# Patient Record
Sex: MALE | Race: BLACK OR AFRICAN AMERICAN | Employment: OTHER | URBAN - METROPOLITAN AREA
[De-identification: names, ages, dates, MRNs, and addresses within clinical notes are randomized per-mention and may not be internally consistent; named-entity substitution may affect disease eponyms.]

---

## 2018-09-17 ENCOUNTER — HOSPITAL ENCOUNTER (EMERGENCY)
Age: 58
Discharge: HOME | End: 2018-09-17
Attending: EMERGENCY MEDICINE
Payer: COMMERCIAL

## 2018-09-17 VITALS
SYSTOLIC BLOOD PRESSURE: 116 MMHG | BODY MASS INDEX: 21.98 KG/M2 | TEMPERATURE: 97.5 F | WEIGHT: 145 LBS | HEIGHT: 68 IN | OXYGEN SATURATION: 97 % | HEART RATE: 66 BPM | RESPIRATION RATE: 18 BRPM | DIASTOLIC BLOOD PRESSURE: 79 MMHG

## 2018-09-17 DIAGNOSIS — J40 BRONCHITIS: Primary | ICD-10-CM

## 2018-09-17 PROCEDURE — 6370000000 HC RX 637 (ALT 250 FOR IP): Performed by: EMERGENCY MEDICINE

## 2018-09-17 PROCEDURE — 99283 EMERGENCY DEPT VISIT LOW MDM: CPT

## 2018-09-17 RX ORDER — BENZONATATE 100 MG/1
100 CAPSULE ORAL 3 TIMES DAILY PRN
Qty: 21 CAPSULE | Refills: 0 | Status: SHIPPED | OUTPATIENT
Start: 2018-09-17 | End: 2018-09-24

## 2018-09-17 RX ORDER — IPRATROPIUM BROMIDE AND ALBUTEROL SULFATE 2.5; .5 MG/3ML; MG/3ML
1 SOLUTION RESPIRATORY (INHALATION) ONCE
Status: COMPLETED | OUTPATIENT
Start: 2018-09-17 | End: 2018-09-17

## 2018-09-17 RX ORDER — AZITHROMYCIN 250 MG/1
TABLET, FILM COATED ORAL
Qty: 1 PACKET | Refills: 0 | Status: SHIPPED | OUTPATIENT
Start: 2018-09-17 | End: 2018-09-27

## 2018-09-17 RX ORDER — ALBUTEROL SULFATE 90 UG/1
2 AEROSOL, METERED RESPIRATORY (INHALATION) EVERY 4 HOURS PRN
Qty: 1 INHALER | Refills: 0 | Status: SHIPPED | OUTPATIENT
Start: 2018-09-17 | End: 2019-08-09

## 2018-09-17 RX ADMIN — IPRATROPIUM BROMIDE AND ALBUTEROL SULFATE 1 AMPULE: .5; 3 SOLUTION RESPIRATORY (INHALATION) at 16:19

## 2018-09-17 ASSESSMENT — ENCOUNTER SYMPTOMS
RHINORRHEA: 1
SHORTNESS OF BREATH: 0
SORE THROAT: 0
BACK PAIN: 0
COUGH: 1
BLOOD IN STOOL: 0
DIARRHEA: 0
CONSTIPATION: 0
COLOR CHANGE: 0
SINUS CONGESTION: 1
ABDOMINAL PAIN: 0
VOMITING: 0
NAUSEA: 0

## 2018-09-17 ASSESSMENT — PAIN DESCRIPTION - PAIN TYPE: TYPE: ACUTE PAIN

## 2018-09-17 ASSESSMENT — PAIN DESCRIPTION - LOCATION: LOCATION: GENERALIZED

## 2018-09-17 ASSESSMENT — PAIN SCALES - GENERAL: PAINLEVEL_OUTOF10: 6

## 2018-09-17 ASSESSMENT — PAIN DESCRIPTION - DESCRIPTORS: DESCRIPTORS: ACHING

## 2018-09-17 NOTE — ED PROVIDER NOTES
The history is provided by the patient. Cough   Cough characteristics:  Productive  Sputum characteristics:  Green and clear  Severity:  Moderate  Onset quality:  Gradual  Duration:  4 days  Timing:  Constant  Progression:  Unchanged  Chronicity:  New  Smoker: yes    Context: upper respiratory infection    Relieved by:  None tried  Worsened by:  Nothing  Ineffective treatments:  None tried  Associated symptoms: chills, rhinorrhea and sinus congestion    Associated symptoms: no chest pain, no fever, no headaches, no rash, no shortness of breath and no sore throat        Review of Systems   Constitutional: Positive for chills. Negative for fever. HENT: Positive for rhinorrhea. Negative for congestion and sore throat. Respiratory: Positive for cough. Negative for shortness of breath. Cardiovascular: Negative for chest pain and palpitations. Gastrointestinal: Negative for abdominal pain, blood in stool, constipation, diarrhea, nausea and vomiting. Genitourinary: Negative for difficulty urinating, dysuria and hematuria. Musculoskeletal: Negative for back pain and neck pain. Skin: Negative for color change, rash and wound. Neurological: Negative for dizziness, syncope, weakness, light-headedness and headaches. Psychiatric/Behavioral: Negative for confusion. Physical Exam   Constitutional: He is oriented to person, place, and time. He appears well-developed and well-nourished. No distress. HENT:   Head: Normocephalic and atraumatic. Right Ear: External ear normal.   Left Ear: External ear normal.   Mouth/Throat: Oropharynx is clear and moist. No oropharyngeal exudate. Edematous nasal mucosa   Eyes: Pupils are equal, round, and reactive to light. Conjunctivae and EOM are normal. Right eye exhibits no discharge. Left eye exhibits no discharge. No scleral icterus. Neck: Neck supple. Cardiovascular: Normal rate, regular rhythm, normal heart sounds and intact distal pulses.   Exam reveals no gallop and no friction rub. No murmur heard. Pulmonary/Chest: Effort normal and breath sounds normal. No stridor. No respiratory distress. He has no wheezes. He has no rales. He exhibits no tenderness. Mild expiratory rhonchi bilaterally   Abdominal: Soft. Bowel sounds are normal. He exhibits no distension and no mass. There is no tenderness. There is no rebound and no guarding. Musculoskeletal: He exhibits no edema. Neurological: He is alert and oriented to person, place, and time. He exhibits normal muscle tone. Skin: Skin is warm and dry. No rash noted. He is not diaphoretic. No erythema. No pallor. Psychiatric: He has a normal mood and affect. His behavior is normal. Judgment and thought content normal.       Procedures    MDM  Number of Diagnoses or Management Options  Bronchitis:   Diagnosis management comments: Initially chest x-ray ordered as well as breathing treatment. He received a breathing treatment but did not want chest x-ray. Patient likely has bronchitis. He is a smoker and was encouraged to stop smoking. He'll be given a Z-Jesus, albuterol inhaler, and Tessalon Perles. He should follow closely with his PCP or return here if needed. His vitals were stable.           --------------------------------------------- PAST HISTORY ---------------------------------------------  Past Medical History:  has a past medical history of Abscess of hand, left; Abscess of scrotum; Bacterial meningitis; Depression; GERD (gastroesophageal reflux disease); Hepatitis C; Herpes zoster w/ nervous system complication; Human immunodeficiency virus, type 2 (HIV 2) ([facility] [address] Inguinal hernia unilateral; Pneumonia; Shingles (herpes zoster) polyneuropathy; Suicidal ideation; and Vitamin D deficiency. Past Surgical History:  has a past surgical history that includes cyst removal (2008); Abscess Drainage (9/18/2007); Inguinal hernia repair (3/9/2012); and other surgical history (3/9/2012).     Social History: reports that he has been smoking Cigarettes. He has a 10.00 pack-year smoking history. He has never used smokeless tobacco. He reports that he does not drink alcohol or use drugs. Family History: family history includes Cancer (age of onset: [de-identified]) in his father; Heart Disease (age of onset: 48) in his brother. The patients home medications have been reviewed. Allergies: Patient has no known allergies. -------------------------------------------------- RESULTS -------------------------------------------------  Labs:  No results found for this visit on 09/17/18. Radiology:  XR CHEST STANDARD (2 VW)    (Results Pending)         ------------------------- NURSING NOTES AND VITALS REVIEWED ---------------------------  Date / Time Roomed:  9/17/2018  3:50 PM  ED Bed Assignment:  [id number]    The nursing notes within the ED encounter and vital signs as below have been reviewed. /79   Pulse 66   Temp 97.5 °F (36.4 °C) (Oral)   Resp 18   Ht 5' 8\" (1.727 m)   Wt 145 lb (65.8 kg)   SpO2 97%   BMI 22.05 kg/m²   Oxygen Saturation Interpretation: Normal      ------------------------------------------ PROGRESS NOTES ------------------------------------------  ED COURSE MEDICATIONS:                Medications   ipratropium-albuterol (DUONEB) nebulizer solution 1 ampule (1 ampule Inhalation Given 9/17/18 1619)       I have spoken with the patient and friend and discussed todays results, in addition to providing specific details for the plan of care and counseling regarding the diagnosis and prognosis. Their questions are answered at this time and they are agreeable with the plan. I discussed at length with them reasons for immediate return here for re evaluation. They will followup with primary care by calling their office tomorrow.       --------------------------------- ADDITIONAL PROVIDER NOTES ---------------------------------  At this time the patient is without objective evidence of an acute process requiring hospitalization or inpatient management. They have remained hemodynamically stable throughout their entire ED visit and are stable for discharge with outpatient follow-up. The plan has been discussed in detail and they are aware of the specific conditions for emergent return, as well as the importance of follow-up. New Prescriptions    ALBUTEROL SULFATE HFA (VENTOLIN HFA) 108 (90 BASE) MCG/ACT INHALER    Inhale 2 puffs into the lungs every 4 hours as needed for Wheezing    AZITHROMYCIN (ZITHROMAX Z-STELLA) 250 MG TABLET    TAKE 500MG PO DAY ONE. .. 250MG PO DAY TWO THROUGH FIVE   DISPENSE 6 TABS  NO REFILLS    BENZONATATE (TESSALON PERLES) 100 MG CAPSULE    Take 1 capsule by mouth 3 times daily as needed for Cough       Diagnosis:  1. Bronchitis        Disposition:  Patient's disposition: Discharge to home  Patient's condition is stable.            Mu Redding DO  Resident  09/17/18 7197

## 2019-08-09 ENCOUNTER — APPOINTMENT (OUTPATIENT)
Dept: ULTRASOUND IMAGING | Age: 59
DRG: 351 | End: 2019-08-09
Payer: COMMERCIAL

## 2019-08-09 ENCOUNTER — APPOINTMENT (OUTPATIENT)
Dept: GENERAL RADIOLOGY | Age: 59
DRG: 351 | End: 2019-08-09
Payer: COMMERCIAL

## 2019-08-09 ENCOUNTER — APPOINTMENT (OUTPATIENT)
Dept: CT IMAGING | Age: 59
DRG: 351 | End: 2019-08-09
Payer: COMMERCIAL

## 2019-08-09 ENCOUNTER — HOSPITAL ENCOUNTER (INPATIENT)
Age: 59
LOS: 5 days | Discharge: NURSING, REHAB OR LONG-TERM CARE | DRG: 351 | End: 2019-08-14
Attending: EMERGENCY MEDICINE | Admitting: INTERNAL MEDICINE
Payer: COMMERCIAL

## 2019-08-09 DIAGNOSIS — M79.89 LEFT ARM SWELLING: ICD-10-CM

## 2019-08-09 DIAGNOSIS — L03.114 CELLULITIS OF LEFT UPPER EXTREMITY: Primary | ICD-10-CM

## 2019-08-09 DIAGNOSIS — S52.092A OTHER CLOSED FRACTURE OF PROXIMAL END OF LEFT ULNA, INITIAL ENCOUNTER: ICD-10-CM

## 2019-08-09 DIAGNOSIS — B20 HISTORY OF HIV INFECTION (HCC): ICD-10-CM

## 2019-08-09 DIAGNOSIS — S52.002A CLOSED FRACTURE OF PROXIMAL END OF LEFT ULNA, UNSPECIFIED FRACTURE MORPHOLOGY, INITIAL ENCOUNTER: ICD-10-CM

## 2019-08-09 PROBLEM — L03.90 CELLULITIS: Status: ACTIVE | Noted: 2019-08-09

## 2019-08-09 PROBLEM — S52.009A: Status: ACTIVE | Noted: 2019-08-09

## 2019-08-09 LAB
ALBUMIN SERPL-MCNC: 3.5 G/DL (ref 3.5–5.2)
ALP BLD-CCNC: 62 U/L (ref 40–129)
ALT SERPL-CCNC: 16 U/L (ref 0–40)
ANION GAP SERPL CALCULATED.3IONS-SCNC: 13 MMOL/L (ref 7–16)
APPEARANCE FLUID: NORMAL
AST SERPL-CCNC: 35 U/L (ref 0–39)
BASOPHILS ABSOLUTE: 0.06 E9/L (ref 0–0.2)
BASOPHILS RELATIVE PERCENT: 0.3 % (ref 0–2)
BILIRUB SERPL-MCNC: 0.8 MG/DL (ref 0–1.2)
BUN BLDV-MCNC: 23 MG/DL (ref 8–23)
CALCIUM SERPL-MCNC: 8.3 MG/DL (ref 8.6–10.2)
CELL COUNT FLUID TYPE: NORMAL
CHLORIDE BLD-SCNC: 97 MMOL/L (ref 98–107)
CO2: 26 MMOL/L (ref 22–29)
COLOR FLUID: NORMAL
CREAT SERPL-MCNC: 1.7 MG/DL (ref 0.7–1.2)
EOSINOPHILS ABSOLUTE: 0.01 E9/L (ref 0.05–0.5)
EOSINOPHILS RELATIVE PERCENT: 0 % (ref 0–6)
GFR AFRICAN AMERICAN: 50
GFR NON-AFRICAN AMERICAN: 50 ML/MIN/1.73
GLUCOSE BLD-MCNC: 163 MG/DL (ref 74–99)
HCT VFR BLD CALC: 43.8 % (ref 37–54)
HEMOGLOBIN: 15.2 G/DL (ref 12.5–16.5)
IMMATURE GRANULOCYTES #: 0.32 E9/L
IMMATURE GRANULOCYTES %: 1.6 % (ref 0–5)
LACTIC ACID, SEPSIS: 2.2 MMOL/L (ref 0.5–1.9)
LACTIC ACID, SEPSIS: 2.8 MMOL/L (ref 0.5–1.9)
LACTIC ACID: 2.6 MMOL/L (ref 0.5–2.2)
LYMPHOCYTES ABSOLUTE: 1.18 E9/L (ref 1.5–4)
LYMPHOCYTES RELATIVE PERCENT: 5.8 % (ref 20–42)
MCH RBC QN AUTO: 35.4 PG (ref 26–35)
MCHC RBC AUTO-ENTMCNC: 34.7 % (ref 32–34.5)
MCV RBC AUTO: 102.1 FL (ref 80–99.9)
MONOCYTE, FLUID: 6 %
MONOCYTES ABSOLUTE: 1.25 E9/L (ref 0.1–0.95)
MONOCYTES RELATIVE PERCENT: 6.2 % (ref 2–12)
NEUTROPHIL, FLUID: 95 %
NEUTROPHILS ABSOLUTE: 17.37 E9/L (ref 1.8–7.3)
NEUTROPHILS RELATIVE PERCENT: 86.1 % (ref 43–80)
NUCLEATED CELLS FLUID: 4973 /UL
PDW BLD-RTO: 13.5 FL (ref 11.5–15)
PLATELET # BLD: 250 E9/L (ref 130–450)
PMV BLD AUTO: 9.8 FL (ref 7–12)
POTASSIUM SERPL-SCNC: 4.2 MMOL/L (ref 3.5–5)
RBC # BLD: 4.29 E12/L (ref 3.8–5.8)
RBC FLUID: NORMAL /UL
SODIUM BLD-SCNC: 136 MMOL/L (ref 132–146)
TOTAL PROTEIN: 7.6 G/DL (ref 6.4–8.3)
WBC # BLD: 20.2 E9/L (ref 4.5–11.5)

## 2019-08-09 PROCEDURE — 88305 TISSUE EXAM BY PATHOLOGIST: CPT

## 2019-08-09 PROCEDURE — 6360000004 HC RX CONTRAST MEDICATION: Performed by: RADIOLOGY

## 2019-08-09 PROCEDURE — 93971 EXTREMITY STUDY: CPT

## 2019-08-09 PROCEDURE — 96375 TX/PRO/DX INJ NEW DRUG ADDON: CPT

## 2019-08-09 PROCEDURE — 96365 THER/PROPH/DIAG IV INF INIT: CPT

## 2019-08-09 PROCEDURE — 6360000002 HC RX W HCPCS: Performed by: NURSE PRACTITIONER

## 2019-08-09 PROCEDURE — 6360000002 HC RX W HCPCS: Performed by: EMERGENCY MEDICINE

## 2019-08-09 PROCEDURE — 6360000002 HC RX W HCPCS: Performed by: INTERNAL MEDICINE

## 2019-08-09 PROCEDURE — 73201 CT UPPER EXTREMITY W/DYE: CPT

## 2019-08-09 PROCEDURE — 6370000000 HC RX 637 (ALT 250 FOR IP): Performed by: NURSE PRACTITIONER

## 2019-08-09 PROCEDURE — 2580000003 HC RX 258: Performed by: NURSE PRACTITIONER

## 2019-08-09 PROCEDURE — 2580000003 HC RX 258: Performed by: RADIOLOGY

## 2019-08-09 PROCEDURE — 90471 IMMUNIZATION ADMIN: CPT | Performed by: EMERGENCY MEDICINE

## 2019-08-09 PROCEDURE — 85025 COMPLETE CBC W/AUTO DIFF WBC: CPT

## 2019-08-09 PROCEDURE — 73090 X-RAY EXAM OF FOREARM: CPT

## 2019-08-09 PROCEDURE — 80053 COMPREHEN METABOLIC PANEL: CPT

## 2019-08-09 PROCEDURE — 2580000003 HC RX 258: Performed by: EMERGENCY MEDICINE

## 2019-08-09 PROCEDURE — 87070 CULTURE OTHR SPECIMN AEROBIC: CPT

## 2019-08-09 PROCEDURE — 73060 X-RAY EXAM OF HUMERUS: CPT

## 2019-08-09 PROCEDURE — 89051 BODY FLUID CELL COUNT: CPT

## 2019-08-09 PROCEDURE — 87205 SMEAR GRAM STAIN: CPT

## 2019-08-09 PROCEDURE — 87186 SC STD MICRODIL/AGAR DIL: CPT

## 2019-08-09 PROCEDURE — 90715 TDAP VACCINE 7 YRS/> IM: CPT | Performed by: EMERGENCY MEDICINE

## 2019-08-09 PROCEDURE — 73080 X-RAY EXAM OF ELBOW: CPT

## 2019-08-09 PROCEDURE — 88112 CYTOPATH CELL ENHANCE TECH: CPT

## 2019-08-09 PROCEDURE — 99284 EMERGENCY DEPT VISIT MOD MDM: CPT

## 2019-08-09 PROCEDURE — 83605 ASSAY OF LACTIC ACID: CPT

## 2019-08-09 PROCEDURE — 87147 CULTURE TYPE IMMUNOLOGIC: CPT

## 2019-08-09 PROCEDURE — 1200000000 HC SEMI PRIVATE

## 2019-08-09 PROCEDURE — APPSS45 APP SPLIT SHARED TIME 31-45 MINUTES: Performed by: NURSE PRACTITIONER

## 2019-08-09 PROCEDURE — 29105 APPLICATION LONG ARM SPLINT: CPT

## 2019-08-09 PROCEDURE — 87040 BLOOD CULTURE FOR BACTERIA: CPT

## 2019-08-09 PROCEDURE — 96367 TX/PROPH/DG ADDL SEQ IV INF: CPT

## 2019-08-09 PROCEDURE — 36415 COLL VENOUS BLD VENIPUNCTURE: CPT

## 2019-08-09 RX ORDER — MULTIVITAMIN WITH FOLIC ACID 400 MCG
1 TABLET ORAL DAILY
COMMUNITY
Start: 2019-07-26 | End: 2021-01-01

## 2019-08-09 RX ORDER — SODIUM CHLORIDE 0.9 % (FLUSH) 0.9 %
10 SYRINGE (ML) INJECTION PRN
Status: DISCONTINUED | OUTPATIENT
Start: 2019-08-09 | End: 2019-08-14 | Stop reason: HOSPADM

## 2019-08-09 RX ORDER — SODIUM CHLORIDE 0.9 % (FLUSH) 0.9 %
10 SYRINGE (ML) INJECTION EVERY 12 HOURS SCHEDULED
Status: DISCONTINUED | OUTPATIENT
Start: 2019-08-09 | End: 2019-08-14 | Stop reason: HOSPADM

## 2019-08-09 RX ORDER — LACTOSE-REDUCED FOOD
1 LIQUID (ML) ORAL 3 TIMES DAILY
COMMUNITY
Start: 2019-07-26 | End: 2021-01-01

## 2019-08-09 RX ORDER — ACETAMINOPHEN 325 MG/1
650 TABLET ORAL EVERY 4 HOURS PRN
Status: DISCONTINUED | OUTPATIENT
Start: 2019-08-09 | End: 2019-08-14 | Stop reason: HOSPADM

## 2019-08-09 RX ORDER — EMTRICITABINE AND TENOFOVIR ALAFENAMIDE 200; 25 MG/1; MG/1
1 TABLET ORAL NIGHTLY
Status: ON HOLD | COMMUNITY
Start: 2019-07-26 | End: 2021-01-01 | Stop reason: HOSPADM

## 2019-08-09 RX ORDER — ONDANSETRON 2 MG/ML
4 INJECTION INTRAMUSCULAR; INTRAVENOUS EVERY 6 HOURS PRN
Status: DISCONTINUED | OUTPATIENT
Start: 2019-08-09 | End: 2019-08-14 | Stop reason: HOSPADM

## 2019-08-09 RX ORDER — KETOROLAC TROMETHAMINE 30 MG/ML
15 INJECTION, SOLUTION INTRAMUSCULAR; INTRAVENOUS EVERY 8 HOURS PRN
Status: DISPENSED | OUTPATIENT
Start: 2019-08-09 | End: 2019-08-11

## 2019-08-09 RX ORDER — DOLUTEGRAVIR SODIUM 50 MG/1
50 TABLET, FILM COATED ORAL NIGHTLY
Status: ON HOLD | COMMUNITY
Start: 2019-07-26 | End: 2021-01-01 | Stop reason: HOSPADM

## 2019-08-09 RX ORDER — KETOROLAC TROMETHAMINE 30 MG/ML
30 INJECTION, SOLUTION INTRAMUSCULAR; INTRAVENOUS EVERY 6 HOURS PRN
Status: DISCONTINUED | OUTPATIENT
Start: 2019-08-09 | End: 2019-08-09

## 2019-08-09 RX ORDER — LACTOSE-REDUCED FOOD
1 LIQUID (ML) ORAL 3 TIMES DAILY
Status: DISCONTINUED | OUTPATIENT
Start: 2019-08-09 | End: 2019-08-09

## 2019-08-09 RX ORDER — MULTIVITAMIN WITH FOLIC ACID 400 MCG
1 TABLET ORAL DAILY
Status: DISCONTINUED | OUTPATIENT
Start: 2019-08-09 | End: 2019-08-14 | Stop reason: HOSPADM

## 2019-08-09 RX ORDER — NICOTINE 21 MG/24HR
1 PATCH, TRANSDERMAL 24 HOURS TRANSDERMAL DAILY
Status: DISCONTINUED | OUTPATIENT
Start: 2019-08-09 | End: 2019-08-14 | Stop reason: HOSPADM

## 2019-08-09 RX ORDER — MORPHINE SULFATE 4 MG/ML
6 INJECTION, SOLUTION INTRAMUSCULAR; INTRAVENOUS ONCE
Status: COMPLETED | OUTPATIENT
Start: 2019-08-09 | End: 2019-08-09

## 2019-08-09 RX ORDER — SODIUM CHLORIDE 9 MG/ML
INJECTION, SOLUTION INTRAVENOUS CONTINUOUS
Status: DISCONTINUED | OUTPATIENT
Start: 2019-08-09 | End: 2019-08-09

## 2019-08-09 RX ORDER — GABAPENTIN 300 MG/1
600 CAPSULE ORAL 2 TIMES DAILY
Status: DISCONTINUED | OUTPATIENT
Start: 2019-08-09 | End: 2019-08-10

## 2019-08-09 RX ORDER — HYDROCODONE BITARTRATE AND ACETAMINOPHEN 5; 325 MG/1; MG/1
1 TABLET ORAL EVERY 4 HOURS PRN
Status: DISCONTINUED | OUTPATIENT
Start: 2019-08-09 | End: 2019-08-14 | Stop reason: HOSPADM

## 2019-08-09 RX ORDER — SODIUM CHLORIDE 9 MG/ML
INJECTION, SOLUTION INTRAVENOUS CONTINUOUS
Status: ACTIVE | OUTPATIENT
Start: 2019-08-09 | End: 2019-08-09

## 2019-08-09 RX ORDER — 0.9 % SODIUM CHLORIDE 0.9 %
1000 INTRAVENOUS SOLUTION INTRAVENOUS ONCE
Status: COMPLETED | OUTPATIENT
Start: 2019-08-09 | End: 2019-08-09

## 2019-08-09 RX ORDER — GABAPENTIN 600 MG/1
1200 TABLET ORAL 2 TIMES DAILY
Status: DISCONTINUED | OUTPATIENT
Start: 2019-08-09 | End: 2019-08-09

## 2019-08-09 RX ADMIN — Medication 10 ML: at 10:26

## 2019-08-09 RX ADMIN — ENOXAPARIN SODIUM 40 MG: 40 INJECTION SUBCUTANEOUS at 13:39

## 2019-08-09 RX ADMIN — MORPHINE SULFATE 6 MG: 4 INJECTION, SOLUTION INTRAMUSCULAR; INTRAVENOUS at 11:00

## 2019-08-09 RX ADMIN — DOLUTEGRAVIR SODIUM 50 MG: 50 TABLET, FILM COATED ORAL at 13:39

## 2019-08-09 RX ADMIN — HYDROCODONE BITARTRATE AND ACETAMINOPHEN 1 TABLET: 5; 325 TABLET ORAL at 21:46

## 2019-08-09 RX ADMIN — CEFTRIAXONE SODIUM 2 G: 2 INJECTION, POWDER, FOR SOLUTION INTRAMUSCULAR; INTRAVENOUS at 10:07

## 2019-08-09 RX ADMIN — SODIUM CHLORIDE: 9 INJECTION, SOLUTION INTRAVENOUS at 13:46

## 2019-08-09 RX ADMIN — GABAPENTIN 600 MG: 300 CAPSULE ORAL at 13:40

## 2019-08-09 RX ADMIN — KETOROLAC TROMETHAMINE 15 MG: 30 INJECTION, SOLUTION INTRAMUSCULAR at 14:26

## 2019-08-09 RX ADMIN — KETOROLAC TROMETHAMINE 15 MG: 30 INJECTION, SOLUTION INTRAMUSCULAR at 22:05

## 2019-08-09 RX ADMIN — IOPAMIDOL 100 ML: 755 INJECTION, SOLUTION INTRAVENOUS at 10:26

## 2019-08-09 RX ADMIN — SODIUM CHLORIDE 1000 ML: 9 INJECTION, SOLUTION INTRAVENOUS at 10:09

## 2019-08-09 RX ADMIN — GABAPENTIN 600 MG: 300 CAPSULE ORAL at 21:49

## 2019-08-09 RX ADMIN — EMTRICITABINE AND TENOFOVIR ALAFENAMIDE 1 TABLET: 200; 25 TABLET ORAL at 13:40

## 2019-08-09 RX ADMIN — MULTIVITAMIN TABLET 1 TABLET: TABLET at 13:39

## 2019-08-09 RX ADMIN — TETANUS TOXOID, REDUCED DIPHTHERIA TOXOID AND ACELLULAR PERTUSSIS VACCINE, ADSORBED 0.5 ML: 5; 2.5; 8; 8; 2.5 SUSPENSION INTRAMUSCULAR at 10:08

## 2019-08-09 RX ADMIN — VANCOMYCIN HYDROCHLORIDE 1.5 G: 10 INJECTION, POWDER, LYOPHILIZED, FOR SOLUTION INTRAVENOUS at 10:45

## 2019-08-09 ASSESSMENT — PAIN SCALES - GENERAL
PAINLEVEL_OUTOF10: 3
PAINLEVEL_OUTOF10: 0
PAINLEVEL_OUTOF10: 10
PAINLEVEL_OUTOF10: 0
PAINLEVEL_OUTOF10: 0
PAINLEVEL_OUTOF10: 8
PAINLEVEL_OUTOF10: 0
PAINLEVEL_OUTOF10: 7
PAINLEVEL_OUTOF10: 7

## 2019-08-09 ASSESSMENT — ENCOUNTER SYMPTOMS
CONSTIPATION: 0
BLOOD IN STOOL: 0
COUGH: 0
RHINORRHEA: 0
SINUS PRESSURE: 0
VOMITING: 0
SHORTNESS OF BREATH: 0
NAUSEA: 0
WHEEZING: 0
EYE PAIN: 0
EYE REDNESS: 0
SORE THROAT: 0
DIARRHEA: 0
ABDOMINAL PAIN: 0
EYE DISCHARGE: 0
BACK PAIN: 0

## 2019-08-09 ASSESSMENT — PAIN DESCRIPTION - PROGRESSION: CLINICAL_PROGRESSION: RESOLVED

## 2019-08-09 NOTE — ED NOTES
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 See Original ED Note for H&P and ED course above. I have reviewed and discussed the case, including pertinent history (medical, surgical, family and social) and exam findings with the Medical Resident assigned to Alda Derian. I have personally performed and/or participated in the history, exam, medical decision making, and procedures and agree with all pertinent clinical information and any additional changes or corrections are noted below in my assessment and plan. I have discussed this patient in detail with the resident, and provided the instruction and education,       I have reviewed my findings and recommendations with the assigned Medical Resident, Alda Menard and members of family present at the time of disposition. Review of Systems:   Pertinent positives and negatives are stated within HPI, all other systems reviewed and are negative.    --------------------------------------------- PAST HISTORY ---------------------------------------------  Past Medical History:  has a past medical history of Abscess of hand, left, Abscess of scrotum, Bacterial meningitis, Depression, GERD (gastroesophageal reflux disease), Hepatitis C, Herpes zoster w/ nervous system complication, Human immunodeficiency virus, type 2 (HIV 2) [facility] Inguinal hernia unilateral, Pneumonia, Shingles (herpes zoster) polyneuropathy, Suicidal ideation, and Vitamin D deficiency. Past Surgical History:  has a past surgical history that includes cyst removal (2008); Abscess Drainage (9/18/2007); Inguinal hernia repair (3/9/2012); and other surgical history (3/9/2012). Social History:  reports that he has been smoking cigarettes. He has a 10.00 pack-year smoking history.  He has never used smokeless tobacco. He reports that he drank alcohol. He reports that he does not use drugs. Family History: family history includes Cancer (age of onset: [de-identified]) in his father; Heart Disease (age of onset: 48) in his brother. The patients home medications have been reviewed. Allergies: Patient has no known allergies. Fall 1 week ago  Injured left arm  Aching pain in left arm  Now swollen and red and warm  Hurts to move and hurts to touch  No other injuries  No fever or chills  No chest pain or sob  No abdominal pain or back pain  No other extremity complaints    On exam:  Left upper extremity is swollen diffusely. Tender to palpation from the mid humerus to the mid forearm. Compartments soft. The arm is warm to the touch as well. Able to pronated and supinate but with pain. Able to flex and extend at the elbow but with pain. Swelling diffusely along the proximal forearm and distal humerus. No obvious abscess, no crepitus or necrosis. Neurovascularly intact distally. 2+ radial pulses. Capillary refill <3 secondary. Warm and well perfused. Median, radial, ulnar nerves intact. Sensation intact to light touch. 5/5 strength. Cardinal movements of the hand intact. No evidence of compartment syndrome. All flexor and extensor mechanisms intact. Based on his immunosuppressed status, wbc elevation and concerns for cellultis, IV antibiotics given. Vanc and Ceftriaxone. Medicine consulted for admission. 1. Cellulitis of left upper extremity    2. Left arm swelling    3. Closed fracture of proximal end of left ulna, unspecified fracture morphology, initial encounter    4.  History of HIV infection ([facility] [address]           Bharat Helm MD  08/09/19 0532

## 2019-08-09 NOTE — ED PROVIDER NOTES
Patient is a 59years old male with history of HIV here with left arm swelling and pain that started a week ago. Patient states he tripped and fell about a week ago on his left arm after which he has had pain in his left arm. He states nothing makes his pain worse or better. He denies taking anything for his discomfort. He does not recall the last time he had a tetanus shot. He denies hitting his head, headache, neck pain/stiffness, change in vision/hearing, numbness/tingling, weakness, nausea/vomiting, chest pain/pressure, shortness of breath, abdominal pain, blood in stool or urine, urinary problems, history of heart problems, or use of blood thinning medications. Arm Injury   Location:  Arm  Arm location:  L arm  Pain details:     Quality:  Dull    Duration:  1 week  Tetanus status:  Unknown  Relieved by:  Nothing  Worsened by:  Nothing  Ineffective treatments:  None tried  Associated symptoms: fever (Subjective) and swelling    Associated symptoms: no back pain, no decreased range of motion, no neck pain, no numbness, no stiffness and no tingling        Review of Systems   Constitutional: Positive for fever (Subjective). Negative for chills and diaphoresis. HENT: Negative for ear pain, hearing loss, rhinorrhea, sinus pressure and sore throat. Eyes: Negative for pain, discharge, redness and visual disturbance. Respiratory: Negative for cough, shortness of breath and wheezing. Cardiovascular: Negative for chest pain. Gastrointestinal: Negative for abdominal pain, blood in stool, constipation, diarrhea, nausea and vomiting. Genitourinary: Negative for dysuria, flank pain, frequency and hematuria. Musculoskeletal: Positive for arthralgias and myalgias. Negative for back pain, neck pain, neck stiffness and stiffness. Skin: Negative for rash and wound. Neurological: Negative for dizziness, syncope, speech difficulty, weakness, light-headedness, numbness and headaches.    Hematological:

## 2019-08-09 NOTE — H&P
identified. As above. Ct Humerus Left W Contrast    Result Date: 2019  Patient MRN:  [id number] : 1960 Age: 59 years Gender: Male Order Date:  2019 9:15 AM EXAM: CT HUMERUS LEFT W CONTRAST NUMBER OF IMAGES:  [address] views INDICATION:  infection? COMPARISON: None . There is a fracture of the proximal ulna. This is likely an avulsion fracture. Small bony fragments are seen. There is a joint effusion. There is extensive soft tissue swelling. No convincing abscess is identified. Otherwise the bones appear to be in anatomic alignment. No foreign body is identified. Probable fracture of the proximal ulna Soft tissue swelling compatible with cellulitis, subcutaneous soft tissue hematoma could give a similar appearance. Ct Radius Ulna Left W Contrast    Result Date: 2019  Patient MRN:  [id number] : 1960 Age: 59 years Gender: Male Order Date:  2019 9:15 AM EXAM: CT RADIUS ULNA LEFT W CONTRAST NUMBER OF IMAGES:  700 views INDICATION:  infection? COMPARISON: None . Findings suspicious for a avulsion fracture at the level the proximal ulna at the level of the coronoid process. There is extensive soft tissue edema present. This probably in the subcutaneous tissues. No convincing abscess is identified. Otherwise the bones appear to be in anatomic alignment. No foreign body is identified. As compatible avulsion fracture Extensive subcutaneous edema, no convincing abscess is identified. Findings could be posttraumatic, the findings could be related to cellulitis please correlate clinically. Us Dup Upper Extremity Left Venous    Result Date: 2019  Patient MRN:  [id number] : 1960 Age: 59 years Gender: Male Order Date:  2019 7:30 AM Exam: US DUP UPPER EXTREMITY LEFT VENOUS Number of images:  27 Indication:  ARM DVT SUSPECTED Comparison: None.  Technique:  Gray-scale, color Doppler, and spectral Doppler ultrasonography of the deep veins was performed by the sonographic technologist. Selected images were submitted for radiologic interpretation. FINDINGS: Vascular flow with physiologic respiratory variation is demonstrated in the internal jugular and subclavian veins. Compressibility, vascular flow, and augmentation of flow are demonstrated in the axillary, brachial, basilic, and cephalic veins. Compressibility and vascular flow are demonstrated in the radial vein. The provided images do not adequately demonstrate compression in the left ulnar vein. The provided images do not adequately demonstrate compressibility in the left ulnar vein. If there is concern for thrombus in the left ulnar vein, images of diagnostic quality are needed. No evidence of deep venous thrombosis in the other left upper extremity veins. ASSESSMENT:    Active Problems:   Cellulitis of Left Arm  Fracture of Left Proximal End of Ulna  Hypertension  HIV Infection   Neuropathy   Resolved Problems:     No resolved hospital problems. PLAN:    1. Cellulitis of Left Arm - admit medical floor - IV Ancef added - ID consult - multiple cuts and scrapes note on bilateral arms and hands - left arm swelling and pain - Norco - continue monitor for fever and chills   2. Fracture of  Left Proximal Ulna - currently a partial cast/splint applied - orthopedic surgery following   3. Acute Kidney Injury - IVF's - continue monitor renal status   4. Elevated Lactic Acid - IVF's and repeat lactic   5. Hypertension - well controlled   6. Neuropathy - on Neuropathy - bilateral hand numbness   7. HIV Infection - continue antiviral medications - ID consulted     Code Status: Full Code  DVT prophylaxis: Lovenox       Electronically signed by CRISTHIAN Sanford CNP on 8/9/2019 at 2:16 PM      NOTE: This report was transcribed using voice recognition software. Every effort was made to ensure accuracy; however, inadvertent computerized transcription errors may be present.

## 2019-08-09 NOTE — ED NOTES
DARRYL faxed and spoke to [place], patient chimed for transport upstairs, Rocio MANZANARES applying splint to left arm     Nahid Armstrong RN  08/09/19 7708

## 2019-08-10 LAB
ALBUMIN SERPL-MCNC: 2.9 G/DL (ref 3.5–5.2)
ALP BLD-CCNC: 58 U/L (ref 40–129)
ALT SERPL-CCNC: 10 U/L (ref 0–40)
ANION GAP SERPL CALCULATED.3IONS-SCNC: 12 MMOL/L (ref 7–16)
ANION GAP SERPL CALCULATED.3IONS-SCNC: 12 MMOL/L (ref 7–16)
ANTISTREPTOLYSIN-O: 583 IU/ML (ref 0–200)
AST SERPL-CCNC: 18 U/L (ref 0–39)
BASOPHILS ABSOLUTE: 0.04 E9/L (ref 0–0.2)
BASOPHILS RELATIVE PERCENT: 0.2 % (ref 0–2)
BILIRUB SERPL-MCNC: 0.6 MG/DL (ref 0–1.2)
BUN BLDV-MCNC: 24 MG/DL (ref 8–23)
BUN BLDV-MCNC: 24 MG/DL (ref 8–23)
C-REACTIVE PROTEIN: 34.4 MG/DL (ref 0–0.4)
CALCIUM SERPL-MCNC: 8.1 MG/DL (ref 8.6–10.2)
CALCIUM SERPL-MCNC: 8.2 MG/DL (ref 8.6–10.2)
CHLORIDE BLD-SCNC: 102 MMOL/L (ref 98–107)
CHLORIDE BLD-SCNC: 102 MMOL/L (ref 98–107)
CO2: 25 MMOL/L (ref 22–29)
CO2: 26 MMOL/L (ref 22–29)
CREAT SERPL-MCNC: 1.5 MG/DL (ref 0.7–1.2)
CREAT SERPL-MCNC: 1.6 MG/DL (ref 0.7–1.2)
EOSINOPHILS ABSOLUTE: 0.17 E9/L (ref 0.05–0.5)
EOSINOPHILS RELATIVE PERCENT: 1 % (ref 0–6)
GFR AFRICAN AMERICAN: 53
GFR AFRICAN AMERICAN: 58
GFR NON-AFRICAN AMERICAN: 53 ML/MIN/1.73
GFR NON-AFRICAN AMERICAN: 58 ML/MIN/1.73
GLUCOSE BLD-MCNC: 114 MG/DL (ref 74–99)
GLUCOSE BLD-MCNC: 141 MG/DL (ref 74–99)
GRAM STAIN ORDERABLE: NORMAL
HCT VFR BLD CALC: 40.4 % (ref 37–54)
HEMOGLOBIN: 13.7 G/DL (ref 12.5–16.5)
IMMATURE GRANULOCYTES #: 0.3 E9/L
IMMATURE GRANULOCYTES %: 1.8 % (ref 0–5)
LYMPHOCYTES ABSOLUTE: 0.88 E9/L (ref 1.5–4)
LYMPHOCYTES RELATIVE PERCENT: 5.3 % (ref 20–42)
MAGNESIUM: 1.9 MG/DL (ref 1.6–2.6)
MCH RBC QN AUTO: 35.3 PG (ref 26–35)
MCHC RBC AUTO-ENTMCNC: 33.9 % (ref 32–34.5)
MCV RBC AUTO: 104.1 FL (ref 80–99.9)
MONOCYTES ABSOLUTE: 0.88 E9/L (ref 0.1–0.95)
MONOCYTES RELATIVE PERCENT: 5.3 % (ref 2–12)
NEUTROPHILS ABSOLUTE: 14.2 E9/L (ref 1.8–7.3)
NEUTROPHILS RELATIVE PERCENT: 86.4 % (ref 43–80)
PDW BLD-RTO: 13.3 FL (ref 11.5–15)
PHOSPHORUS: 2.1 MG/DL (ref 2.5–4.5)
PLATELET # BLD: 214 E9/L (ref 130–450)
PMV BLD AUTO: 10.3 FL (ref 7–12)
POTASSIUM REFLEX MAGNESIUM: 3.3 MMOL/L (ref 3.5–5)
POTASSIUM SERPL-SCNC: 3.4 MMOL/L (ref 3.5–5)
RBC # BLD: 3.88 E12/L (ref 3.8–5.8)
SEDIMENTATION RATE, ERYTHROCYTE: 55 MM/HR (ref 0–15)
SODIUM BLD-SCNC: 139 MMOL/L (ref 132–146)
SODIUM BLD-SCNC: 140 MMOL/L (ref 132–146)
TOTAL PROTEIN: 6.8 G/DL (ref 6.4–8.3)
WBC # BLD: 16.5 E9/L (ref 4.5–11.5)

## 2019-08-10 PROCEDURE — 80053 COMPREHEN METABOLIC PANEL: CPT

## 2019-08-10 PROCEDURE — 36415 COLL VENOUS BLD VENIPUNCTURE: CPT

## 2019-08-10 PROCEDURE — 83735 ASSAY OF MAGNESIUM: CPT

## 2019-08-10 PROCEDURE — 84100 ASSAY OF PHOSPHORUS: CPT

## 2019-08-10 PROCEDURE — 6370000000 HC RX 637 (ALT 250 FOR IP): Performed by: INTERNAL MEDICINE

## 2019-08-10 PROCEDURE — 6360000002 HC RX W HCPCS: Performed by: SPECIALIST

## 2019-08-10 PROCEDURE — 80048 BASIC METABOLIC PNL TOTAL CA: CPT

## 2019-08-10 PROCEDURE — 1200000000 HC SEMI PRIVATE

## 2019-08-10 PROCEDURE — 2580000003 HC RX 258: Performed by: SPECIALIST

## 2019-08-10 PROCEDURE — 86140 C-REACTIVE PROTEIN: CPT

## 2019-08-10 PROCEDURE — 85651 RBC SED RATE NONAUTOMATED: CPT

## 2019-08-10 PROCEDURE — 85025 COMPLETE CBC W/AUTO DIFF WBC: CPT

## 2019-08-10 PROCEDURE — 6370000000 HC RX 637 (ALT 250 FOR IP): Performed by: NURSE PRACTITIONER

## 2019-08-10 PROCEDURE — 2580000003 HC RX 258: Performed by: NURSE PRACTITIONER

## 2019-08-10 PROCEDURE — 6360000002 HC RX W HCPCS: Performed by: INTERNAL MEDICINE

## 2019-08-10 PROCEDURE — APPSS30 APP SPLIT SHARED TIME 16-30 MINUTES: Performed by: NURSE PRACTITIONER

## 2019-08-10 PROCEDURE — 86060 ANTISTREPTOLYSIN O TITER: CPT

## 2019-08-10 RX ORDER — GABAPENTIN 300 MG/1
600 CAPSULE ORAL ONCE
Status: COMPLETED | OUTPATIENT
Start: 2019-08-10 | End: 2019-08-10

## 2019-08-10 RX ORDER — UREA 10 %
1 LOTION (ML) TOPICAL NIGHTLY PRN
Status: DISCONTINUED | OUTPATIENT
Start: 2019-08-10 | End: 2019-08-14 | Stop reason: HOSPADM

## 2019-08-10 RX ORDER — GABAPENTIN 400 MG/1
1200 CAPSULE ORAL 2 TIMES DAILY
Status: DISCONTINUED | OUTPATIENT
Start: 2019-08-10 | End: 2019-08-14 | Stop reason: HOSPADM

## 2019-08-10 RX ORDER — POTASSIUM CHLORIDE 20 MEQ/1
40 TABLET, EXTENDED RELEASE ORAL ONCE
Status: COMPLETED | OUTPATIENT
Start: 2019-08-10 | End: 2019-08-10

## 2019-08-10 RX ORDER — LANOLIN ALCOHOL/MO/W.PET/CERES
1 CREAM (GRAM) TOPICAL NIGHTLY PRN
COMMUNITY
End: 2021-01-01

## 2019-08-10 RX ADMIN — HYDROCODONE BITARTRATE AND ACETAMINOPHEN 1 TABLET: 5; 325 TABLET ORAL at 13:10

## 2019-08-10 RX ADMIN — KETOROLAC TROMETHAMINE 15 MG: 30 INJECTION, SOLUTION INTRAMUSCULAR at 10:47

## 2019-08-10 RX ADMIN — GABAPENTIN 600 MG: 300 CAPSULE ORAL at 08:51

## 2019-08-10 RX ADMIN — EMTRICITABINE AND TENOFOVIR ALAFENAMIDE 1 TABLET: 200; 25 TABLET ORAL at 08:52

## 2019-08-10 RX ADMIN — Medication 10 ML: at 08:53

## 2019-08-10 RX ADMIN — Medication 10 ML: at 09:39

## 2019-08-10 RX ADMIN — POTASSIUM CHLORIDE 40 MEQ: 20 TABLET, EXTENDED RELEASE ORAL at 10:26

## 2019-08-10 RX ADMIN — DOLUTEGRAVIR SODIUM 50 MG: 50 TABLET, FILM COATED ORAL at 08:52

## 2019-08-10 RX ADMIN — HYDROCODONE BITARTRATE AND ACETAMINOPHEN 1 TABLET: 5; 325 TABLET ORAL at 04:48

## 2019-08-10 RX ADMIN — VANCOMYCIN HYDROCHLORIDE 1.5 G: 10 INJECTION, POWDER, LYOPHILIZED, FOR SOLUTION INTRAVENOUS at 06:37

## 2019-08-10 RX ADMIN — HYDROCODONE BITARTRATE AND ACETAMINOPHEN 1 TABLET: 5; 325 TABLET ORAL at 19:24

## 2019-08-10 RX ADMIN — GABAPENTIN 1200 MG: 400 CAPSULE ORAL at 21:13

## 2019-08-10 RX ADMIN — GABAPENTIN 600 MG: 300 CAPSULE ORAL at 14:55

## 2019-08-10 RX ADMIN — HYDROCODONE BITARTRATE AND ACETAMINOPHEN 1 TABLET: 5; 325 TABLET ORAL at 08:59

## 2019-08-10 RX ADMIN — Medication 1 MG: at 21:13

## 2019-08-10 RX ADMIN — MULTIVITAMIN TABLET 1 TABLET: TABLET at 08:52

## 2019-08-10 ASSESSMENT — PAIN DESCRIPTION - DESCRIPTORS
DESCRIPTORS: ACHING
DESCRIPTORS: ACHING;DISCOMFORT
DESCRIPTORS: ACHING

## 2019-08-10 ASSESSMENT — PAIN SCALES - GENERAL
PAINLEVEL_OUTOF10: 6
PAINLEVEL_OUTOF10: 6
PAINLEVEL_OUTOF10: 7
PAINLEVEL_OUTOF10: 2
PAINLEVEL_OUTOF10: 6
PAINLEVEL_OUTOF10: 6

## 2019-08-10 ASSESSMENT — PAIN DESCRIPTION - FREQUENCY
FREQUENCY: INTERMITTENT

## 2019-08-10 ASSESSMENT — PAIN DESCRIPTION - LOCATION
LOCATION: ELBOW
LOCATION: GENERALIZED
LOCATION: GENERALIZED

## 2019-08-10 ASSESSMENT — PAIN DESCRIPTION - PAIN TYPE
TYPE: ACUTE PAIN

## 2019-08-10 ASSESSMENT — PAIN - FUNCTIONAL ASSESSMENT
PAIN_FUNCTIONAL_ASSESSMENT: ACTIVITIES ARE NOT PREVENTED

## 2019-08-10 ASSESSMENT — PAIN DESCRIPTION - PROGRESSION: CLINICAL_PROGRESSION: NOT CHANGED

## 2019-08-10 ASSESSMENT — PAIN DESCRIPTION - ONSET
ONSET: AWAKENED FROM SLEEP
ONSET: ON-GOING

## 2019-08-10 ASSESSMENT — PAIN DESCRIPTION - ORIENTATION: ORIENTATION: LEFT

## 2019-08-10 NOTE — PROGRESS NOTES
AND PLAN:    Left arm cellulitis likely stemming from hand web space blisters with olecranon bursitis. Small avulsion fracture off coronoid process and medial olecranon appear old on CT scan and xray. CT scan did not show significant olecranon bursa fluid or other abscess formation. U/S neg for DVT    Will see how he continues to respond to vanc. WBC count trending down today. (20 => 16)  Wrap applied to LUE and elevating LUE by hanging up on IV pole with stockinette. Pt clinically feeling better than yesterday. OK to diet, will make NPO p MN just in case.     Vibha Ray

## 2019-08-10 NOTE — PROGRESS NOTES
Pt refused a full skin assessment, education provided, will continue to encourage cooperation with assessments to best meet the patient needs.  Alex Melara

## 2019-08-10 NOTE — PROGRESS NOTES
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 No reported adverse drug reactions. No nausea, vomiting, diarrhea. + pain left arm. Temp max 100    Review of systems:  As stated above in the chief complaint, otherwise negative. Medications:  Scheduled Meds:   potassium chloride  40 mEq Oral Once    emtricitabine-tenofovir alafenamide  1 tablet Oral Daily    multivitamin  1 tablet Oral Daily    dolutegravir sodium  50 mg Oral Daily    sodium chloride flush  10 mL Intravenous 2 times per day    enoxaparin  40 mg Subcutaneous Daily    nicotine  1 patch Transdermal Daily    gabapentin  600 mg Oral BID    vancomycin  1,500 mg Intravenous Q24H     Continuous Infusions:  PRN Meds:sodium chloride flush, sodium chloride flush, magnesium hydroxide, ondansetron, acetaminophen, HYDROcodone 5 mg - acetaminophen, HYDROmorphone, ketorolac    OBJECTIVE:  /86   Pulse 75   Temp 98.5 °F (36.9 °C) (Oral)   Resp 18   Ht 5' 8\" (1.727 m)   Wt 167 lb 8 oz (76 kg)   SpO2 96%   BMI 25.47 kg/m²   Temp  Av.1 °F (37.3 °C)  Min: 98.2 °F (36.8 °C)  Max: 100 °F (37.8 °C)  Constitutional: The patient is awake, alert, and oriented. Skin: Warm and dry. Left hand with areas of cracking, splitting, open between fingers, no tenderness or cellulitis  HEENT: Round and reactive pupils. Moist mucous membranes. No ulcerations or thrush. Neck: Supple to movements. Chest: No use of accessory muscles to breathe. Symmetrical expansion. No wheezing, crackles or rhonchi. Cardiovascular: S1 and S2 are rhythmic and regular. No murmurs appreciated. Abdomen: Positive bowel sounds to auscultation. Benign to palpation. Extremities: No clubbing, no cyanosis. Left arm hanging elevated, wrapped hand to mid humerus.    Lines:

## 2019-08-11 LAB
ANION GAP SERPL CALCULATED.3IONS-SCNC: 10 MMOL/L (ref 7–16)
BASOPHILS ABSOLUTE: 0.05 E9/L (ref 0–0.2)
BASOPHILS RELATIVE PERCENT: 0.4 % (ref 0–2)
BUN BLDV-MCNC: 20 MG/DL (ref 8–23)
CALCIUM SERPL-MCNC: 8.4 MG/DL (ref 8.6–10.2)
CHLORIDE BLD-SCNC: 103 MMOL/L (ref 98–107)
CO2: 27 MMOL/L (ref 22–29)
CREAT SERPL-MCNC: 1.4 MG/DL (ref 0.7–1.2)
EOSINOPHILS ABSOLUTE: 0.4 E9/L (ref 0.05–0.5)
EOSINOPHILS RELATIVE PERCENT: 3.1 % (ref 0–6)
GFR AFRICAN AMERICAN: >60
GFR NON-AFRICAN AMERICAN: >60 ML/MIN/1.73
GLUCOSE BLD-MCNC: 101 MG/DL (ref 74–99)
HCT VFR BLD CALC: 39 % (ref 37–54)
HEMOGLOBIN: 13.5 G/DL (ref 12.5–16.5)
IMMATURE GRANULOCYTES #: 0.09 E9/L
IMMATURE GRANULOCYTES %: 0.7 % (ref 0–5)
LYMPHOCYTES ABSOLUTE: 1.28 E9/L (ref 1.5–4)
LYMPHOCYTES RELATIVE PERCENT: 10 % (ref 20–42)
MCH RBC QN AUTO: 35.7 PG (ref 26–35)
MCHC RBC AUTO-ENTMCNC: 34.6 % (ref 32–34.5)
MCV RBC AUTO: 103.2 FL (ref 80–99.9)
MONOCYTES ABSOLUTE: 1.09 E9/L (ref 0.1–0.95)
MONOCYTES RELATIVE PERCENT: 8.5 % (ref 2–12)
NEUTROPHILS ABSOLUTE: 9.89 E9/L (ref 1.8–7.3)
NEUTROPHILS RELATIVE PERCENT: 77.3 % (ref 43–80)
PDW BLD-RTO: 13.3 FL (ref 11.5–15)
PLATELET # BLD: 238 E9/L (ref 130–450)
PMV BLD AUTO: 9.6 FL (ref 7–12)
POTASSIUM SERPL-SCNC: 3.6 MMOL/L (ref 3.5–5)
RBC # BLD: 3.78 E12/L (ref 3.8–5.8)
SODIUM BLD-SCNC: 140 MMOL/L (ref 132–146)
VANCOMYCIN TROUGH: 7.5 MCG/ML (ref 5–16)
WBC # BLD: 12.8 E9/L (ref 4.5–11.5)

## 2019-08-11 PROCEDURE — 6370000000 HC RX 637 (ALT 250 FOR IP): Performed by: NURSE PRACTITIONER

## 2019-08-11 PROCEDURE — 6360000002 HC RX W HCPCS: Performed by: NURSE PRACTITIONER

## 2019-08-11 PROCEDURE — 36415 COLL VENOUS BLD VENIPUNCTURE: CPT

## 2019-08-11 PROCEDURE — 2580000003 HC RX 258: Performed by: SPECIALIST

## 2019-08-11 PROCEDURE — 80202 ASSAY OF VANCOMYCIN: CPT

## 2019-08-11 PROCEDURE — 85025 COMPLETE CBC W/AUTO DIFF WBC: CPT

## 2019-08-11 PROCEDURE — APPSS30 APP SPLIT SHARED TIME 16-30 MINUTES: Performed by: NURSE PRACTITIONER

## 2019-08-11 PROCEDURE — 6360000002 HC RX W HCPCS: Performed by: SPECIALIST

## 2019-08-11 PROCEDURE — 2580000003 HC RX 258: Performed by: NURSE PRACTITIONER

## 2019-08-11 PROCEDURE — 80048 BASIC METABOLIC PNL TOTAL CA: CPT

## 2019-08-11 PROCEDURE — 6370000000 HC RX 637 (ALT 250 FOR IP): Performed by: INTERNAL MEDICINE

## 2019-08-11 PROCEDURE — 6360000002 HC RX W HCPCS: Performed by: INTERNAL MEDICINE

## 2019-08-11 PROCEDURE — 1200000000 HC SEMI PRIVATE

## 2019-08-11 RX ADMIN — VANCOMYCIN HYDROCHLORIDE 1000 MG: 1 INJECTION, POWDER, LYOPHILIZED, FOR SOLUTION INTRAVENOUS at 22:05

## 2019-08-11 RX ADMIN — DOLUTEGRAVIR SODIUM 50 MG: 50 TABLET, FILM COATED ORAL at 10:53

## 2019-08-11 RX ADMIN — HYDROCODONE BITARTRATE AND ACETAMINOPHEN 1 TABLET: 5; 325 TABLET ORAL at 19:40

## 2019-08-11 RX ADMIN — HYDROCODONE BITARTRATE AND ACETAMINOPHEN 1 TABLET: 5; 325 TABLET ORAL at 08:36

## 2019-08-11 RX ADMIN — Medication 10 ML: at 07:14

## 2019-08-11 RX ADMIN — HYDROMORPHONE HYDROCHLORIDE 0.5 MG: 1 INJECTION, SOLUTION INTRAMUSCULAR; INTRAVENOUS; SUBCUTANEOUS at 02:45

## 2019-08-11 RX ADMIN — Medication 10 ML: at 09:14

## 2019-08-11 RX ADMIN — EMTRICITABINE AND TENOFOVIR ALAFENAMIDE 1 TABLET: 200; 25 TABLET ORAL at 10:54

## 2019-08-11 RX ADMIN — VANCOMYCIN HYDROCHLORIDE 1.5 G: 10 INJECTION, POWDER, LYOPHILIZED, FOR SOLUTION INTRAVENOUS at 07:14

## 2019-08-11 RX ADMIN — MULTIVITAMIN TABLET 1 TABLET: TABLET at 10:54

## 2019-08-11 RX ADMIN — GABAPENTIN 1200 MG: 400 CAPSULE ORAL at 22:05

## 2019-08-11 RX ADMIN — HYDROCODONE BITARTRATE AND ACETAMINOPHEN 1 TABLET: 5; 325 TABLET ORAL at 15:19

## 2019-08-11 RX ADMIN — GABAPENTIN 1200 MG: 400 CAPSULE ORAL at 10:54

## 2019-08-11 RX ADMIN — Medication 10 ML: at 22:05

## 2019-08-11 ASSESSMENT — PAIN SCALES - GENERAL
PAINLEVEL_OUTOF10: 6
PAINLEVEL_OUTOF10: 3
PAINLEVEL_OUTOF10: 8
PAINLEVEL_OUTOF10: 3
PAINLEVEL_OUTOF10: 0
PAINLEVEL_OUTOF10: 0
PAINLEVEL_OUTOF10: 7
PAINLEVEL_OUTOF10: 5

## 2019-08-11 ASSESSMENT — PAIN DESCRIPTION - FREQUENCY
FREQUENCY: INTERMITTENT

## 2019-08-11 ASSESSMENT — PAIN DESCRIPTION - ONSET
ONSET: ON-GOING

## 2019-08-11 ASSESSMENT — PAIN - FUNCTIONAL ASSESSMENT
PAIN_FUNCTIONAL_ASSESSMENT: ACTIVITIES ARE NOT PREVENTED
PAIN_FUNCTIONAL_ASSESSMENT: PREVENTS OR INTERFERES SOME ACTIVE ACTIVITIES AND ADLS
PAIN_FUNCTIONAL_ASSESSMENT: PREVENTS OR INTERFERES SOME ACTIVE ACTIVITIES AND ADLS
PAIN_FUNCTIONAL_ASSESSMENT: ACTIVITIES ARE NOT PREVENTED
PAIN_FUNCTIONAL_ASSESSMENT: PREVENTS OR INTERFERES SOME ACTIVE ACTIVITIES AND ADLS

## 2019-08-11 ASSESSMENT — PAIN DESCRIPTION - DESCRIPTORS
DESCRIPTORS: ACHING;DISCOMFORT;SHARP
DESCRIPTORS: ACHING
DESCRIPTORS: ACHING;DISCOMFORT
DESCRIPTORS: ACHING

## 2019-08-11 ASSESSMENT — PAIN DESCRIPTION - ORIENTATION
ORIENTATION: LEFT

## 2019-08-11 ASSESSMENT — PAIN DESCRIPTION - LOCATION
LOCATION: GENERALIZED
LOCATION: GENERALIZED
LOCATION: ELBOW
LOCATION: ARM;ELBOW
LOCATION: ARM;ELBOW
LOCATION: ELBOW

## 2019-08-11 ASSESSMENT — PAIN DESCRIPTION - PROGRESSION
CLINICAL_PROGRESSION: NOT CHANGED
CLINICAL_PROGRESSION: NOT CHANGED

## 2019-08-11 ASSESSMENT — PAIN DESCRIPTION - PAIN TYPE
TYPE: ACUTE PAIN

## 2019-08-11 NOTE — PROGRESS NOTES
peripheral    Laboratory and Tests Review:  Lab Results   Component Value Date    WBC 16.5 (H) 08/10/2019    WBC 20.2 (H) 08/09/2019    WBC 5.7 03/08/2017    HGB 13.7 08/10/2019    HCT 40.4 08/10/2019    .1 (H) 08/10/2019     08/10/2019     Lab Results   Component Value Date    NEUTROABS 14.20 (H) 08/10/2019    NEUTROABS 17.37 (H) 08/09/2019    NEUTROABS 3.00 03/08/2017     No results found for: CRPHS  Lab Results   Component Value Date    ALT 10 08/10/2019    AST 18 08/10/2019    ALKPHOS 58 08/10/2019    BILITOT 0.6 08/10/2019     Lab Results   Component Value Date     08/10/2019    K 3.4 08/10/2019    K 3.3 08/10/2019     08/10/2019    CO2 26 08/10/2019    BUN 24 08/10/2019    CREATININE 1.5 08/10/2019    CREATININE 1.6 08/10/2019    CREATININE 1.7 08/09/2019    GFRAA 58 08/10/2019    LABGLOM 58 08/10/2019    GLUCOSE 141 08/10/2019    GLUCOSE 83 01/26/2012    PROT 6.8 08/10/2019    LABALBU 2.9 08/10/2019    LABALBU 4.2 01/17/2012    CALCIUM 8.2 08/10/2019    BILITOT 0.6 08/10/2019    ALKPHOS 58 08/10/2019    AST 18 08/10/2019    ALT 10 08/10/2019     Lab Results   Component Value Date    CRP 34.4 (H) 08/10/2019     Lab Results   Component Value Date    SEDRATE 55 (H) 08/10/2019     Radiology:  CT radius/ulna  Findings suspicious for a avulsion fracture at the level the proximal   ulna at the level of the coronoid process. Microbiology:     Blood cultures 8/9/2019: Negative so far  Left elbow olecranon fluid. Gram stain: Rare gram-positive cocci. Culture negative so far    ASSESSMENT:  · Status post fall  · Left septic olecranon bursitis  · Cellulitis associated to bursitis  · Leukocytosis secondary to bursitis - improving  · Minimally displaced fracture of the left coronoid process  · HIV infection, currently on antiretrovirals.  Reportedly has a suppressed viral load and a near normal CD4 count  · History of hepatitis C infection treated by Dr. Ramirez Poles:    · Continue

## 2019-08-11 NOTE — PLAN OF CARE
Problem: SKIN INTEGRITY  Goal: Report decrease in pain level  8/11/2019 1023 by Santana Brandon RN  Outcome: Met This Shift  8/11/2019 0553 by Tri Estrada RN  Outcome: Met This Shift     Problem: Pain:  Goal: Pain level will decrease  Description  Pain level will decrease  8/11/2019 1023 by Santana Brandon RN  Outcome: Met This Shift  8/11/2019 0553 by Tri Estrada RN  Outcome: Met This Shift

## 2019-08-12 PROCEDURE — 2580000003 HC RX 258: Performed by: NURSE PRACTITIONER

## 2019-08-12 PROCEDURE — 6360000002 HC RX W HCPCS: Performed by: SPECIALIST

## 2019-08-12 PROCEDURE — 6360000002 HC RX W HCPCS: Performed by: INTERNAL MEDICINE

## 2019-08-12 PROCEDURE — 6360000002 HC RX W HCPCS: Performed by: NURSE PRACTITIONER

## 2019-08-12 PROCEDURE — 6370000000 HC RX 637 (ALT 250 FOR IP): Performed by: NURSE PRACTITIONER

## 2019-08-12 PROCEDURE — 99232 SBSQ HOSP IP/OBS MODERATE 35: CPT | Performed by: INTERNAL MEDICINE

## 2019-08-12 PROCEDURE — APPSS30 APP SPLIT SHARED TIME 16-30 MINUTES: Performed by: PHYSICIAN ASSISTANT

## 2019-08-12 PROCEDURE — 6370000000 HC RX 637 (ALT 250 FOR IP): Performed by: INTERNAL MEDICINE

## 2019-08-12 PROCEDURE — 1200000000 HC SEMI PRIVATE

## 2019-08-12 PROCEDURE — 2580000003 HC RX 258: Performed by: SPECIALIST

## 2019-08-12 RX ADMIN — HYDROCODONE BITARTRATE AND ACETAMINOPHEN 1 TABLET: 5; 325 TABLET ORAL at 08:58

## 2019-08-12 RX ADMIN — VANCOMYCIN HYDROCHLORIDE 1000 MG: 1 INJECTION, POWDER, LYOPHILIZED, FOR SOLUTION INTRAVENOUS at 10:26

## 2019-08-12 RX ADMIN — HYDROCODONE BITARTRATE AND ACETAMINOPHEN 1 TABLET: 5; 325 TABLET ORAL at 04:57

## 2019-08-12 RX ADMIN — HYDROCODONE BITARTRATE AND ACETAMINOPHEN 1 TABLET: 5; 325 TABLET ORAL at 22:54

## 2019-08-12 RX ADMIN — DOLUTEGRAVIR SODIUM 50 MG: 50 TABLET, FILM COATED ORAL at 07:48

## 2019-08-12 RX ADMIN — HYDROCODONE BITARTRATE AND ACETAMINOPHEN 1 TABLET: 5; 325 TABLET ORAL at 13:57

## 2019-08-12 RX ADMIN — MULTIVITAMIN TABLET 1 TABLET: TABLET at 07:48

## 2019-08-12 RX ADMIN — Medication 10 ML: at 20:04

## 2019-08-12 RX ADMIN — HYDROMORPHONE HYDROCHLORIDE 0.5 MG: 1 INJECTION, SOLUTION INTRAMUSCULAR; INTRAVENOUS; SUBCUTANEOUS at 20:04

## 2019-08-12 RX ADMIN — GABAPENTIN 1200 MG: 400 CAPSULE ORAL at 21:15

## 2019-08-12 RX ADMIN — EMTRICITABINE AND TENOFOVIR ALAFENAMIDE 1 TABLET: 200; 25 TABLET ORAL at 07:48

## 2019-08-12 RX ADMIN — GABAPENTIN 1200 MG: 400 CAPSULE ORAL at 07:47

## 2019-08-12 RX ADMIN — Medication 10 ML: at 10:26

## 2019-08-12 RX ADMIN — CEFTRIAXONE 2 G: 2 INJECTION, POWDER, FOR SOLUTION INTRAMUSCULAR; INTRAVENOUS at 15:44

## 2019-08-12 ASSESSMENT — PAIN SCALES - GENERAL
PAINLEVEL_OUTOF10: 9
PAINLEVEL_OUTOF10: 4
PAINLEVEL_OUTOF10: 6
PAINLEVEL_OUTOF10: 0
PAINLEVEL_OUTOF10: 9
PAINLEVEL_OUTOF10: 9
PAINLEVEL_OUTOF10: 3
PAINLEVEL_OUTOF10: 6
PAINLEVEL_OUTOF10: 0
PAINLEVEL_OUTOF10: 0
PAINLEVEL_OUTOF10: 9

## 2019-08-12 ASSESSMENT — PAIN DESCRIPTION - DESCRIPTORS
DESCRIPTORS: ACHING
DESCRIPTORS: ACHING;CONSTANT
DESCRIPTORS: ACHING
DESCRIPTORS: ACHING
DESCRIPTORS: ACHING;CONSTANT

## 2019-08-12 ASSESSMENT — PAIN DESCRIPTION - ONSET
ONSET: ON-GOING
ONSET: GRADUAL
ONSET: ON-GOING
ONSET: ON-GOING

## 2019-08-12 ASSESSMENT — PAIN DESCRIPTION - LOCATION
LOCATION: ARM;ELBOW
LOCATION: ELBOW
LOCATION: ARM;ELBOW

## 2019-08-12 ASSESSMENT — PAIN DESCRIPTION - ORIENTATION
ORIENTATION: LEFT

## 2019-08-12 ASSESSMENT — PAIN DESCRIPTION - PAIN TYPE
TYPE: ACUTE PAIN

## 2019-08-12 ASSESSMENT — PAIN - FUNCTIONAL ASSESSMENT
PAIN_FUNCTIONAL_ASSESSMENT: PREVENTS OR INTERFERES SOME ACTIVE ACTIVITIES AND ADLS
PAIN_FUNCTIONAL_ASSESSMENT: PREVENTS OR INTERFERES SOME ACTIVE ACTIVITIES AND ADLS
PAIN_FUNCTIONAL_ASSESSMENT: ACTIVITIES ARE NOT PREVENTED
PAIN_FUNCTIONAL_ASSESSMENT: PREVENTS OR INTERFERES SOME ACTIVE ACTIVITIES AND ADLS
PAIN_FUNCTIONAL_ASSESSMENT: ACTIVITIES ARE NOT PREVENTED

## 2019-08-12 ASSESSMENT — PAIN DESCRIPTION - FREQUENCY
FREQUENCY: INTERMITTENT

## 2019-08-12 ASSESSMENT — PAIN DESCRIPTION - PROGRESSION: CLINICAL_PROGRESSION: NOT CHANGED

## 2019-08-12 NOTE — PROGRESS NOTES
Rebecca Guevara Hospitalist   Progress Note    Admitting Date and Time: 8/9/2019  7:04 AM  Admit Dx: Cellulitis [L03.90]  Cellulitis [L03.90]    Subjective: patient lying in bed, doing well this am   Denies any new complaints   Continues to have left arm pain but has noted some mild improvement. Left arm in sling and elevated. Denies fever, chills,. Denies N/V/D- tolerating antibiotics well       Patient was admitted with Cellulitis [L03.90]  Cellulitis [L03.90]. ROS: denies fever, chills, cp, sob, n/v, HA unless stated above.      enoxaparin  40 mg Subcutaneous Daily    vancomycin  1,000 mg Intravenous Q12H    gabapentin  1,200 mg Oral BID    emtricitabine-tenofovir alafenamide  1 tablet Oral Daily    multivitamin  1 tablet Oral Daily    dolutegravir sodium  50 mg Oral Daily    sodium chloride flush  10 mL Intravenous 2 times per day    nicotine  1 patch Transdermal Daily       melatonin 1 mg Nightly PRN   sodium chloride flush 10 mL PRN   sodium chloride flush 10 mL PRN   magnesium hydroxide 30 mL Daily PRN   ondansetron 4 mg Q6H PRN   acetaminophen 650 mg Q4H PRN   HYDROcodone 5 mg - acetaminophen 1 tablet Q4H PRN   HYDROmorphone 0.5 mg Q4H PRN        Objective:    /76   Pulse 64   Temp 99.1 °F (37.3 °C) (Oral)   Resp 16   Ht 5' 8\" (1.727 m)   Wt 162 lb 4.8 oz (73.6 kg)   SpO2 92%   BMI 24.68 kg/m²   General Appearance: alert and oriented to person, place and time, well-developed and well-nourished, in no acute distress  Skin: warm and dry, no rash or erythema  Head: normocephalic and atraumatic  Pulmonary/Chest: clear to auscultation bilaterally- no wheezes, rales or rhonchi, normal air movement, no respiratory distress  Cardiovascular: normal rate, normal S1 and S2, no gallops and intact distal pulses  Abdomen: soft, non-tender, non-distended, normal bowel sounds, no masses or organomegaly  Extremities: + 1 edema of left hand-  Left arm in sling and elevated  Neurologic: proximal ulnar fracture: ortho following. No surgical intervention at this time. Splinted and elevated   3. CHANEL: unsure of baseline. IVF discontinued. Creatinine now 1.4, trending downward. Will continue to monitor   4. HTN: BP normalized now, continue to monitor  5. HIV: continue antiretrovirals   6. Tobacco abuse: continue to encourage cessation   7. Deconditioning: will consult PT/OT to evaluate and treat   8. Dispo: awaiting final ID recommendations on possible IV antibiotics on discharge and possible need for PICC. If needed, will likely go to SNF. Social work following      Electronically signed by Ping Lucio on 8/12/2019 at 8:50 AM  HOSPITALIST Brynn [facility] NOTE 8/13/2019 0857AM:    Details of the evaluation - subjective assessment (including medication profile, past medical, family and social history when applicable), examination, review of lab and test data, diagnostic impressions and medical decision making - performed by MAURICIO Lucio, were discussed with me on the date of service and I agree with clinical information herein unless otherwise noted. The patient has been evaluated by me personally on date of service. Pt reports no fevers, chills,n/v.     Exam: heart reg at rate of 68,lungs cta, abd pos bs soft nt, ext neg for le edema    I agree with the assessment and plan of MAURICIO Lucio    Left arm cellulitis  Left arm edema  Left septic olecranon bursitis  chanel  htn  hiv  Hypokalemia  Elevated lactic acid level      Electronically signed by Daniele Koroma D.O.   Hospitalist  4M Hospitalist Service at [facility]

## 2019-08-12 NOTE — PROGRESS NOTES
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 No reported adverse drug reactions. The patient is feeling better. He is having less pain over the left elbow. Review of systems:  As stated above in the chief complaint, otherwise negative. Medications:  Scheduled Meds:   enoxaparin  40 mg Subcutaneous Daily    vancomycin  1,000 mg Intravenous Q12H    gabapentin  1,200 mg Oral BID    emtricitabine-tenofovir alafenamide  1 tablet Oral Daily    multivitamin  1 tablet Oral Daily    dolutegravir sodium  50 mg Oral Daily    sodium chloride flush  10 mL Intravenous 2 times per day    nicotine  1 patch Transdermal Daily     Continuous Infusions:  PRN Meds:melatonin, sodium chloride flush, sodium chloride flush, magnesium hydroxide, ondansetron, acetaminophen, HYDROcodone 5 mg - acetaminophen, HYDROmorphone    OBJECTIVE:  /72   Pulse 70   Temp 98.2 °F (36.8 °C) (Oral)   Resp 17   Ht 5' 8\" (1.727 m)   Wt 162 lb 4.8 oz (73.6 kg)   SpO2 93%   BMI 24.68 kg/m²   Temp  Av.8 °F (37.1 °C)  Min: 98.2 °F (36.8 °C)  Max: 99.2 °F (37.3 °C)  Constitutional: The patient is awake, alert, and oriented. Skin: Warm and dry. Left hand with areas of cracking, splitting, open between fingers, no tenderness or cellulitis  HEENT: Round and reactive pupils. Moist mucous membranes. No ulcerations or thrush. Neck: Supple to movements. Chest: No use of accessory muscles to breathe. Symmetrical expansion. No wheezing, crackles or rhonchi. Cardiovascular: S1 and S2 are rhythmic and regular. No murmurs appreciated. Abdomen: Positive bowel sounds to auscultation. Benign to palpation. Extremities: No clubbing, no cyanosis. Left arm hanging elevated, wrapped hand to mid humerus.    Lines: peripheral    Laboratory and

## 2019-08-12 NOTE — CARE COORDINATION
Social Work / Discharge Planning : SW and CM met with patient and explained role and discharge planner/ transition of care. Patient resides independently in the community. Patient states he works as a . Await ID plan. Patient may need PICC with IV antibiotics. Patient in agreement to go to SNF for antibiotics due to lacking support at home and has an injured arm and states he will NOT be able to do own IV at home. . CM updated for therapy input. SNF choices discussed and he did not state preference. Referral called to Geraldine Fowler for [facility]. Await response. As well as await ID final plan. SW to follow. ,Electronically signed by JUAN CARLOS Mcconnell on 8/12/19 at 12:53 PM    Addendum :Geraldine Fowler from [facility] accepted patient. Geraldine Fowler from [place] [address] met with patient and patient aware of acceptance. Will submit for pre-cert once therapies are in. N 17, HENS and transport form completed. CM updated. CORNELIO to follow.  Electronically signed by JUAN CARLOS Mcconnell on 8/12/2019 at 3:15 PM

## 2019-08-13 LAB
ALBUMIN SERPL-MCNC: 3.1 G/DL (ref 3.5–5.2)
ALP BLD-CCNC: 79 U/L (ref 40–129)
ALT SERPL-CCNC: 26 U/L (ref 0–40)
ANION GAP SERPL CALCULATED.3IONS-SCNC: 10 MMOL/L (ref 7–16)
AST SERPL-CCNC: 34 U/L (ref 0–39)
BILIRUB SERPL-MCNC: 0.3 MG/DL (ref 0–1.2)
BUN BLDV-MCNC: 17 MG/DL (ref 8–23)
CALCIUM SERPL-MCNC: 8.8 MG/DL (ref 8.6–10.2)
CHLORIDE BLD-SCNC: 100 MMOL/L (ref 98–107)
CO2: 27 MMOL/L (ref 22–29)
CREAT SERPL-MCNC: 1.4 MG/DL (ref 0.7–1.2)
GFR AFRICAN AMERICAN: >60
GFR NON-AFRICAN AMERICAN: >60 ML/MIN/1.73
GLUCOSE BLD-MCNC: 102 MG/DL (ref 74–99)
HCT VFR BLD CALC: 38.4 % (ref 37–54)
HEMOGLOBIN: 13.3 G/DL (ref 12.5–16.5)
MCH RBC QN AUTO: 35.3 PG (ref 26–35)
MCHC RBC AUTO-ENTMCNC: 34.6 % (ref 32–34.5)
MCV RBC AUTO: 101.9 FL (ref 80–99.9)
PDW BLD-RTO: 13.2 FL (ref 11.5–15)
PLATELET # BLD: 277 E9/L (ref 130–450)
PMV BLD AUTO: 9.6 FL (ref 7–12)
POTASSIUM SERPL-SCNC: 3.5 MMOL/L (ref 3.5–5)
RBC # BLD: 3.77 E12/L (ref 3.8–5.8)
SODIUM BLD-SCNC: 137 MMOL/L (ref 132–146)
TOTAL PROTEIN: 7.8 G/DL (ref 6.4–8.3)
WBC # BLD: 9.8 E9/L (ref 4.5–11.5)

## 2019-08-13 PROCEDURE — 1200000000 HC SEMI PRIVATE

## 2019-08-13 PROCEDURE — 6360000002 HC RX W HCPCS: Performed by: SPECIALIST

## 2019-08-13 PROCEDURE — 2580000003 HC RX 258: Performed by: SPECIALIST

## 2019-08-13 PROCEDURE — 6360000002 HC RX W HCPCS: Performed by: INTERNAL MEDICINE

## 2019-08-13 PROCEDURE — C1751 CATH, INF, PER/CENT/MIDLINE: HCPCS

## 2019-08-13 PROCEDURE — 2500000003 HC RX 250 WO HCPCS: Performed by: SPECIALIST

## 2019-08-13 PROCEDURE — 99232 SBSQ HOSP IP/OBS MODERATE 35: CPT | Performed by: INTERNAL MEDICINE

## 2019-08-13 PROCEDURE — 80053 COMPREHEN METABOLIC PANEL: CPT

## 2019-08-13 PROCEDURE — 02HV33Z INSERTION OF INFUSION DEVICE INTO SUPERIOR VENA CAVA, PERCUTANEOUS APPROACH: ICD-10-PCS | Performed by: SPECIALIST

## 2019-08-13 PROCEDURE — APPSS30 APP SPLIT SHARED TIME 16-30 MINUTES: Performed by: PHYSICIAN ASSISTANT

## 2019-08-13 PROCEDURE — 6370000000 HC RX 637 (ALT 250 FOR IP): Performed by: NURSE PRACTITIONER

## 2019-08-13 PROCEDURE — 36569 INSJ PICC 5 YR+ W/O IMAGING: CPT

## 2019-08-13 PROCEDURE — 2580000003 HC RX 258: Performed by: NURSE PRACTITIONER

## 2019-08-13 PROCEDURE — 97165 OT EVAL LOW COMPLEX 30 MIN: CPT

## 2019-08-13 PROCEDURE — 97161 PT EVAL LOW COMPLEX 20 MIN: CPT

## 2019-08-13 PROCEDURE — 85027 COMPLETE CBC AUTOMATED: CPT

## 2019-08-13 PROCEDURE — 36415 COLL VENOUS BLD VENIPUNCTURE: CPT

## 2019-08-13 PROCEDURE — 6370000000 HC RX 637 (ALT 250 FOR IP): Performed by: INTERNAL MEDICINE

## 2019-08-13 PROCEDURE — 76937 US GUIDE VASCULAR ACCESS: CPT

## 2019-08-13 RX ORDER — SODIUM CHLORIDE 0.9 % (FLUSH) 0.9 %
10 SYRINGE (ML) INJECTION PRN
Status: DISCONTINUED | OUTPATIENT
Start: 2019-08-13 | End: 2019-08-14 | Stop reason: HOSPADM

## 2019-08-13 RX ORDER — HEPARIN SODIUM (PORCINE) LOCK FLUSH IV SOLN 100 UNIT/ML 100 UNIT/ML
3 SOLUTION INTRAVENOUS PRN
Status: DISCONTINUED | OUTPATIENT
Start: 2019-08-13 | End: 2019-08-14 | Stop reason: HOSPADM

## 2019-08-13 RX ORDER — HEPARIN SODIUM (PORCINE) LOCK FLUSH IV SOLN 100 UNIT/ML 100 UNIT/ML
3 SOLUTION INTRAVENOUS EVERY 12 HOURS SCHEDULED
Status: DISCONTINUED | OUTPATIENT
Start: 2019-08-13 | End: 2019-08-14 | Stop reason: HOSPADM

## 2019-08-13 RX ORDER — LIDOCAINE HYDROCHLORIDE 10 MG/ML
5 INJECTION, SOLUTION EPIDURAL; INFILTRATION; INTRACAUDAL; PERINEURAL ONCE
Status: COMPLETED | OUTPATIENT
Start: 2019-08-13 | End: 2019-08-13

## 2019-08-13 RX ORDER — CEFTRIAXONE 1 G/1
2 INJECTION, POWDER, FOR SOLUTION INTRAMUSCULAR; INTRAVENOUS EVERY 24 HOURS
Qty: 40 G | Refills: 0 | Status: SHIPPED | OUTPATIENT
Start: 2019-08-13 | End: 2019-09-02

## 2019-08-13 RX ADMIN — Medication 10 ML: at 09:26

## 2019-08-13 RX ADMIN — MULTIVITAMIN TABLET 1 TABLET: TABLET at 09:25

## 2019-08-13 RX ADMIN — Medication 10 ML: at 00:07

## 2019-08-13 RX ADMIN — GABAPENTIN 1200 MG: 400 CAPSULE ORAL at 09:25

## 2019-08-13 RX ADMIN — HYDROCODONE BITARTRATE AND ACETAMINOPHEN 1 TABLET: 5; 325 TABLET ORAL at 09:40

## 2019-08-13 RX ADMIN — GABAPENTIN 1200 MG: 400 CAPSULE ORAL at 20:09

## 2019-08-13 RX ADMIN — HYDROCODONE BITARTRATE AND ACETAMINOPHEN 1 TABLET: 5; 325 TABLET ORAL at 04:47

## 2019-08-13 RX ADMIN — Medication 10 ML: at 20:09

## 2019-08-13 RX ADMIN — DOLUTEGRAVIR SODIUM 50 MG: 50 TABLET, FILM COATED ORAL at 09:25

## 2019-08-13 RX ADMIN — LIDOCAINE HYDROCHLORIDE 5 ML: 10 INJECTION, SOLUTION EPIDURAL; INFILTRATION; INTRACAUDAL; PERINEURAL at 11:00

## 2019-08-13 RX ADMIN — HYDROMORPHONE HYDROCHLORIDE 0.5 MG: 1 INJECTION, SOLUTION INTRAMUSCULAR; INTRAVENOUS; SUBCUTANEOUS at 16:52

## 2019-08-13 RX ADMIN — HYDROMORPHONE HYDROCHLORIDE 0.5 MG: 1 INJECTION, SOLUTION INTRAMUSCULAR; INTRAVENOUS; SUBCUTANEOUS at 06:30

## 2019-08-13 RX ADMIN — HYDROMORPHONE HYDROCHLORIDE 0.5 MG: 1 INJECTION, SOLUTION INTRAMUSCULAR; INTRAVENOUS; SUBCUTANEOUS at 10:46

## 2019-08-13 RX ADMIN — CEFTRIAXONE 2 G: 2 INJECTION, POWDER, FOR SOLUTION INTRAMUSCULAR; INTRAVENOUS at 15:40

## 2019-08-13 RX ADMIN — HYDROMORPHONE HYDROCHLORIDE 0.5 MG: 1 INJECTION, SOLUTION INTRAMUSCULAR; INTRAVENOUS; SUBCUTANEOUS at 21:38

## 2019-08-13 RX ADMIN — EMTRICITABINE AND TENOFOVIR ALAFENAMIDE 1 TABLET: 200; 25 TABLET ORAL at 09:25

## 2019-08-13 RX ADMIN — HYDROMORPHONE HYDROCHLORIDE 0.5 MG: 1 INJECTION, SOLUTION INTRAMUSCULAR; INTRAVENOUS; SUBCUTANEOUS at 00:07

## 2019-08-13 RX ADMIN — Medication 300 UNITS: at 20:09

## 2019-08-13 RX ADMIN — HYDROCODONE BITARTRATE AND ACETAMINOPHEN 1 TABLET: 5; 325 TABLET ORAL at 20:09

## 2019-08-13 RX ADMIN — HYDROCODONE BITARTRATE AND ACETAMINOPHEN 1 TABLET: 5; 325 TABLET ORAL at 15:39

## 2019-08-13 RX ADMIN — MAGNESIUM HYDROXIDE 30 ML: 400 SUSPENSION ORAL at 04:51

## 2019-08-13 ASSESSMENT — PAIN DESCRIPTION - PAIN TYPE
TYPE: ACUTE PAIN
TYPE: ACUTE PAIN;SURGICAL PAIN
TYPE: ACUTE PAIN

## 2019-08-13 ASSESSMENT — PAIN SCALES - GENERAL
PAINLEVEL_OUTOF10: 9
PAINLEVEL_OUTOF10: 6
PAINLEVEL_OUTOF10: 7
PAINLEVEL_OUTOF10: 9
PAINLEVEL_OUTOF10: 0
PAINLEVEL_OUTOF10: 9
PAINLEVEL_OUTOF10: 8
PAINLEVEL_OUTOF10: 10
PAINLEVEL_OUTOF10: 9
PAINLEVEL_OUTOF10: 5
PAINLEVEL_OUTOF10: 2
PAINLEVEL_OUTOF10: 10

## 2019-08-13 ASSESSMENT — PAIN DESCRIPTION - ORIENTATION
ORIENTATION: LEFT

## 2019-08-13 ASSESSMENT — PAIN DESCRIPTION - DESCRIPTORS
DESCRIPTORS: CONSTANT
DESCRIPTORS: CONSTANT;ACHING
DESCRIPTORS: ACHING;CONSTANT
DESCRIPTORS: CONSTANT
DESCRIPTORS: ACHING;DISCOMFORT

## 2019-08-13 ASSESSMENT — PAIN DESCRIPTION - LOCATION
LOCATION: ELBOW

## 2019-08-13 ASSESSMENT — PAIN DESCRIPTION - ONSET
ONSET: ON-GOING
ONSET: ON-GOING
ONSET: GRADUAL
ONSET: ON-GOING
ONSET: ON-GOING

## 2019-08-13 ASSESSMENT — PAIN DESCRIPTION - FREQUENCY
FREQUENCY: CONTINUOUS

## 2019-08-13 ASSESSMENT — PAIN DESCRIPTION - PROGRESSION
CLINICAL_PROGRESSION: NOT CHANGED
CLINICAL_PROGRESSION: GRADUALLY IMPROVING
CLINICAL_PROGRESSION: GRADUALLY WORSENING

## 2019-08-13 ASSESSMENT — PAIN - FUNCTIONAL ASSESSMENT
PAIN_FUNCTIONAL_ASSESSMENT: ACTIVITIES ARE NOT PREVENTED
PAIN_FUNCTIONAL_ASSESSMENT: ACTIVITIES ARE NOT PREVENTED
PAIN_FUNCTIONAL_ASSESSMENT: PREVENTS OR INTERFERES SOME ACTIVE ACTIVITIES AND ADLS
PAIN_FUNCTIONAL_ASSESSMENT: PREVENTS OR INTERFERES SOME ACTIVE ACTIVITIES AND ADLS
PAIN_FUNCTIONAL_ASSESSMENT: ACTIVITIES ARE NOT PREVENTED

## 2019-08-13 NOTE — PLAN OF CARE
Problem: SKIN INTEGRITY  Goal: Report decrease in pain level  8/12/2019 2225 by Yolanda Flood RN  Outcome: Met This Shift  8/12/2019 1800 by Mary Subramanian RN  Outcome: Met This Shift     Problem: Pain:  Goal: Pain level will decrease  Description  Pain level will decrease  8/12/2019 2225 by Yolanda Flood RN  Outcome: Met This Shift  8/12/2019 1800 by Mary Subramanian RN  Outcome: Met This Shift  Goal: Control of acute pain  Description  Control of acute pain  8/12/2019 2225 by Yolanda Flood RN  Outcome: Met This Shift  8/12/2019 1800 by Mary Subramanian RN  Outcome: Met This Shift     Problem: Falls - Risk of:  Goal: Will remain free from falls  Description  Will remain free from falls  Outcome: Met This Shift  Goal: Absence of physical injury  Description  Absence of physical injury  Outcome: Met This Shift

## 2019-08-13 NOTE — PLAN OF CARE
Problem: Pain:  Description  Pain management should include both nonpharmacologic and pharmacologic interventions.   Goal: Pain level will decrease  Description  Pain level will decrease  8/13/2019 1208 by Herminio Jerome RN  Outcome: Met This Shift     Problem: Falls - Risk of:  Goal: Will remain free from falls  Description  Will remain free from falls  8/13/2019 1208 by Herminio Jerome RN  Outcome: Met This Shift     Problem: Falls - Risk of:  Goal: Absence of physical injury  Description  Absence of physical injury  8/13/2019 1208 by Herminio Jerome RN  Outcome: Met This Shift     Problem: SKIN INTEGRITY  Goal: Report decrease in pain level  8/13/2019 1208 by Herminio Jerome RN  Outcome: Met This Shift

## 2019-08-13 NOTE — DISCHARGE SUMMARY
[L03.90]  Cellulitis []  59year old male with history of depression, GERD, hepatitis C, HIV, and neuropathy presented to [facility] for left arm swelling and pain. He was admitted for further management of left arm cellulitis and left proximal ulnar fracture. Orthopedic surgery and ID followed throughout in consultation. He was started on IV antibiotics. LUE US negative for DVT. Left elbow olecranon fluid grew strep pyogenes indicating left septic olecranon bursitis. No surgical intervention per orthopedic surgery. Social work followed for placement to [facility]. Patient received PICC line placement for continuation on IV ceftriaxone upon discharge. He is medically stable for discharge to facility with close outpatient follow up     Discharge Exam:  General Appearance: alert and oriented to person, place and time and in no acute distress  Skin: warm and dry  Head: normocephalic and atraumatic  Neck: neck supple and non tender without mass   Pulmonary/Chest: clear to auscultation bilaterally- no wheezes, rales or rhonchi, normal air movement, no respiratory distress  Cardiovascular: normal rate, normal S1 and S2 and no carotid bruits  Abdomen: soft, non-tender, non-distended, normal bowel sounds, no masses or organomegaly  Extremities: left hand/forearm edema- improving  Neurologic: no cranial nerve deficit and speech normal    I/O last 3 completed shifts: In: 1080 [P.O.:1080]  Out: -   No intake/output data recorded. LABS:  Recent Labs     08/13/19  1140 08/14/19  0344    136   K 3.5 3.5    102   CO2 27 27   BUN 17 16   CREATININE 1.4* 1.4*   GLUCOSE 102* 99   CALCIUM 8.8 8.4*       Recent Labs     08/13/19  1140 08/14/19  0344   WBC 9.8 8.9   RBC 3.77* 3.68*   HGB 13.3 13.1   HCT 38.4 37.6   .9* 102.2*   MCH 35.3* 35.6*   MCHC 34.6* 34.8*   RDW 13.2 13.1    316   MPV 9.6 9.9       No results for input(s): POCGLU in the last 72 hours.     Imaging:  Xr Humerus Left (min 2

## 2019-08-13 NOTE — PATIENT CARE CONFERENCE
P Quality Flow/Interdisciplinary Rounds Progress Note        Quality Flow Rounds held on August 13, 2019    Disciplines Attending:  Bedside Nurse, ,  and Nursing Unit Leadership    Saritha Brantley was admitted on 8/9/2019  7:04 AM    Anticipated Discharge Date:  Expected Discharge Date: 08/14/19    Disposition:    Esteban Score:  Esteban Scale Score: 20    Readmission Risk              Risk of Unplanned Readmission:        11           Discussed patient goal for the day, patient clinical progression, and barriers to discharge.   The following Goal(s) of the Day/Commitment(s) have been identified:  picc today       Loretta Bond  August 13, 2019

## 2019-08-13 NOTE — PROGRESS NOTES
· Continue Ceftriaxone for a minimum of 20 days. Reconciled  · Continue antiretrovirals from home  · The patient can be discharged from ID standpoint.   Patient to go to a nursing facility    Vidya Bernard  1:02 PM  8/13/2019

## 2019-08-13 NOTE — PROGRESS NOTES
Occupational Therapy  OCCUPATIONAL THERAPY INITIAL EVALUATION      Date:2019  Patient Name: Xavier Holland  MRN: [id number]  : 1960  Room: [address] [place], [place] [address]    Evaluating OT: Colonel Stern OTR/L [id number]    AM-PAC Daily Activity Raw Score: 23/24    Recommended Adaptive Equipment: TBD    Diagnosis: cellulitis. Pt presents to ED post fall 2 days prior to ED visit with L arm edema. Pertinent Medical History: per ortho continue compression L elbow   Precautions:  Falls     Home Living: Pt lives alone in a 2 story home with B/B available on 1st or 2nd floor. Several \"porch\" steps on entry. Bathroom setup: tub/shower     Prior Level of Function: Independent with ADLs, Independent with IADLs; completed functional mobility no AD. Works on [employer]. Pain Level: no reported pain    Cognition: A&O: 4/4. Problem solving:  Fair   Judgement/safety:  Fair     Functional Assessment:   Initial Eval Status  Date: 19 Treatment session:  Short Term Goals  Treatment frequency: 1-3x/wk PRN x1-3 wks   Feeding Independent     Grooming Independent     UB Dressing Independent     LB Dressing Independent  Donning/doffing B socks      Bathing Supervision  Independent   Toileting Independent     Bed Mobility  Supine to sit: Independent     Functional Transfers STS: Independent     Functional Mobility Independent no AD  Hallway distance     Balance Sitting: good    Standing: good minus no AD     Activity Tolerance WFL     L UE  Pt educated on L UE positioning, edema management, and gentle ROM 1 set x8 reps shoulder to digits with no reported pain  Pt will be Mod I in follow through of HEP for L UE ROM in preparation for maximum independence in all self care tasks.         Strength ROM Additional Info:    RUE  5/5 WFL good  and FMC/dexterity noted during ADL tasks     LUE 3/5 Mild limitations in end ranges due to edema and compression dressing fair  and FMC/dexterity noted during ADL tasks         Hearing: [facility]   Vision: WFL   Sensation:  No c/o numbness or tingling   Tone: WFL   Edema: L UE                            Comments/Treatment: Patient educated on adapted techniques for completion of ADL, safe functional transfers and mobility. Patient required cues for follow through with proper hand/foot placement, pacing, safety, precautions and technique in L UE ROM and HEP in preparation for maximum independence in all self care tasks. Eval Complexity: Low    Assessment of current deficits   Functional mobility []  ADLs [] Strength [x]  Cognition []  Functional transfers  [] IADLs [x] Safety Awareness [x]  Endurance [x]  Fine Motor Coordination [x] Balance [] Vision/perception [] Sensation []   Gross Motor Coordination [x] ROM [x] Delirium []                  Motor Control []    Plan of Care:   ADL retraining []   Equipment needs []   Neuromuscular re-education [] Energy Conservation Techniques []  Functional Transfer training [] Patient and/or Family Education [x]  Functional Mobility training []  Environmental Modifications []  Cognitive re-training []   Compensatory techniques for ADLs []  Splinting Needs []   Positioning to improve overall function [x]   Therapeutic Activity [x]  Therapeutic Exercise  [x]  Visual/Perceptual: []    Delirium prevention/treatment  []   Other:  []    Rehab Potential: Good for established goals     Patient / Family Goal: To get home. Patient and/or family were instructed on functional diagnosis, prognosis/goals and OT plan of care. Pt verbalized good understanding. Upon arrival, patient supine in bed. At end of session, patient seated in chair with call light and phone within reach, all lines and tubes intact. Pt would benefit from continued skilled OT to increase safety and independence with completion of ADL/IADL tasks for functional independence and quality of life.     Low Evaluation + 0 timed treatment minutes    Evaluation time includes thorough review of current medical

## 2019-08-14 VITALS
WEIGHT: 167.9 LBS | HEIGHT: 68 IN | SYSTOLIC BLOOD PRESSURE: 118 MMHG | RESPIRATION RATE: 18 BRPM | HEART RATE: 60 BPM | TEMPERATURE: 99 F | BODY MASS INDEX: 25.45 KG/M2 | DIASTOLIC BLOOD PRESSURE: 70 MMHG | OXYGEN SATURATION: 95 %

## 2019-08-14 LAB
ANION GAP SERPL CALCULATED.3IONS-SCNC: 7 MMOL/L (ref 7–16)
BLOOD CULTURE, ROUTINE: NORMAL
BUN BLDV-MCNC: 16 MG/DL (ref 8–23)
CALCIUM SERPL-MCNC: 8.4 MG/DL (ref 8.6–10.2)
CHLORIDE BLD-SCNC: 102 MMOL/L (ref 98–107)
CO2: 27 MMOL/L (ref 22–29)
CREAT SERPL-MCNC: 1.4 MG/DL (ref 0.7–1.2)
CULTURE, BLOOD 2: NORMAL
GFR AFRICAN AMERICAN: >60
GFR NON-AFRICAN AMERICAN: >60 ML/MIN/1.73
GLUCOSE BLD-MCNC: 99 MG/DL (ref 74–99)
HCT VFR BLD CALC: 37.6 % (ref 37–54)
HEMOGLOBIN: 13.1 G/DL (ref 12.5–16.5)
MCH RBC QN AUTO: 35.6 PG (ref 26–35)
MCHC RBC AUTO-ENTMCNC: 34.8 % (ref 32–34.5)
MCV RBC AUTO: 102.2 FL (ref 80–99.9)
PDW BLD-RTO: 13.1 FL (ref 11.5–15)
PLATELET # BLD: 316 E9/L (ref 130–450)
PMV BLD AUTO: 9.9 FL (ref 7–12)
POTASSIUM SERPL-SCNC: 3.5 MMOL/L (ref 3.5–5)
RBC # BLD: 3.68 E12/L (ref 3.8–5.8)
SODIUM BLD-SCNC: 136 MMOL/L (ref 132–146)
WBC # BLD: 8.9 E9/L (ref 4.5–11.5)

## 2019-08-14 PROCEDURE — 6370000000 HC RX 637 (ALT 250 FOR IP): Performed by: INTERNAL MEDICINE

## 2019-08-14 PROCEDURE — 6360000002 HC RX W HCPCS: Performed by: INTERNAL MEDICINE

## 2019-08-14 PROCEDURE — 36415 COLL VENOUS BLD VENIPUNCTURE: CPT

## 2019-08-14 PROCEDURE — 99239 HOSP IP/OBS DSCHRG MGMT >30: CPT | Performed by: INTERNAL MEDICINE

## 2019-08-14 PROCEDURE — 97110 THERAPEUTIC EXERCISES: CPT

## 2019-08-14 PROCEDURE — 97535 SELF CARE MNGMENT TRAINING: CPT

## 2019-08-14 PROCEDURE — APPSS45 APP SPLIT SHARED TIME 31-45 MINUTES: Performed by: PHYSICIAN ASSISTANT

## 2019-08-14 PROCEDURE — 2580000003 HC RX 258: Performed by: NURSE PRACTITIONER

## 2019-08-14 PROCEDURE — 2580000003 HC RX 258: Performed by: SPECIALIST

## 2019-08-14 PROCEDURE — 6370000000 HC RX 637 (ALT 250 FOR IP): Performed by: NURSE PRACTITIONER

## 2019-08-14 PROCEDURE — 80048 BASIC METABOLIC PNL TOTAL CA: CPT

## 2019-08-14 PROCEDURE — 6360000002 HC RX W HCPCS: Performed by: SPECIALIST

## 2019-08-14 PROCEDURE — 85027 COMPLETE CBC AUTOMATED: CPT

## 2019-08-14 RX ORDER — HYDROCODONE BITARTRATE AND ACETAMINOPHEN 5; 325 MG/1; MG/1
1 TABLET ORAL EVERY 4 HOURS PRN
Refills: 0 | Status: SHIPPED | DISCHARGE
Start: 2019-08-14 | End: 2019-08-17

## 2019-08-14 RX ADMIN — HYDROCODONE BITARTRATE AND ACETAMINOPHEN 1 TABLET: 5; 325 TABLET ORAL at 17:37

## 2019-08-14 RX ADMIN — DOLUTEGRAVIR SODIUM 50 MG: 50 TABLET, FILM COATED ORAL at 08:45

## 2019-08-14 RX ADMIN — HYDROMORPHONE HYDROCHLORIDE 0.5 MG: 1 INJECTION, SOLUTION INTRAMUSCULAR; INTRAVENOUS; SUBCUTANEOUS at 18:38

## 2019-08-14 RX ADMIN — HYDROCODONE BITARTRATE AND ACETAMINOPHEN 1 TABLET: 5; 325 TABLET ORAL at 11:30

## 2019-08-14 RX ADMIN — GABAPENTIN 1200 MG: 400 CAPSULE ORAL at 08:45

## 2019-08-14 RX ADMIN — HYDROMORPHONE HYDROCHLORIDE 0.5 MG: 1 INJECTION, SOLUTION INTRAMUSCULAR; INTRAVENOUS; SUBCUTANEOUS at 09:24

## 2019-08-14 RX ADMIN — MULTIVITAMIN TABLET 1 TABLET: TABLET at 08:45

## 2019-08-14 RX ADMIN — Medication 300 UNITS: at 08:45

## 2019-08-14 RX ADMIN — HYDROMORPHONE HYDROCHLORIDE 0.5 MG: 1 INJECTION, SOLUTION INTRAMUSCULAR; INTRAVENOUS; SUBCUTANEOUS at 14:04

## 2019-08-14 RX ADMIN — CEFTRIAXONE 2 G: 2 INJECTION, POWDER, FOR SOLUTION INTRAMUSCULAR; INTRAVENOUS at 15:37

## 2019-08-14 RX ADMIN — Medication 10 ML: at 08:45

## 2019-08-14 RX ADMIN — EMTRICITABINE AND TENOFOVIR ALAFENAMIDE 1 TABLET: 200; 25 TABLET ORAL at 08:45

## 2019-08-14 RX ADMIN — HYDROCODONE BITARTRATE AND ACETAMINOPHEN 1 TABLET: 5; 325 TABLET ORAL at 03:27

## 2019-08-14 RX ADMIN — HYDROMORPHONE HYDROCHLORIDE 0.5 MG: 1 INJECTION, SOLUTION INTRAMUSCULAR; INTRAVENOUS; SUBCUTANEOUS at 05:16

## 2019-08-14 ASSESSMENT — PAIN SCALES - GENERAL
PAINLEVEL_OUTOF10: 7
PAINLEVEL_OUTOF10: 6
PAINLEVEL_OUTOF10: 10
PAINLEVEL_OUTOF10: 9
PAINLEVEL_OUTOF10: 4
PAINLEVEL_OUTOF10: 7
PAINLEVEL_OUTOF10: 7
PAINLEVEL_OUTOF10: 6
PAINLEVEL_OUTOF10: 3
PAINLEVEL_OUTOF10: 10

## 2019-08-14 NOTE — PROGRESS NOTES
Nurse to nurse called ruben Castor. Papers faxed to [phone]. Made nurse aware elbow has swelling that decreases and increases. Also notified that pt has wrap off at this time.     Electronically signed by Cheko Medel RN on 8/14/2019 at 7:03 PM

## 2019-08-14 NOTE — PROGRESS NOTES
Orthopaedec Progress Note    Resting comfortably. Pain in left elbow with deep flexion, otherwise without complaints. Arm is elevated in foam pillow. Denies fever or chills. LUE:  Unwrapped. Swelling continues to be down and improved from admission  L touch intact  Good cap refill  Compartments soft and no pain with passive stretch  No pain with gentle PROM left elbow and minimal bursal fluid, pain only with passive deep flexion of elbow.  No tenderness to palpation and no pain with passive pronation or supination.      A/P:  Cellulitis, healing hand wounds           Cont elevation of LUE           IV abx as per ID           F/U with ortho in 3 weeks     Rommel Cortes PA-C

## 2019-08-14 NOTE — PROGRESS NOTES
Pt complaining of rapidly increasing LUE pain, redness, and swelling. Pt removed compression wrap and stated he believes it was contributing to the pain and swelling. Asked if I could replace the ACE wrap and he said not now as the swelling has gone down since removal. Pt has left upper extremity elevated with hiwot pillow. Will continue to monitor.     Electronically signed by Flash Lau RN on 8/14/2019 at 10:45 AM

## 2019-08-14 NOTE — PROGRESS NOTES
of motion; Good understanding indicated. patient demonstrated Good tolerance of 10 repetitions of each exercise without complaint/indication of pain. Patient education provided regarding L UE edema management techniques, including AROM and elevation; patient verbalized understanding. Pt will be Mod I in follow through of HEP for L UE ROM in preparation for maximum independence in all self care tasks. Comments: Upon this OTR's arrival to patient's room, patient supine in bed. Upon conclusion of this session, patient supine in bed (per patient preference) with all lines and tubes intact and call light within reach. Pair of elastic shoe laces and shoe horn provided to patient during this session, following provision of patient education regarding the potential benefits of these AE items to maximize independence with lower body ADLs. Education: Patient education provided regardin) potential benefits of adaptations/AE to maximize independence with LB dressing tasks, 2) compensatory strategies to maximize independence/safety with ADLs, 3) edema management and reduction techniques, 4) AROM exercises with L UE and potential benefits of these. Patient demonstrated Good understanding. · Patient has made Good progress towards set goals. · Continue with current plan of care. Time In: 1525  Time Out: 1555  Total Treatment Time: 30 minutes    Hedy Haro OTR/L  License Number: [id number]

## 2019-08-16 LAB
BODY FLUID CULTURE, STERILE: ABNORMAL
BODY FLUID CULTURE, STERILE: ABNORMAL
GRAM STAIN RESULT: ABNORMAL
ORGANISM: ABNORMAL

## 2020-01-01 ENCOUNTER — APPOINTMENT (OUTPATIENT)
Dept: GENERAL RADIOLOGY | Age: 60
End: 2020-01-01
Payer: MEDICAID

## 2020-01-01 ENCOUNTER — HOSPITAL ENCOUNTER (EMERGENCY)
Age: 60
Discharge: HOME | End: 2020-11-15
Payer: MEDICAID

## 2020-01-01 VITALS
HEIGHT: 68 IN | DIASTOLIC BLOOD PRESSURE: 95 MMHG | BODY MASS INDEX: 24.25 KG/M2 | TEMPERATURE: 96.8 F | SYSTOLIC BLOOD PRESSURE: 151 MMHG | RESPIRATION RATE: 18 BRPM | HEART RATE: 83 BPM | WEIGHT: 160 LBS | OXYGEN SATURATION: 97 %

## 2020-01-01 LAB
ANION GAP SERPL CALCULATED.3IONS-SCNC: 13 MMOL/L (ref 7–16)
BASOPHILS ABSOLUTE: 0.07 E9/L (ref 0–0.2)
BASOPHILS RELATIVE PERCENT: 1.1 % (ref 0–2)
BUN BLDV-MCNC: 9 MG/DL (ref 8–23)
CALCIUM SERPL-MCNC: 9.1 MG/DL (ref 8.6–10.2)
CHLORIDE BLD-SCNC: 102 MMOL/L (ref 98–107)
CO2: 25 MMOL/L (ref 22–29)
CREAT SERPL-MCNC: 1.4 MG/DL (ref 0.7–1.2)
EOSINOPHILS ABSOLUTE: 0.38 E9/L (ref 0.05–0.5)
EOSINOPHILS RELATIVE PERCENT: 5.8 % (ref 0–6)
GFR AFRICAN AMERICAN: >60
GFR NON-AFRICAN AMERICAN: >60 ML/MIN/1.73
GLUCOSE BLD-MCNC: 89 MG/DL (ref 74–99)
HCT VFR BLD CALC: 47.3 % (ref 37–54)
HEMOGLOBIN: 16.4 G/DL (ref 12.5–16.5)
IMMATURE GRANULOCYTES #: 0.04 E9/L
IMMATURE GRANULOCYTES %: 0.6 % (ref 0–5)
LYMPHOCYTES ABSOLUTE: 1.26 E9/L (ref 1.5–4)
LYMPHOCYTES RELATIVE PERCENT: 19.1 % (ref 20–42)
MCH RBC QN AUTO: 34.7 PG (ref 26–35)
MCHC RBC AUTO-ENTMCNC: 34.7 % (ref 32–34.5)
MCV RBC AUTO: 100.2 FL (ref 80–99.9)
MONOCYTES ABSOLUTE: 0.82 E9/L (ref 0.1–0.95)
MONOCYTES RELATIVE PERCENT: 12.4 % (ref 2–12)
NEUTROPHILS ABSOLUTE: 4.03 E9/L (ref 1.8–7.3)
NEUTROPHILS RELATIVE PERCENT: 61 % (ref 43–80)
PDW BLD-RTO: 12.2 FL (ref 11.5–15)
PLATELET # BLD: 253 E9/L (ref 130–450)
PMV BLD AUTO: 9.5 FL (ref 7–12)
POTASSIUM SERPL-SCNC: 4.1 MMOL/L (ref 3.5–5)
RBC # BLD: 4.72 E12/L (ref 3.8–5.8)
SEDIMENTATION RATE, ERYTHROCYTE: 8 MM/HR (ref 0–15)
SODIUM BLD-SCNC: 140 MMOL/L (ref 132–146)
URIC ACID, SERUM: 7.7 MG/DL (ref 3.4–7)
WBC # BLD: 6.6 E9/L (ref 4.5–11.5)

## 2020-01-01 PROCEDURE — 85025 COMPLETE CBC W/AUTO DIFF WBC: CPT

## 2020-01-01 PROCEDURE — 73564 X-RAY EXAM KNEE 4 OR MORE: CPT

## 2020-01-01 PROCEDURE — 84550 ASSAY OF BLOOD/URIC ACID: CPT

## 2020-01-01 PROCEDURE — 99283 EMERGENCY DEPT VISIT LOW MDM: CPT

## 2020-01-01 PROCEDURE — 96374 THER/PROPH/DIAG INJ IV PUSH: CPT

## 2020-01-01 PROCEDURE — 6370000000 HC RX 637 (ALT 250 FOR IP): Performed by: NURSE PRACTITIONER

## 2020-01-01 PROCEDURE — 6360000002 HC RX W HCPCS

## 2020-01-01 PROCEDURE — 80048 BASIC METABOLIC PNL TOTAL CA: CPT

## 2020-01-01 PROCEDURE — 85651 RBC SED RATE NONAUTOMATED: CPT

## 2020-01-01 RX ORDER — DEXAMETHASONE SODIUM PHOSPHATE 10 MG/ML
10 INJECTION INTRAMUSCULAR; INTRAVENOUS ONCE
Status: COMPLETED | OUTPATIENT
Start: 2020-01-01 | End: 2020-01-01

## 2020-01-01 RX ORDER — COLCHICINE 0.6 MG/1
0.6 TABLET ORAL 2 TIMES DAILY
Qty: 20 TABLET | Refills: 0 | Status: SHIPPED | OUTPATIENT
Start: 2020-01-01 | End: 2021-01-01

## 2020-01-01 RX ORDER — DEXAMETHASONE SODIUM PHOSPHATE 10 MG/ML
10 INJECTION INTRAMUSCULAR; INTRAVENOUS ONCE
Status: DISCONTINUED | OUTPATIENT
Start: 2020-01-01 | End: 2020-01-01

## 2020-01-01 RX ORDER — OXYCODONE HYDROCHLORIDE AND ACETAMINOPHEN 5; 325 MG/1; MG/1
1 TABLET ORAL ONCE
Status: COMPLETED | OUTPATIENT
Start: 2020-01-01 | End: 2020-01-01

## 2020-01-01 RX ORDER — DEXAMETHASONE SODIUM PHOSPHATE 10 MG/ML
INJECTION INTRAMUSCULAR; INTRAVENOUS
Status: COMPLETED
Start: 2020-01-01 | End: 2020-01-01

## 2020-01-01 RX ORDER — OXYCODONE HYDROCHLORIDE AND ACETAMINOPHEN 5; 325 MG/1; MG/1
1 TABLET ORAL EVERY 6 HOURS PRN
Qty: 10 TABLET | Refills: 0 | Status: SHIPPED | OUTPATIENT
Start: 2020-01-01 | End: 2020-01-01

## 2020-01-01 RX ADMIN — DEXAMETHASONE SODIUM PHOSPHATE 10 MG: 10 INJECTION INTRAMUSCULAR; INTRAVENOUS at 00:28

## 2020-01-01 RX ADMIN — OXYCODONE AND ACETAMINOPHEN 1 TABLET: 5; 325 TABLET ORAL at 23:46

## 2020-01-01 ASSESSMENT — PAIN DESCRIPTION - ORIENTATION: ORIENTATION: LEFT

## 2020-01-01 ASSESSMENT — PAIN SCALES - GENERAL
PAINLEVEL_OUTOF10: 9
PAINLEVEL_OUTOF10: 10

## 2020-01-01 ASSESSMENT — PAIN DESCRIPTION - PAIN TYPE: TYPE: ACUTE PAIN

## 2020-01-01 ASSESSMENT — PAIN DESCRIPTION - LOCATION: LOCATION: KNEE

## 2020-02-25 ENCOUNTER — HOSPITAL ENCOUNTER (EMERGENCY)
Age: 60
Discharge: HOME | End: 2020-02-25
Payer: COMMERCIAL

## 2020-02-25 VITALS
TEMPERATURE: 98 F | SYSTOLIC BLOOD PRESSURE: 133 MMHG | RESPIRATION RATE: 18 BRPM | OXYGEN SATURATION: 95 % | HEART RATE: 60 BPM | DIASTOLIC BLOOD PRESSURE: 60 MMHG

## 2020-02-25 PROCEDURE — 99282 EMERGENCY DEPT VISIT SF MDM: CPT

## 2020-02-25 RX ORDER — BACITRACIN, NEOMYCIN, POLYMYXIN B 400; 3.5; 5 [USP'U]/G; MG/G; [USP'U]/G
OINTMENT TOPICAL
Qty: 30 G | Refills: 0 | Status: SHIPPED | OUTPATIENT
Start: 2020-02-25 | End: 2020-03-06

## 2020-02-25 RX ORDER — CEPHALEXIN 500 MG/1
500 CAPSULE ORAL 4 TIMES DAILY
Qty: 28 CAPSULE | Refills: 0 | Status: SHIPPED | OUTPATIENT
Start: 2020-02-25 | End: 2020-03-03
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
## 2020-03-19 ENCOUNTER — HOSPITAL ENCOUNTER (EMERGENCY)
Age: 60
Discharge: HOME | End: 2020-03-19
Payer: COMMERCIAL

## 2020-03-19 ENCOUNTER — APPOINTMENT (OUTPATIENT)
Dept: GENERAL RADIOLOGY | Age: 60
End: 2020-03-19
Payer: COMMERCIAL

## 2020-03-19 ENCOUNTER — APPOINTMENT (OUTPATIENT)
Dept: CT IMAGING | Age: 60
End: 2020-03-19
Payer: COMMERCIAL

## 2020-03-19 VITALS
BODY MASS INDEX: 25.76 KG/M2 | DIASTOLIC BLOOD PRESSURE: 79 MMHG | HEIGHT: 68 IN | RESPIRATION RATE: 14 BRPM | SYSTOLIC BLOOD PRESSURE: 139 MMHG | OXYGEN SATURATION: 95 % | TEMPERATURE: 98 F | WEIGHT: 170 LBS | HEART RATE: 81 BPM

## 2020-03-19 PROCEDURE — 73562 X-RAY EXAM OF KNEE 3: CPT

## 2020-03-19 PROCEDURE — 70486 CT MAXILLOFACIAL W/O DYE: CPT

## 2020-03-19 PROCEDURE — 99284 EMERGENCY DEPT VISIT MOD MDM: CPT

## 2020-03-19 RX ORDER — NAPROXEN 500 MG/1
500 TABLET ORAL 2 TIMES DAILY WITH MEALS
Qty: 20 TABLET | Refills: 0 | Status: SHIPPED | OUTPATIENT
Start: 2020-03-19 | End: 2021-01-01

## 2020-03-19 ASSESSMENT — PAIN DESCRIPTION - LOCATION: LOCATION: KNEE

## 2020-03-19 ASSESSMENT — PAIN DESCRIPTION - PAIN TYPE: TYPE: ACUTE PAIN

## 2020-03-19 ASSESSMENT — PAIN DESCRIPTION - ORIENTATION: ORIENTATION: LEFT;RIGHT
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
## 2020-03-19 NOTE — CARE COORDINATION
3/19/2020 Contacted [facility] and spoke to Ursula Zhang at the transportation line. Wheelchair transportation was arranged for between now 12:30 pm and 3 pm to  at the ER entrance to transport to home conf # [id number]. Patient informed of that transportation was arranged.

## 2020-05-25 ENCOUNTER — APPOINTMENT (OUTPATIENT)
Dept: GENERAL RADIOLOGY | Age: 60
End: 2020-05-25
Payer: MEDICAID

## 2020-05-25 ENCOUNTER — HOSPITAL ENCOUNTER (EMERGENCY)
Age: 60
Discharge: HOME | End: 2020-05-25
Payer: MEDICAID

## 2020-05-25 VITALS
OXYGEN SATURATION: 93 % | HEIGHT: 68 IN | SYSTOLIC BLOOD PRESSURE: 135 MMHG | DIASTOLIC BLOOD PRESSURE: 88 MMHG | TEMPERATURE: 98.9 F | RESPIRATION RATE: 16 BRPM | BODY MASS INDEX: 25.76 KG/M2 | WEIGHT: 170 LBS | HEART RATE: 76 BPM

## 2020-05-25 PROCEDURE — 99283 EMERGENCY DEPT VISIT LOW MDM: CPT

## 2020-05-25 PROCEDURE — 73610 X-RAY EXAM OF ANKLE: CPT

## 2020-05-25 PROCEDURE — 6370000000 HC RX 637 (ALT 250 FOR IP): Performed by: PHYSICIAN ASSISTANT

## 2020-05-25 PROCEDURE — 73590 X-RAY EXAM OF LOWER LEG: CPT

## 2020-05-25 PROCEDURE — 73630 X-RAY EXAM OF FOOT: CPT

## 2020-05-25 RX ORDER — NAPROXEN 500 MG/1
500 TABLET ORAL 2 TIMES DAILY
Qty: 60 TABLET | Refills: 0 | Status: SHIPPED | OUTPATIENT
Start: 2020-05-25 | End: 2021-01-01

## 2020-05-25 RX ORDER — IBUPROFEN 400 MG/1
600 TABLET ORAL ONCE
Status: COMPLETED | OUTPATIENT
Start: 2020-05-25 | End: 2020-05-25

## 2020-05-25 RX ADMIN — IBUPROFEN 600 MG: 400 TABLET ORAL at 13:01

## 2020-05-25 ASSESSMENT — PAIN DESCRIPTION - PAIN TYPE: TYPE: ACUTE PAIN

## 2020-05-25 ASSESSMENT — PAIN SCALES - GENERAL
PAINLEVEL_OUTOF10: 7
PAINLEVEL_OUTOF10: 7

## 2020-05-25 ASSESSMENT — PAIN DESCRIPTION - ORIENTATION: ORIENTATION: LEFT

## 2020-05-25 ASSESSMENT — PAIN DESCRIPTION - LOCATION: LOCATION: ANKLE

## 2020-05-25 NOTE — ED PROVIDER NOTES
[facility]     Department of Emergency Medicine   ED  Provider Note  Admit Date/RoomTime: 5/25/2020 11:56 AM  ED Room: FIFI/FIFI  Chief Complaint:   Ankle Pain (twisted his ankle when he fell. Able to apply some pressure on it when walking. )    History of Present Illness   Source of history provided by:  patient. History/Exam Limitations: none. Ramsey Nath is a 62 y.o. old male presenting to the emergency department by private vehicle, for Left ankle pain which occured 1 hour(s) prior to arrival.  Cause of complaint: twisting injury while at home. Patient states he was coming down off of a ladder and his knee gave out and he landed on the ground rolling his ankle. There has been a history of no prior problems with this area in the past.  Since onset the symptoms have been moderate in degree with ability to bear weight, but with some pain. His pain is aggraveated by walking and relieved by nothing. Tetanus Status: up to date. ROS    Pertinent positives and negatives are stated within HPI, all other systems reviewed and are negative. Past Surgical History:   Procedure Laterality Date    ABSCESS DRAINAGE  9/18/2007    scrotal. [facility]. Dr. Lay Ramos  2008    testicle    INGUINAL HERNIA REPAIR  3/9/2012    left indirect hernia repaired with mesh, Dr. Jeimy Tyler, [address] [place] [address] OTHER SURGICAL HISTORY  3/9/2012    Left Inguinal Hernia Repair;Removal of Foreign Object Left foot   Social History:  reports that he has been smoking cigarettes. He has a 10.00 pack-year smoking history. He has never used smokeless tobacco. He reports previous alcohol use. He reports that he does not use drugs. Family History: family history includes Cancer (age of onset: [de-identified]) in his father; Heart Disease (age of onset: 48) in his brother. Allergies: Patient has no known allergies.     Physical Exam           ED Triage Vitals [05/25/20 1157]   BP Temp Temp Source Pulse Resp SpO2 Height Weight   135/88 98.9 °F (37.2 °C) (ADVIL;MOTRIN) tablet 600 mg (600 mg Oral Given 5/25/20 1301)        Consults:   None    Procedure(s):   none    MDM:       Films were obtained based on moderate suspicion for bony injury as per history/physical findings . Patient was noted to have a moderate ankle sprain no fracture dislocation was noted with some arthritis. Patient was placed in a postop shoe Ace wrap and crutches. Patient was told to use ice 20 minutes on 20 minutes off and stay nonweightbearing for a few days to help aid in his healing. Patient was told if his pain continues he may need repeat x-ray in a week or 2 after the swelling decreases. Patient was told to alternate Tylenol and ibuprofen for his discomfort. Patient was told to take this on a full stomach. Patient was told to return back to emergency department if symptoms worsen in any way. Patient still able to ambulate and has good DP/PT pulses. Patient does have full flexion extension with pain. Patient neurovascular and sensory intact. Good cap refill. Patient stable for discharge. Plan is subsequently for symptom control, limited use and  immobilization with appropriate outpatient follow-up. Counseling: The emergency provider has spoken with the patient and discussed todays results, in addition to providing specific details for the plan of care and counseling regarding the diagnosis and prognosis. Questions are answered at this time and they are agreeable with the plan. Assessment      1. Moderate left ankle sprain, initial encounter      Plan   Discharge to home  Patient condition is stable    New Medications     New Prescriptions    NAPROXEN (NAPROSYN) 500 MG TABLET    Take 1 tablet by mouth 2 times daily     Electronically signed by Dee Lord PA-C   DD: 5/25/20  **This report was transcribed using voice recognition software. Every effort was made to ensure accuracy; however, inadvertent computerized transcription errors may be present.   END OF ED

## 2020-05-26 ENCOUNTER — CARE COORDINATION (OUTPATIENT)
Dept: CARE COORDINATION | Age: 60
End: 2020-05-26

## 2020-05-26 NOTE — CARE COORDINATION
Patient contacted regarding recent discharge and COVID-19 risk. Discussed COVID-19 related testing which was not done at this time. Test results were not done. Patient informed of results, if available? No     Care Transition Nurse/ Ambulatory Care Manager contacted the patient by telephone to perform post discharge assessment. Verified name and  with patient as identifiers. Patient has following risk factors of: immunocompromised and liver dx. CTN/ACM reviewed discharge instructions, medical action plan and red flags related to discharge diagnosis. Reviewed and educated them on any new and changed medications related to discharge diagnosis. Reports that he will be picking up the Naproxen from the pharmacy today. Advised obtaining a 90-day supply of all daily and as-needed medications. Denies any fever, cough, or SOB at this time. ACM discussed f/u appt from ED visit with his PCP. Reports that he isn't sure if Dr Levar Strickland is still his doctor and was agreeable to the contact information for pre-service once he confirms. Education provided regarding infection prevention, and signs and symptoms of COVID-19 and when to seek medical attention with patient who verbalized understanding. Discussed exposure protocols and quarantine from [address] you at higher risk for severe illness 2019 and given an opportunity for questions and concerns. The patient agrees to contact the COVID-19 hotline 130-588-7006 or PCP office for questions related to their healthcare. CTN/ACM provided contact information for future reference. From CDC: Are you at higher risk for severe illness?  Wash your hands often.  Avoid close contact (6 feet, which is about two arm lengths) with people who are sick.  Put distance between yourself and other people if COVID-19 is spreading in your community.  Clean and disinfect frequently touched surfaces.  Avoid all cruise travel and non-essential air travel.    Call your healthcare professional if you have concerns about COVID-19 and your underlying condition or if you are sick. For more information on steps you can take to protect yourself, see CDC's How to Protect Yourself    Pt declined Loop enrollment. Plan for follow-up call in 7-14 days based on severity of symptoms and risk factors.

## 2020-06-05 ENCOUNTER — CARE COORDINATION (OUTPATIENT)
Dept: CARE COORDINATION | Age: 60
End: 2020-06-05

## 2020-06-27 ENCOUNTER — HOSPITAL ENCOUNTER (EMERGENCY)
Age: 60
Discharge: HOME | End: 2020-06-27
Payer: COMMERCIAL

## 2020-06-27 VITALS
HEART RATE: 75 BPM | OXYGEN SATURATION: 97 % | BODY MASS INDEX: 23.57 KG/M2 | SYSTOLIC BLOOD PRESSURE: 135 MMHG | DIASTOLIC BLOOD PRESSURE: 97 MMHG | RESPIRATION RATE: 16 BRPM | TEMPERATURE: 96.7 F | WEIGHT: 155 LBS

## 2020-06-27 PROCEDURE — 12032 INTMD RPR S/A/T/EXT 2.6-7.5: CPT

## 2020-06-27 PROCEDURE — 99283 EMERGENCY DEPT VISIT LOW MDM: CPT

## 2020-06-27 PROCEDURE — 2500000003 HC RX 250 WO HCPCS: Performed by: PHYSICIAN ASSISTANT

## 2020-06-27 RX ORDER — LIDOCAINE HYDROCHLORIDE 10 MG/ML
10 INJECTION, SOLUTION EPIDURAL; INFILTRATION; INTRACAUDAL; PERINEURAL ONCE
Status: COMPLETED | OUTPATIENT
Start: 2020-06-27 | End: 2020-06-27

## 2020-06-27 RX ADMIN — LIDOCAINE HYDROCHLORIDE 10 ML: 10 INJECTION, SOLUTION EPIDURAL; INFILTRATION; INTRACAUDAL; PERINEURAL at 10:04

## 2020-06-27 ASSESSMENT — PAIN DESCRIPTION - LOCATION: LOCATION: ELBOW

## 2020-06-27 ASSESSMENT — PAIN DESCRIPTION - PAIN TYPE: TYPE: ACUTE PAIN

## 2020-06-27 ASSESSMENT — PAIN DESCRIPTION - ORIENTATION: ORIENTATION: RIGHT

## 2020-06-27 ASSESSMENT — PAIN SCALES - GENERAL: PAINLEVEL_OUTOF10: 7

## 2020-06-27 ASSESSMENT — PAIN DESCRIPTION - DESCRIPTORS: DESCRIPTORS: SORE
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
## 2020-06-29 ENCOUNTER — CARE COORDINATION (OUTPATIENT)
Dept: CARE COORDINATION | Age: 60
End: 2020-06-29

## 2020-07-05 ENCOUNTER — HOSPITAL ENCOUNTER (EMERGENCY)
Age: 60
Discharge: HOME | End: 2020-07-05
Attending: EMERGENCY MEDICINE
Payer: MEDICAID

## 2020-07-05 VITALS
BODY MASS INDEX: 22.73 KG/M2 | DIASTOLIC BLOOD PRESSURE: 88 MMHG | RESPIRATION RATE: 16 BRPM | HEIGHT: 68 IN | SYSTOLIC BLOOD PRESSURE: 132 MMHG | OXYGEN SATURATION: 97 % | WEIGHT: 150 LBS | HEART RATE: 67 BPM | TEMPERATURE: 97 F

## 2020-07-05 PROCEDURE — 6370000000 HC RX 637 (ALT 250 FOR IP): Performed by: NURSE PRACTITIONER

## 2020-07-05 PROCEDURE — 99283 EMERGENCY DEPT VISIT LOW MDM: CPT

## 2020-07-05 RX ORDER — DIAPER,BRIEF,INFANT-TODD,DISP
EACH MISCELLANEOUS ONCE
Status: COMPLETED | OUTPATIENT
Start: 2020-07-05 | End: 2020-07-05

## 2020-07-05 RX ORDER — IBUPROFEN 600 MG/1
600 TABLET ORAL EVERY 6 HOURS PRN
Qty: 28 TABLET | Refills: 0 | Status: SHIPPED | OUTPATIENT
Start: 2020-07-05 | End: 2021-01-01

## 2020-07-05 RX ORDER — IBUPROFEN 400 MG/1
400 TABLET ORAL ONCE
Status: COMPLETED | OUTPATIENT
Start: 2020-07-05 | End: 2020-07-05

## 2020-07-05 RX ORDER — CEFDINIR 300 MG/1
300 CAPSULE ORAL ONCE
Status: COMPLETED | OUTPATIENT
Start: 2020-07-05 | End: 2020-07-05

## 2020-07-05 RX ORDER — CEFDINIR 300 MG/1
300 CAPSULE ORAL 2 TIMES DAILY
Qty: 14 CAPSULE | Refills: 0 | Status: SHIPPED | OUTPATIENT
Start: 2020-07-05 | End: 2020-01-01

## 2020-07-05 RX ADMIN — IBUPROFEN 400 MG: 400 TABLET, FILM COATED ORAL at 13:43

## 2020-07-05 RX ADMIN — BACITRACIN ZINC: 500 OINTMENT TOPICAL at 13:43

## 2020-07-05 RX ADMIN — CEFDINIR 300 MG: 300 CAPSULE ORAL at 13:43

## 2020-07-05 ASSESSMENT — PAIN SCALES - GENERAL
PAINLEVEL_OUTOF10: 5
PAINLEVEL_OUTOF10: 5
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
## 2020-11-15 NOTE — ED PROVIDER NOTES
Independent   HPI:  11/14/20, Time: 10:50 PM KYLE Leach is a 62 y.o. male presenting to the ED for 1 week history of left knee pain and swelling. Patient denies any specific injury, trauma, turning or twisting that he can recall over the last week but reports that over the last week he has been having pain and swelling. He is able to ambulate but expresses pain. States that over a year ago he actually twisted it but nothing recently. He denies fevers with this. He denies any pain or swelling to his left calf. Patient otherwise reports being in normal state of health. He denies any fevers denies any chest pain, shortness of breath or abdominal pain he also has not had any nausea vomiting or diarrhea. States that he has been taken over-the-counter meds without effect. Review of Systems:   A complete review of systems was performed and pertinent positives and negatives are stated within HPI, all other systems reviewed and are negative.          --------------------------------------------- PAST HISTORY ---------------------------------------------  Past Medical History:  has a past medical history of Abscess of hand, left, Abscess of scrotum, Bacterial meningitis, Depression, GERD (gastroesophageal reflux disease), Hepatitis C, Herpes zoster w/ nervous system complication, Human immunodeficiency virus, type 2 ([facility] 2) [facility] Inguinal hernia unilateral, Pneumonia, Shingles (herpes zoster) polyneuropathy, Suicidal ideation, and Vitamin D deficiency. Past Surgical History:  has a past surgical history that includes cyst removal (2008); Abscess Drainage (9/18/2007); Inguinal hernia repair (3/9/2012); and other surgical history (3/9/2012). Social History:  reports that he has been smoking cigarettes. He has a 10.00 pack-year smoking history. He has never used smokeless tobacco. He reports previous alcohol use. He reports that he does not use drugs.     Family History: family history includes Cancer (age of onset: [de-identified]) in his father; Heart Disease (age of onset: 48) in his brother. The patients home medications have been reviewed. Allergies: Patient has no known allergies.     -------------------------------------------------- RESULTS -------------------------------------------------  All laboratory and radiology results have been personally reviewed by myself   LABS:  Results for orders placed or performed during the hospital encounter of 11/14/20   CBC Auto Differential   Result Value Ref Range    WBC 6.6 4.5 - 11.5 E9/L    RBC 4.72 3.80 - 5.80 E12/L    Hemoglobin 16.4 12.5 - 16.5 g/dL    Hematocrit 47.3 37.0 - 54.0 %    .2 (H) 80.0 - 99.9 fL    MCH 34.7 26.0 - 35.0 pg    MCHC 34.7 (H) 32.0 - 34.5 %    RDW 12.2 11.5 - 15.0 fL    Platelets 506 867 - 159 E9/L    MPV 9.5 7.0 - 12.0 fL    Neutrophils % 61.0 43.0 - 80.0 %    Immature Granulocytes % 0.6 0.0 - 5.0 %    Lymphocytes % 19.1 (L) 20.0 - 42.0 %    Monocytes % 12.4 (H) 2.0 - 12.0 %    Eosinophils % 5.8 0.0 - 6.0 %    Basophils % 1.1 0.0 - 2.0 %    Neutrophils Absolute 4.03 1.80 - 7.30 E9/L    Immature Granulocytes # 0.04 E9/L    Lymphocytes Absolute 1.26 (L) 1.50 - 4.00 E9/L    Monocytes Absolute 0.82 0.10 - 0.95 E9/L    Eosinophils Absolute 0.38 0.05 - 0.50 E9/L    Basophils Absolute 0.07 0.00 - 0.20 L1/Z   Basic Metabolic Panel   Result Value Ref Range    Sodium 140 132 - 146 mmol/L    Potassium 4.1 3.5 - 5.0 mmol/L    Chloride 102 98 - 107 mmol/L    CO2 25 22 - 29 mmol/L    Anion Gap 13 7 - 16 mmol/L    Glucose 89 74 - 99 mg/dL    BUN 9 8 - 23 mg/dL    CREATININE 1.4 (H) 0.7 - 1.2 mg/dL    GFR Non-African American >60 >=60 mL/min/1.73    GFR African American >60     Calcium 9.1 8.6 - 10.2 mg/dL   Uric acid   Result Value Ref Range    Uric Acid, Serum 7.7 (H) 3.4 - 7.0 mg/dL       RADIOLOGY:  Interpreted by Radiologist.  XR KNEE LEFT (MIN 4 VIEWS)   Final Result   Mild-to-moderate tricompartmental knee joint space narrowing with marginal   buttressing osteophytes. ------------------------- NURSING NOTES AND VITALS REVIEWED ---------------------------   The nursing notes within the ED encounter and vital signs as below have been reviewed. BP (!) 151/95   Pulse 83   Temp 96.8 °F (36 °C) (Temporal)   Resp 18   Ht 5' 8\" (1.727 m)   Wt 160 lb (72.6 kg)   SpO2 97%   BMI 24.33 kg/m²   Oxygen Saturation Interpretation: Normal      ---------------------------------------------------PHYSICAL EXAM--------------------------------------      Constitutional/General: Alert and oriented x3, well appearing, non toxic in NAD  Head: Normocephalic and atraumatic  Eyes: PERRL, EOMI  Mouth: Oropharynx clear, handling secretions, no trismus  Neck: Supple, full ROM,   Pulmonary: Lungs clear to auscultation bilaterally, no wheezes, rales, or rhonchi. Not in respiratory distress  Cardiovascular:  Regular rate and rhythm, no murmurs, gallops, or rubs. 2+ distal pulses  Abdomen: Soft, non tender, non distended,   Extremities: Moves all extremities x 4. Warm and well perfused, patient with notable welling to supra patella as well as medial aspect. Left lower extremity and right lower extremity compartments are all soft. Full sensation intact. 2+ pulses. No warmth no erythema noted to the left patella region. Skin: warm and dry without rash  Neurologic: GCS 15,  Psych: Normal Affect      ------------------------------ ED COURSE/MEDICAL DECISION MAKING----------------------  Medications   oxyCODONE-acetaminophen (PERCOCET) 5-325 MG per tablet 1 tablet (1 tablet Oral Given 11/14/20 6354)   dexamethasone (DECADRON) injection 10 mg (10 mg Intravenous Given 11/15/20 0028)         ED COURSE:       Medical Decision Making: Will be for labs will also obtain imaging and medicate for symptom relief.   Labs resulted CBC unremarkable, chemistry panel with creatinine level of 1.4 appears to be at patient's baseline, uric acid is elevated at 7.7 patient with gout to the left knee which would explain his knee swelling and pain. Patient was provided Percocet here as well as Decadron. Plan will be for discharge home with close follow-up by his primary care physician. Patient will be sent home with Percocet for pain relief will not send him home with any NSAID as he does have slight bump in his creatinine level will also send him home with colchicine. Patient made aware of diagnosis. He was educated to follow-up with his primary care physician on Monday as well as when to return back to the emergency department. Patient neurovascular intact. He is able to ambulate easily and independently. I do not suspect any septic joint. Patient expressed understanding and safely discharged home    Counseling: The emergency provider has spoken with the patient and discussed todays results, in addition to providing specific details for the plan of care and counseling regarding the diagnosis and prognosis. Questions are answered at this time and they are agreeable with the plan.      --------------------------------- IMPRESSION AND DISPOSITION ---------------------------------    IMPRESSION  1. Acute gout of left knee, unspecified cause    2. Acute pain of left knee    3. Joint swelling        DISPOSITION  Disposition: Discharge to home  Patient condition is good      NOTE: This report was transcribed using voice recognition software.  Every effort was made to ensure accuracy; however, inadvertent computerized transcription errors may be present     CRISTHIAN Lynne CNP  11/15/20 0052  ATTENDING PROVIDER ATTESTATION:     Supervising Physician, on-site, available for consultation, non-participatory in the evaluation or care of this patient       Donovan Smallwood MD  11/15/20 1964

## 2020-11-15 NOTE — ED NOTES
Bed:  HA  Expected date:   Expected time:   Means of arrival:   Comments:  triage     Charles Estrada RN  11/14/20 [id number]

## 2021-01-01 ENCOUNTER — APPOINTMENT (OUTPATIENT)
Dept: CT IMAGING | Age: 61
DRG: 720 | End: 2021-01-01
Payer: MEDICAID

## 2021-01-01 ENCOUNTER — APPOINTMENT (OUTPATIENT)
Dept: MRI IMAGING | Age: 61
DRG: 720 | End: 2021-01-01
Payer: MEDICAID

## 2021-01-01 ENCOUNTER — ANESTHESIA EVENT (OUTPATIENT)
Dept: OPERATING ROOM | Age: 61
DRG: 720 | End: 2021-01-01
Payer: MEDICAID

## 2021-01-01 ENCOUNTER — APPOINTMENT (OUTPATIENT)
Dept: GENERAL RADIOLOGY | Age: 61
DRG: 890 | End: 2021-01-01
Payer: MEDICAID

## 2021-01-01 ENCOUNTER — HOSPITAL ENCOUNTER (INPATIENT)
Age: 61
LOS: 4 days | Discharge: HOSPICE | DRG: 890 | End: 2021-07-08
Attending: EMERGENCY MEDICINE | Admitting: STUDENT IN AN ORGANIZED HEALTH CARE EDUCATION/TRAINING PROGRAM
Payer: MEDICAID

## 2021-01-01 ENCOUNTER — HOSPITAL ENCOUNTER (EMERGENCY)
Age: 61
Discharge: NURSING, REHAB OR LONG-TERM CARE | DRG: 890 | End: 2021-07-03
Attending: EMERGENCY MEDICINE
Payer: MEDICAID

## 2021-01-01 ENCOUNTER — HOSPITAL ENCOUNTER (EMERGENCY)
Age: 61
Discharge: HOME | End: 2021-06-11
Attending: EMERGENCY MEDICINE
Payer: MEDICAID

## 2021-01-01 ENCOUNTER — APPOINTMENT (OUTPATIENT)
Dept: CT IMAGING | Age: 61
DRG: 890 | End: 2021-01-01
Payer: MEDICAID

## 2021-01-01 ENCOUNTER — APPOINTMENT (OUTPATIENT)
Dept: MRI IMAGING | Age: 61
DRG: 890 | End: 2021-01-01
Payer: MEDICAID

## 2021-01-01 ENCOUNTER — HOSPITAL ENCOUNTER (INPATIENT)
Age: 61
LOS: 12 days | Discharge: HOME HEALTH | DRG: 720 | End: 2021-06-06
Attending: EMERGENCY MEDICINE | Admitting: FAMILY MEDICINE
Payer: MEDICAID

## 2021-01-01 ENCOUNTER — HOSPITAL ENCOUNTER (EMERGENCY)
Age: 61
Discharge: HOME | End: 2021-06-10
Attending: EMERGENCY MEDICINE
Payer: MEDICAID

## 2021-01-01 ENCOUNTER — APPOINTMENT (OUTPATIENT)
Dept: GENERAL RADIOLOGY | Age: 61
DRG: 720 | End: 2021-01-01
Payer: MEDICAID

## 2021-01-01 ENCOUNTER — HOSPITAL ENCOUNTER (INPATIENT)
Age: 61
LOS: 9 days | Discharge: NURSING, REHAB OR LONG-TERM CARE | DRG: 890 | End: 2021-06-25
Attending: EMERGENCY MEDICINE | Admitting: FAMILY MEDICINE
Payer: MEDICAID

## 2021-01-01 ENCOUNTER — APPOINTMENT (OUTPATIENT)
Dept: ULTRASOUND IMAGING | Age: 61
DRG: 720 | End: 2021-01-01
Payer: MEDICAID

## 2021-01-01 ENCOUNTER — APPOINTMENT (OUTPATIENT)
Dept: CT IMAGING | Age: 61
End: 2021-01-01
Payer: MEDICAID

## 2021-01-01 ENCOUNTER — TELEPHONE (OUTPATIENT)
Dept: VASCULAR SURGERY | Age: 61
End: 2021-01-01

## 2021-01-01 ENCOUNTER — ANESTHESIA (OUTPATIENT)
Dept: OPERATING ROOM | Age: 61
DRG: 720 | End: 2021-01-01
Payer: MEDICAID

## 2021-01-01 ENCOUNTER — HOSPITAL ENCOUNTER (EMERGENCY)
Age: 61
Discharge: LEFT BEFORE DISCHARGE | End: 2021-06-10
Attending: EMERGENCY MEDICINE
Payer: MEDICAID

## 2021-01-01 ENCOUNTER — HOSPITAL ENCOUNTER (EMERGENCY)
Age: 61
Discharge: LEFT BEFORE DISCHARGE | End: 2021-06-11
Attending: EMERGENCY MEDICINE
Payer: MEDICAID

## 2021-01-01 ENCOUNTER — APPOINTMENT (OUTPATIENT)
Dept: ULTRASOUND IMAGING | Age: 61
DRG: 890 | End: 2021-01-01
Payer: MEDICAID

## 2021-01-01 VITALS
BODY MASS INDEX: 26.98 KG/M2 | SYSTOLIC BLOOD PRESSURE: 88 MMHG | WEIGHT: 178 LBS | RESPIRATION RATE: 18 BRPM | OXYGEN SATURATION: 95 % | TEMPERATURE: 99 F | HEART RATE: 96 BPM | DIASTOLIC BLOOD PRESSURE: 64 MMHG | HEIGHT: 68 IN

## 2021-01-01 VITALS
TEMPERATURE: 96.9 F | SYSTOLIC BLOOD PRESSURE: 173 MMHG | DIASTOLIC BLOOD PRESSURE: 105 MMHG | HEART RATE: 84 BPM | RESPIRATION RATE: 18 BRPM | OXYGEN SATURATION: 96 %

## 2021-01-01 VITALS
BODY MASS INDEX: 26.98 KG/M2 | HEIGHT: 68 IN | OXYGEN SATURATION: 95 % | HEART RATE: 78 BPM | RESPIRATION RATE: 16 BRPM | WEIGHT: 178 LBS | DIASTOLIC BLOOD PRESSURE: 86 MMHG | TEMPERATURE: 97.8 F | SYSTOLIC BLOOD PRESSURE: 151 MMHG

## 2021-01-01 VITALS
TEMPERATURE: 97.1 F | BODY MASS INDEX: 26.98 KG/M2 | HEIGHT: 68 IN | DIASTOLIC BLOOD PRESSURE: 102 MMHG | SYSTOLIC BLOOD PRESSURE: 154 MMHG | HEART RATE: 80 BPM | WEIGHT: 178 LBS | OXYGEN SATURATION: 95 % | RESPIRATION RATE: 20 BRPM

## 2021-01-01 VITALS
OXYGEN SATURATION: 96 % | WEIGHT: 178 LBS | DIASTOLIC BLOOD PRESSURE: 99 MMHG | TEMPERATURE: 97.3 F | HEART RATE: 82 BPM | SYSTOLIC BLOOD PRESSURE: 176 MMHG | RESPIRATION RATE: 20 BRPM | BODY MASS INDEX: 27.06 KG/M2

## 2021-01-01 VITALS
RESPIRATION RATE: 28 BRPM | HEIGHT: 68 IN | TEMPERATURE: 96.9 F | WEIGHT: 178 LBS | BODY MASS INDEX: 26.98 KG/M2 | HEART RATE: 99 BPM | OXYGEN SATURATION: 96 % | SYSTOLIC BLOOD PRESSURE: 95 MMHG | DIASTOLIC BLOOD PRESSURE: 66 MMHG

## 2021-01-01 VITALS
DIASTOLIC BLOOD PRESSURE: 94 MMHG | WEIGHT: 178 LBS | HEART RATE: 110 BPM | BODY MASS INDEX: 26.98 KG/M2 | SYSTOLIC BLOOD PRESSURE: 125 MMHG | RESPIRATION RATE: 18 BRPM | HEIGHT: 68 IN | OXYGEN SATURATION: 100 % | TEMPERATURE: 97.3 F

## 2021-01-01 VITALS
DIASTOLIC BLOOD PRESSURE: 76 MMHG | BODY MASS INDEX: 29.44 KG/M2 | OXYGEN SATURATION: 94 % | HEART RATE: 82 BPM | RESPIRATION RATE: 16 BRPM | HEIGHT: 68 IN | WEIGHT: 194.22 LBS | SYSTOLIC BLOOD PRESSURE: 148 MMHG | TEMPERATURE: 97.8 F

## 2021-01-01 VITALS — SYSTOLIC BLOOD PRESSURE: 108 MMHG | OXYGEN SATURATION: 91 % | DIASTOLIC BLOOD PRESSURE: 83 MMHG

## 2021-01-01 DIAGNOSIS — N18.9 ACUTE RENAL FAILURE SUPERIMPOSED ON CHRONIC KIDNEY DISEASE, UNSPECIFIED CKD STAGE, UNSPECIFIED ACUTE RENAL FAILURE TYPE (HCC): ICD-10-CM

## 2021-01-01 DIAGNOSIS — L03.818 CELLULITIS OF OTHER SPECIFIED SITE: ICD-10-CM

## 2021-01-01 DIAGNOSIS — N17.9 SEPSIS WITH ACUTE RENAL FAILURE WITHOUT SEPTIC SHOCK, DUE TO UNSPECIFIED ORGANISM, UNSPECIFIED ACUTE RENAL FAILURE TYPE (HCC): ICD-10-CM

## 2021-01-01 DIAGNOSIS — N19 UREMIA: ICD-10-CM

## 2021-01-01 DIAGNOSIS — N17.9 ACUTE RENAL FAILURE SUPERIMPOSED ON CHRONIC KIDNEY DISEASE, UNSPECIFIED CKD STAGE, UNSPECIFIED ACUTE RENAL FAILURE TYPE (HCC): ICD-10-CM

## 2021-01-01 DIAGNOSIS — R41.82 ALTERED MENTAL STATUS, UNSPECIFIED ALTERED MENTAL STATUS TYPE: Primary | ICD-10-CM

## 2021-01-01 DIAGNOSIS — M54.2 NECK PAIN: ICD-10-CM

## 2021-01-01 DIAGNOSIS — J18.9 PNEUMONIA OF BOTH LOWER LOBES DUE TO INFECTIOUS ORGANISM: ICD-10-CM

## 2021-01-01 DIAGNOSIS — Z51.89 ENCOUNTER FOR REHABILITATION: Primary | ICD-10-CM

## 2021-01-01 DIAGNOSIS — G93.41 METABOLIC ENCEPHALOPATHY: ICD-10-CM

## 2021-01-01 DIAGNOSIS — N18.6 ESRD ON DIALYSIS (HCC): Primary | ICD-10-CM

## 2021-01-01 DIAGNOSIS — F14.10 COCAINE ABUSE (HCC): ICD-10-CM

## 2021-01-01 DIAGNOSIS — E86.0 DEHYDRATION: ICD-10-CM

## 2021-01-01 DIAGNOSIS — Z45.2 ENCOUNTER FOR INSERTION OF VENOUS ACCESS PORT: ICD-10-CM

## 2021-01-01 DIAGNOSIS — Z53.21 ELOPED FROM EMERGENCY DEPARTMENT: Primary | ICD-10-CM

## 2021-01-01 DIAGNOSIS — R74.01 TRANSAMINITIS: ICD-10-CM

## 2021-01-01 DIAGNOSIS — A41.9 SEPSIS WITH ACUTE RENAL FAILURE WITHOUT SEPTIC SHOCK, DUE TO UNSPECIFIED ORGANISM, UNSPECIFIED ACUTE RENAL FAILURE TYPE (HCC): ICD-10-CM

## 2021-01-01 DIAGNOSIS — E86.0 DEHYDRATION: Primary | ICD-10-CM

## 2021-01-01 DIAGNOSIS — S16.1XXA STRAIN OF NECK MUSCLE, INITIAL ENCOUNTER: ICD-10-CM

## 2021-01-01 DIAGNOSIS — E87.0 HYPERNATREMIA: Primary | ICD-10-CM

## 2021-01-01 DIAGNOSIS — R51.9 NONINTRACTABLE HEADACHE, UNSPECIFIED CHRONICITY PATTERN, UNSPECIFIED HEADACHE TYPE: Primary | ICD-10-CM

## 2021-01-01 DIAGNOSIS — F19.10 POLYSUBSTANCE ABUSE (HCC): ICD-10-CM

## 2021-01-01 DIAGNOSIS — R51.9 ACUTE NONINTRACTABLE HEADACHE, UNSPECIFIED HEADACHE TYPE: Primary | ICD-10-CM

## 2021-01-01 DIAGNOSIS — Z99.2 ESRD ON DIALYSIS (HCC): Primary | ICD-10-CM

## 2021-01-01 DIAGNOSIS — R65.20 SEPSIS WITH ACUTE RENAL FAILURE WITHOUT SEPTIC SHOCK, DUE TO UNSPECIFIED ORGANISM, UNSPECIFIED ACUTE RENAL FAILURE TYPE (HCC): ICD-10-CM

## 2021-01-01 DIAGNOSIS — A41.9 SEPTICEMIA (HCC): ICD-10-CM

## 2021-01-01 DIAGNOSIS — Z99.2 DIALYSIS PATIENT (HCC): ICD-10-CM

## 2021-01-01 LAB
AADO2: 497.4 MMHG
AADO2: 590.3 MMHG
ABSOLUTE CD 3: 247 CELLS/UL (ref 570–2400)
ABSOLUTE CD 3: 425 CELLS/UL (ref 570–2400)
ABSOLUTE CD 4 HELPER: 111 CELLS/UL (ref 430–1800)
ABSOLUTE CD 4 HELPER: 45 CELLS/UL (ref 430–1800)
ABSOLUTE CD 8 (SUPP): 199 CELLS/UL (ref 210–1200)
ABSOLUTE CD 8 (SUPP): 307 CELLS/UL (ref 210–1200)
ACETAMINOPHEN LEVEL: <5 MCG/ML (ref 10–30)
ACETAMINOPHEN LEVEL: <5 MCG/ML (ref 10–30)
ACINETOBACTER BAUMANNII BY PCR: NOT DETECTED
ACINETOBACTER BAUMANNII BY PCR: NOT DETECTED
ALBUMIN SERPL-MCNC: 2.3 G/DL (ref 3.5–5.2)
ALBUMIN SERPL-MCNC: 2.4 G/DL (ref 3.5–5.2)
ALBUMIN SERPL-MCNC: 2.5 G/DL (ref 3.5–5.2)
ALBUMIN SERPL-MCNC: 2.6 G/DL (ref 3.5–5.2)
ALBUMIN SERPL-MCNC: 2.7 G/DL (ref 3.5–5.2)
ALBUMIN SERPL-MCNC: 2.8 G/DL (ref 3.5–5.2)
ALBUMIN SERPL-MCNC: 2.9 G/DL (ref 3.5–5.2)
ALBUMIN SERPL-MCNC: 2.9 G/DL (ref 3.5–5.2)
ALBUMIN SERPL-MCNC: 3 G/DL (ref 3.5–5.2)
ALBUMIN SERPL-MCNC: 3.1 G/DL (ref 3.5–5.2)
ALBUMIN SERPL-MCNC: 3.1 G/DL (ref 3.5–5.2)
ALBUMIN SERPL-MCNC: 3.3 G/DL (ref 3.5–5.2)
ALBUMIN SERPL-MCNC: 3.4 G/DL (ref 3.5–5.2)
ALBUMIN SERPL-MCNC: 3.6 G/DL (ref 3.5–5.2)
ALBUMIN SERPL-MCNC: 3.6 G/DL (ref 3.5–5.2)
ALBUMIN SERPL-MCNC: 4 G/DL (ref 3.5–5.2)
ALP BLD-CCNC: 117 U/L (ref 40–129)
ALP BLD-CCNC: 133 U/L (ref 40–129)
ALP BLD-CCNC: 42 U/L (ref 40–129)
ALP BLD-CCNC: 44 U/L (ref 40–129)
ALP BLD-CCNC: 44 U/L (ref 40–129)
ALP BLD-CCNC: 45 U/L (ref 40–129)
ALP BLD-CCNC: 45 U/L (ref 40–129)
ALP BLD-CCNC: 46 U/L (ref 40–129)
ALP BLD-CCNC: 47 U/L (ref 40–129)
ALP BLD-CCNC: 48 U/L (ref 40–129)
ALP BLD-CCNC: 51 U/L (ref 40–129)
ALP BLD-CCNC: 52 U/L (ref 40–129)
ALP BLD-CCNC: 55 U/L (ref 40–129)
ALP BLD-CCNC: 55 U/L (ref 40–129)
ALP BLD-CCNC: 56 U/L (ref 40–129)
ALP BLD-CCNC: 61 U/L (ref 40–129)
ALP BLD-CCNC: 75 U/L (ref 40–129)
ALP BLD-CCNC: 75 U/L (ref 40–129)
ALP BLD-CCNC: 79 U/L (ref 40–129)
ALP BLD-CCNC: 94 U/L (ref 40–129)
ALP BLD-CCNC: 94 U/L (ref 40–129)
ALT SERPL-CCNC: 120 U/L (ref 0–40)
ALT SERPL-CCNC: 133 U/L (ref 0–40)
ALT SERPL-CCNC: 150 U/L (ref 0–40)
ALT SERPL-CCNC: 170 U/L (ref 0–40)
ALT SERPL-CCNC: 191 U/L (ref 0–40)
ALT SERPL-CCNC: 198 U/L (ref 0–40)
ALT SERPL-CCNC: 21 U/L (ref 0–40)
ALT SERPL-CCNC: 238 U/L (ref 0–40)
ALT SERPL-CCNC: 294 U/L (ref 0–40)
ALT SERPL-CCNC: 319 U/L (ref 0–40)
ALT SERPL-CCNC: 322 U/L (ref 0–40)
ALT SERPL-CCNC: 324 U/L (ref 0–40)
ALT SERPL-CCNC: 33 U/L (ref 0–40)
ALT SERPL-CCNC: 34 U/L (ref 0–40)
ALT SERPL-CCNC: 405 U/L (ref 0–40)
ALT SERPL-CCNC: 46 U/L (ref 0–40)
ALT SERPL-CCNC: 48 U/L (ref 0–40)
ALT SERPL-CCNC: 58 U/L (ref 0–40)
ALT SERPL-CCNC: 62 U/L (ref 0–40)
ALT SERPL-CCNC: 62 U/L (ref 0–40)
ALT SERPL-CCNC: 71 U/L (ref 0–40)
ALT SERPL-CCNC: 83 U/L (ref 0–40)
ALT SERPL-CCNC: 99 U/L (ref 0–40)
AMMONIA: 21 UMOL/L (ref 16–60)
AMMONIA: 31 UMOL/L (ref 16–60)
AMORPHOUS: ABNORMAL
AMORPHOUS: ABNORMAL
AMPHETAMINE SCREEN, URINE: NOT DETECTED
AMPHETAMINE SCREEN, URINE: NOT DETECTED
ANION GAP SERPL CALCULATED.3IONS-SCNC: 10 MMOL/L (ref 7–16)
ANION GAP SERPL CALCULATED.3IONS-SCNC: 11 MMOL/L (ref 7–16)
ANION GAP SERPL CALCULATED.3IONS-SCNC: 12 MMOL/L (ref 7–16)
ANION GAP SERPL CALCULATED.3IONS-SCNC: 13 MMOL/L (ref 7–16)
ANION GAP SERPL CALCULATED.3IONS-SCNC: 14 MMOL/L (ref 7–16)
ANION GAP SERPL CALCULATED.3IONS-SCNC: 15 MMOL/L (ref 7–16)
ANION GAP SERPL CALCULATED.3IONS-SCNC: 16 MMOL/L (ref 7–16)
ANION GAP SERPL CALCULATED.3IONS-SCNC: 17 MMOL/L (ref 7–16)
ANION GAP SERPL CALCULATED.3IONS-SCNC: 17 MMOL/L (ref 7–16)
ANION GAP SERPL CALCULATED.3IONS-SCNC: 18 MMOL/L (ref 7–16)
ANION GAP SERPL CALCULATED.3IONS-SCNC: 19 MMOL/L (ref 7–16)
ANION GAP SERPL CALCULATED.3IONS-SCNC: 20 MMOL/L (ref 7–16)
ANION GAP SERPL CALCULATED.3IONS-SCNC: 21 MMOL/L (ref 7–16)
ANION GAP SERPL CALCULATED.3IONS-SCNC: 21 MMOL/L (ref 7–16)
ANION GAP SERPL CALCULATED.3IONS-SCNC: 5 MMOL/L (ref 7–16)
ANION GAP SERPL CALCULATED.3IONS-SCNC: 7 MMOL/L (ref 7–16)
ANION GAP SERPL CALCULATED.3IONS-SCNC: 8 MMOL/L (ref 7–16)
ANISOCYTOSIS: ABNORMAL
APPEARANCE CSF: CLEAR
APTT: 29.8 SEC (ref 24.5–35.1)
APTT: 34.6 SEC (ref 24.5–35.1)
APTT: 34.7 SEC (ref 24.5–35.1)
APTT: 40.2 SEC (ref 24.5–35.1)
AST SERPL-CCNC: 106 U/L (ref 0–39)
AST SERPL-CCNC: 1108 U/L (ref 0–39)
AST SERPL-CCNC: 116 U/L (ref 0–39)
AST SERPL-CCNC: 128 U/L (ref 0–39)
AST SERPL-CCNC: 129 U/L (ref 0–39)
AST SERPL-CCNC: 147 U/L (ref 0–39)
AST SERPL-CCNC: 204 U/L (ref 0–39)
AST SERPL-CCNC: 283 U/L (ref 0–39)
AST SERPL-CCNC: 301 U/L (ref 0–39)
AST SERPL-CCNC: 319 U/L (ref 0–39)
AST SERPL-CCNC: 32 U/L (ref 0–39)
AST SERPL-CCNC: 494 U/L (ref 0–39)
AST SERPL-CCNC: 50 U/L (ref 0–39)
AST SERPL-CCNC: 65 U/L (ref 0–39)
AST SERPL-CCNC: 696 U/L (ref 0–39)
AST SERPL-CCNC: 71 U/L (ref 0–39)
AST SERPL-CCNC: 74 U/L (ref 0–39)
AST SERPL-CCNC: 748 U/L (ref 0–39)
AST SERPL-CCNC: 784 U/L (ref 0–39)
AST SERPL-CCNC: 82 U/L (ref 0–39)
AST SERPL-CCNC: 83 U/L (ref 0–39)
AST SERPL-CCNC: 831 U/L (ref 0–39)
AST SERPL-CCNC: 90 U/L (ref 0–39)
ATYPICAL LYMPHOCYTE RELATIVE PERCENT: 0.9 % (ref 0–4)
B.E.: -11.3 MMOL/L (ref -3–3)
B.E.: -11.9 MMOL/L (ref -3–3)
BACTERIA: ABNORMAL /HPF
BARBITURATE SCREEN URINE: NOT DETECTED
BARBITURATE SCREEN URINE: NOT DETECTED
BASOPHILS ABSOLUTE: 0 E9/L (ref 0–0.2)
BASOPHILS ABSOLUTE: 0.01 E9/L (ref 0–0.2)
BASOPHILS ABSOLUTE: 0.02 E9/L (ref 0–0.2)
BASOPHILS ABSOLUTE: 0.03 E9/L (ref 0–0.2)
BASOPHILS ABSOLUTE: 0.04 E9/L (ref 0–0.2)
BASOPHILS ABSOLUTE: 0.05 E9/L (ref 0–0.2)
BASOPHILS ABSOLUTE: 0.06 E9/L (ref 0–0.2)
BASOPHILS ABSOLUTE: 0.07 E9/L (ref 0–0.2)
BASOPHILS ABSOLUTE: 0.08 E9/L (ref 0–0.2)
BASOPHILS ABSOLUTE: 0.08 E9/L (ref 0–0.2)
BASOPHILS ABSOLUTE: 0.1 E9/L (ref 0–0.2)
BASOPHILS RELATIVE PERCENT: 0 % (ref 0–2)
BASOPHILS RELATIVE PERCENT: 0.1 % (ref 0–2)
BASOPHILS RELATIVE PERCENT: 0.2 % (ref 0–2)
BASOPHILS RELATIVE PERCENT: 0.3 % (ref 0–2)
BASOPHILS RELATIVE PERCENT: 0.4 % (ref 0–2)
BASOPHILS RELATIVE PERCENT: 0.5 % (ref 0–2)
BASOPHILS RELATIVE PERCENT: 0.6 % (ref 0–2)
BASOPHILS RELATIVE PERCENT: 0.6 % (ref 0–2)
BASOPHILS RELATIVE PERCENT: 0.7 % (ref 0–2)
BASOPHILS RELATIVE PERCENT: 0.7 % (ref 0–2)
BASOPHILS RELATIVE PERCENT: 0.8 % (ref 0–2)
BASOPHILS RELATIVE PERCENT: 0.9 % (ref 0–2)
BASOPHILS RELATIVE PERCENT: 0.9 % (ref 0–2)
BASOPHILS RELATIVE PERCENT: 1 % (ref 0–2)
BASOPHILS RELATIVE PERCENT: 1.1 % (ref 0–2)
BASOPHILS RELATIVE PERCENT: 1.2 % (ref 0–2)
BASOPHILS RELATIVE PERCENT: 1.2 % (ref 0–2)
BASOPHILS RELATIVE PERCENT: 1.3 % (ref 0–2)
BASOPHILS RELATIVE PERCENT: 1.5 % (ref 0–2)
BASOPHILS RELATIVE PERCENT: 1.7 % (ref 0–2)
BENZODIAZEPINE SCREEN, URINE: NOT DETECTED
BENZODIAZEPINE SCREEN, URINE: NOT DETECTED
BILIRUB SERPL-MCNC: 0.3 MG/DL (ref 0–1.2)
BILIRUB SERPL-MCNC: 0.4 MG/DL (ref 0–1.2)
BILIRUB SERPL-MCNC: 0.5 MG/DL (ref 0–1.2)
BILIRUB SERPL-MCNC: 0.6 MG/DL (ref 0–1.2)
BILIRUB SERPL-MCNC: 0.7 MG/DL (ref 0–1.2)
BILIRUB SERPL-MCNC: 0.8 MG/DL (ref 0–1.2)
BILIRUB SERPL-MCNC: 0.9 MG/DL (ref 0–1.2)
BILIRUBIN URINE: ABNORMAL
BILIRUBIN URINE: ABNORMAL
BILIRUBIN URINE: NEGATIVE
BILIRUBIN URINE: NEGATIVE
BLOOD CULTURE, ROUTINE: ABNORMAL
BLOOD CULTURE, ROUTINE: ABNORMAL
BLOOD CULTURE, ROUTINE: NORMAL
BLOOD, URINE: ABNORMAL
BOTTLE TYPE: ABNORMAL
BOTTLE TYPE: ABNORMAL
BUN BLDV-MCNC: 108 MG/DL (ref 6–23)
BUN BLDV-MCNC: 114 MG/DL (ref 6–23)
BUN BLDV-MCNC: 122 MG/DL (ref 6–23)
BUN BLDV-MCNC: 123 MG/DL (ref 6–23)
BUN BLDV-MCNC: 128 MG/DL (ref 6–23)
BUN BLDV-MCNC: 19 MG/DL (ref 6–23)
BUN BLDV-MCNC: 19 MG/DL (ref 6–23)
BUN BLDV-MCNC: 20 MG/DL (ref 6–23)
BUN BLDV-MCNC: 22 MG/DL (ref 6–23)
BUN BLDV-MCNC: 23 MG/DL (ref 6–23)
BUN BLDV-MCNC: 25 MG/DL (ref 6–23)
BUN BLDV-MCNC: 28 MG/DL (ref 6–23)
BUN BLDV-MCNC: 30 MG/DL (ref 6–23)
BUN BLDV-MCNC: 34 MG/DL (ref 6–23)
BUN BLDV-MCNC: 36 MG/DL (ref 6–23)
BUN BLDV-MCNC: 38 MG/DL (ref 6–23)
BUN BLDV-MCNC: 41 MG/DL (ref 6–23)
BUN BLDV-MCNC: 43 MG/DL (ref 6–23)
BUN BLDV-MCNC: 44 MG/DL (ref 6–23)
BUN BLDV-MCNC: 50 MG/DL (ref 6–23)
BUN BLDV-MCNC: 51 MG/DL (ref 6–23)
BUN BLDV-MCNC: 52 MG/DL (ref 6–23)
BUN BLDV-MCNC: 53 MG/DL (ref 6–23)
BUN BLDV-MCNC: 54 MG/DL (ref 6–23)
BUN BLDV-MCNC: 57 MG/DL (ref 6–23)
BUN BLDV-MCNC: 65 MG/DL (ref 6–23)
BUN BLDV-MCNC: 74 MG/DL (ref 6–23)
BUN BLDV-MCNC: 76 MG/DL (ref 6–23)
BUN BLDV-MCNC: 77 MG/DL (ref 6–23)
BUN BLDV-MCNC: 77 MG/DL (ref 6–23)
BUN BLDV-MCNC: 78 MG/DL (ref 6–23)
BUN BLDV-MCNC: 85 MG/DL (ref 6–23)
BUN BLDV-MCNC: 88 MG/DL (ref 6–23)
BUN BLDV-MCNC: 93 MG/DL (ref 6–23)
BURR CELLS: ABNORMAL
BURR CELLS: ABNORMAL
C-REACTIVE PROTEIN: 19 MG/DL (ref 0–0.4)
CALCIUM SERPL-MCNC: 7.2 MG/DL (ref 8.6–10.2)
CALCIUM SERPL-MCNC: 7.3 MG/DL (ref 8.6–10.2)
CALCIUM SERPL-MCNC: 7.5 MG/DL (ref 8.6–10.2)
CALCIUM SERPL-MCNC: 7.6 MG/DL (ref 8.6–10.2)
CALCIUM SERPL-MCNC: 7.7 MG/DL (ref 8.6–10.2)
CALCIUM SERPL-MCNC: 7.8 MG/DL (ref 8.6–10.2)
CALCIUM SERPL-MCNC: 7.8 MG/DL (ref 8.6–10.2)
CALCIUM SERPL-MCNC: 7.9 MG/DL (ref 8.6–10.2)
CALCIUM SERPL-MCNC: 8 MG/DL (ref 8.6–10.2)
CALCIUM SERPL-MCNC: 8.1 MG/DL (ref 8.6–10.2)
CALCIUM SERPL-MCNC: 8.3 MG/DL (ref 8.6–10.2)
CALCIUM SERPL-MCNC: 8.5 MG/DL (ref 8.6–10.2)
CALCIUM SERPL-MCNC: 8.5 MG/DL (ref 8.6–10.2)
CALCIUM SERPL-MCNC: 8.6 MG/DL (ref 8.6–10.2)
CALCIUM SERPL-MCNC: 8.7 MG/DL (ref 8.6–10.2)
CALCIUM SERPL-MCNC: 8.7 MG/DL (ref 8.6–10.2)
CALCIUM SERPL-MCNC: 8.8 MG/DL (ref 8.6–10.2)
CALCIUM SERPL-MCNC: 8.8 MG/DL (ref 8.6–10.2)
CALCIUM SERPL-MCNC: 8.9 MG/DL (ref 8.6–10.2)
CALCIUM SERPL-MCNC: 9.1 MG/DL (ref 8.6–10.2)
CALCIUM SERPL-MCNC: 9.2 MG/DL (ref 8.6–10.2)
CALCIUM SERPL-MCNC: 9.2 MG/DL (ref 8.6–10.2)
CALCIUM SERPL-MCNC: 9.4 MG/DL (ref 8.6–10.2)
CALCIUM SERPL-MCNC: 9.6 MG/DL (ref 8.6–10.2)
CANDIDA ALBICANS BY PCR: NOT DETECTED
CANDIDA ALBICANS BY PCR: NOT DETECTED
CANDIDA GLABRATA BY PCR: NOT DETECTED
CANDIDA GLABRATA BY PCR: NOT DETECTED
CANDIDA KRUSEI BY PCR: NOT DETECTED
CANDIDA KRUSEI BY PCR: NOT DETECTED
CANDIDA PARAPSILOSIS BY PCR: NOT DETECTED
CANDIDA PARAPSILOSIS BY PCR: NOT DETECTED
CANDIDA TROPICALIS BY PCR: NOT DETECTED
CANDIDA TROPICALIS BY PCR: NOT DETECTED
CANNABINOID SCREEN URINE: NOT DETECTED
CANNABINOID SCREEN URINE: NOT DETECTED
CD4/CD8 RATIO: 0.23 RATIO (ref 0.8–3.9)
CD4/CD8 RATIO: 0.36 RATIO (ref 0.8–3.9)
CHLORIDE BLD-SCNC: 100 MMOL/L (ref 98–107)
CHLORIDE BLD-SCNC: 102 MMOL/L (ref 98–107)
CHLORIDE BLD-SCNC: 102 MMOL/L (ref 98–107)
CHLORIDE BLD-SCNC: 103 MMOL/L (ref 98–107)
CHLORIDE BLD-SCNC: 105 MMOL/L (ref 98–107)
CHLORIDE BLD-SCNC: 105 MMOL/L (ref 98–107)
CHLORIDE BLD-SCNC: 106 MMOL/L (ref 98–107)
CHLORIDE BLD-SCNC: 107 MMOL/L (ref 98–107)
CHLORIDE BLD-SCNC: 108 MMOL/L (ref 98–107)
CHLORIDE BLD-SCNC: 108 MMOL/L (ref 98–107)
CHLORIDE BLD-SCNC: 110 MMOL/L (ref 98–107)
CHLORIDE BLD-SCNC: 111 MMOL/L (ref 98–107)
CHLORIDE BLD-SCNC: 112 MMOL/L (ref 98–107)
CHLORIDE BLD-SCNC: 114 MMOL/L (ref 98–107)
CHLORIDE BLD-SCNC: 116 MMOL/L (ref 98–107)
CHLORIDE BLD-SCNC: 116 MMOL/L (ref 98–107)
CHLORIDE BLD-SCNC: 117 MMOL/L (ref 98–107)
CHLORIDE BLD-SCNC: 117 MMOL/L (ref 98–107)
CHLORIDE BLD-SCNC: 122 MMOL/L (ref 98–107)
CHLORIDE BLD-SCNC: 122 MMOL/L (ref 98–107)
CHLORIDE BLD-SCNC: 123 MMOL/L (ref 98–107)
CHLORIDE BLD-SCNC: 94 MMOL/L (ref 98–107)
CHLORIDE BLD-SCNC: 94 MMOL/L (ref 98–107)
CHLORIDE BLD-SCNC: 96 MMOL/L (ref 98–107)
CHLORIDE BLD-SCNC: 99 MMOL/L (ref 98–107)
CHLORIDE URINE RANDOM: 31 MMOL/L
CHLORIDE URINE RANDOM: 52 MMOL/L
CHLORIDE URINE RANDOM: <20 MMOL/L
CHOLESTEROL, TOTAL: 184 MG/DL (ref 0–199)
CHOLESTEROL, TOTAL: 191 MG/DL (ref 0–199)
CHP ED QC CHECK: YES
CLARITY: CLEAR
CO2: 14 MMOL/L (ref 22–29)
CO2: 14 MMOL/L (ref 22–29)
CO2: 15 MMOL/L (ref 22–29)
CO2: 16 MMOL/L (ref 22–29)
CO2: 17 MMOL/L (ref 22–29)
CO2: 17 MMOL/L (ref 22–29)
CO2: 18 MMOL/L (ref 22–29)
CO2: 19 MMOL/L (ref 22–29)
CO2: 20 MMOL/L (ref 22–29)
CO2: 20 MMOL/L (ref 22–29)
CO2: 21 MMOL/L (ref 22–29)
CO2: 22 MMOL/L (ref 22–29)
CO2: 22 MMOL/L (ref 22–29)
CO2: 23 MMOL/L (ref 22–29)
CO2: 24 MMOL/L (ref 22–29)
CO2: 25 MMOL/L (ref 22–29)
CO2: 26 MMOL/L (ref 22–29)
CO2: 26 MMOL/L (ref 22–29)
CO2: 28 MMOL/L (ref 22–29)
CO2: 28 MMOL/L (ref 22–29)
CO2: 29 MMOL/L (ref 22–29)
COCAINE METABOLITE SCREEN URINE: POSITIVE
COCAINE METABOLITE SCREEN URINE: POSITIVE
COHB: 0.2 % (ref 0–1.5)
COHB: 0.3 % (ref 0–1.5)
COLOR CSF: COLORLESS
COLOR: ABNORMAL
COLOR: YELLOW
CREAT SERPL-MCNC: 1.3 MG/DL (ref 0.7–1.2)
CREAT SERPL-MCNC: 1.3 MG/DL (ref 0.7–1.2)
CREAT SERPL-MCNC: 1.4 MG/DL (ref 0.7–1.2)
CREAT SERPL-MCNC: 1.5 MG/DL (ref 0.7–1.2)
CREAT SERPL-MCNC: 1.5 MG/DL (ref 0.7–1.2)
CREAT SERPL-MCNC: 1.6 MG/DL (ref 0.7–1.2)
CREAT SERPL-MCNC: 1.9 MG/DL (ref 0.7–1.2)
CREAT SERPL-MCNC: 10.8 MG/DL (ref 0.7–1.2)
CREAT SERPL-MCNC: 11.1 MG/DL (ref 0.7–1.2)
CREAT SERPL-MCNC: 2.2 MG/DL (ref 0.7–1.2)
CREAT SERPL-MCNC: 2.2 MG/DL (ref 0.7–1.2)
CREAT SERPL-MCNC: 2.4 MG/DL (ref 0.7–1.2)
CREAT SERPL-MCNC: 2.9 MG/DL (ref 0.7–1.2)
CREAT SERPL-MCNC: 3 MG/DL (ref 0.7–1.2)
CREAT SERPL-MCNC: 3.1 MG/DL (ref 0.7–1.2)
CREAT SERPL-MCNC: 3.5 MG/DL (ref 0.7–1.2)
CREAT SERPL-MCNC: 3.6 MG/DL (ref 0.7–1.2)
CREAT SERPL-MCNC: 3.9 MG/DL (ref 0.7–1.2)
CREAT SERPL-MCNC: 4.2 MG/DL (ref 0.7–1.2)
CREAT SERPL-MCNC: 4.6 MG/DL (ref 0.7–1.2)
CREAT SERPL-MCNC: 4.7 MG/DL (ref 0.7–1.2)
CREAT SERPL-MCNC: 4.8 MG/DL (ref 0.7–1.2)
CREAT SERPL-MCNC: 4.8 MG/DL (ref 0.7–1.2)
CREAT SERPL-MCNC: 5.1 MG/DL (ref 0.7–1.2)
CREAT SERPL-MCNC: 5.2 MG/DL (ref 0.7–1.2)
CREAT SERPL-MCNC: 5.6 MG/DL (ref 0.7–1.2)
CREAT SERPL-MCNC: 5.8 MG/DL (ref 0.7–1.2)
CREAT SERPL-MCNC: 6.6 MG/DL (ref 0.7–1.2)
CREAT SERPL-MCNC: 7.1 MG/DL (ref 0.7–1.2)
CREAT SERPL-MCNC: 7.9 MG/DL (ref 0.7–1.2)
CREAT SERPL-MCNC: 8.6 MG/DL (ref 0.7–1.2)
CREAT SERPL-MCNC: 8.9 MG/DL (ref 0.7–1.2)
CREAT SERPL-MCNC: 9.4 MG/DL (ref 0.7–1.2)
CREAT SERPL-MCNC: 9.5 MG/DL (ref 0.7–1.2)
CREAT SERPL-MCNC: 9.9 MG/DL (ref 0.7–1.2)
CREATININE URINE: 144 MG/DL (ref 40–278)
CRITICAL: ABNORMAL
CRITICAL: ABNORMAL
CRYPTOCOCCUS NEOFORMANS/GATTII CSF BY PCR: NOT DETECTED
CSF CULTURE: NORMAL
CULTURE, BLOOD 2: ABNORMAL
CULTURE, BLOOD 2: NORMAL
CYTOMEGALOVIRUS CSF BY PCR: NOT DETECTED
DATE ANALYZED: ABNORMAL
DATE ANALYZED: ABNORMAL
DATE OF COLLECTION: ABNORMAL
DATE OF COLLECTION: ABNORMAL
DIRECT EXAM: NORMAL
DIRECT EXAM: NORMAL
EER HIV GENOTYPING: NORMAL
EKG ATRIAL RATE: 76 BPM
EKG ATRIAL RATE: 82 BPM
EKG ATRIAL RATE: 90 BPM
EKG ATRIAL RATE: 96 BPM
EKG P AXIS: 49 DEGREES
EKG P AXIS: 52 DEGREES
EKG P AXIS: 62 DEGREES
EKG P AXIS: 72 DEGREES
EKG P-R INTERVAL: 144 MS
EKG P-R INTERVAL: 144 MS
EKG P-R INTERVAL: 148 MS
EKG P-R INTERVAL: 152 MS
EKG Q-T INTERVAL: 366 MS
EKG Q-T INTERVAL: 392 MS
EKG Q-T INTERVAL: 416 MS
EKG Q-T INTERVAL: 418 MS
EKG QRS DURATION: 76 MS
EKG QRS DURATION: 76 MS
EKG QRS DURATION: 78 MS
EKG QRS DURATION: 86 MS
EKG QTC CALCULATION (BAZETT): 462 MS
EKG QTC CALCULATION (BAZETT): 468 MS
EKG QTC CALCULATION (BAZETT): 479 MS
EKG QTC CALCULATION (BAZETT): 488 MS
EKG R AXIS: 45 DEGREES
EKG R AXIS: 59 DEGREES
EKG R AXIS: 62 DEGREES
EKG R AXIS: 70 DEGREES
EKG T AXIS: 63 DEGREES
EKG T AXIS: 64 DEGREES
EKG T AXIS: 65 DEGREES
EKG T AXIS: 83 DEGREES
EKG VENTRICULAR RATE: 76 BPM
EKG VENTRICULAR RATE: 82 BPM
EKG VENTRICULAR RATE: 90 BPM
EKG VENTRICULAR RATE: 96 BPM
ENTEROBACTER CLOACAE COMPLEX BY PCR: NOT DETECTED
ENTEROBACTER CLOACAE COMPLEX BY PCR: NOT DETECTED
ENTEROBACTERALES BY PCR: NOT DETECTED
ENTEROBACTERALES BY PCR: NOT DETECTED
ENTEROCOCCUS BY PCR: NOT DETECTED
ENTEROCOCCUS BY PCR: NOT DETECTED
ENTEROVIRUS CSF BY PCR: NOT DETECTED
EOSINOPHIL, URINE: 0 % (ref 0–1)
EOSINOPHILS ABSOLUTE: 0 E9/L (ref 0.05–0.5)
EOSINOPHILS ABSOLUTE: 0.01 E9/L (ref 0.05–0.5)
EOSINOPHILS ABSOLUTE: 0.02 E9/L (ref 0.05–0.5)
EOSINOPHILS ABSOLUTE: 0.07 E9/L (ref 0.05–0.5)
EOSINOPHILS ABSOLUTE: 0.1 E9/L (ref 0.05–0.5)
EOSINOPHILS ABSOLUTE: 0.13 E9/L (ref 0.05–0.5)
EOSINOPHILS ABSOLUTE: 0.17 E9/L (ref 0.05–0.5)
EOSINOPHILS ABSOLUTE: 0.26 E9/L (ref 0.05–0.5)
EOSINOPHILS ABSOLUTE: 0.27 E9/L (ref 0.05–0.5)
EOSINOPHILS ABSOLUTE: 0.43 E9/L (ref 0.05–0.5)
EOSINOPHILS ABSOLUTE: 0.45 E9/L (ref 0.05–0.5)
EOSINOPHILS ABSOLUTE: 0.5 E9/L (ref 0.05–0.5)
EOSINOPHILS ABSOLUTE: 0.51 E9/L (ref 0.05–0.5)
EOSINOPHILS ABSOLUTE: 0.51 E9/L (ref 0.05–0.5)
EOSINOPHILS ABSOLUTE: 0.52 E9/L (ref 0.05–0.5)
EOSINOPHILS ABSOLUTE: 0.55 E9/L (ref 0.05–0.5)
EOSINOPHILS ABSOLUTE: 0.56 E9/L (ref 0.05–0.5)
EOSINOPHILS ABSOLUTE: 0.6 E9/L (ref 0.05–0.5)
EOSINOPHILS ABSOLUTE: 0.6 E9/L (ref 0.05–0.5)
EOSINOPHILS ABSOLUTE: 0.65 E9/L (ref 0.05–0.5)
EOSINOPHILS ABSOLUTE: 0.71 E9/L (ref 0.05–0.5)
EOSINOPHILS ABSOLUTE: 0.75 E9/L (ref 0.05–0.5)
EOSINOPHILS ABSOLUTE: 0.79 E9/L (ref 0.05–0.5)
EOSINOPHILS ABSOLUTE: 0.96 E9/L (ref 0.05–0.5)
EOSINOPHILS ABSOLUTE: 1.27 E9/L (ref 0.05–0.5)
EOSINOPHILS ABSOLUTE: 1.31 E9/L (ref 0.05–0.5)
EOSINOPHILS ABSOLUTE: 1.55 E9/L (ref 0.05–0.5)
EOSINOPHILS RELATIVE PERCENT: 0 % (ref 0–6)
EOSINOPHILS RELATIVE PERCENT: 0.2 % (ref 0–6)
EOSINOPHILS RELATIVE PERCENT: 0.3 % (ref 0–6)
EOSINOPHILS RELATIVE PERCENT: 1 % (ref 0–6)
EOSINOPHILS RELATIVE PERCENT: 1.5 % (ref 0–6)
EOSINOPHILS RELATIVE PERCENT: 1.7 % (ref 0–6)
EOSINOPHILS RELATIVE PERCENT: 1.8 % (ref 0–6)
EOSINOPHILS RELATIVE PERCENT: 10 % (ref 0–6)
EOSINOPHILS RELATIVE PERCENT: 10.2 % (ref 0–6)
EOSINOPHILS RELATIVE PERCENT: 11.9 % (ref 0–6)
EOSINOPHILS RELATIVE PERCENT: 13.9 % (ref 0–6)
EOSINOPHILS RELATIVE PERCENT: 21.4 % (ref 0–6)
EOSINOPHILS RELATIVE PERCENT: 24.2 % (ref 0–6)
EOSINOPHILS RELATIVE PERCENT: 27.5 % (ref 0–6)
EOSINOPHILS RELATIVE PERCENT: 3.5 % (ref 0–6)
EOSINOPHILS RELATIVE PERCENT: 5.9 % (ref 0–6)
EOSINOPHILS RELATIVE PERCENT: 6 % (ref 0–6)
EOSINOPHILS RELATIVE PERCENT: 6.2 % (ref 0–6)
EOSINOPHILS RELATIVE PERCENT: 6.8 % (ref 0–6)
EOSINOPHILS RELATIVE PERCENT: 7.1 % (ref 0–6)
EOSINOPHILS RELATIVE PERCENT: 7.2 % (ref 0–6)
EOSINOPHILS RELATIVE PERCENT: 7.6 % (ref 0–6)
EOSINOPHILS RELATIVE PERCENT: 7.8 % (ref 0–6)
EOSINOPHILS RELATIVE PERCENT: 7.8 % (ref 0–6)
EOSINOPHILS RELATIVE PERCENT: 8.9 % (ref 0–6)
EOSINOPHILS RELATIVE PERCENT: 9.4 % (ref 0–6)
EPITHELIAL CELLS, UA: ABNORMAL /HPF
ESCHERICHIA COLI BY PCR: NOT DETECTED
ESCHERICHIA COLI BY PCR: NOT DETECTED
ESCHERICHIA COLI K1 CSF BY PCR: NOT DETECTED
ETHANOL: <10 MG/DL (ref 0–0.08)
ETHANOL: <10 MG/DL (ref 0–0.08)
FENTANYL SCREEN, URINE: NOT DETECTED
FENTANYL SCREEN, URINE: POSITIVE
FIO2: 100 %
FIO2: 100 %
GFR AFRICAN AMERICAN: 10
GFR AFRICAN AMERICAN: 10
GFR AFRICAN AMERICAN: 12
GFR AFRICAN AMERICAN: 13
GFR AFRICAN AMERICAN: 14
GFR AFRICAN AMERICAN: 14
GFR AFRICAN AMERICAN: 15
GFR AFRICAN AMERICAN: 16
GFR AFRICAN AMERICAN: 17
GFR AFRICAN AMERICAN: 19
GFR AFRICAN AMERICAN: 21
GFR AFRICAN AMERICAN: 22
GFR AFRICAN AMERICAN: 25
GFR AFRICAN AMERICAN: 26
GFR AFRICAN AMERICAN: 27
GFR AFRICAN AMERICAN: 33
GFR AFRICAN AMERICAN: 37
GFR AFRICAN AMERICAN: 37
GFR AFRICAN AMERICAN: 44
GFR AFRICAN AMERICAN: 53
GFR AFRICAN AMERICAN: 57
GFR AFRICAN AMERICAN: 57
GFR AFRICAN AMERICAN: 6
GFR AFRICAN AMERICAN: 7
GFR AFRICAN AMERICAN: 8
GFR AFRICAN AMERICAN: 8
GFR AFRICAN AMERICAN: >60
GFR NON-AFRICAN AMERICAN: 10 ML/MIN/1.73
GFR NON-AFRICAN AMERICAN: 10 ML/MIN/1.73
GFR NON-AFRICAN AMERICAN: 12 ML/MIN/1.73
GFR NON-AFRICAN AMERICAN: 13 ML/MIN/1.73
GFR NON-AFRICAN AMERICAN: 14 ML/MIN/1.73
GFR NON-AFRICAN AMERICAN: 14 ML/MIN/1.73
GFR NON-AFRICAN AMERICAN: 15 ML/MIN/1.73
GFR NON-AFRICAN AMERICAN: 16 ML/MIN/1.73
GFR NON-AFRICAN AMERICAN: 17 ML/MIN/1.73
GFR NON-AFRICAN AMERICAN: 19 ML/MIN/1.73
GFR NON-AFRICAN AMERICAN: 21 ML/MIN/1.73
GFR NON-AFRICAN AMERICAN: 22 ML/MIN/1.73
GFR NON-AFRICAN AMERICAN: 25 ML/MIN/1.73
GFR NON-AFRICAN AMERICAN: 26 ML/MIN/1.73
GFR NON-AFRICAN AMERICAN: 27 ML/MIN/1.73
GFR NON-AFRICAN AMERICAN: 33 ML/MIN/1.73
GFR NON-AFRICAN AMERICAN: 37 ML/MIN/1.73
GFR NON-AFRICAN AMERICAN: 37 ML/MIN/1.73
GFR NON-AFRICAN AMERICAN: 44 ML/MIN/1.73
GFR NON-AFRICAN AMERICAN: 53 ML/MIN/1.73
GFR NON-AFRICAN AMERICAN: 57 ML/MIN/1.73
GFR NON-AFRICAN AMERICAN: 57 ML/MIN/1.73
GFR NON-AFRICAN AMERICAN: 6 ML/MIN/1.73
GFR NON-AFRICAN AMERICAN: 7 ML/MIN/1.73
GFR NON-AFRICAN AMERICAN: 8 ML/MIN/1.73
GFR NON-AFRICAN AMERICAN: 8 ML/MIN/1.73
GFR NON-AFRICAN AMERICAN: >60 ML/MIN/1.73
GLUCOSE BLD-MCNC: 100 MG/DL (ref 74–99)
GLUCOSE BLD-MCNC: 100 MG/DL (ref 74–99)
GLUCOSE BLD-MCNC: 103 MG/DL (ref 74–99)
GLUCOSE BLD-MCNC: 106 MG/DL (ref 74–99)
GLUCOSE BLD-MCNC: 110 MG/DL (ref 74–99)
GLUCOSE BLD-MCNC: 111 MG/DL (ref 74–99)
GLUCOSE BLD-MCNC: 111 MG/DL (ref 74–99)
GLUCOSE BLD-MCNC: 113 MG/DL (ref 74–99)
GLUCOSE BLD-MCNC: 115 MG/DL (ref 74–99)
GLUCOSE BLD-MCNC: 117 MG/DL (ref 74–99)
GLUCOSE BLD-MCNC: 117 MG/DL (ref 74–99)
GLUCOSE BLD-MCNC: 118 MG/DL (ref 74–99)
GLUCOSE BLD-MCNC: 123 MG/DL (ref 74–99)
GLUCOSE BLD-MCNC: 124 MG/DL (ref 74–99)
GLUCOSE BLD-MCNC: 161 MG/DL (ref 74–99)
GLUCOSE BLD-MCNC: 162 MG/DL
GLUCOSE BLD-MCNC: 169 MG/DL (ref 74–99)
GLUCOSE BLD-MCNC: 71 MG/DL (ref 74–99)
GLUCOSE BLD-MCNC: 78 MG/DL (ref 74–99)
GLUCOSE BLD-MCNC: 80 MG/DL (ref 74–99)
GLUCOSE BLD-MCNC: 81 MG/DL (ref 74–99)
GLUCOSE BLD-MCNC: 82 MG/DL (ref 74–99)
GLUCOSE BLD-MCNC: 82 MG/DL (ref 74–99)
GLUCOSE BLD-MCNC: 83 MG/DL (ref 74–99)
GLUCOSE BLD-MCNC: 87 MG/DL (ref 74–99)
GLUCOSE BLD-MCNC: 89 MG/DL (ref 74–99)
GLUCOSE BLD-MCNC: 90 MG/DL (ref 74–99)
GLUCOSE BLD-MCNC: 90 MG/DL (ref 74–99)
GLUCOSE BLD-MCNC: 91 MG/DL (ref 74–99)
GLUCOSE BLD-MCNC: 95 MG/DL (ref 74–99)
GLUCOSE BLD-MCNC: 95 MG/DL (ref 74–99)
GLUCOSE BLD-MCNC: 97 MG/DL (ref 74–99)
GLUCOSE BLD-MCNC: 98 MG/DL (ref 74–99)
GLUCOSE BLD-MCNC: 98 MG/DL (ref 74–99)
GLUCOSE BLD-MCNC: 99 MG/DL (ref 74–99)
GLUCOSE BLD-MCNC: 99 MG/DL (ref 74–99)
GLUCOSE URINE: NEGATIVE MG/DL
GLUCOSE, CSF: 41 MG/DL (ref 40–70)
GRAM STAIN RESULT: NORMAL
HAEMOPHILUS INFLUENZAE BY PCR: NOT DETECTED
HAEMOPHILUS INFLUENZAE BY PCR: NOT DETECTED
HAEMOPHILUS INFLUENZAE CSF BY PCR: NOT DETECTED
HAV IGM SER IA-ACNC: ABNORMAL
HAV IGM SER IA-ACNC: NORMAL
HBA1C MFR BLD: 4.9 % (ref 4–5.6)
HBA1C MFR BLD: 5.9 % (ref 4–5.6)
HBV SURFACE AB TITR SER: NORMAL {TITER}
HBV SURFACE AB TITR SER: NORMAL {TITER}
HCO3: 14.9 MMOL/L (ref 22–26)
HCO3: 15 MMOL/L (ref 22–26)
HCT VFR BLD CALC: 28 % (ref 37–54)
HCT VFR BLD CALC: 29.4 % (ref 37–54)
HCT VFR BLD CALC: 31 % (ref 37–54)
HCT VFR BLD CALC: 32 % (ref 37–54)
HCT VFR BLD CALC: 32.1 % (ref 37–54)
HCT VFR BLD CALC: 32.6 % (ref 37–54)
HCT VFR BLD CALC: 32.6 % (ref 37–54)
HCT VFR BLD CALC: 32.9 % (ref 37–54)
HCT VFR BLD CALC: 33.3 % (ref 37–54)
HCT VFR BLD CALC: 33.5 % (ref 37–54)
HCT VFR BLD CALC: 33.6 % (ref 37–54)
HCT VFR BLD CALC: 33.8 % (ref 37–54)
HCT VFR BLD CALC: 33.8 % (ref 37–54)
HCT VFR BLD CALC: 34.2 % (ref 37–54)
HCT VFR BLD CALC: 34.4 % (ref 37–54)
HCT VFR BLD CALC: 35 % (ref 37–54)
HCT VFR BLD CALC: 35 % (ref 37–54)
HCT VFR BLD CALC: 35.7 % (ref 37–54)
HCT VFR BLD CALC: 35.8 % (ref 37–54)
HCT VFR BLD CALC: 36 % (ref 37–54)
HCT VFR BLD CALC: 36.2 % (ref 37–54)
HCT VFR BLD CALC: 36.3 % (ref 37–54)
HCT VFR BLD CALC: 37.1 % (ref 37–54)
HCT VFR BLD CALC: 37.7 % (ref 37–54)
HCT VFR BLD CALC: 37.9 % (ref 37–54)
HCT VFR BLD CALC: 38 % (ref 37–54)
HCT VFR BLD CALC: 38 % (ref 37–54)
HCT VFR BLD CALC: 40.1 % (ref 37–54)
HCT VFR BLD CALC: 41.3 % (ref 37–54)
HCT VFR BLD CALC: 42.2 % (ref 37–54)
HCT VFR BLD CALC: 42.3 % (ref 37–54)
HCT VFR BLD CALC: 49.1 % (ref 37–54)
HCT VFR BLD CALC: 54.6 % (ref 37–54)
HCV QNT BY NAAT IU/ML: NOT DETECTED IU/ML
HCV QNT BY NAAT LOG IU/ML: NOT DETECTED LOG IU/ML
HDLC SERPL-MCNC: 28 MG/DL
HDLC SERPL-MCNC: 45 MG/DL
HEMOGLOBIN: 10 G/DL (ref 12.5–16.5)
HEMOGLOBIN: 10.3 G/DL (ref 12.5–16.5)
HEMOGLOBIN: 10.4 G/DL (ref 12.5–16.5)
HEMOGLOBIN: 10.5 G/DL (ref 12.5–16.5)
HEMOGLOBIN: 10.5 G/DL (ref 12.5–16.5)
HEMOGLOBIN: 10.6 G/DL (ref 12.5–16.5)
HEMOGLOBIN: 10.8 G/DL (ref 12.5–16.5)
HEMOGLOBIN: 10.9 G/DL (ref 12.5–16.5)
HEMOGLOBIN: 11 G/DL (ref 12.5–16.5)
HEMOGLOBIN: 11.2 G/DL (ref 12.5–16.5)
HEMOGLOBIN: 11.4 G/DL (ref 12.5–16.5)
HEMOGLOBIN: 11.5 G/DL (ref 12.5–16.5)
HEMOGLOBIN: 11.6 G/DL (ref 12.5–16.5)
HEMOGLOBIN: 11.6 G/DL (ref 12.5–16.5)
HEMOGLOBIN: 11.8 G/DL (ref 12.5–16.5)
HEMOGLOBIN: 12.1 G/DL (ref 12.5–16.5)
HEMOGLOBIN: 12.2 G/DL (ref 12.5–16.5)
HEMOGLOBIN: 12.3 G/DL (ref 12.5–16.5)
HEMOGLOBIN: 12.7 G/DL (ref 12.5–16.5)
HEMOGLOBIN: 12.8 G/DL (ref 12.5–16.5)
HEMOGLOBIN: 13.2 G/DL (ref 12.5–16.5)
HEMOGLOBIN: 13.5 G/DL (ref 12.5–16.5)
HEMOGLOBIN: 14.3 G/DL (ref 12.5–16.5)
HEMOGLOBIN: 14.4 G/DL (ref 12.5–16.5)
HEMOGLOBIN: 16.4 G/DL (ref 12.5–16.5)
HEMOGLOBIN: 18.2 G/DL (ref 12.5–16.5)
HEMOGLOBIN: 9.3 G/DL (ref 12.5–16.5)
HEMOGLOBIN: 9.7 G/DL (ref 12.5–16.5)
HEPATITIS B CORE IGM ANTIBODY: ABNORMAL
HEPATITIS B CORE IGM ANTIBODY: NORMAL
HEPATITIS B SURFACE ANTIGEN INTERPRETATION: ABNORMAL
HEPATITIS B SURFACE ANTIGEN INTERPRETATION: NORMAL
HEPATITIS C ANTIBODY INTERPRETATION: REACTIVE
HEPATITIS C ANTIBODY INTERPRETATION: REACTIVE
HERPES SIMPLEX VIRUS 1 CSF BY PCR: NOT DETECTED
HERPES SIMPLEX VIRUS 2 CSF BY PCR: NOT DETECTED
HHB: 0.9 % (ref 0–5)
HHB: 14.1 % (ref 0–5)
HIV-1 GENOTYPE: NORMAL
HUMAN HERPESVIRUS 6 CSF BY PCR: NOT DETECTED
HUMAN PARECHOVIRUS CSF BY PCR: NOT DETECTED
HYPOCHROMIA: ABNORMAL
HYPOCHROMIA: ABNORMAL
IMMATURE GRANULOCYTES #: 0.02 E9/L
IMMATURE GRANULOCYTES #: 0.03 E9/L
IMMATURE GRANULOCYTES #: 0.04 E9/L
IMMATURE GRANULOCYTES #: 0.07 E9/L
IMMATURE GRANULOCYTES #: 0.07 E9/L
IMMATURE GRANULOCYTES #: 0.08 E9/L
IMMATURE GRANULOCYTES #: 0.12 E9/L
IMMATURE GRANULOCYTES #: 0.17 E9/L
IMMATURE GRANULOCYTES #: 0.19 E9/L
IMMATURE GRANULOCYTES #: 0.22 E9/L
IMMATURE GRANULOCYTES #: 0.25 E9/L
IMMATURE GRANULOCYTES #: 0.28 E9/L
IMMATURE GRANULOCYTES #: 0.39 E9/L
IMMATURE GRANULOCYTES %: 0.3 % (ref 0–5)
IMMATURE GRANULOCYTES %: 0.5 % (ref 0–5)
IMMATURE GRANULOCYTES %: 0.6 % (ref 0–5)
IMMATURE GRANULOCYTES %: 0.7 % (ref 0–5)
IMMATURE GRANULOCYTES %: 0.9 % (ref 0–5)
IMMATURE GRANULOCYTES %: 0.9 % (ref 0–5)
IMMATURE GRANULOCYTES %: 1.2 % (ref 0–5)
IMMATURE GRANULOCYTES %: 1.5 % (ref 0–5)
IMMATURE GRANULOCYTES %: 1.8 % (ref 0–5)
IMMATURE GRANULOCYTES %: 2 % (ref 0–5)
IMMATURE GRANULOCYTES %: 2.4 % (ref 0–5)
IMMATURE GRANULOCYTES %: 3.1 % (ref 0–5)
IMMATURE GRANULOCYTES %: 3.1 % (ref 0–5)
IMMATURE GRANULOCYTES %: 3.8 % (ref 0–5)
IMMATURE GRANULOCYTES %: 5 % (ref 0–5)
INR BLD: 1.2
INR BLD: 1.4
INR BLD: 3.9
INTERPRETATION: NOT DETECTED
KETONES, URINE: 15 MG/DL
KETONES, URINE: ABNORMAL MG/DL
KETONES, URINE: NEGATIVE MG/DL
KETONES, URINE: NEGATIVE MG/DL
KLEBSIELLA OXYTOCA BY PCR: NOT DETECTED
KLEBSIELLA OXYTOCA BY PCR: NOT DETECTED
KLEBSIELLA PNEUMONIAE GROUP BY PCR: NOT DETECTED
KLEBSIELLA PNEUMONIAE GROUP BY PCR: NOT DETECTED
LAB: ABNORMAL
LAB: ABNORMAL
LACTIC ACID, SEPSIS: 1.2 MMOL/L (ref 0.5–1.9)
LACTIC ACID, SEPSIS: 1.3 MMOL/L (ref 0.5–1.9)
LACTIC ACID, SEPSIS: 1.5 MMOL/L (ref 0.5–1.9)
LACTIC ACID, SEPSIS: 1.7 MMOL/L (ref 0.5–1.9)
LACTIC ACID, SEPSIS: 2.7 MMOL/L (ref 0.5–1.9)
LACTIC ACID, SEPSIS: 3.9 MMOL/L (ref 0.5–1.9)
LACTIC ACID: 0.8 MMOL/L (ref 0.5–2.2)
LACTIC ACID: 0.9 MMOL/L (ref 0.5–2.2)
LACTIC ACID: 1 MMOL/L (ref 0.5–2.2)
LACTIC ACID: 1.1 MMOL/L (ref 0.5–2.2)
LACTIC ACID: 1.2 MMOL/L (ref 0.5–2.2)
LACTIC ACID: 1.3 MMOL/L (ref 0.5–2.2)
LACTIC ACID: 1.4 MMOL/L (ref 0.5–2.2)
LACTIC ACID: 1.4 MMOL/L (ref 0.5–2.2)
LACTIC ACID: 1.6 MMOL/L (ref 0.5–2.2)
LACTIC ACID: 1.9 MMOL/L (ref 0.5–2.2)
LACTIC ACID: 2.5 MMOL/L (ref 0.5–2.2)
LACTIC ACID: 3.9 MMOL/L (ref 0.5–2.2)
LDL CHOLESTEROL CALCULATED: 121 MG/DL (ref 0–99)
LDL CHOLESTEROL CALCULATED: 90 MG/DL (ref 0–99)
LEUKOCYTE ESTERASE, URINE: ABNORMAL
LEUKOCYTE ESTERASE, URINE: NEGATIVE
LIPASE: 17 U/L (ref 13–60)
LISTERIA MONOCYTOGENES BY PCR: NOT DETECTED
LISTERIA MONOCYTOGENES BY PCR: NOT DETECTED
LISTERIA MONOCYTOGENES CSF BY PCR: NOT DETECTED
LV EF: 60 %
LVEF MODALITY: NORMAL
LYMPH SUBSET INFORMATION: ABNORMAL
LYMPH SUBSET INFORMATION: ABNORMAL
LYMPHOCYTES ABSOLUTE: 0.19 E9/L (ref 1.5–4)
LYMPHOCYTES ABSOLUTE: 0.38 E9/L (ref 1.5–4)
LYMPHOCYTES ABSOLUTE: 0.45 E9/L (ref 1.5–4)
LYMPHOCYTES ABSOLUTE: 0.55 E9/L (ref 1.5–4)
LYMPHOCYTES ABSOLUTE: 0.64 E9/L (ref 1.5–4)
LYMPHOCYTES ABSOLUTE: 0.8 E9/L (ref 1.5–4)
LYMPHOCYTES ABSOLUTE: 0.83 E9/L (ref 1.5–4)
LYMPHOCYTES ABSOLUTE: 0.85 E9/L (ref 1.5–4)
LYMPHOCYTES ABSOLUTE: 0.88 E9/L (ref 1.5–4)
LYMPHOCYTES ABSOLUTE: 0.93 E9/L (ref 1.5–4)
LYMPHOCYTES ABSOLUTE: 0.99 E9/L (ref 1.5–4)
LYMPHOCYTES ABSOLUTE: 1.03 E9/L (ref 1.5–4)
LYMPHOCYTES ABSOLUTE: 1.1 E9/L (ref 1.5–4)
LYMPHOCYTES ABSOLUTE: 1.15 E9/L (ref 1.5–4)
LYMPHOCYTES ABSOLUTE: 1.15 E9/L (ref 1.5–4)
LYMPHOCYTES ABSOLUTE: 1.17 E9/L (ref 1.5–4)
LYMPHOCYTES ABSOLUTE: 1.17 E9/L (ref 1.5–4)
LYMPHOCYTES ABSOLUTE: 1.21 E9/L (ref 1.5–4)
LYMPHOCYTES ABSOLUTE: 1.24 E9/L (ref 1.5–4)
LYMPHOCYTES ABSOLUTE: 1.26 E9/L (ref 1.5–4)
LYMPHOCYTES ABSOLUTE: 1.33 E9/L (ref 1.5–4)
LYMPHOCYTES ABSOLUTE: 1.35 E9/L (ref 1.5–4)
LYMPHOCYTES ABSOLUTE: 1.37 E9/L (ref 1.5–4)
LYMPHOCYTES ABSOLUTE: 1.4 E9/L (ref 1.5–4)
LYMPHOCYTES ABSOLUTE: 1.4 E9/L (ref 1.5–4)
LYMPHOCYTES ABSOLUTE: 1.41 E9/L (ref 1.5–4)
LYMPHOCYTES ABSOLUTE: 1.49 E9/L (ref 1.5–4)
LYMPHOCYTES ABSOLUTE: 1.61 E9/L (ref 1.5–4)
LYMPHOCYTES ABSOLUTE: 1.94 E9/L (ref 1.5–4)
LYMPHOCYTES ABSOLUTE: 2.4 E9/L (ref 1.5–4)
LYMPHOCYTES RELATIVE PERCENT: 1.8 % (ref 20–42)
LYMPHOCYTES RELATIVE PERCENT: 12.2 % (ref 20–42)
LYMPHOCYTES RELATIVE PERCENT: 12.7 % (ref 20–42)
LYMPHOCYTES RELATIVE PERCENT: 12.7 % (ref 20–42)
LYMPHOCYTES RELATIVE PERCENT: 12.8 % (ref 20–42)
LYMPHOCYTES RELATIVE PERCENT: 13.2 % (ref 20–42)
LYMPHOCYTES RELATIVE PERCENT: 14.1 % (ref 20–42)
LYMPHOCYTES RELATIVE PERCENT: 14.4 % (ref 20–42)
LYMPHOCYTES RELATIVE PERCENT: 14.8 % (ref 20–42)
LYMPHOCYTES RELATIVE PERCENT: 15.5 % (ref 20–42)
LYMPHOCYTES RELATIVE PERCENT: 15.6 % (ref 20–42)
LYMPHOCYTES RELATIVE PERCENT: 16.4 % (ref 20–42)
LYMPHOCYTES RELATIVE PERCENT: 16.9 % (ref 20–42)
LYMPHOCYTES RELATIVE PERCENT: 17.4 % (ref 20–42)
LYMPHOCYTES RELATIVE PERCENT: 18.6 % (ref 20–42)
LYMPHOCYTES RELATIVE PERCENT: 18.7 % (ref 20–42)
LYMPHOCYTES RELATIVE PERCENT: 19.6 % (ref 20–42)
LYMPHOCYTES RELATIVE PERCENT: 19.8 % (ref 20–42)
LYMPHOCYTES RELATIVE PERCENT: 20.4 % (ref 20–42)
LYMPHOCYTES RELATIVE PERCENT: 21.6 % (ref 20–42)
LYMPHOCYTES RELATIVE PERCENT: 24.2 % (ref 20–42)
LYMPHOCYTES RELATIVE PERCENT: 24.4 % (ref 20–42)
LYMPHOCYTES RELATIVE PERCENT: 31.1 % (ref 20–42)
LYMPHOCYTES RELATIVE PERCENT: 34.9 % (ref 20–42)
LYMPHOCYTES RELATIVE PERCENT: 38.9 % (ref 20–42)
LYMPHOCYTES RELATIVE PERCENT: 40.9 % (ref 20–42)
LYMPHOCYTES RELATIVE PERCENT: 5.2 % (ref 20–42)
LYMPHOCYTES RELATIVE PERCENT: 5.3 % (ref 20–42)
LYMPHOCYTES RELATIVE PERCENT: 6.7 % (ref 20–42)
LYMPHOCYTES RELATIVE PERCENT: 8.7 % (ref 20–42)
Lab: ABNORMAL
MAGNESIUM: 1.5 MG/DL (ref 1.6–2.6)
MAGNESIUM: 1.8 MG/DL (ref 1.6–2.6)
MAGNESIUM: 1.9 MG/DL (ref 1.6–2.6)
MAGNESIUM: 2 MG/DL (ref 1.6–2.6)
MAGNESIUM: 2.1 MG/DL (ref 1.6–2.6)
MAGNESIUM: 2.4 MG/DL (ref 1.6–2.6)
MAGNESIUM: 2.8 MG/DL (ref 1.6–2.6)
MAGNESIUM: 2.8 MG/DL (ref 1.6–2.6)
MCH RBC QN AUTO: 30.8 PG (ref 26–35)
MCH RBC QN AUTO: 30.9 PG (ref 26–35)
MCH RBC QN AUTO: 31.1 PG (ref 26–35)
MCH RBC QN AUTO: 31.1 PG (ref 26–35)
MCH RBC QN AUTO: 31.2 PG (ref 26–35)
MCH RBC QN AUTO: 32.1 PG (ref 26–35)
MCH RBC QN AUTO: 32.2 PG (ref 26–35)
MCH RBC QN AUTO: 32.3 PG (ref 26–35)
MCH RBC QN AUTO: 32.6 PG (ref 26–35)
MCH RBC QN AUTO: 32.6 PG (ref 26–35)
MCH RBC QN AUTO: 32.7 PG (ref 26–35)
MCH RBC QN AUTO: 32.8 PG (ref 26–35)
MCH RBC QN AUTO: 32.9 PG (ref 26–35)
MCH RBC QN AUTO: 33.3 PG (ref 26–35)
MCH RBC QN AUTO: 33.3 PG (ref 26–35)
MCH RBC QN AUTO: 33.4 PG (ref 26–35)
MCH RBC QN AUTO: 33.4 PG (ref 26–35)
MCH RBC QN AUTO: 33.5 PG (ref 26–35)
MCH RBC QN AUTO: 33.6 PG (ref 26–35)
MCH RBC QN AUTO: 33.7 PG (ref 26–35)
MCH RBC QN AUTO: 33.7 PG (ref 26–35)
MCH RBC QN AUTO: 33.9 PG (ref 26–35)
MCH RBC QN AUTO: 33.9 PG (ref 26–35)
MCH RBC QN AUTO: 34 PG (ref 26–35)
MCH RBC QN AUTO: 34 PG (ref 26–35)
MCH RBC QN AUTO: 34.1 PG (ref 26–35)
MCH RBC QN AUTO: 34.2 PG (ref 26–35)
MCH RBC QN AUTO: 34.3 PG (ref 26–35)
MCH RBC QN AUTO: 34.4 PG (ref 26–35)
MCHC RBC AUTO-ENTMCNC: 30 % (ref 32–34.5)
MCHC RBC AUTO-ENTMCNC: 30.3 % (ref 32–34.5)
MCHC RBC AUTO-ENTMCNC: 30.5 % (ref 32–34.5)
MCHC RBC AUTO-ENTMCNC: 30.5 % (ref 32–34.5)
MCHC RBC AUTO-ENTMCNC: 30.7 % (ref 32–34.5)
MCHC RBC AUTO-ENTMCNC: 30.9 % (ref 32–34.5)
MCHC RBC AUTO-ENTMCNC: 31.1 % (ref 32–34.5)
MCHC RBC AUTO-ENTMCNC: 31.2 % (ref 32–34.5)
MCHC RBC AUTO-ENTMCNC: 31.2 % (ref 32–34.5)
MCHC RBC AUTO-ENTMCNC: 32.1 % (ref 32–34.5)
MCHC RBC AUTO-ENTMCNC: 32.2 % (ref 32–34.5)
MCHC RBC AUTO-ENTMCNC: 32.2 % (ref 32–34.5)
MCHC RBC AUTO-ENTMCNC: 32.5 % (ref 32–34.5)
MCHC RBC AUTO-ENTMCNC: 32.8 % (ref 32–34.5)
MCHC RBC AUTO-ENTMCNC: 33 % (ref 32–34.5)
MCHC RBC AUTO-ENTMCNC: 33.1 % (ref 32–34.5)
MCHC RBC AUTO-ENTMCNC: 33.2 % (ref 32–34.5)
MCHC RBC AUTO-ENTMCNC: 33.2 % (ref 32–34.5)
MCHC RBC AUTO-ENTMCNC: 33.3 % (ref 32–34.5)
MCHC RBC AUTO-ENTMCNC: 33.4 % (ref 32–34.5)
MCHC RBC AUTO-ENTMCNC: 33.6 % (ref 32–34.5)
MCHC RBC AUTO-ENTMCNC: 33.6 % (ref 32–34.5)
MCHC RBC AUTO-ENTMCNC: 33.7 % (ref 32–34.5)
MCHC RBC AUTO-ENTMCNC: 33.8 % (ref 32–34.5)
MCHC RBC AUTO-ENTMCNC: 33.9 % (ref 32–34.5)
MCHC RBC AUTO-ENTMCNC: 34 % (ref 32–34.5)
MCHC RBC AUTO-ENTMCNC: 34.1 % (ref 32–34.5)
MCHC RBC AUTO-ENTMCNC: 34.6 % (ref 32–34.5)
MCV RBC AUTO: 100 FL (ref 80–99.9)
MCV RBC AUTO: 100 FL (ref 80–99.9)
MCV RBC AUTO: 100.3 FL (ref 80–99.9)
MCV RBC AUTO: 100.5 FL (ref 80–99.9)
MCV RBC AUTO: 100.8 FL (ref 80–99.9)
MCV RBC AUTO: 101 FL (ref 80–99.9)
MCV RBC AUTO: 101.4 FL (ref 80–99.9)
MCV RBC AUTO: 101.5 FL (ref 80–99.9)
MCV RBC AUTO: 101.9 FL (ref 80–99.9)
MCV RBC AUTO: 102.1 FL (ref 80–99.9)
MCV RBC AUTO: 102.2 FL (ref 80–99.9)
MCV RBC AUTO: 102.4 FL (ref 80–99.9)
MCV RBC AUTO: 102.4 FL (ref 80–99.9)
MCV RBC AUTO: 102.5 FL (ref 80–99.9)
MCV RBC AUTO: 103 FL (ref 80–99.9)
MCV RBC AUTO: 103.2 FL (ref 80–99.9)
MCV RBC AUTO: 103.4 FL (ref 80–99.9)
MCV RBC AUTO: 103.6 FL (ref 80–99.9)
MCV RBC AUTO: 103.8 FL (ref 80–99.9)
MCV RBC AUTO: 104.6 FL (ref 80–99.9)
MCV RBC AUTO: 105.9 FL (ref 80–99.9)
MCV RBC AUTO: 106.5 FL (ref 80–99.9)
MCV RBC AUTO: 96 FL (ref 80–99.9)
MCV RBC AUTO: 96.7 FL (ref 80–99.9)
MCV RBC AUTO: 97.5 FL (ref 80–99.9)
MCV RBC AUTO: 98.3 FL (ref 80–99.9)
MCV RBC AUTO: 98.6 FL (ref 80–99.9)
MCV RBC AUTO: 99 FL (ref 80–99.9)
MCV RBC AUTO: 99.4 FL (ref 80–99.9)
MCV RBC AUTO: 99.5 FL (ref 80–99.9)
MCV RBC AUTO: 99.8 FL (ref 80–99.9)
METAMYELOCYTES RELATIVE PERCENT: 0.9 % (ref 0–1)
METER GLUCOSE: 100 MG/DL (ref 74–99)
METER GLUCOSE: 104 MG/DL (ref 74–99)
METER GLUCOSE: 118 MG/DL (ref 74–99)
METER GLUCOSE: 121 MG/DL (ref 74–99)
METER GLUCOSE: 121 MG/DL (ref 74–99)
METER GLUCOSE: 137 MG/DL (ref 74–99)
METER GLUCOSE: 162 MG/DL (ref 74–99)
METER GLUCOSE: 220 MG/DL (ref 74–99)
METER GLUCOSE: 71 MG/DL (ref 74–99)
METER GLUCOSE: 83 MG/DL (ref 74–99)
METER GLUCOSE: 86 MG/DL (ref 74–99)
METER GLUCOSE: 93 MG/DL (ref 74–99)
METER GLUCOSE: 94 MG/DL (ref 74–99)
METER GLUCOSE: 97 MG/DL (ref 74–99)
METHADONE SCREEN, URINE: NOT DETECTED
METHADONE SCREEN, URINE: NOT DETECTED
METHB: 0.2 % (ref 0–1.5)
METHB: 0.4 % (ref 0–1.5)
METHICILLIN RESISTANCE MECA/C  BY PCR: DETECTED
METHICILLIN RESISTANCE MECA/C  BY PCR: NOT DETECTED
MODE: ABNORMAL
MODE: ABNORMAL
MONOCYTE, CSF: 86 % (ref 10–70)
MONOCYTES ABSOLUTE: 0.12 E9/L (ref 0.1–0.95)
MONOCYTES ABSOLUTE: 0.21 E9/L (ref 0.1–0.95)
MONOCYTES ABSOLUTE: 0.22 E9/L (ref 0.1–0.95)
MONOCYTES ABSOLUTE: 0.37 E9/L (ref 0.1–0.95)
MONOCYTES ABSOLUTE: 0.38 E9/L (ref 0.1–0.95)
MONOCYTES ABSOLUTE: 0.4 E9/L (ref 0.1–0.95)
MONOCYTES ABSOLUTE: 0.44 E9/L (ref 0.1–0.95)
MONOCYTES ABSOLUTE: 0.47 E9/L (ref 0.1–0.95)
MONOCYTES ABSOLUTE: 0.5 E9/L (ref 0.1–0.95)
MONOCYTES ABSOLUTE: 0.55 E9/L (ref 0.1–0.95)
MONOCYTES ABSOLUTE: 0.55 E9/L (ref 0.1–0.95)
MONOCYTES ABSOLUTE: 0.56 E9/L (ref 0.1–0.95)
MONOCYTES ABSOLUTE: 0.63 E9/L (ref 0.1–0.95)
MONOCYTES ABSOLUTE: 0.64 E9/L (ref 0.1–0.95)
MONOCYTES ABSOLUTE: 0.66 E9/L (ref 0.1–0.95)
MONOCYTES ABSOLUTE: 0.68 E9/L (ref 0.1–0.95)
MONOCYTES ABSOLUTE: 0.68 E9/L (ref 0.1–0.95)
MONOCYTES ABSOLUTE: 0.74 E9/L (ref 0.1–0.95)
MONOCYTES ABSOLUTE: 0.78 E9/L (ref 0.1–0.95)
MONOCYTES ABSOLUTE: 0.82 E9/L (ref 0.1–0.95)
MONOCYTES ABSOLUTE: 0.82 E9/L (ref 0.1–0.95)
MONOCYTES ABSOLUTE: 0.83 E9/L (ref 0.1–0.95)
MONOCYTES ABSOLUTE: 0.9 E9/L (ref 0.1–0.95)
MONOCYTES ABSOLUTE: 0.91 E9/L (ref 0.1–0.95)
MONOCYTES ABSOLUTE: 0.92 E9/L (ref 0.1–0.95)
MONOCYTES ABSOLUTE: 0.94 E9/L (ref 0.1–0.95)
MONOCYTES ABSOLUTE: 0.99 E9/L (ref 0.1–0.95)
MONOCYTES ABSOLUTE: 1.02 E9/L (ref 0.1–0.95)
MONOCYTES ABSOLUTE: 1.04 E9/L (ref 0.1–0.95)
MONOCYTES ABSOLUTE: 1.12 E9/L (ref 0.1–0.95)
MONOCYTES RELATIVE PERCENT: 1.7 % (ref 2–12)
MONOCYTES RELATIVE PERCENT: 10.2 % (ref 2–12)
MONOCYTES RELATIVE PERCENT: 10.2 % (ref 2–12)
MONOCYTES RELATIVE PERCENT: 10.3 % (ref 2–12)
MONOCYTES RELATIVE PERCENT: 10.3 % (ref 2–12)
MONOCYTES RELATIVE PERCENT: 10.4 % (ref 2–12)
MONOCYTES RELATIVE PERCENT: 10.4 % (ref 2–12)
MONOCYTES RELATIVE PERCENT: 10.5 % (ref 2–12)
MONOCYTES RELATIVE PERCENT: 10.6 % (ref 2–12)
MONOCYTES RELATIVE PERCENT: 10.9 % (ref 2–12)
MONOCYTES RELATIVE PERCENT: 11.2 % (ref 2–12)
MONOCYTES RELATIVE PERCENT: 11.3 % (ref 2–12)
MONOCYTES RELATIVE PERCENT: 11.4 % (ref 2–12)
MONOCYTES RELATIVE PERCENT: 11.6 % (ref 2–12)
MONOCYTES RELATIVE PERCENT: 12 % (ref 2–12)
MONOCYTES RELATIVE PERCENT: 12.2 % (ref 2–12)
MONOCYTES RELATIVE PERCENT: 13.7 % (ref 2–12)
MONOCYTES RELATIVE PERCENT: 3.6 % (ref 2–12)
MONOCYTES RELATIVE PERCENT: 6.4 % (ref 2–12)
MONOCYTES RELATIVE PERCENT: 7.2 % (ref 2–12)
MONOCYTES RELATIVE PERCENT: 7.3 % (ref 2–12)
MONOCYTES RELATIVE PERCENT: 7.4 % (ref 2–12)
MONOCYTES RELATIVE PERCENT: 7.7 % (ref 2–12)
MONOCYTES RELATIVE PERCENT: 7.8 % (ref 2–12)
MONOCYTES RELATIVE PERCENT: 7.9 % (ref 2–12)
MONOCYTES RELATIVE PERCENT: 8.3 % (ref 2–12)
MONOCYTES RELATIVE PERCENT: 9.3 % (ref 2–12)
MONOCYTES RELATIVE PERCENT: 9.3 % (ref 2–12)
MONOCYTES RELATIVE PERCENT: 9.7 % (ref 2–12)
MONOCYTES RELATIVE PERCENT: 9.8 % (ref 2–12)
MYELOCYTE PERCENT: 0.9 % (ref 0–0)
NEISSERIA MENINGITIDIS BY PCR: NOT DETECTED
NEISSERIA MENINGITIDIS BY PCR: NOT DETECTED
NEISSERIA MENINGITIDIS CSF BY PCR: NOT DETECTED
NEUTROPHILS ABSOLUTE: 1.32 E9/L (ref 1.8–7.3)
NEUTROPHILS ABSOLUTE: 1.37 E9/L (ref 1.8–7.3)
NEUTROPHILS ABSOLUTE: 11.83 E9/L (ref 1.8–7.3)
NEUTROPHILS ABSOLUTE: 2.3 E9/L (ref 1.8–7.3)
NEUTROPHILS ABSOLUTE: 2.32 E9/L (ref 1.8–7.3)
NEUTROPHILS ABSOLUTE: 2.64 E9/L (ref 1.8–7.3)
NEUTROPHILS ABSOLUTE: 2.84 E9/L (ref 1.8–7.3)
NEUTROPHILS ABSOLUTE: 3.01 E9/L (ref 1.8–7.3)
NEUTROPHILS ABSOLUTE: 3.22 E9/L (ref 1.8–7.3)
NEUTROPHILS ABSOLUTE: 3.83 E9/L (ref 1.8–7.3)
NEUTROPHILS ABSOLUTE: 3.98 E9/L (ref 1.8–7.3)
NEUTROPHILS ABSOLUTE: 4.02 E9/L (ref 1.8–7.3)
NEUTROPHILS ABSOLUTE: 4.24 E9/L (ref 1.8–7.3)
NEUTROPHILS ABSOLUTE: 4.27 E9/L (ref 1.8–7.3)
NEUTROPHILS ABSOLUTE: 4.31 E9/L (ref 1.8–7.3)
NEUTROPHILS ABSOLUTE: 4.32 E9/L (ref 1.8–7.3)
NEUTROPHILS ABSOLUTE: 4.38 E9/L (ref 1.8–7.3)
NEUTROPHILS ABSOLUTE: 4.41 E9/L (ref 1.8–7.3)
NEUTROPHILS ABSOLUTE: 4.46 E9/L (ref 1.8–7.3)
NEUTROPHILS ABSOLUTE: 4.48 E9/L (ref 1.8–7.3)
NEUTROPHILS ABSOLUTE: 4.59 E9/L (ref 1.8–7.3)
NEUTROPHILS ABSOLUTE: 4.88 E9/L (ref 1.8–7.3)
NEUTROPHILS ABSOLUTE: 5.24 E9/L (ref 1.8–7.3)
NEUTROPHILS ABSOLUTE: 5.37 E9/L (ref 1.8–7.3)
NEUTROPHILS ABSOLUTE: 5.85 E9/L (ref 1.8–7.3)
NEUTROPHILS ABSOLUTE: 5.95 E9/L (ref 1.8–7.3)
NEUTROPHILS ABSOLUTE: 6.23 E9/L (ref 1.8–7.3)
NEUTROPHILS ABSOLUTE: 7.2 E9/L (ref 1.8–7.3)
NEUTROPHILS ABSOLUTE: 8.65 E9/L (ref 1.8–7.3)
NEUTROPHILS ABSOLUTE: 8.86 E9/L (ref 1.8–7.3)
NEUTROPHILS RELATIVE PERCENT: 42.7 % (ref 43–80)
NEUTROPHILS RELATIVE PERCENT: 46.1 % (ref 43–80)
NEUTROPHILS RELATIVE PERCENT: 47 % (ref 43–80)
NEUTROPHILS RELATIVE PERCENT: 48.4 % (ref 43–80)
NEUTROPHILS RELATIVE PERCENT: 50.6 % (ref 43–80)
NEUTROPHILS RELATIVE PERCENT: 51.7 % (ref 43–80)
NEUTROPHILS RELATIVE PERCENT: 53.5 % (ref 43–80)
NEUTROPHILS RELATIVE PERCENT: 53.9 % (ref 43–80)
NEUTROPHILS RELATIVE PERCENT: 55.2 % (ref 43–80)
NEUTROPHILS RELATIVE PERCENT: 57.7 % (ref 43–80)
NEUTROPHILS RELATIVE PERCENT: 59.8 % (ref 43–80)
NEUTROPHILS RELATIVE PERCENT: 60.6 % (ref 43–80)
NEUTROPHILS RELATIVE PERCENT: 60.7 % (ref 43–80)
NEUTROPHILS RELATIVE PERCENT: 61.1 % (ref 43–80)
NEUTROPHILS RELATIVE PERCENT: 61.3 % (ref 43–80)
NEUTROPHILS RELATIVE PERCENT: 61.7 % (ref 43–80)
NEUTROPHILS RELATIVE PERCENT: 63 % (ref 43–80)
NEUTROPHILS RELATIVE PERCENT: 64.9 % (ref 43–80)
NEUTROPHILS RELATIVE PERCENT: 65.7 % (ref 43–80)
NEUTROPHILS RELATIVE PERCENT: 67.2 % (ref 43–80)
NEUTROPHILS RELATIVE PERCENT: 67.4 % (ref 43–80)
NEUTROPHILS RELATIVE PERCENT: 68.6 % (ref 43–80)
NEUTROPHILS RELATIVE PERCENT: 69 % (ref 43–80)
NEUTROPHILS RELATIVE PERCENT: 70.4 % (ref 43–80)
NEUTROPHILS RELATIVE PERCENT: 77.1 % (ref 43–80)
NEUTROPHILS RELATIVE PERCENT: 81 % (ref 43–80)
NEUTROPHILS RELATIVE PERCENT: 84.9 % (ref 43–80)
NEUTROPHILS RELATIVE PERCENT: 85.3 % (ref 43–80)
NEUTROPHILS RELATIVE PERCENT: 87.8 % (ref 43–80)
NEUTROPHILS RELATIVE PERCENT: 92.9 % (ref 43–80)
NEUTROPHILS, CSF: 14 % (ref 0–10)
NITRITE, URINE: NEGATIVE
NUCLEATED RED BLOOD CELLS: 0.9 /100 WBC
NUCLEATED RED BLOOD CELLS: 2.6 /100 WBC
O2 CONTENT: 15 ML/DL
O2 CONTENT: 17.1 ML/DL
O2 SATURATION: 85.8 % (ref 92–98.5)
O2 SATURATION: 99.1 % (ref 92–98.5)
O2HB: 85.4 % (ref 94–97)
O2HB: 98.5 % (ref 94–97)
OPERATOR ID: 1632
OPERATOR ID: 2962
OPIATE SCREEN URINE: NOT DETECTED
OPIATE SCREEN URINE: POSITIVE
ORDER NUMBER: ABNORMAL
ORDER NUMBER: ABNORMAL
ORGANISM: ABNORMAL
OSMOLALITY URINE: 338 MOSM/KG (ref 300–900)
OSMOLALITY URINE: 619 MOSM/KG (ref 300–900)
OSMOLALITY: 367 MOSM/KG (ref 285–310)
OVALOCYTES: ABNORMAL
OXYCODONE URINE: NOT DETECTED
OXYCODONE URINE: POSITIVE
PATIENT TEMP: 37 C
PATIENT TEMP: 37 C
PCO2: 34.9 MMHG (ref 35–45)
PCO2: 37.3 MMHG (ref 35–45)
PDW BLD-RTO: 13 FL (ref 11.5–15)
PDW BLD-RTO: 13 FL (ref 11.5–15)
PDW BLD-RTO: 13.1 FL (ref 11.5–15)
PDW BLD-RTO: 13.2 FL (ref 11.5–15)
PDW BLD-RTO: 13.3 FL (ref 11.5–15)
PDW BLD-RTO: 13.3 FL (ref 11.5–15)
PDW BLD-RTO: 13.4 FL (ref 11.5–15)
PDW BLD-RTO: 13.5 FL (ref 11.5–15)
PDW BLD-RTO: 13.6 FL (ref 11.5–15)
PDW BLD-RTO: 13.7 FL (ref 11.5–15)
PDW BLD-RTO: 13.8 FL (ref 11.5–15)
PDW BLD-RTO: 13.9 FL (ref 11.5–15)
PDW BLD-RTO: 14.1 FL (ref 11.5–15)
PDW BLD-RTO: 14.2 FL (ref 11.5–15)
PDW BLD-RTO: 14.5 FL (ref 11.5–15)
PDW BLD-RTO: 14.8 FL (ref 11.5–15)
PDW BLD-RTO: 14.9 FL (ref 11.5–15)
PDW BLD-RTO: 15.2 FL (ref 11.5–15)
PEEP/CPAP: 8 CMH2O
PEEP/CPAP: 8 CMH2O
PFO2: 0.6 MMHG/%
PFO2: 1.56 MMHG/%
PH BLOOD GAS: 7.22 (ref 7.35–7.45)
PH BLOOD GAS: 7.25 (ref 7.35–7.45)
PH UA: 5 (ref 5–9)
PH UA: 5 (ref 5–9)
PH UA: 5.5 (ref 5–9)
PH UA: 6 (ref 5–9)
PHENCYCLIDINE SCREEN URINE: NOT DETECTED
PHENCYCLIDINE SCREEN URINE: NOT DETECTED
PHOSPHORUS: 2.5 MG/DL (ref 2.5–4.5)
PHOSPHORUS: 2.8 MG/DL (ref 2.5–4.5)
PHOSPHORUS: 3.2 MG/DL (ref 2.5–4.5)
PHOSPHORUS: 3.5 MG/DL (ref 2.5–4.5)
PHOSPHORUS: 3.6 MG/DL (ref 2.5–4.5)
PHOSPHORUS: 5.6 MG/DL (ref 2.5–4.5)
PHOSPHORUS: 6.7 MG/DL (ref 2.5–4.5)
PIP: 16 CMH2O
PIP: 16 CMH2O
PLATELET # BLD: 146 E9/L (ref 130–450)
PLATELET # BLD: 152 E9/L (ref 130–450)
PLATELET # BLD: 154 E9/L (ref 130–450)
PLATELET # BLD: 156 E9/L (ref 130–450)
PLATELET # BLD: 156 E9/L (ref 130–450)
PLATELET # BLD: 162 E9/L (ref 130–450)
PLATELET # BLD: 168 E9/L (ref 130–450)
PLATELET # BLD: 177 E9/L (ref 130–450)
PLATELET # BLD: 179 E9/L (ref 130–450)
PLATELET # BLD: 182 E9/L (ref 130–450)
PLATELET # BLD: 188 E9/L (ref 130–450)
PLATELET # BLD: 193 E9/L (ref 130–450)
PLATELET # BLD: 199 E9/L (ref 130–450)
PLATELET # BLD: 200 E9/L (ref 130–450)
PLATELET # BLD: 206 E9/L (ref 130–450)
PLATELET # BLD: 207 E9/L (ref 130–450)
PLATELET # BLD: 212 E9/L (ref 130–450)
PLATELET # BLD: 217 E9/L (ref 130–450)
PLATELET # BLD: 219 E9/L (ref 130–450)
PLATELET # BLD: 239 E9/L (ref 130–450)
PLATELET # BLD: 241 E9/L (ref 130–450)
PLATELET # BLD: 244 E9/L (ref 130–450)
PLATELET # BLD: 247 E9/L (ref 130–450)
PLATELET # BLD: 265 E9/L (ref 130–450)
PLATELET # BLD: 269 E9/L (ref 130–450)
PLATELET # BLD: 272 E9/L (ref 130–450)
PLATELET # BLD: 274 E9/L (ref 130–450)
PLATELET # BLD: 288 E9/L (ref 130–450)
PLATELET # BLD: 294 E9/L (ref 130–450)
PLATELET # BLD: 312 E9/L (ref 130–450)
PLATELET # BLD: 319 E9/L (ref 130–450)
PLATELET # BLD: 322 E9/L (ref 130–450)
PLATELET # BLD: 342 E9/L (ref 130–450)
PMV BLD AUTO: 10 FL (ref 7–12)
PMV BLD AUTO: 10.1 FL (ref 7–12)
PMV BLD AUTO: 10.2 FL (ref 7–12)
PMV BLD AUTO: 10.4 FL (ref 7–12)
PMV BLD AUTO: 10.7 FL (ref 7–12)
PMV BLD AUTO: 10.8 FL (ref 7–12)
PMV BLD AUTO: 11 FL (ref 7–12)
PMV BLD AUTO: 11 FL (ref 7–12)
PMV BLD AUTO: 11.1 FL (ref 7–12)
PMV BLD AUTO: 11.2 FL (ref 7–12)
PMV BLD AUTO: 11.8 FL (ref 7–12)
PMV BLD AUTO: 12.1 FL (ref 7–12)
PMV BLD AUTO: 12.2 FL (ref 7–12)
PMV BLD AUTO: 12.3 FL (ref 7–12)
PMV BLD AUTO: 9.4 FL (ref 7–12)
PMV BLD AUTO: 9.5 FL (ref 7–12)
PMV BLD AUTO: 9.6 FL (ref 7–12)
PMV BLD AUTO: 9.6 FL (ref 7–12)
PMV BLD AUTO: 9.7 FL (ref 7–12)
PMV BLD AUTO: 9.7 FL (ref 7–12)
PMV BLD AUTO: 9.8 FL (ref 7–12)
PMV BLD AUTO: 9.9 FL (ref 7–12)
PMV BLD AUTO: 9.9 FL (ref 7–12)
PO2: 155.7 MMHG (ref 75–100)
PO2: 60.4 MMHG (ref 75–100)
POIKILOCYTES: ABNORMAL
POLYCHROMASIA: ABNORMAL
POTASSIUM REFLEX MAGNESIUM: 3.7 MMOL/L (ref 3.5–5)
POTASSIUM REFLEX MAGNESIUM: 3.8 MMOL/L (ref 3.5–5)
POTASSIUM REFLEX MAGNESIUM: 4 MMOL/L (ref 3.5–5)
POTASSIUM REFLEX MAGNESIUM: 4.3 MMOL/L (ref 3.5–5)
POTASSIUM REFLEX MAGNESIUM: 4.6 MMOL/L (ref 3.5–5)
POTASSIUM REFLEX MAGNESIUM: 4.7 MMOL/L (ref 3.5–5)
POTASSIUM REFLEX MAGNESIUM: 4.7 MMOL/L (ref 3.5–5)
POTASSIUM REFLEX MAGNESIUM: 4.9 MMOL/L (ref 3.5–5)
POTASSIUM REFLEX MAGNESIUM: 5 MMOL/L (ref 3.5–5)
POTASSIUM REFLEX MAGNESIUM: 5.1 MMOL/L (ref 3.5–5)
POTASSIUM SERPL-SCNC: 3.1 MMOL/L (ref 3.5–5)
POTASSIUM SERPL-SCNC: 3.2 MMOL/L (ref 3.5–5)
POTASSIUM SERPL-SCNC: 3.4 MMOL/L (ref 3.5–5)
POTASSIUM SERPL-SCNC: 3.4 MMOL/L (ref 3.5–5)
POTASSIUM SERPL-SCNC: 3.7 MMOL/L (ref 3.5–5)
POTASSIUM SERPL-SCNC: 3.7 MMOL/L (ref 3.5–5)
POTASSIUM SERPL-SCNC: 3.8 MMOL/L (ref 3.5–5)
POTASSIUM SERPL-SCNC: 4 MMOL/L (ref 3.5–5)
POTASSIUM SERPL-SCNC: 4.1 MMOL/L (ref 3.5–5)
POTASSIUM SERPL-SCNC: 4.2 MMOL/L (ref 3.5–5)
POTASSIUM SERPL-SCNC: 4.5 MMOL/L (ref 3.5–5)
POTASSIUM SERPL-SCNC: 4.5 MMOL/L (ref 3.5–5)
POTASSIUM SERPL-SCNC: 4.7 MMOL/L (ref 3.5–5)
POTASSIUM SERPL-SCNC: 4.8 MMOL/L (ref 3.5–5)
POTASSIUM SERPL-SCNC: 5.2 MMOL/L (ref 3.5–5)
POTASSIUM SERPL-SCNC: 5.3 MMOL/L (ref 3.5–5)
POTASSIUM SERPL-SCNC: 5.4 MMOL/L (ref 3.5–5)
POTASSIUM SERPL-SCNC: 5.5 MMOL/L (ref 3.5–5)
POTASSIUM SERPL-SCNC: 5.5 MMOL/L (ref 3.5–5)
POTASSIUM, UR: 44.9 MMOL/L
POTASSIUM, UR: 51.3 MMOL/L
POTASSIUM, UR: 79.7 MMOL/L
PRO-BNP: 2031 PG/ML (ref 0–125)
PRO-BNP: 8594 PG/ML (ref 0–125)
PROCALCITONIN: 0.15 NG/ML (ref 0–0.08)
PROCALCITONIN: 0.18 NG/ML (ref 0–0.08)
PROCALCITONIN: 0.25 NG/ML (ref 0–0.08)
PROCALCITONIN: 0.45 NG/ML (ref 0–0.08)
PROCALCITONIN: 0.67 NG/ML (ref 0–0.08)
PROCALCITONIN: 1.11 NG/ML (ref 0–0.08)
PROCALCITONIN: 1.18 NG/ML (ref 0–0.08)
PROCALCITONIN: 2.13 NG/ML (ref 0–0.08)
PROCALCITONIN: 3.77 NG/ML (ref 0–0.08)
PROCALCITONIN: 5.31 NG/ML (ref 0–0.08)
PROMYELOCYTES PERCENT: 0.9 % (ref 0–0)
PROTEIN CSF: 201 MG/DL (ref 15–40)
PROTEIN PROTEIN: >600 MG/DL (ref 0–12)
PROTEIN UA: 100 MG/DL
PROTEIN UA: 100 MG/DL
PROTEIN UA: >=300 MG/DL
PROTEIN UA: >=300 MG/DL
PROTEIN/CREAT RATIO: 4.2
PROTEIN/CREAT RATIO: 4.2 (ref 0–0.2)
PROTEUS SPECIES BY PCR: NOT DETECTED
PROTEUS SPECIES BY PCR: NOT DETECTED
PROTHROMBIN TIME: 13 SEC (ref 9.3–12.4)
PROTHROMBIN TIME: 14.9 SEC (ref 9.3–12.4)
PROTHROMBIN TIME: 43 SEC (ref 9.3–12.4)
PSEUDOMONAS AERUGINOSA BY PCR: NOT DETECTED
PSEUDOMONAS AERUGINOSA BY PCR: NOT DETECTED
RBC # BLD: 2.76 E12/L (ref 3.8–5.8)
RBC # BLD: 2.9 E12/L (ref 3.8–5.8)
RBC # BLD: 3.07 E12/L (ref 3.8–5.8)
RBC # BLD: 3.07 E12/L (ref 3.8–5.8)
RBC # BLD: 3.09 E12/L (ref 3.8–5.8)
RBC # BLD: 3.19 E12/L (ref 3.8–5.8)
RBC # BLD: 3.21 E12/L (ref 3.8–5.8)
RBC # BLD: 3.23 E12/L (ref 3.8–5.8)
RBC # BLD: 3.26 E12/L (ref 3.8–5.8)
RBC # BLD: 3.27 E12/L (ref 3.8–5.8)
RBC # BLD: 3.28 E12/L (ref 3.8–5.8)
RBC # BLD: 3.33 E12/L (ref 3.8–5.8)
RBC # BLD: 3.37 E12/L (ref 3.8–5.8)
RBC # BLD: 3.37 E12/L (ref 3.8–5.8)
RBC # BLD: 3.39 E12/L (ref 3.8–5.8)
RBC # BLD: 3.46 E12/L (ref 3.8–5.8)
RBC # BLD: 3.49 E12/L (ref 3.8–5.8)
RBC # BLD: 3.57 E12/L (ref 3.8–5.8)
RBC # BLD: 3.58 E12/L (ref 3.8–5.8)
RBC # BLD: 3.59 E12/L (ref 3.8–5.8)
RBC # BLD: 3.64 E12/L (ref 3.8–5.8)
RBC # BLD: 3.65 E12/L (ref 3.8–5.8)
RBC # BLD: 3.66 E12/L (ref 3.8–5.8)
RBC # BLD: 3.69 E12/L (ref 3.8–5.8)
RBC # BLD: 3.7 E12/L (ref 3.8–5.8)
RBC # BLD: 3.8 E12/L (ref 3.8–5.8)
RBC # BLD: 3.81 E12/L (ref 3.8–5.8)
RBC # BLD: 3.98 E12/L (ref 3.8–5.8)
RBC # BLD: 4.2 E12/L (ref 3.8–5.8)
RBC # BLD: 4.23 E12/L (ref 3.8–5.8)
RBC # BLD: 4.23 E12/L (ref 3.8–5.8)
RBC # BLD: 4.79 E12/L (ref 3.8–5.8)
RBC # BLD: 5.3 E12/L (ref 3.8–5.8)
RBC # BLD: NORMAL 10*6/UL
RBC CSF: <2000 /UL
RBC UA: ABNORMAL /HPF (ref 0–2)
RI(T): 3.19
RI(T): 977 %
SALICYLATE, SERUM: <0.3 MG/DL (ref 0–30)
SALICYLATE, SERUM: <0.3 MG/DL (ref 0–30)
SARS-COV-2, NAAT: NOT DETECTED
SARS-COV-2, PCR: NOT DETECTED
SEDIMENTATION RATE, ERYTHROCYTE: 18 MM/HR (ref 0–15)
SERRATIA MARCESCENS BY PCR: NOT DETECTED
SERRATIA MARCESCENS BY PCR: NOT DETECTED
SODIUM BLD-SCNC: 133 MMOL/L (ref 132–146)
SODIUM BLD-SCNC: 135 MMOL/L (ref 132–146)
SODIUM BLD-SCNC: 136 MMOL/L (ref 132–146)
SODIUM BLD-SCNC: 137 MMOL/L (ref 132–146)
SODIUM BLD-SCNC: 138 MMOL/L (ref 132–146)
SODIUM BLD-SCNC: 139 MMOL/L (ref 132–146)
SODIUM BLD-SCNC: 139 MMOL/L (ref 132–146)
SODIUM BLD-SCNC: 140 MMOL/L (ref 132–146)
SODIUM BLD-SCNC: 141 MMOL/L (ref 132–146)
SODIUM BLD-SCNC: 142 MMOL/L (ref 132–146)
SODIUM BLD-SCNC: 143 MMOL/L (ref 132–146)
SODIUM BLD-SCNC: 143 MMOL/L (ref 132–146)
SODIUM BLD-SCNC: 144 MMOL/L (ref 132–146)
SODIUM BLD-SCNC: 144 MMOL/L (ref 132–146)
SODIUM BLD-SCNC: 145 MMOL/L (ref 132–146)
SODIUM BLD-SCNC: 146 MMOL/L (ref 132–146)
SODIUM BLD-SCNC: 146 MMOL/L (ref 132–146)
SODIUM BLD-SCNC: 147 MMOL/L (ref 132–146)
SODIUM BLD-SCNC: 148 MMOL/L (ref 132–146)
SODIUM BLD-SCNC: 149 MMOL/L (ref 132–146)
SODIUM BLD-SCNC: 149 MMOL/L (ref 132–146)
SODIUM BLD-SCNC: 151 MMOL/L (ref 132–146)
SODIUM BLD-SCNC: 152 MMOL/L (ref 132–146)
SODIUM BLD-SCNC: 153 MMOL/L (ref 132–146)
SODIUM BLD-SCNC: 154 MMOL/L (ref 132–146)
SODIUM BLD-SCNC: 154 MMOL/L (ref 132–146)
SODIUM BLD-SCNC: 155 MMOL/L (ref 132–146)
SODIUM URINE: 44 MMOL/L
SODIUM URINE: 67 MMOL/L
SODIUM URINE: <20 MMOL/L
SOURCE OF BLOOD CULTURE: ABNORMAL
SOURCE OF BLOOD CULTURE: ABNORMAL
SOURCE, BLOOD GAS: ABNORMAL
SOURCE, BLOOD GAS: ABNORMAL
SPECIFIC GRAVITY UA: 1.02 (ref 1–1.03)
SPECIFIC GRAVITY UA: 1.02 (ref 1–1.03)
SPECIFIC GRAVITY UA: >=1.03 (ref 1–1.03)
SPECIFIC GRAVITY UA: >=1.03 (ref 1–1.03)
SPECIMEN: NORMAL
STAPHYLOCOCCUS AUREUS BY PCR: NOT DETECTED
STAPHYLOCOCCUS AUREUS BY PCR: NOT DETECTED
STAPHYLOCOCCUS SPECIES BY PCR: DETECTED
STAPHYLOCOCCUS SPECIES BY PCR: DETECTED
STREPTOCOCCUS AGALACTIAE BY PCR: NOT DETECTED
STREPTOCOCCUS AGALACTIAE BY PCR: NOT DETECTED
STREPTOCOCCUS AGALACTIAE CSF BY PCR: NOT DETECTED
STREPTOCOCCUS PNEUMONIAE BY PCR: NOT DETECTED
STREPTOCOCCUS PNEUMONIAE BY PCR: NOT DETECTED
STREPTOCOCCUS PNEUMONIAE CSF BY PCR: NOT DETECTED
STREPTOCOCCUS PYOGENES  BY PCR: NOT DETECTED
STREPTOCOCCUS PYOGENES  BY PCR: NOT DETECTED
STREPTOCOCCUS SPECIES BY PCR: NOT DETECTED
STREPTOCOCCUS SPECIES BY PCR: NOT DETECTED
T4 FREE: 1.02 NG/DL (ref 0.93–1.7)
TARGET CELLS: ABNORMAL
THB: 12.1 G/DL (ref 11.5–16.5)
THB: 12.5 G/DL (ref 11.5–16.5)
TIME ANALYZED: 213
TIME ANALYZED: 523
TOTAL CK: 102 U/L (ref 20–200)
TOTAL CK: 1081 U/L (ref 20–200)
TOTAL CK: 122 U/L (ref 20–200)
TOTAL CK: 131 U/L (ref 20–200)
TOTAL CK: 1319 U/L (ref 20–200)
TOTAL CK: 1644 U/L (ref 20–200)
TOTAL CK: 193 U/L (ref 20–200)
TOTAL CK: 2326 U/L (ref 20–200)
TOTAL CK: 283 U/L (ref 20–200)
TOTAL CK: 3235 U/L (ref 20–200)
TOTAL CK: 4002 U/L (ref 20–200)
TOTAL CK: 4525 U/L (ref 20–200)
TOTAL CK: 5028 U/L (ref 20–200)
TOTAL CK: 507 U/L (ref 20–200)
TOTAL CK: 5701 U/L (ref 20–200)
TOTAL CK: 663 U/L (ref 20–200)
TOTAL CK: 6994 U/L (ref 20–200)
TOTAL CK: 805 U/L (ref 20–200)
TOTAL CK: 882 U/L (ref 20–200)
TOTAL CK: 9420 U/L (ref 20–200)
TOTAL CK: ABNORMAL U/L (ref 20–200)
TOTAL PROTEIN: 5.4 G/DL (ref 6.4–8.3)
TOTAL PROTEIN: 5.5 G/DL (ref 6.4–8.3)
TOTAL PROTEIN: 5.6 G/DL (ref 6.4–8.3)
TOTAL PROTEIN: 5.7 G/DL (ref 6.4–8.3)
TOTAL PROTEIN: 5.8 G/DL (ref 6.4–8.3)
TOTAL PROTEIN: 5.8 G/DL (ref 6.4–8.3)
TOTAL PROTEIN: 5.9 G/DL (ref 6.4–8.3)
TOTAL PROTEIN: 5.9 G/DL (ref 6.4–8.3)
TOTAL PROTEIN: 6.1 G/DL (ref 6.4–8.3)
TOTAL PROTEIN: 6.2 G/DL (ref 6.4–8.3)
TOTAL PROTEIN: 6.4 G/DL (ref 6.4–8.3)
TOTAL PROTEIN: 6.5 G/DL (ref 6.4–8.3)
TOTAL PROTEIN: 6.6 G/DL (ref 6.4–8.3)
TOTAL PROTEIN: 7.3 G/DL (ref 6.4–8.3)
TOTAL PROTEIN: 7.7 G/DL (ref 6.4–8.3)
TOTAL PROTEIN: 7.7 G/DL (ref 6.4–8.3)
TOTAL PROTEIN: 7.8 G/DL (ref 6.4–8.3)
TOTAL PROTEIN: 8 G/DL (ref 6.4–8.3)
TOTAL PROTEIN: 8.1 G/DL (ref 6.4–8.3)
TOTAL PROTEIN: 8.3 G/DL (ref 6.4–8.3)
TOTAL PROTEIN: 8.5 G/DL (ref 6.4–8.3)
TOTAL PROTEIN: 9.3 G/DL (ref 6.4–8.3)
TOTAL PROTEIN: 9.6 G/DL (ref 6.4–8.3)
TRICYCLIC ANTIDEPRESSANTS SCREEN SERUM: NEGATIVE NG/ML
TRICYCLIC ANTIDEPRESSANTS SCREEN SERUM: NEGATIVE NG/ML
TRIGL SERPL-MCNC: 177 MG/DL (ref 0–149)
TRIGL SERPL-MCNC: 279 MG/DL (ref 0–149)
TROPONIN, HIGH SENSITIVITY: 477 NG/L (ref 0–11)
TROPONIN, HIGH SENSITIVITY: 501 NG/L (ref 0–11)
TROPONIN, HIGH SENSITIVITY: 634 NG/L (ref 0–11)
TROPONIN, HIGH SENSITIVITY: 721 NG/L (ref 0–11)
TROPONIN: 0.48 NG/ML (ref 0–0.03)
TROPONIN: 0.52 NG/ML (ref 0–0.03)
TSH SERPL DL<=0.05 MIU/L-ACNC: 17.02 UIU/ML (ref 0.27–4.2)
TUBE NUMBER CSF: ABNORMAL
URINE CULTURE, ROUTINE: NORMAL
UROBILINOGEN, URINE: 0.2 E.U./DL
UROBILINOGEN, URINE: 1 E.U./DL
VANCOMYCIN RANDOM: 11.2 MCG/ML (ref 5–40)
VANCOMYCIN RANDOM: 12.8 MCG/ML (ref 5–40)
VANCOMYCIN RANDOM: 13.6 MCG/ML (ref 5–40)
VANCOMYCIN RANDOM: 16.9 MCG/ML (ref 5–40)
VANCOMYCIN RANDOM: 17.5 MCG/ML (ref 5–40)
VANCOMYCIN RANDOM: 19.1 MCG/ML (ref 5–40)
VANCOMYCIN RANDOM: 21.1 MCG/ML (ref 5–40)
VARICELLA ZOSTER VIRUS CSF BY PCR: NOT DETECTED
VLDLC SERPL CALC-MCNC: 35 MG/DL
VLDLC SERPL CALC-MCNC: 56 MG/DL
WBC # BLD: 10.4 E9/L (ref 4.5–11.5)
WBC # BLD: 11.5 E9/L (ref 4.5–11.5)
WBC # BLD: 11.5 E9/L (ref 4.5–11.5)
WBC # BLD: 13.9 E9/L (ref 4.5–11.5)
WBC # BLD: 2.7 E9/L (ref 4.5–11.5)
WBC # BLD: 2.8 E9/L (ref 4.5–11.5)
WBC # BLD: 4 E9/L (ref 4.5–11.5)
WBC # BLD: 4.3 E9/L (ref 4.5–11.5)
WBC # BLD: 5.4 E9/L (ref 4.5–11.5)
WBC # BLD: 5.6 E9/L (ref 4.5–11.5)
WBC # BLD: 5.9 E9/L (ref 4.5–11.5)
WBC # BLD: 5.9 E9/L (ref 4.5–11.5)
WBC # BLD: 6.1 E9/L (ref 4.5–11.5)
WBC # BLD: 6.3 E9/L (ref 4.5–11.5)
WBC # BLD: 6.7 E9/L (ref 4.5–11.5)
WBC # BLD: 6.9 E9/L (ref 4.5–11.5)
WBC # BLD: 7.1 E9/L (ref 4.5–11.5)
WBC # BLD: 7.2 E9/L (ref 4.5–11.5)
WBC # BLD: 7.3 E9/L (ref 4.5–11.5)
WBC # BLD: 7.4 E9/L (ref 4.5–11.5)
WBC # BLD: 7.6 E9/L (ref 4.5–11.5)
WBC # BLD: 7.7 E9/L (ref 4.5–11.5)
WBC # BLD: 7.8 E9/L (ref 4.5–11.5)
WBC # BLD: 7.9 E9/L (ref 4.5–11.5)
WBC # BLD: 8.1 E9/L (ref 4.5–11.5)
WBC # BLD: 8.5 E9/L (ref 4.5–11.5)
WBC # BLD: 8.7 E9/L (ref 4.5–11.5)
WBC # BLD: 9.3 E9/L (ref 4.5–11.5)
WBC # BLD: 9.5 E9/L (ref 4.5–11.5)
WBC CSF: 7 /UL (ref 0–2)
WBC UA: ABNORMAL /HPF (ref 0–5)

## 2021-01-01 PROCEDURE — 85025 COMPLETE CBC W/AUTO DIFF WBC: CPT

## 2021-01-01 PROCEDURE — 82570 ASSAY OF URINE CREATININE: CPT

## 2021-01-01 PROCEDURE — 82805 BLOOD GASES W/O2 SATURATION: CPT

## 2021-01-01 PROCEDURE — 86359 T CELLS TOTAL COUNT: CPT

## 2021-01-01 PROCEDURE — 1200000000 HC SEMI PRIVATE

## 2021-01-01 PROCEDURE — 83690 ASSAY OF LIPASE: CPT

## 2021-01-01 PROCEDURE — 84295 ASSAY OF SERUM SODIUM: CPT

## 2021-01-01 PROCEDURE — 80048 BASIC METABOLIC PNL TOTAL CA: CPT

## 2021-01-01 PROCEDURE — 6370000000 HC RX 637 (ALT 250 FOR IP): Performed by: STUDENT IN AN ORGANIZED HEALTH CARE EDUCATION/TRAINING PROGRAM

## 2021-01-01 PROCEDURE — 6370000000 HC RX 637 (ALT 250 FOR IP): Performed by: SURGERY

## 2021-01-01 PROCEDURE — 2580000003 HC RX 258: Performed by: INTERNAL MEDICINE

## 2021-01-01 PROCEDURE — 6370000000 HC RX 637 (ALT 250 FOR IP): Performed by: NURSE PRACTITIONER

## 2021-01-01 PROCEDURE — 86360 T CELL ABSOLUTE COUNT/RATIO: CPT

## 2021-01-01 PROCEDURE — 99221 1ST HOSP IP/OBS SF/LOW 40: CPT | Performed by: PHYSICIAN ASSISTANT

## 2021-01-01 PROCEDURE — 84100 ASSAY OF PHOSPHORUS: CPT

## 2021-01-01 PROCEDURE — 2580000003 HC RX 258: Performed by: STUDENT IN AN ORGANIZED HEALTH CARE EDUCATION/TRAINING PROGRAM

## 2021-01-01 PROCEDURE — 83605 ASSAY OF LACTIC ACID: CPT

## 2021-01-01 PROCEDURE — 6370000000 HC RX 637 (ALT 250 FOR IP): Performed by: INTERNAL MEDICINE

## 2021-01-01 PROCEDURE — 6360000002 HC RX W HCPCS: Performed by: SURGERY

## 2021-01-01 PROCEDURE — 6370000000 HC RX 637 (ALT 250 FOR IP)

## 2021-01-01 PROCEDURE — 96361 HYDRATE IV INFUSION ADD-ON: CPT

## 2021-01-01 PROCEDURE — 6370000000 HC RX 637 (ALT 250 FOR IP): Performed by: PSYCHIATRY & NEUROLOGY

## 2021-01-01 PROCEDURE — 6370000000 HC RX 637 (ALT 250 FOR IP): Performed by: FAMILY MEDICINE

## 2021-01-01 PROCEDURE — 82550 ASSAY OF CK (CPK): CPT

## 2021-01-01 PROCEDURE — 36415 COLL VENOUS BLD VENIPUNCTURE: CPT

## 2021-01-01 PROCEDURE — 99233 SBSQ HOSP IP/OBS HIGH 50: CPT | Performed by: FAMILY MEDICINE

## 2021-01-01 PROCEDURE — 73060 X-RAY EXAM OF HUMERUS: CPT

## 2021-01-01 PROCEDURE — 93010 ELECTROCARDIOGRAM REPORT: CPT | Performed by: INTERNAL MEDICINE

## 2021-01-01 PROCEDURE — 80053 COMPREHEN METABOLIC PANEL: CPT

## 2021-01-01 PROCEDURE — 51702 INSERT TEMP BLADDER CATH: CPT

## 2021-01-01 PROCEDURE — 86706 HEP B SURFACE ANTIBODY: CPT

## 2021-01-01 PROCEDURE — 70551 MRI BRAIN STEM W/O DYE: CPT

## 2021-01-01 PROCEDURE — 2060000000 HC ICU INTERMEDIATE R&B

## 2021-01-01 PROCEDURE — 6360000004 HC RX CONTRAST MEDICATION: Performed by: RADIOLOGY

## 2021-01-01 PROCEDURE — 87635 SARS-COV-2 COVID-19 AMP PRB: CPT

## 2021-01-01 PROCEDURE — 51701 INSERT BLADDER CATHETER: CPT

## 2021-01-01 PROCEDURE — 6360000002 HC RX W HCPCS: Performed by: INTERNAL MEDICINE

## 2021-01-01 PROCEDURE — 71045 X-RAY EXAM CHEST 1 VIEW: CPT

## 2021-01-01 PROCEDURE — 84133 ASSAY OF URINE POTASSIUM: CPT

## 2021-01-01 PROCEDURE — 82140 ASSAY OF AMMONIA: CPT

## 2021-01-01 PROCEDURE — 74176 CT ABD & PELVIS W/O CONTRAST: CPT

## 2021-01-01 PROCEDURE — 97110 THERAPEUTIC EXERCISES: CPT

## 2021-01-01 PROCEDURE — 2580000003 HC RX 258: Performed by: FAMILY MEDICINE

## 2021-01-01 PROCEDURE — 6360000002 HC RX W HCPCS: Performed by: FAMILY MEDICINE

## 2021-01-01 PROCEDURE — 2580000003 HC RX 258: Performed by: SURGERY

## 2021-01-01 PROCEDURE — 97530 THERAPEUTIC ACTIVITIES: CPT

## 2021-01-01 PROCEDURE — 3700000000 HC ANESTHESIA ATTENDED CARE: Performed by: SURGERY

## 2021-01-01 PROCEDURE — 93005 ELECTROCARDIOGRAM TRACING: CPT | Performed by: INTERNAL MEDICINE

## 2021-01-01 PROCEDURE — 97130 THER IVNTJ EA ADDL 15 MIN: CPT

## 2021-01-01 PROCEDURE — 93005 ELECTROCARDIOGRAM TRACING: CPT | Performed by: STUDENT IN AN ORGANIZED HEALTH CARE EDUCATION/TRAINING PROGRAM

## 2021-01-01 PROCEDURE — 99222 1ST HOSP IP/OBS MODERATE 55: CPT | Performed by: CLINICAL NURSE SPECIALIST

## 2021-01-01 PROCEDURE — 99284 EMERGENCY DEPT VISIT MOD MDM: CPT

## 2021-01-01 PROCEDURE — 80143 DRUG ASSAY ACETAMINOPHEN: CPT

## 2021-01-01 PROCEDURE — 80179 DRUG ASSAY SALICYLATE: CPT

## 2021-01-01 PROCEDURE — 85027 COMPLETE CBC AUTOMATED: CPT

## 2021-01-01 PROCEDURE — 85610 PROTHROMBIN TIME: CPT

## 2021-01-01 PROCEDURE — 70496 CT ANGIOGRAPHY HEAD: CPT

## 2021-01-01 PROCEDURE — 99222 1ST HOSP IP/OBS MODERATE 55: CPT | Performed by: PSYCHIATRY & NEUROLOGY

## 2021-01-01 PROCEDURE — 87901 NFCT AGT GNTYP ALYS HIV1 REV: CPT

## 2021-01-01 PROCEDURE — 2700000000 HC OXYGEN THERAPY PER DAY

## 2021-01-01 PROCEDURE — 94660 CPAP INITIATION&MGMT: CPT

## 2021-01-01 PROCEDURE — 80202 ASSAY OF VANCOMYCIN: CPT

## 2021-01-01 PROCEDURE — 85730 THROMBOPLASTIN TIME PARTIAL: CPT

## 2021-01-01 PROCEDURE — 87088 URINE BACTERIA CULTURE: CPT

## 2021-01-01 PROCEDURE — 6360000002 HC RX W HCPCS: Performed by: STUDENT IN AN ORGANIZED HEALTH CARE EDUCATION/TRAINING PROGRAM

## 2021-01-01 PROCEDURE — 99233 SBSQ HOSP IP/OBS HIGH 50: CPT | Performed by: NURSE PRACTITIONER

## 2021-01-01 PROCEDURE — U0003 INFECTIOUS AGENT DETECTION BY NUCLEIC ACID (DNA OR RNA); SEVERE ACUTE RESPIRATORY SYNDROME CORONAVIRUS 2 (SARS-COV-2) (CORONAVIRUS DISEASE [COVID-19]), AMPLIFIED PROBE TECHNIQUE, MAKING USE OF HIGH THROUGHPUT TECHNOLOGIES AS DESCRIBED BY CMS-2020-01-R: HCPCS

## 2021-01-01 PROCEDURE — 80307 DRUG TEST PRSMV CHEM ANLYZR: CPT

## 2021-01-01 PROCEDURE — 2500000003 HC RX 250 WO HCPCS: Performed by: INTERNAL MEDICINE

## 2021-01-01 PROCEDURE — 84145 PROCALCITONIN (PCT): CPT

## 2021-01-01 PROCEDURE — 76770 US EXAM ABDO BACK WALL COMP: CPT

## 2021-01-01 PROCEDURE — 97166 OT EVAL MOD COMPLEX 45 MIN: CPT

## 2021-01-01 PROCEDURE — 73200 CT UPPER EXTREMITY W/O DYE: CPT

## 2021-01-01 PROCEDURE — 84439 ASSAY OF FREE THYROXINE: CPT

## 2021-01-01 PROCEDURE — 82436 ASSAY OF URINE CHLORIDE: CPT

## 2021-01-01 PROCEDURE — 83935 ASSAY OF URINE OSMOLALITY: CPT

## 2021-01-01 PROCEDURE — 5A1D70Z PERFORMANCE OF URINARY FILTRATION, INTERMITTENT, LESS THAN 6 HOURS PER DAY: ICD-10-PCS | Performed by: INTERNAL MEDICINE

## 2021-01-01 PROCEDURE — 82077 ASSAY SPEC XCP UR&BREATH IA: CPT

## 2021-01-01 PROCEDURE — 89051 BODY FLUID CELL COUNT: CPT

## 2021-01-01 PROCEDURE — 99232 SBSQ HOSP IP/OBS MODERATE 35: CPT | Performed by: CLINICAL NURSE SPECIALIST

## 2021-01-01 PROCEDURE — 3209999900 FLUORO FOR SURGICAL PROCEDURES

## 2021-01-01 PROCEDURE — 99232 SBSQ HOSP IP/OBS MODERATE 35: CPT | Performed by: SURGERY

## 2021-01-01 PROCEDURE — 96365 THER/PROPH/DIAG IV INF INIT: CPT

## 2021-01-01 PROCEDURE — 82962 GLUCOSE BLOOD TEST: CPT

## 2021-01-01 PROCEDURE — 93970 EXTREMITY STUDY: CPT | Performed by: RADIOLOGY

## 2021-01-01 PROCEDURE — 87040 BLOOD CULTURE FOR BACTERIA: CPT

## 2021-01-01 PROCEDURE — 96374 THER/PROPH/DIAG INJ IV PUSH: CPT

## 2021-01-01 PROCEDURE — 87536 HIV-1 QUANT&REVRSE TRNSCRPJ: CPT

## 2021-01-01 PROCEDURE — 97535 SELF CARE MNGMENT TRAINING: CPT

## 2021-01-01 PROCEDURE — 73070 X-RAY EXAM OF ELBOW: CPT

## 2021-01-01 PROCEDURE — 6370000000 HC RX 637 (ALT 250 FOR IP): Performed by: EMERGENCY MEDICINE

## 2021-01-01 PROCEDURE — 84156 ASSAY OF PROTEIN URINE: CPT

## 2021-01-01 PROCEDURE — 84484 ASSAY OF TROPONIN QUANT: CPT

## 2021-01-01 PROCEDURE — 85651 RBC SED RATE NONAUTOMATED: CPT

## 2021-01-01 PROCEDURE — 96360 HYDRATION IV INFUSION INIT: CPT

## 2021-01-01 PROCEDURE — 36558 INSERT TUNNELED CV CATH: CPT | Performed by: SURGERY

## 2021-01-01 PROCEDURE — 90935 HEMODIALYSIS ONE EVALUATION: CPT | Performed by: INTERNAL MEDICINE

## 2021-01-01 PROCEDURE — 80061 LIPID PANEL: CPT

## 2021-01-01 PROCEDURE — 70498 CT ANGIOGRAPHY NECK: CPT | Performed by: RADIOLOGY

## 2021-01-01 PROCEDURE — 87186 SC STD MICRODIL/AGAR DIL: CPT

## 2021-01-01 PROCEDURE — 70450 CT HEAD/BRAIN W/O DYE: CPT

## 2021-01-01 PROCEDURE — 83735 ASSAY OF MAGNESIUM: CPT

## 2021-01-01 PROCEDURE — 80069 RENAL FUNCTION PANEL: CPT

## 2021-01-01 PROCEDURE — 96366 THER/PROPH/DIAG IV INF ADDON: CPT

## 2021-01-01 PROCEDURE — 87483 CNS DNA AMP PROBE TYPE 12-25: CPT

## 2021-01-01 PROCEDURE — 97129 THER IVNTJ 1ST 15 MIN: CPT

## 2021-01-01 PROCEDURE — 6360000002 HC RX W HCPCS: Performed by: NURSE PRACTITIONER

## 2021-01-01 PROCEDURE — 93306 TTE W/DOPPLER COMPLETE: CPT

## 2021-01-01 PROCEDURE — 96368 THER/DIAG CONCURRENT INF: CPT

## 2021-01-01 PROCEDURE — 84157 ASSAY OF PROTEIN OTHER: CPT

## 2021-01-01 PROCEDURE — 87205 SMEAR GRAM STAIN: CPT

## 2021-01-01 PROCEDURE — 2580000003 HC RX 258: Performed by: EMERGENCY MEDICINE

## 2021-01-01 PROCEDURE — U0005 INFEC AGEN DETEC AMPLI PROBE: HCPCS

## 2021-01-01 PROCEDURE — 72125 CT NECK SPINE W/O DYE: CPT

## 2021-01-01 PROCEDURE — 73090 X-RAY EXAM OF FOREARM: CPT

## 2021-01-01 PROCEDURE — 99253 IP/OBS CNSLTJ NEW/EST LOW 45: CPT | Performed by: INTERNAL MEDICINE

## 2021-01-01 PROCEDURE — 6360000002 HC RX W HCPCS: Performed by: EMERGENCY MEDICINE

## 2021-01-01 PROCEDURE — 87077 CULTURE AEROBIC IDENTIFY: CPT

## 2021-01-01 PROCEDURE — 2500000003 HC RX 250 WO HCPCS: Performed by: FAMILY MEDICINE

## 2021-01-01 PROCEDURE — B543ZZA ULTRASONOGRAPHY OF RIGHT JUGULAR VEINS, GUIDANCE: ICD-10-PCS | Performed by: SURGERY

## 2021-01-01 PROCEDURE — 6360000002 HC RX W HCPCS: Performed by: PHARMACIST

## 2021-01-01 PROCEDURE — 81001 URINALYSIS AUTO W/SCOPE: CPT

## 2021-01-01 PROCEDURE — 76937 US GUIDE VASCULAR ACCESS: CPT | Performed by: SURGERY

## 2021-01-01 PROCEDURE — 3700000001 HC ADD 15 MINUTES (ANESTHESIA): Performed by: SURGERY

## 2021-01-01 PROCEDURE — 93971 EXTREMITY STUDY: CPT

## 2021-01-01 PROCEDURE — 84300 ASSAY OF URINE SODIUM: CPT

## 2021-01-01 PROCEDURE — 97161 PT EVAL LOW COMPLEX 20 MIN: CPT

## 2021-01-01 PROCEDURE — 99253 IP/OBS CNSLTJ NEW/EST LOW 45: CPT | Performed by: ORTHOPAEDIC SURGERY

## 2021-01-01 PROCEDURE — 6360000002 HC RX W HCPCS

## 2021-01-01 PROCEDURE — 83036 HEMOGLOBIN GLYCOSYLATED A1C: CPT

## 2021-01-01 PROCEDURE — 2709999900 HC NON-CHARGEABLE SUPPLY: Performed by: SURGERY

## 2021-01-01 PROCEDURE — 87070 CULTURE OTHR SPECIMN AEROBIC: CPT

## 2021-01-01 PROCEDURE — 71250 CT THORAX DX C-: CPT

## 2021-01-01 PROCEDURE — 84443 ASSAY THYROID STIM HORMONE: CPT

## 2021-01-01 PROCEDURE — 99253 IP/OBS CNSLTJ NEW/EST LOW 45: CPT | Performed by: PSYCHIATRY & NEUROLOGY

## 2021-01-01 PROCEDURE — 05HM33Z INSERTION OF INFUSION DEVICE INTO RIGHT INTERNAL JUGULAR VEIN, PERCUTANEOUS APPROACH: ICD-10-PCS | Performed by: SURGERY

## 2021-01-01 PROCEDURE — 2709999900 FL LUMBAR PUNCTURE DIAG

## 2021-01-01 PROCEDURE — 70540 MRI ORBIT/FACE/NECK W/O DYE: CPT

## 2021-01-01 PROCEDURE — 2580000003 HC RX 258

## 2021-01-01 PROCEDURE — 87150 DNA/RNA AMPLIFIED PROBE: CPT

## 2021-01-01 PROCEDURE — 2500000003 HC RX 250 WO HCPCS: Performed by: SURGERY

## 2021-01-01 PROCEDURE — 87522 HEPATITIS C REVRS TRNSCRPJ: CPT

## 2021-01-01 PROCEDURE — 99222 1ST HOSP IP/OBS MODERATE 55: CPT | Performed by: SURGERY

## 2021-01-01 PROCEDURE — 2500000003 HC RX 250 WO HCPCS

## 2021-01-01 PROCEDURE — 009U3ZX DRAINAGE OF SPINAL CANAL, PERCUTANEOUS APPROACH, DIAGNOSTIC: ICD-10-PCS | Performed by: RADIOLOGY

## 2021-01-01 PROCEDURE — 80074 ACUTE HEPATITIS PANEL: CPT

## 2021-01-01 PROCEDURE — 93005 ELECTROCARDIOGRAM TRACING: CPT | Performed by: EMERGENCY MEDICINE

## 2021-01-01 PROCEDURE — 99285 EMERGENCY DEPT VISIT HI MDM: CPT

## 2021-01-01 PROCEDURE — 2580000003 HC RX 258: Performed by: PHARMACIST

## 2021-01-01 PROCEDURE — 0JH63XZ INSERTION OF TUNNELED VASCULAR ACCESS DEVICE INTO CHEST SUBCUTANEOUS TISSUE AND FASCIA, PERCUTANEOUS APPROACH: ICD-10-PCS | Performed by: SURGERY

## 2021-01-01 PROCEDURE — 83880 ASSAY OF NATRIURETIC PEPTIDE: CPT

## 2021-01-01 PROCEDURE — 99232 SBSQ HOSP IP/OBS MODERATE 35: CPT | Performed by: PHYSICIAN ASSISTANT

## 2021-01-01 PROCEDURE — 90935 HEMODIALYSIS ONE EVALUATION: CPT

## 2021-01-01 PROCEDURE — 99283 EMERGENCY DEPT VISIT LOW MDM: CPT

## 2021-01-01 PROCEDURE — 73030 X-RAY EXAM OF SHOULDER: CPT

## 2021-01-01 PROCEDURE — 3600000013 HC SURGERY LEVEL 3 ADDTL 15MIN: Performed by: SURGERY

## 2021-01-01 PROCEDURE — 0042T CT BRAIN PERFUSION: CPT | Performed by: RADIOLOGY

## 2021-01-01 PROCEDURE — 92523 SPEECH SOUND LANG COMPREHEN: CPT

## 2021-01-01 PROCEDURE — 70491 CT SOFT TISSUE NECK W/DYE: CPT

## 2021-01-01 PROCEDURE — 99223 1ST HOSP IP/OBS HIGH 75: CPT | Performed by: FAMILY MEDICINE

## 2021-01-01 PROCEDURE — 3600000003 HC SURGERY LEVEL 3 BASE: Performed by: SURGERY

## 2021-01-01 PROCEDURE — 70496 CT ANGIOGRAPHY HEAD: CPT | Performed by: RADIOLOGY

## 2021-01-01 PROCEDURE — 77001 FLUOROGUIDE FOR VEIN DEVICE: CPT | Performed by: SURGERY

## 2021-01-01 PROCEDURE — 70498 CT ANGIOGRAPHY NECK: CPT

## 2021-01-01 PROCEDURE — 93971 EXTREMITY STUDY: CPT | Performed by: RADIOLOGY

## 2021-01-01 PROCEDURE — 93970 EXTREMITY STUDY: CPT

## 2021-01-01 PROCEDURE — 83930 ASSAY OF BLOOD OSMOLALITY: CPT

## 2021-01-01 PROCEDURE — 86140 C-REACTIVE PROTEIN: CPT

## 2021-01-01 PROCEDURE — 0042T CT BRAIN PERFUSION: CPT

## 2021-01-01 PROCEDURE — 82945 GLUCOSE OTHER FLUID: CPT

## 2021-01-01 DEVICE — 15.5F X 28CM PRE-CURVED15.5F X 2 TITAN HD CATHETERTITAN HD CATHET FULL SET W/SIDEHOLESFULL SET W/S (CUFF 23CM FROM TIP)(CUFF 23CM F
Type: IMPLANTABLE DEVICE | Site: CHEST | Status: FUNCTIONAL
Brand: MEDCOMP HEMO-FLOW DOUBLE LUMEN CATHETERMEDCOMP HEMO-FLOW DOUBLE LUMEN CATHETER

## 2021-01-01 RX ORDER — FLUMAZENIL 0.1 MG/ML
INJECTION, SOLUTION INTRAVENOUS PRN
Status: DISCONTINUED | OUTPATIENT
Start: 2021-01-01 | End: 2021-01-01 | Stop reason: SDUPTHER

## 2021-01-01 RX ORDER — DEXTROSE MONOHYDRATE 50 MG/ML
INJECTION, SOLUTION INTRAVENOUS CONTINUOUS
Status: DISCONTINUED | OUTPATIENT
Start: 2021-01-01 | End: 2021-01-01

## 2021-01-01 RX ORDER — EMTRICITABINE AND TENOFOVIR DISOPROXIL FUMARATE 200; 300 MG/1; MG/1
1 TABLET, FILM COATED ORAL ONCE
Status: COMPLETED | OUTPATIENT
Start: 2021-01-01 | End: 2021-01-01

## 2021-01-01 RX ORDER — THIAMINE MONONITRATE (VIT B1) 100 MG
100 TABLET ORAL DAILY
Qty: 30 TABLET | Refills: 0
Start: 2021-01-01

## 2021-01-01 RX ORDER — HEPARIN SODIUM (PORCINE) LOCK FLUSH IV SOLN 100 UNIT/ML 100 UNIT/ML
SOLUTION INTRAVENOUS PRN
Status: DISCONTINUED | OUTPATIENT
Start: 2021-01-01 | End: 2021-01-01 | Stop reason: HOSPADM

## 2021-01-01 RX ORDER — SODIUM CHLORIDE 9 MG/ML
25 INJECTION, SOLUTION INTRAVENOUS PRN
Status: DISCONTINUED | OUTPATIENT
Start: 2021-01-01 | End: 2021-01-01 | Stop reason: HOSPADM

## 2021-01-01 RX ORDER — LORAZEPAM 2 MG/ML
1 INJECTION INTRAMUSCULAR
Status: DISCONTINUED | OUTPATIENT
Start: 2021-01-01 | End: 2021-01-01

## 2021-01-01 RX ORDER — HYDROCODONE BITARTRATE AND ACETAMINOPHEN 5; 325 MG/1; MG/1
2 TABLET ORAL PRN
Status: DISCONTINUED | OUTPATIENT
Start: 2021-01-01 | End: 2021-01-01 | Stop reason: HOSPADM

## 2021-01-01 RX ORDER — HEPARIN SODIUM 1000 [USP'U]/ML
80 INJECTION, SOLUTION INTRAVENOUS; SUBCUTANEOUS PRN
Status: DISCONTINUED | OUTPATIENT
Start: 2021-01-01 | End: 2021-01-01

## 2021-01-01 RX ORDER — MORPHINE SULFATE 2 MG/ML
2 INJECTION, SOLUTION INTRAMUSCULAR; INTRAVENOUS EVERY 4 HOURS PRN
Status: DISCONTINUED | OUTPATIENT
Start: 2021-01-01 | End: 2021-01-01 | Stop reason: HOSPADM

## 2021-01-01 RX ORDER — FENTANYL CITRATE 50 UG/ML
INJECTION, SOLUTION INTRAMUSCULAR; INTRAVENOUS PRN
Status: DISCONTINUED | OUTPATIENT
Start: 2021-01-01 | End: 2021-01-01 | Stop reason: SDUPTHER

## 2021-01-01 RX ORDER — HEPARIN SODIUM 10000 [USP'U]/ML
5000 INJECTION, SOLUTION INTRAVENOUS; SUBCUTANEOUS EVERY 8 HOURS SCHEDULED
Status: DISCONTINUED | OUTPATIENT
Start: 2021-01-01 | End: 2021-01-01

## 2021-01-01 RX ORDER — HYDROCODONE BITARTRATE AND ACETAMINOPHEN 5; 325 MG/1; MG/1
1 TABLET ORAL ONCE
Status: COMPLETED | OUTPATIENT
Start: 2021-01-01 | End: 2021-01-01

## 2021-01-01 RX ORDER — POTASSIUM CHLORIDE 20 MEQ/1
40 TABLET, EXTENDED RELEASE ORAL ONCE
Status: COMPLETED | OUTPATIENT
Start: 2021-01-01 | End: 2021-01-01

## 2021-01-01 RX ORDER — OLANZAPINE 10 MG/1
5 INJECTION, POWDER, LYOPHILIZED, FOR SOLUTION INTRAMUSCULAR ONCE
Status: COMPLETED | OUTPATIENT
Start: 2021-01-01 | End: 2021-01-01

## 2021-01-01 RX ORDER — LORAZEPAM 2 MG/ML
4 INJECTION INTRAMUSCULAR
Status: DISCONTINUED | OUTPATIENT
Start: 2021-01-01 | End: 2021-01-01

## 2021-01-01 RX ORDER — LAMIVUDINE 100 MG/1
50 TABLET, FILM COATED ORAL DAILY
Status: DISCONTINUED | OUTPATIENT
Start: 2021-01-01 | End: 2021-01-01 | Stop reason: HOSPADM

## 2021-01-01 RX ORDER — ACETAMINOPHEN 650 MG/1
SUPPOSITORY RECTAL
Status: COMPLETED
Start: 2021-01-01 | End: 2021-01-01

## 2021-01-01 RX ORDER — LORAZEPAM 2 MG/ML
0.5 INJECTION INTRAMUSCULAR ONCE
Status: DISCONTINUED | OUTPATIENT
Start: 2021-01-01 | End: 2021-01-01

## 2021-01-01 RX ORDER — 0.9 % SODIUM CHLORIDE 0.9 %
500 INTRAVENOUS SOLUTION INTRAVENOUS ONCE
Status: COMPLETED | OUTPATIENT
Start: 2021-01-01 | End: 2021-01-01

## 2021-01-01 RX ORDER — MORPHINE SULFATE 2 MG/ML
2 INJECTION, SOLUTION INTRAMUSCULAR; INTRAVENOUS
Status: DISCONTINUED | OUTPATIENT
Start: 2021-01-01 | End: 2021-01-01 | Stop reason: HOSPADM

## 2021-01-01 RX ORDER — LORAZEPAM 2 MG/ML
3 INJECTION INTRAMUSCULAR
Status: DISCONTINUED | OUTPATIENT
Start: 2021-01-01 | End: 2021-01-01

## 2021-01-01 RX ORDER — SODIUM CHLORIDE 9 MG/ML
INJECTION, SOLUTION INTRAVENOUS CONTINUOUS
Status: DISCONTINUED | OUTPATIENT
Start: 2021-01-01 | End: 2021-01-01 | Stop reason: SDUPTHER

## 2021-01-01 RX ORDER — SODIUM CHLORIDE 9 MG/ML
25 INJECTION, SOLUTION INTRAVENOUS PRN
Status: DISCONTINUED | OUTPATIENT
Start: 2021-01-01 | End: 2021-01-01 | Stop reason: SDUPTHER

## 2021-01-01 RX ORDER — LORAZEPAM 2 MG/ML
1 INJECTION INTRAMUSCULAR SEE ADMIN INSTRUCTIONS
Status: COMPLETED | OUTPATIENT
Start: 2021-01-01 | End: 2021-01-01

## 2021-01-01 RX ORDER — LORAZEPAM 1 MG/1
2 TABLET ORAL
Status: DISCONTINUED | OUTPATIENT
Start: 2021-01-01 | End: 2021-01-01

## 2021-01-01 RX ORDER — MULTIVITAMIN WITH IRON
1 TABLET ORAL DAILY
Status: DISCONTINUED | OUTPATIENT
Start: 2021-01-01 | End: 2021-01-01 | Stop reason: HOSPADM

## 2021-01-01 RX ORDER — HEPARIN SODIUM 10000 [USP'U]/100ML
5-30 INJECTION, SOLUTION INTRAVENOUS CONTINUOUS
Status: DISCONTINUED | OUTPATIENT
Start: 2021-01-01 | End: 2021-01-01

## 2021-01-01 RX ORDER — ASPIRIN 81 MG/1
81 TABLET, CHEWABLE ORAL DAILY
Status: DISCONTINUED | OUTPATIENT
Start: 2021-01-01 | End: 2021-01-01 | Stop reason: HOSPADM

## 2021-01-01 RX ORDER — HEPARIN SODIUM 1000 [USP'U]/ML
40 INJECTION, SOLUTION INTRAVENOUS; SUBCUTANEOUS PRN
Status: DISCONTINUED | OUTPATIENT
Start: 2021-01-01 | End: 2021-01-01

## 2021-01-01 RX ORDER — EMTRICITABINE AND TENOFOVIR DISOPROXIL FUMARATE 200; 300 MG/1; MG/1
1 TABLET, FILM COATED ORAL ONCE
Status: DISCONTINUED | OUTPATIENT
Start: 2021-01-01 | End: 2021-01-01

## 2021-01-01 RX ORDER — MULTIVITAMIN WITH IRON
1 TABLET ORAL DAILY
Qty: 30 TABLET | Refills: 0 | Status: SHIPPED | OUTPATIENT
Start: 2021-01-01

## 2021-01-01 RX ORDER — LORAZEPAM 2 MG/ML
0.5 INJECTION INTRAMUSCULAR ONCE
Status: COMPLETED | OUTPATIENT
Start: 2021-01-01 | End: 2021-01-01

## 2021-01-01 RX ORDER — MORPHINE SULFATE 2 MG/ML
1 INJECTION, SOLUTION INTRAMUSCULAR; INTRAVENOUS EVERY 5 MIN PRN
Status: DISCONTINUED | OUTPATIENT
Start: 2021-01-01 | End: 2021-01-01 | Stop reason: HOSPADM

## 2021-01-01 RX ORDER — ONDANSETRON 4 MG/1
4 TABLET, ORALLY DISINTEGRATING ORAL EVERY 8 HOURS PRN
Status: DISCONTINUED | OUTPATIENT
Start: 2021-01-01 | End: 2021-01-01 | Stop reason: HOSPADM

## 2021-01-01 RX ORDER — LORAZEPAM 2 MG/ML
2 INJECTION INTRAMUSCULAR
Status: DISCONTINUED | OUTPATIENT
Start: 2021-01-01 | End: 2021-01-01

## 2021-01-01 RX ORDER — HEPARIN SODIUM 1000 [USP'U]/ML
80 INJECTION, SOLUTION INTRAVENOUS; SUBCUTANEOUS ONCE
Status: DISCONTINUED | OUTPATIENT
Start: 2021-01-01 | End: 2021-01-01

## 2021-01-01 RX ORDER — CLOPIDOGREL BISULFATE 75 MG/1
75 TABLET ORAL DAILY
Qty: 30 TABLET | Refills: 3
Start: 2021-01-01

## 2021-01-01 RX ORDER — MEGESTROL ACETATE 20 MG/1
40 TABLET ORAL 2 TIMES DAILY
Status: DISCONTINUED | OUTPATIENT
Start: 2021-01-01 | End: 2021-01-01 | Stop reason: HOSPADM

## 2021-01-01 RX ORDER — LORAZEPAM 1 MG/1
4 TABLET ORAL
Status: DISCONTINUED | OUTPATIENT
Start: 2021-01-01 | End: 2021-01-01

## 2021-01-01 RX ORDER — MEPERIDINE HYDROCHLORIDE 25 MG/ML
12.5 INJECTION INTRAMUSCULAR; INTRAVENOUS; SUBCUTANEOUS EVERY 5 MIN PRN
Status: DISCONTINUED | OUTPATIENT
Start: 2021-01-01 | End: 2021-01-01 | Stop reason: HOSPADM

## 2021-01-01 RX ORDER — SODIUM CHLORIDE 0.9 % (FLUSH) 0.9 %
5-40 SYRINGE (ML) INJECTION EVERY 12 HOURS SCHEDULED
Status: DISCONTINUED | OUTPATIENT
Start: 2021-01-01 | End: 2021-01-01 | Stop reason: HOSPADM

## 2021-01-01 RX ORDER — ACETAMINOPHEN 650 MG/1
650 SUPPOSITORY RECTAL EVERY 6 HOURS PRN
Status: DISCONTINUED | OUTPATIENT
Start: 2021-01-01 | End: 2021-01-01 | Stop reason: HOSPADM

## 2021-01-01 RX ORDER — OXYCODONE HYDROCHLORIDE 5 MG/1
5 TABLET ORAL EVERY 6 HOURS PRN
Qty: 12 TABLET | Refills: 0 | Status: SHIPPED | OUTPATIENT
Start: 2021-01-01 | End: 2021-01-01

## 2021-01-01 RX ORDER — SODIUM CHLORIDE 0.9 % (FLUSH) 0.9 %
5-40 SYRINGE (ML) INJECTION PRN
Status: DISCONTINUED | OUTPATIENT
Start: 2021-01-01 | End: 2021-01-01 | Stop reason: HOSPADM

## 2021-01-01 RX ORDER — LANOLIN ALCOHOL/MO/W.PET/CERES
6 CREAM (GRAM) TOPICAL NIGHTLY
Status: DISCONTINUED | OUTPATIENT
Start: 2021-01-01 | End: 2021-01-01 | Stop reason: HOSPADM

## 2021-01-01 RX ORDER — CLOPIDOGREL BISULFATE 75 MG/1
75 TABLET ORAL DAILY
Status: DISCONTINUED | OUTPATIENT
Start: 2021-01-01 | End: 2021-01-01 | Stop reason: HOSPADM

## 2021-01-01 RX ORDER — HEPARIN SODIUM 10000 [USP'U]/ML
5000 INJECTION, SOLUTION INTRAVENOUS; SUBCUTANEOUS EVERY 8 HOURS SCHEDULED
Status: DISCONTINUED | OUTPATIENT
Start: 2021-01-01 | End: 2021-01-01 | Stop reason: HOSPADM

## 2021-01-01 RX ORDER — ACETAMINOPHEN 650 MG/1
650 SUPPOSITORY RECTAL EVERY 4 HOURS PRN
Status: DISCONTINUED | OUTPATIENT
Start: 2021-01-01 | End: 2021-01-01 | Stop reason: HOSPADM

## 2021-01-01 RX ORDER — POTASSIUM CHLORIDE 7.45 MG/ML
40 INJECTION INTRAVENOUS ONCE
Status: COMPLETED | OUTPATIENT
Start: 2021-01-01 | End: 2021-01-01

## 2021-01-01 RX ORDER — POLYETHYLENE GLYCOL 3350 17 G/17G
17 POWDER, FOR SOLUTION ORAL DAILY PRN
Qty: 527 G | Refills: 0 | DISCHARGE
Start: 2021-01-01 | End: 2021-07-25

## 2021-01-01 RX ORDER — PROPOFOL 10 MG/ML
INJECTION, EMULSION INTRAVENOUS CONTINUOUS PRN
Status: DISCONTINUED | OUTPATIENT
Start: 2021-01-01 | End: 2021-01-01 | Stop reason: SDUPTHER

## 2021-01-01 RX ORDER — SODIUM CHLORIDE 9 MG/ML
INJECTION, SOLUTION INTRAVENOUS CONTINUOUS
Status: DISCONTINUED | OUTPATIENT
Start: 2021-01-01 | End: 2021-01-01 | Stop reason: HOSPADM

## 2021-01-01 RX ORDER — SODIUM BICARBONATE 650 MG/1
1300 TABLET ORAL 4 TIMES DAILY
Status: DISCONTINUED | OUTPATIENT
Start: 2021-01-01 | End: 2021-01-01

## 2021-01-01 RX ORDER — MIDAZOLAM HYDROCHLORIDE 2 MG/2ML
0.5 INJECTION, SOLUTION INTRAMUSCULAR; INTRAVENOUS
Status: DISCONTINUED | OUTPATIENT
Start: 2021-01-01 | End: 2021-01-01 | Stop reason: HOSPADM

## 2021-01-01 RX ORDER — LAMIVUDINE 100 MG/1
50 TABLET, FILM COATED ORAL DAILY
Status: DISCONTINUED | OUTPATIENT
Start: 2021-01-01 | End: 2021-01-01

## 2021-01-01 RX ORDER — SODIUM BICARBONATE 650 MG/1
1300 TABLET ORAL 4 TIMES DAILY
Qty: 30 TABLET | Refills: 0 | Status: ON HOLD
Start: 2021-01-01 | End: 2021-01-01 | Stop reason: HOSPADM

## 2021-01-01 RX ORDER — POTASSIUM CHLORIDE 29.8 MG/ML
40 INJECTION INTRAVENOUS ONCE
Status: DISCONTINUED | OUTPATIENT
Start: 2021-01-01 | End: 2021-01-01

## 2021-01-01 RX ORDER — POTASSIUM CHLORIDE 7.45 MG/ML
10 INJECTION INTRAVENOUS
Status: ACTIVE | OUTPATIENT
Start: 2021-01-01 | End: 2021-01-01

## 2021-01-01 RX ORDER — MORPHINE SULFATE 2 MG/ML
INJECTION, SOLUTION INTRAMUSCULAR; INTRAVENOUS
Status: COMPLETED
Start: 2021-01-01 | End: 2021-01-01

## 2021-01-01 RX ORDER — GABAPENTIN 400 MG/1
400 CAPSULE ORAL 2 TIMES DAILY
Status: DISCONTINUED | OUTPATIENT
Start: 2021-01-01 | End: 2021-01-01 | Stop reason: HOSPADM

## 2021-01-01 RX ORDER — ACETAMINOPHEN 325 MG/1
650 TABLET ORAL EVERY 6 HOURS PRN
Status: DISCONTINUED | OUTPATIENT
Start: 2021-01-01 | End: 2021-01-01 | Stop reason: HOSPADM

## 2021-01-01 RX ORDER — GAUZE BANDAGE 2" X 2"
100 BANDAGE TOPICAL DAILY
Status: DISCONTINUED | OUTPATIENT
Start: 2021-01-01 | End: 2021-01-01 | Stop reason: HOSPADM

## 2021-01-01 RX ORDER — HALOPERIDOL 5 MG/ML
5 INJECTION INTRAMUSCULAR ONCE
Status: COMPLETED | OUTPATIENT
Start: 2021-01-01 | End: 2021-01-01

## 2021-01-01 RX ORDER — MORPHINE SULFATE 2 MG/ML
2 INJECTION, SOLUTION INTRAMUSCULAR; INTRAVENOUS EVERY 5 MIN PRN
Status: DISCONTINUED | OUTPATIENT
Start: 2021-01-01 | End: 2021-01-01 | Stop reason: HOSPADM

## 2021-01-01 RX ORDER — SODIUM CHLORIDE 9 MG/ML
INJECTION, SOLUTION INTRAVENOUS CONTINUOUS
Status: DISCONTINUED | OUTPATIENT
Start: 2021-01-01 | End: 2021-01-01

## 2021-01-01 RX ORDER — OXYCODONE HYDROCHLORIDE 5 MG/1
2.5 TABLET ORAL EVERY 6 HOURS PRN
Status: DISCONTINUED | OUTPATIENT
Start: 2021-01-01 | End: 2021-01-01 | Stop reason: HOSPADM

## 2021-01-01 RX ORDER — ASPIRIN 81 MG/1
324 TABLET, CHEWABLE ORAL ONCE
Status: COMPLETED | OUTPATIENT
Start: 2021-01-01 | End: 2021-01-01

## 2021-01-01 RX ORDER — SODIUM CHLORIDE 0.9 % (FLUSH) 0.9 %
5-40 SYRINGE (ML) INJECTION EVERY 12 HOURS SCHEDULED
Status: DISCONTINUED | OUTPATIENT
Start: 2021-01-01 | End: 2021-01-01 | Stop reason: SDUPTHER

## 2021-01-01 RX ORDER — GLYCOPYRROLATE 0.2 MG/ML
0.4 INJECTION INTRAMUSCULAR; INTRAVENOUS EVERY 4 HOURS PRN
Status: DISCONTINUED | OUTPATIENT
Start: 2021-01-01 | End: 2021-01-01 | Stop reason: HOSPADM

## 2021-01-01 RX ORDER — ONDANSETRON 2 MG/ML
4 INJECTION INTRAMUSCULAR; INTRAVENOUS EVERY 6 HOURS PRN
Status: DISCONTINUED | OUTPATIENT
Start: 2021-01-01 | End: 2021-01-01 | Stop reason: HOSPADM

## 2021-01-01 RX ORDER — 0.9 % SODIUM CHLORIDE 0.9 %
1000 INTRAVENOUS SOLUTION INTRAVENOUS ONCE
Status: COMPLETED | OUTPATIENT
Start: 2021-01-01 | End: 2021-01-01

## 2021-01-01 RX ORDER — LAMIVUDINE 100 MG/1
50 TABLET, FILM COATED ORAL DAILY
Qty: 15 TABLET | Refills: 0 | DISCHARGE
Start: 2021-01-01

## 2021-01-01 RX ORDER — LANOLIN ALCOHOL/MO/W.PET/CERES
1.5 CREAM (GRAM) TOPICAL NIGHTLY PRN
Status: ON HOLD | COMMUNITY
End: 2021-01-01 | Stop reason: HOSPADM

## 2021-01-01 RX ORDER — ASPIRIN 81 MG/1
81 TABLET, CHEWABLE ORAL DAILY
Qty: 30 TABLET | Refills: 3
Start: 2021-01-01

## 2021-01-01 RX ORDER — 0.9 % SODIUM CHLORIDE 0.9 %
30 INTRAVENOUS SOLUTION INTRAVENOUS ONCE
Status: COMPLETED | OUTPATIENT
Start: 2021-01-01 | End: 2021-01-01

## 2021-01-01 RX ORDER — PROMETHAZINE HYDROCHLORIDE 25 MG/ML
6.25 INJECTION, SOLUTION INTRAMUSCULAR; INTRAVENOUS EVERY 10 MIN PRN
Status: DISCONTINUED | OUTPATIENT
Start: 2021-01-01 | End: 2021-01-01 | Stop reason: HOSPADM

## 2021-01-01 RX ORDER — OXYCODONE HYDROCHLORIDE 5 MG/1
5 TABLET ORAL EVERY 6 HOURS PRN
Status: DISCONTINUED | OUTPATIENT
Start: 2021-01-01 | End: 2021-01-01 | Stop reason: HOSPADM

## 2021-01-01 RX ORDER — CLOPIDOGREL BISULFATE 75 MG/1
75 TABLET ORAL DAILY
Status: DISCONTINUED | OUTPATIENT
Start: 2021-01-01 | End: 2021-01-01

## 2021-01-01 RX ORDER — LORAZEPAM 1 MG/1
3 TABLET ORAL
Status: DISCONTINUED | OUTPATIENT
Start: 2021-01-01 | End: 2021-01-01

## 2021-01-01 RX ORDER — OXYCODONE HYDROCHLORIDE AND ACETAMINOPHEN 5; 325 MG/1; MG/1
1 TABLET ORAL ONCE
Status: DISCONTINUED | OUTPATIENT
Start: 2021-01-01 | End: 2021-01-01

## 2021-01-01 RX ORDER — SODIUM CHLORIDE 9 MG/ML
INJECTION, SOLUTION INTRAVENOUS CONTINUOUS PRN
Status: DISCONTINUED | OUTPATIENT
Start: 2021-01-01 | End: 2021-01-01 | Stop reason: SDUPTHER

## 2021-01-01 RX ORDER — ACETAMINOPHEN 325 MG/1
650 TABLET ORAL ONCE
Status: COMPLETED | OUTPATIENT
Start: 2021-01-01 | End: 2021-01-01

## 2021-01-01 RX ORDER — ZIPRASIDONE MESYLATE 20 MG/ML
10 INJECTION, POWDER, LYOPHILIZED, FOR SOLUTION INTRAMUSCULAR ONCE
Status: COMPLETED | OUTPATIENT
Start: 2021-01-01 | End: 2021-01-01

## 2021-01-01 RX ORDER — MIDAZOLAM HYDROCHLORIDE 1 MG/ML
INJECTION INTRAMUSCULAR; INTRAVENOUS PRN
Status: DISCONTINUED | OUTPATIENT
Start: 2021-01-01 | End: 2021-01-01 | Stop reason: SDUPTHER

## 2021-01-01 RX ORDER — GABAPENTIN 100 MG/1
100 CAPSULE ORAL 3 TIMES DAILY
Status: DISCONTINUED | OUTPATIENT
Start: 2021-01-01 | End: 2021-01-01 | Stop reason: HOSPADM

## 2021-01-01 RX ORDER — LANOLIN ALCOHOL/MO/W.PET/CERES
1 CREAM (GRAM) TOPICAL NIGHTLY PRN
Qty: 15 TABLET | Refills: 0 | Status: SHIPPED | OUTPATIENT
Start: 2021-01-01 | End: 2021-01-01

## 2021-01-01 RX ORDER — GABAPENTIN 400 MG/1
400 CAPSULE ORAL 2 TIMES DAILY
Qty: 60 CAPSULE | Refills: 0 | DISCHARGE
Start: 2021-01-01 | End: 2021-07-25

## 2021-01-01 RX ORDER — LORAZEPAM 1 MG/1
1 TABLET ORAL
Status: DISCONTINUED | OUTPATIENT
Start: 2021-01-01 | End: 2021-01-01

## 2021-01-01 RX ORDER — SODIUM CHLORIDE 0.9 % (FLUSH) 0.9 %
5-40 SYRINGE (ML) INJECTION PRN
Status: DISCONTINUED | OUTPATIENT
Start: 2021-01-01 | End: 2021-01-01 | Stop reason: SDUPTHER

## 2021-01-01 RX ORDER — POLYETHYLENE GLYCOL 3350 17 G/17G
17 POWDER, FOR SOLUTION ORAL DAILY PRN
Status: DISCONTINUED | OUTPATIENT
Start: 2021-01-01 | End: 2021-01-01 | Stop reason: HOSPADM

## 2021-01-01 RX ORDER — MEGESTROL ACETATE 40 MG/1
40 TABLET ORAL 2 TIMES DAILY
Qty: 30 TABLET | Refills: 0 | DISCHARGE
Start: 2021-01-01

## 2021-01-01 RX ORDER — HYDROCODONE BITARTRATE AND ACETAMINOPHEN 5; 325 MG/1; MG/1
1 TABLET ORAL PRN
Status: DISCONTINUED | OUTPATIENT
Start: 2021-01-01 | End: 2021-01-01 | Stop reason: HOSPADM

## 2021-01-01 RX ORDER — CALCIUM GLUCONATE 94 MG/ML
1000 INJECTION, SOLUTION INTRAVENOUS ONCE
Status: COMPLETED | OUTPATIENT
Start: 2021-01-01 | End: 2021-01-01

## 2021-01-01 RX ORDER — SODIUM BICARBONATE 650 MG/1
1300 TABLET ORAL 4 TIMES DAILY
Status: DISCONTINUED | OUTPATIENT
Start: 2021-01-01 | End: 2021-01-01 | Stop reason: HOSPADM

## 2021-01-01 RX ORDER — LORAZEPAM 2 MG/ML
0.5 INJECTION INTRAMUSCULAR EVERY 4 HOURS PRN
Status: DISCONTINUED | OUTPATIENT
Start: 2021-01-01 | End: 2021-01-01

## 2021-01-01 RX ORDER — LANOLIN ALCOHOL/MO/W.PET/CERES
3 CREAM (GRAM) TOPICAL NIGHTLY PRN
Status: DISCONTINUED | OUTPATIENT
Start: 2021-01-01 | End: 2021-01-01

## 2021-01-01 RX ORDER — SODIUM CHLORIDE 0.9 % (FLUSH) 0.9 %
10 SYRINGE (ML) INJECTION PRN
Status: DISCONTINUED | OUTPATIENT
Start: 2021-01-01 | End: 2021-01-01 | Stop reason: SDUPTHER

## 2021-01-01 RX ORDER — SODIUM CHLORIDE 450 MG/100ML
INJECTION, SOLUTION INTRAVENOUS CONTINUOUS
Status: ACTIVE | OUTPATIENT
Start: 2021-01-01 | End: 2021-01-01

## 2021-01-01 RX ORDER — LORAZEPAM 2 MG/ML
2 INJECTION INTRAMUSCULAR ONCE
Status: COMPLETED | OUTPATIENT
Start: 2021-01-01 | End: 2021-01-01

## 2021-01-01 RX ADMIN — HEPARIN SODIUM 5000 UNITS: 10000 INJECTION INTRAVENOUS; SUBCUTANEOUS at 14:45

## 2021-01-01 RX ADMIN — Medication 2 MG: at 17:01

## 2021-01-01 RX ADMIN — MIDAZOLAM 2 MG: 1 INJECTION INTRAMUSCULAR; INTRAVENOUS at 13:25

## 2021-01-01 RX ADMIN — VANCOMYCIN HYDROCHLORIDE 1000 MG: 1 INJECTION, POWDER, LYOPHILIZED, FOR SOLUTION INTRAVENOUS at 17:21

## 2021-01-01 RX ADMIN — GABAPENTIN 100 MG: 100 CAPSULE ORAL at 14:05

## 2021-01-01 RX ADMIN — SODIUM CHLORIDE: 9 INJECTION, SOLUTION INTRAVENOUS at 01:41

## 2021-01-01 RX ADMIN — AMPICILLIN SODIUM 2000 MG: 2 INJECTION, POWDER, FOR SOLUTION INTRAMUSCULAR; INTRAVENOUS at 10:13

## 2021-01-01 RX ADMIN — CEFTRIAXONE SODIUM 2000 MG: 2 INJECTION, POWDER, FOR SOLUTION INTRAMUSCULAR; INTRAVENOUS at 22:30

## 2021-01-01 RX ADMIN — LAMIVUDINE 50 MG: 100 TABLET, FILM COATED ORAL at 17:21

## 2021-01-01 RX ADMIN — ASPIRIN 81 MG CHEWABLE TABLET 81 MG: 81 TABLET CHEWABLE at 08:54

## 2021-01-01 RX ADMIN — SODIUM BICARBONATE 1300 MG: 650 TABLET ORAL at 09:49

## 2021-01-01 RX ADMIN — Medication 10 ML: at 09:18

## 2021-01-01 RX ADMIN — VANCOMYCIN HYDROCHLORIDE 1000 MG: 1 INJECTION, POWDER, LYOPHILIZED, FOR SOLUTION INTRAVENOUS at 16:10

## 2021-01-01 RX ADMIN — SODIUM BICARBONATE 1300 MG: 650 TABLET ORAL at 08:13

## 2021-01-01 RX ADMIN — VANCOMYCIN HYDROCHLORIDE 1000 MG: 1 INJECTION, POWDER, LYOPHILIZED, FOR SOLUTION INTRAVENOUS at 17:50

## 2021-01-01 RX ADMIN — ASPIRIN 81 MG CHEWABLE TABLET 81 MG: 81 TABLET CHEWABLE at 09:19

## 2021-01-01 RX ADMIN — Medication 1 TABLET: at 09:48

## 2021-01-01 RX ADMIN — Medication 100 MG: at 11:27

## 2021-01-01 RX ADMIN — CEFTRIAXONE SODIUM 2000 MG: 2 INJECTION, POWDER, FOR SOLUTION INTRAMUSCULAR; INTRAVENOUS at 10:13

## 2021-01-01 RX ADMIN — Medication 2 MG: at 20:39

## 2021-01-01 RX ADMIN — DEXTROSE MONOHYDRATE: 50 INJECTION, SOLUTION INTRAVENOUS at 21:25

## 2021-01-01 RX ADMIN — ASPIRIN 81 MG CHEWABLE TABLET 81 MG: 81 TABLET CHEWABLE at 09:18

## 2021-01-01 RX ADMIN — GABAPENTIN 400 MG: 400 CAPSULE ORAL at 08:28

## 2021-01-01 RX ADMIN — GABAPENTIN 100 MG: 100 CAPSULE ORAL at 08:13

## 2021-01-01 RX ADMIN — CEFEPIME HYDROCHLORIDE 1000 MG: 1 INJECTION, POWDER, FOR SOLUTION INTRAMUSCULAR; INTRAVENOUS at 22:41

## 2021-01-01 RX ADMIN — Medication 100 MG: at 09:03

## 2021-01-01 RX ADMIN — DEXTROSE MONOHYDRATE: 50 INJECTION, SOLUTION INTRAVENOUS at 06:31

## 2021-01-01 RX ADMIN — OXYCODONE 5 MG: 5 TABLET ORAL at 06:14

## 2021-01-01 RX ADMIN — Medication 2 MG: at 22:57

## 2021-01-01 RX ADMIN — SODIUM BICARBONATE 1300 MG: 650 TABLET ORAL at 21:07

## 2021-01-01 RX ADMIN — AMPICILLIN SODIUM 2000 MG: 2 INJECTION, POWDER, FOR SOLUTION INTRAMUSCULAR; INTRAVENOUS at 02:48

## 2021-01-01 RX ADMIN — Medication 10 ML: at 22:03

## 2021-01-01 RX ADMIN — Medication 10 ML: at 20:40

## 2021-01-01 RX ADMIN — PIPERACILLIN AND TAZOBACTAM 3375 MG: 3; .375 INJECTION, POWDER, LYOPHILIZED, FOR SOLUTION INTRAVENOUS at 21:51

## 2021-01-01 RX ADMIN — HEPARIN SODIUM 5000 UNITS: 10000 INJECTION INTRAVENOUS; SUBCUTANEOUS at 17:17

## 2021-01-01 RX ADMIN — DOLUTEGRAVIR SODIUM 50 MG: 50 TABLET, FILM COATED ORAL at 08:57

## 2021-01-01 RX ADMIN — Medication 2 MG: at 05:18

## 2021-01-01 RX ADMIN — Medication 100 MG: at 08:28

## 2021-01-01 RX ADMIN — SODIUM CHLORIDE: 9 INJECTION, SOLUTION INTRAVENOUS at 21:40

## 2021-01-01 RX ADMIN — HEPARIN SODIUM 5000 UNITS: 10000 INJECTION INTRAVENOUS; SUBCUTANEOUS at 09:15

## 2021-01-01 RX ADMIN — CLOPIDOGREL 75 MG: 75 TABLET, FILM COATED ORAL at 08:54

## 2021-01-01 RX ADMIN — OXYCODONE 5 MG: 5 TABLET ORAL at 18:37

## 2021-01-01 RX ADMIN — AMPICILLIN SODIUM 2000 MG: 2 INJECTION, POWDER, FOR SOLUTION INTRAMUSCULAR; INTRAVENOUS at 01:40

## 2021-01-01 RX ADMIN — SODIUM BICARBONATE 1300 MG: 650 TABLET ORAL at 19:00

## 2021-01-01 RX ADMIN — CLOPIDOGREL 75 MG: 75 TABLET, FILM COATED ORAL at 08:13

## 2021-01-01 RX ADMIN — AMPICILLIN SODIUM 2000 MG: 2 INJECTION, POWDER, FOR SOLUTION INTRAMUSCULAR; INTRAVENOUS at 10:51

## 2021-01-01 RX ADMIN — SODIUM BICARBONATE 1300 MG: 650 TABLET ORAL at 20:17

## 2021-01-01 RX ADMIN — Medication 100 MG: at 08:13

## 2021-01-01 RX ADMIN — CLOPIDOGREL 75 MG: 75 TABLET, FILM COATED ORAL at 11:30

## 2021-01-01 RX ADMIN — CLOPIDOGREL 75 MG: 75 TABLET, FILM COATED ORAL at 08:15

## 2021-01-01 RX ADMIN — EMTRICITABINE AND TENOFOVIR ALAFENAMIDE 1 TABLET: 200; 25 TABLET ORAL at 09:48

## 2021-01-01 RX ADMIN — Medication 2 MG: at 14:07

## 2021-01-01 RX ADMIN — Medication 100 MG: at 09:47

## 2021-01-01 RX ADMIN — MIDAZOLAM HYDROCHLORIDE 0.5 MG: 1 INJECTION, SOLUTION INTRAMUSCULAR; INTRAVENOUS at 14:08

## 2021-01-01 RX ADMIN — OXYCODONE 5 MG: 5 TABLET ORAL at 02:35

## 2021-01-01 RX ADMIN — DOLUTEGRAVIR SODIUM 50 MG: 50 TABLET, FILM COATED ORAL at 22:45

## 2021-01-01 RX ADMIN — ASPIRIN 81 MG CHEWABLE TABLET 81 MG: 81 TABLET CHEWABLE at 09:48

## 2021-01-01 RX ADMIN — OXYCODONE 5 MG: 5 TABLET ORAL at 02:56

## 2021-01-01 RX ADMIN — HEPARIN SODIUM 5000 UNITS: 10000 INJECTION INTRAVENOUS; SUBCUTANEOUS at 08:12

## 2021-01-01 RX ADMIN — IOPAMIDOL 90 ML: 755 INJECTION, SOLUTION INTRAVENOUS at 14:47

## 2021-01-01 RX ADMIN — ASPIRIN 81 MG CHEWABLE TABLET 81 MG: 81 TABLET CHEWABLE at 11:29

## 2021-01-01 RX ADMIN — Medication 1 TABLET: at 09:35

## 2021-01-01 RX ADMIN — MIDAZOLAM HYDROCHLORIDE 0.5 MG: 1 INJECTION, SOLUTION INTRAMUSCULAR; INTRAVENOUS at 16:26

## 2021-01-01 RX ADMIN — MORPHINE SULFATE 2 MG: 2 INJECTION, SOLUTION INTRAMUSCULAR; INTRAVENOUS at 11:02

## 2021-01-01 RX ADMIN — EMTRICITABINE AND TENOFOVIR ALAFENAMIDE 1 TABLET: 200; 25 TABLET ORAL at 09:58

## 2021-01-01 RX ADMIN — Medication 3 MG: at 02:10

## 2021-01-01 RX ADMIN — DOLUTEGRAVIR SODIUM 50 MG: 50 TABLET, FILM COATED ORAL at 21:08

## 2021-01-01 RX ADMIN — SODIUM CHLORIDE: 9 INJECTION, SOLUTION INTRAVENOUS at 13:46

## 2021-01-01 RX ADMIN — SODIUM CHLORIDE, PRESERVATIVE FREE 10 ML: 5 INJECTION INTRAVENOUS at 21:10

## 2021-01-01 RX ADMIN — LAMIVUDINE 50 MG: 100 TABLET, FILM COATED ORAL at 09:28

## 2021-01-01 RX ADMIN — EMTRICITABINE AND TENOFOVIR ALAFENAMIDE 1 TABLET: 200; 25 TABLET ORAL at 09:40

## 2021-01-01 RX ADMIN — SODIUM BICARBONATE 1300 MG: 650 TABLET ORAL at 14:30

## 2021-01-01 RX ADMIN — LORAZEPAM 1 MG: 2 INJECTION INTRAMUSCULAR; INTRAVENOUS at 20:27

## 2021-01-01 RX ADMIN — GABAPENTIN 100 MG: 100 CAPSULE ORAL at 08:14

## 2021-01-01 RX ADMIN — HEPARIN SODIUM 5000 UNITS: 10000 INJECTION INTRAVENOUS; SUBCUTANEOUS at 22:46

## 2021-01-01 RX ADMIN — HEPARIN SODIUM 5000 UNITS: 10000 INJECTION INTRAVENOUS; SUBCUTANEOUS at 23:07

## 2021-01-01 RX ADMIN — Medication 1 TABLET: at 09:46

## 2021-01-01 RX ADMIN — Medication 10 ML: at 22:58

## 2021-01-01 RX ADMIN — OXYCODONE 5 MG: 5 TABLET ORAL at 22:51

## 2021-01-01 RX ADMIN — SODIUM BICARBONATE 1300 MG: 650 TABLET ORAL at 16:37

## 2021-01-01 RX ADMIN — HEPARIN SODIUM 5000 UNITS: 10000 INJECTION INTRAVENOUS; SUBCUTANEOUS at 21:15

## 2021-01-01 RX ADMIN — OXYCODONE 5 MG: 5 TABLET ORAL at 06:20

## 2021-01-01 RX ADMIN — SODIUM BICARBONATE 1300 MG: 650 TABLET ORAL at 13:04

## 2021-01-01 RX ADMIN — CALCIUM GLUCONATE 1000 MG: 98 INJECTION, SOLUTION INTRAVENOUS at 23:48

## 2021-01-01 RX ADMIN — SODIUM CHLORIDE 500 ML: 9 INJECTION, SOLUTION INTRAVENOUS at 00:52

## 2021-01-01 RX ADMIN — CLOPIDOGREL 75 MG: 75 TABLET, FILM COATED ORAL at 09:14

## 2021-01-01 RX ADMIN — EMTRICITABINE AND TENOFOVIR ALAFENAMIDE 1 TABLET: 200; 25 TABLET ORAL at 08:55

## 2021-01-01 RX ADMIN — AMPICILLIN SODIUM 2000 MG: 2 INJECTION, POWDER, FOR SOLUTION INTRAMUSCULAR; INTRAVENOUS at 02:21

## 2021-01-01 RX ADMIN — POTASSIUM CHLORIDE 40 MEQ: 1500 TABLET, EXTENDED RELEASE ORAL at 11:19

## 2021-01-01 RX ADMIN — Medication 100 MG: at 09:28

## 2021-01-01 RX ADMIN — EMTRICITABINE AND TENOFOVIR ALAFENAMIDE 1 TABLET: 200; 25 TABLET ORAL at 08:13

## 2021-01-01 RX ADMIN — ASPIRIN 81 MG CHEWABLE TABLET 324 MG: 81 TABLET CHEWABLE at 00:52

## 2021-01-01 RX ADMIN — CEFTRIAXONE SODIUM 2000 MG: 2 INJECTION, POWDER, FOR SOLUTION INTRAMUSCULAR; INTRAVENOUS at 22:01

## 2021-01-01 RX ADMIN — Medication 6 MG: at 22:10

## 2021-01-01 RX ADMIN — PIPERACILLIN AND TAZOBACTAM 3375 MG: 3; .375 INJECTION, POWDER, LYOPHILIZED, FOR SOLUTION INTRAVENOUS at 08:11

## 2021-01-01 RX ADMIN — GABAPENTIN 100 MG: 100 CAPSULE ORAL at 14:14

## 2021-01-01 RX ADMIN — EMTRICITABINE AND TENOFOVIR ALAFENAMIDE 1 TABLET: 200; 25 TABLET ORAL at 11:33

## 2021-01-01 RX ADMIN — Medication 100 MG: at 08:55

## 2021-01-01 RX ADMIN — AMPICILLIN SODIUM 2000 MG: 2 INJECTION, POWDER, FOR SOLUTION INTRAMUSCULAR; INTRAVENOUS at 18:36

## 2021-01-01 RX ADMIN — SODIUM CHLORIDE: 9 INJECTION, SOLUTION INTRAVENOUS at 13:04

## 2021-01-01 RX ADMIN — CLOPIDOGREL 75 MG: 75 TABLET, FILM COATED ORAL at 09:58

## 2021-01-01 RX ADMIN — ACETAMINOPHEN 650 MG: 650 SUPPOSITORY RECTAL at 16:09

## 2021-01-01 RX ADMIN — Medication 100 MG: at 10:41

## 2021-01-01 RX ADMIN — PIPERACILLIN AND TAZOBACTAM 3375 MG: 3; .375 INJECTION, POWDER, FOR SOLUTION INTRAVENOUS at 09:15

## 2021-01-01 RX ADMIN — AMPICILLIN SODIUM 2000 MG: 2 INJECTION, POWDER, FOR SOLUTION INTRAMUSCULAR; INTRAVENOUS at 09:37

## 2021-01-01 RX ADMIN — Medication 100 MG: at 09:35

## 2021-01-01 RX ADMIN — FENTANYL CITRATE 50 MCG: 50 INJECTION, SOLUTION INTRAMUSCULAR; INTRAVENOUS at 13:44

## 2021-01-01 RX ADMIN — ASPIRIN 81 MG CHEWABLE TABLET 81 MG: 81 TABLET CHEWABLE at 09:56

## 2021-01-01 RX ADMIN — Medication 400 MG: at 16:32

## 2021-01-01 RX ADMIN — DOLUTEGRAVIR SODIUM 50 MG: 50 TABLET, FILM COATED ORAL at 22:51

## 2021-01-01 RX ADMIN — Medication 1 TABLET: at 08:57

## 2021-01-01 RX ADMIN — Medication 100 MG: at 08:14

## 2021-01-01 RX ADMIN — LAMIVUDINE 50 MG: 100 TABLET, FILM COATED ORAL at 09:44

## 2021-01-01 RX ADMIN — GABAPENTIN 400 MG: 400 CAPSULE ORAL at 09:27

## 2021-01-01 RX ADMIN — ACETAMINOPHEN 650 MG: 650 SUPPOSITORY RECTAL at 21:35

## 2021-01-01 RX ADMIN — PIPERACILLIN AND TAZOBACTAM 3375 MG: 3; .375 INJECTION, POWDER, LYOPHILIZED, FOR SOLUTION INTRAVENOUS at 09:16

## 2021-01-01 RX ADMIN — GABAPENTIN 100 MG: 100 CAPSULE ORAL at 21:22

## 2021-01-01 RX ADMIN — AMPICILLIN SODIUM 2000 MG: 2 INJECTION, POWDER, FOR SOLUTION INTRAMUSCULAR; INTRAVENOUS at 03:00

## 2021-01-01 RX ADMIN — HEPARIN SODIUM 5000 UNITS: 10000 INJECTION INTRAVENOUS; SUBCUTANEOUS at 01:46

## 2021-01-01 RX ADMIN — OXYCODONE 5 MG: 5 TABLET ORAL at 12:49

## 2021-01-01 RX ADMIN — SODIUM BICARBONATE 1300 MG: 650 TABLET ORAL at 13:55

## 2021-01-01 RX ADMIN — Medication 1 TABLET: at 11:19

## 2021-01-01 RX ADMIN — MEGESTROL ACETATE 40 MG: 20 TABLET ORAL at 10:42

## 2021-01-01 RX ADMIN — GABAPENTIN 100 MG: 100 CAPSULE ORAL at 09:19

## 2021-01-01 RX ADMIN — ACETAMINOPHEN 650 MG: 650 SUPPOSITORY RECTAL at 16:32

## 2021-01-01 RX ADMIN — Medication 2 MG: at 18:41

## 2021-01-01 RX ADMIN — OXYCODONE 5 MG: 5 TABLET ORAL at 21:42

## 2021-01-01 RX ADMIN — Medication 1 TABLET: at 10:41

## 2021-01-01 RX ADMIN — DOLUTEGRAVIR SODIUM 50 MG: 50 TABLET, FILM COATED ORAL at 08:28

## 2021-01-01 RX ADMIN — Medication 10 ML: at 20:42

## 2021-01-01 RX ADMIN — Medication 10 ML: at 22:30

## 2021-01-01 RX ADMIN — Medication 1 TABLET: at 09:28

## 2021-01-01 RX ADMIN — OLANZAPINE 5 MG: 10 INJECTION, POWDER, LYOPHILIZED, FOR SOLUTION INTRAMUSCULAR at 00:25

## 2021-01-01 RX ADMIN — DOLUTEGRAVIR SODIUM 50 MG: 50 TABLET, FILM COATED ORAL at 10:42

## 2021-01-01 RX ADMIN — CEFTRIAXONE SODIUM 2000 MG: 2 INJECTION, POWDER, FOR SOLUTION INTRAMUSCULAR; INTRAVENOUS at 22:09

## 2021-01-01 RX ADMIN — EMTRICITABINE AND TENOFOVIR ALAFENAMIDE 1 TABLET: 200; 25 TABLET ORAL at 08:14

## 2021-01-01 RX ADMIN — Medication 2 MG: at 14:31

## 2021-01-01 RX ADMIN — ACETAMINOPHEN 650 MG: 325 TABLET ORAL at 02:15

## 2021-01-01 RX ADMIN — Medication 1 TABLET: at 14:00

## 2021-01-01 RX ADMIN — Medication 10 ML: at 21:46

## 2021-01-01 RX ADMIN — LAMIVUDINE 50 MG: 100 TABLET, FILM COATED ORAL at 17:45

## 2021-01-01 RX ADMIN — SODIUM BICARBONATE 1300 MG: 650 TABLET ORAL at 09:46

## 2021-01-01 RX ADMIN — OXYCODONE 5 MG: 5 TABLET ORAL at 11:00

## 2021-01-01 RX ADMIN — Medication 2 MG: at 15:58

## 2021-01-01 RX ADMIN — SODIUM BICARBONATE: 84 INJECTION, SOLUTION INTRAVENOUS at 22:01

## 2021-01-01 RX ADMIN — SODIUM BICARBONATE 1300 MG: 650 TABLET ORAL at 21:22

## 2021-01-01 RX ADMIN — HEPARIN SODIUM 5000 UNITS: 10000 INJECTION INTRAVENOUS; SUBCUTANEOUS at 05:21

## 2021-01-01 RX ADMIN — ZIPRASIDONE MESYLATE 10 MG: 20 INJECTION, POWDER, LYOPHILIZED, FOR SOLUTION INTRAMUSCULAR at 03:37

## 2021-01-01 RX ADMIN — GABAPENTIN 400 MG: 400 CAPSULE ORAL at 21:45

## 2021-01-01 RX ADMIN — OXYCODONE 5 MG: 5 TABLET ORAL at 00:28

## 2021-01-01 RX ADMIN — MEGESTROL ACETATE 40 MG: 20 TABLET ORAL at 20:13

## 2021-01-01 RX ADMIN — VANCOMYCIN HYDROCHLORIDE 1250 MG: 10 INJECTION, POWDER, LYOPHILIZED, FOR SOLUTION INTRAVENOUS at 11:01

## 2021-01-01 RX ADMIN — LORAZEPAM 0.5 MG: 2 INJECTION INTRAMUSCULAR; INTRAVENOUS at 06:05

## 2021-01-01 RX ADMIN — DOLUTEGRAVIR SODIUM 50 MG: 50 TABLET, FILM COATED ORAL at 09:35

## 2021-01-01 RX ADMIN — Medication 5 ML: at 20:29

## 2021-01-01 RX ADMIN — GABAPENTIN 400 MG: 400 CAPSULE ORAL at 21:12

## 2021-01-01 RX ADMIN — CEFTRIAXONE 1000 MG: 1 INJECTION, POWDER, FOR SOLUTION INTRAMUSCULAR; INTRAVENOUS at 02:11

## 2021-01-01 RX ADMIN — VANCOMYCIN HYDROCHLORIDE 1000 MG: 1 INJECTION, POWDER, LYOPHILIZED, FOR SOLUTION INTRAVENOUS at 09:55

## 2021-01-01 RX ADMIN — LAMIVUDINE 50 MG: 100 TABLET, FILM COATED ORAL at 10:42

## 2021-01-01 RX ADMIN — PIPERACILLIN AND TAZOBACTAM 3375 MG: 3; .375 INJECTION, POWDER, LYOPHILIZED, FOR SOLUTION INTRAVENOUS at 20:40

## 2021-01-01 RX ADMIN — SODIUM BICARBONATE 1300 MG: 650 TABLET ORAL at 23:04

## 2021-01-01 RX ADMIN — MEGESTROL ACETATE 40 MG: 20 TABLET ORAL at 08:28

## 2021-01-01 RX ADMIN — Medication 6 MG: at 22:50

## 2021-01-01 RX ADMIN — GABAPENTIN 100 MG: 100 CAPSULE ORAL at 23:05

## 2021-01-01 RX ADMIN — MIDAZOLAM HYDROCHLORIDE 0.5 MG: 1 INJECTION, SOLUTION INTRAMUSCULAR; INTRAVENOUS at 15:11

## 2021-01-01 RX ADMIN — SODIUM CHLORIDE 500 ML: 9 INJECTION, SOLUTION INTRAVENOUS at 03:07

## 2021-01-01 RX ADMIN — SODIUM CHLORIDE: 9 INJECTION, SOLUTION INTRAVENOUS at 16:08

## 2021-01-01 RX ADMIN — SODIUM CHLORIDE 1000 ML: 9 INJECTION, SOLUTION INTRAVENOUS at 14:47

## 2021-01-01 RX ADMIN — AZITHROMYCIN DIHYDRATE 500 MG: 500 INJECTION, POWDER, LYOPHILIZED, FOR SOLUTION INTRAVENOUS at 22:41

## 2021-01-01 RX ADMIN — ASPIRIN 81 MG CHEWABLE TABLET 81 MG: 81 TABLET CHEWABLE at 08:15

## 2021-01-01 RX ADMIN — Medication 2 MG: at 18:56

## 2021-01-01 RX ADMIN — GABAPENTIN 400 MG: 400 CAPSULE ORAL at 10:42

## 2021-01-01 RX ADMIN — SODIUM CHLORIDE: 9 INJECTION, SOLUTION INTRAVENOUS at 22:49

## 2021-01-01 RX ADMIN — Medication 10 ML: at 21:43

## 2021-01-01 RX ADMIN — SODIUM BICARBONATE 1300 MG: 650 TABLET ORAL at 23:53

## 2021-01-01 RX ADMIN — LORAZEPAM 1 MG: 2 INJECTION INTRAMUSCULAR; INTRAVENOUS at 09:39

## 2021-01-01 RX ADMIN — SODIUM BICARBONATE 1300 MG: 650 TABLET ORAL at 14:14

## 2021-01-01 RX ADMIN — OXYCODONE 5 MG: 5 TABLET ORAL at 04:36

## 2021-01-01 RX ADMIN — GABAPENTIN 100 MG: 100 CAPSULE ORAL at 08:56

## 2021-01-01 RX ADMIN — Medication 1 TABLET: at 08:14

## 2021-01-01 RX ADMIN — SODIUM BICARBONATE 1300 MG: 650 TABLET ORAL at 21:10

## 2021-01-01 RX ADMIN — MEGESTROL ACETATE 40 MG: 20 TABLET ORAL at 13:55

## 2021-01-01 RX ADMIN — Medication 10 ML: at 09:56

## 2021-01-01 RX ADMIN — CLOPIDOGREL 75 MG: 75 TABLET, FILM COATED ORAL at 09:56

## 2021-01-01 RX ADMIN — SODIUM CHLORIDE: 9 INJECTION, SOLUTION INTRAVENOUS at 02:27

## 2021-01-01 RX ADMIN — DOLUTEGRAVIR SODIUM 50 MG: 50 TABLET, FILM COATED ORAL at 23:04

## 2021-01-01 RX ADMIN — AMPICILLIN SODIUM 2000 MG: 2 INJECTION, POWDER, FOR SOLUTION INTRAMUSCULAR; INTRAVENOUS at 19:21

## 2021-01-01 RX ADMIN — FLUMAZENIL 0.3 MG: 0.1 INJECTION, SOLUTION INTRAVENOUS at 14:09

## 2021-01-01 RX ADMIN — DOXYCYCLINE 100 MG: 100 INJECTION, POWDER, LYOPHILIZED, FOR SOLUTION INTRAVENOUS at 02:12

## 2021-01-01 RX ADMIN — VANCOMYCIN HYDROCHLORIDE 1000 MG: 1 INJECTION, POWDER, LYOPHILIZED, FOR SOLUTION INTRAVENOUS at 16:46

## 2021-01-01 RX ADMIN — VANCOMYCIN HYDROCHLORIDE 1500 MG: 10 INJECTION, POWDER, LYOPHILIZED, FOR SOLUTION INTRAVENOUS at 03:09

## 2021-01-01 RX ADMIN — Medication 2 MG: at 16:26

## 2021-01-01 RX ADMIN — AMPICILLIN SODIUM 2000 MG: 2 INJECTION, POWDER, FOR SOLUTION INTRAMUSCULAR; INTRAVENOUS at 09:27

## 2021-01-01 RX ADMIN — DOLUTEGRAVIR SODIUM 50 MG: 50 TABLET, FILM COATED ORAL at 14:00

## 2021-01-01 RX ADMIN — SODIUM CHLORIDE: 9 INJECTION, SOLUTION INTRAVENOUS at 09:16

## 2021-01-01 RX ADMIN — CEFTRIAXONE SODIUM 2000 MG: 2 INJECTION, POWDER, FOR SOLUTION INTRAMUSCULAR; INTRAVENOUS at 05:40

## 2021-01-01 RX ADMIN — MEGESTROL ACETATE 40 MG: 20 TABLET ORAL at 20:43

## 2021-01-01 RX ADMIN — AMPICILLIN SODIUM 2000 MG: 2 INJECTION, POWDER, FOR SOLUTION INTRAMUSCULAR; INTRAVENOUS at 14:00

## 2021-01-01 RX ADMIN — CLOPIDOGREL 75 MG: 75 TABLET, FILM COATED ORAL at 10:42

## 2021-01-01 RX ADMIN — OXYCODONE 5 MG: 5 TABLET ORAL at 10:43

## 2021-01-01 RX ADMIN — EMTRICITABINE AND TENOFOVIR DISOPROXIL FUMARATE 1 TABLET: 200; 300 TABLET, FILM COATED ORAL at 09:29

## 2021-01-01 RX ADMIN — AMPICILLIN SODIUM 2000 MG: 2 INJECTION, POWDER, FOR SOLUTION INTRAMUSCULAR; INTRAVENOUS at 02:20

## 2021-01-01 RX ADMIN — GABAPENTIN 100 MG: 100 CAPSULE ORAL at 09:48

## 2021-01-01 RX ADMIN — HEPARIN SODIUM 5000 UNITS: 10000 INJECTION INTRAVENOUS; SUBCUTANEOUS at 22:00

## 2021-01-01 RX ADMIN — VANCOMYCIN HYDROCHLORIDE 1000 MG: 1 INJECTION, POWDER, LYOPHILIZED, FOR SOLUTION INTRAVENOUS at 13:21

## 2021-01-01 RX ADMIN — CEFTRIAXONE SODIUM 2000 MG: 2 INJECTION, POWDER, FOR SOLUTION INTRAMUSCULAR; INTRAVENOUS at 17:44

## 2021-01-01 RX ADMIN — HEPARIN SODIUM 5000 UNITS: 10000 INJECTION INTRAVENOUS; SUBCUTANEOUS at 06:20

## 2021-01-01 RX ADMIN — Medication 1 TABLET: at 08:55

## 2021-01-01 RX ADMIN — GABAPENTIN 400 MG: 400 CAPSULE ORAL at 20:56

## 2021-01-01 RX ADMIN — SODIUM BICARBONATE 1300 MG: 650 TABLET ORAL at 22:50

## 2021-01-01 RX ADMIN — SODIUM BICARBONATE 1300 MG: 650 TABLET ORAL at 18:35

## 2021-01-01 RX ADMIN — SODIUM CHLORIDE: 9 INJECTION, SOLUTION INTRAVENOUS at 02:22

## 2021-01-01 RX ADMIN — AMPICILLIN SODIUM 2000 MG: 2 INJECTION, POWDER, FOR SOLUTION INTRAMUSCULAR; INTRAVENOUS at 18:27

## 2021-01-01 RX ADMIN — PROPOFOL 50 MCG/KG/MIN: 10 INJECTION, EMULSION INTRAVENOUS at 13:36

## 2021-01-01 RX ADMIN — SODIUM CHLORIDE, PRESERVATIVE FREE 10 ML: 5 INJECTION INTRAVENOUS at 08:55

## 2021-01-01 RX ADMIN — HEPARIN SODIUM 5000 UNITS: 10000 INJECTION INTRAVENOUS; SUBCUTANEOUS at 15:45

## 2021-01-01 RX ADMIN — Medication 1 TABLET: at 08:12

## 2021-01-01 RX ADMIN — SODIUM CHLORIDE, PRESERVATIVE FREE 10 ML: 5 INJECTION INTRAVENOUS at 11:01

## 2021-01-01 RX ADMIN — AMPICILLIN SODIUM 2000 MG: 2 INJECTION, POWDER, FOR SOLUTION INTRAMUSCULAR; INTRAVENOUS at 11:11

## 2021-01-01 RX ADMIN — SODIUM CHLORIDE: 9 INJECTION, SOLUTION INTRAVENOUS at 06:21

## 2021-01-01 RX ADMIN — MEGESTROL ACETATE 40 MG: 20 TABLET ORAL at 20:55

## 2021-01-01 RX ADMIN — Medication 1 TABLET: at 08:13

## 2021-01-01 RX ADMIN — POTASSIUM BICARBONATE 40 MEQ: 782 TABLET, EFFERVESCENT ORAL at 20:43

## 2021-01-01 RX ADMIN — Medication 100 MG: at 12:50

## 2021-01-01 RX ADMIN — SODIUM CHLORIDE, PRESERVATIVE FREE 10 ML: 5 INJECTION INTRAVENOUS at 09:19

## 2021-01-01 RX ADMIN — ASPIRIN 81 MG CHEWABLE TABLET 81 MG: 81 TABLET CHEWABLE at 09:40

## 2021-01-01 RX ADMIN — Medication 1 TABLET: at 09:04

## 2021-01-01 RX ADMIN — AMPICILLIN SODIUM 2000 MG: 2 INJECTION, POWDER, FOR SOLUTION INTRAMUSCULAR; INTRAVENOUS at 19:30

## 2021-01-01 RX ADMIN — Medication 10 ML: at 20:17

## 2021-01-01 RX ADMIN — GABAPENTIN 400 MG: 400 CAPSULE ORAL at 20:13

## 2021-01-01 RX ADMIN — SODIUM BICARBONATE 1300 MG: 650 TABLET ORAL at 09:28

## 2021-01-01 RX ADMIN — VANCOMYCIN HYDROCHLORIDE 1000 MG: 1 INJECTION, POWDER, LYOPHILIZED, FOR SOLUTION INTRAVENOUS at 09:14

## 2021-01-01 RX ADMIN — AMPICILLIN SODIUM 2000 MG: 2 INJECTION, POWDER, FOR SOLUTION INTRAMUSCULAR; INTRAVENOUS at 12:40

## 2021-01-01 RX ADMIN — Medication 3 MG: at 01:16

## 2021-01-01 RX ADMIN — FENTANYL CITRATE 50 MCG: 50 INJECTION, SOLUTION INTRAMUSCULAR; INTRAVENOUS at 13:35

## 2021-01-01 RX ADMIN — POTASSIUM CHLORIDE 40 MEQ: 7.46 INJECTION, SOLUTION INTRAVENOUS at 11:13

## 2021-01-01 RX ADMIN — VANCOMYCIN HYDROCHLORIDE 1000 MG: 1 INJECTION, POWDER, LYOPHILIZED, FOR SOLUTION INTRAVENOUS at 16:32

## 2021-01-01 RX ADMIN — DOLUTEGRAVIR SODIUM 50 MG: 50 TABLET, FILM COATED ORAL at 21:22

## 2021-01-01 RX ADMIN — SODIUM CHLORIDE, PRESERVATIVE FREE 10 ML: 5 INJECTION INTRAVENOUS at 08:13

## 2021-01-01 RX ADMIN — SODIUM CHLORIDE: 9 INJECTION, SOLUTION INTRAVENOUS at 11:50

## 2021-01-01 RX ADMIN — Medication 2 MG: at 15:08

## 2021-01-01 RX ADMIN — Medication 10 ML: at 09:16

## 2021-01-01 RX ADMIN — Medication 2 MG: at 15:11

## 2021-01-01 RX ADMIN — CEFTRIAXONE SODIUM 2000 MG: 2 INJECTION, POWDER, FOR SOLUTION INTRAMUSCULAR; INTRAVENOUS at 11:23

## 2021-01-01 RX ADMIN — HEPARIN SODIUM 5000 UNITS: 10000 INJECTION INTRAVENOUS; SUBCUTANEOUS at 16:34

## 2021-01-01 RX ADMIN — ACETAMINOPHEN 650 MG: 650 SUPPOSITORY RECTAL at 15:08

## 2021-01-01 RX ADMIN — HEPARIN SODIUM 5000 UNITS: 10000 INJECTION INTRAVENOUS; SUBCUTANEOUS at 02:29

## 2021-01-01 RX ADMIN — AMPICILLIN SODIUM 2000 MG: 2 INJECTION, POWDER, FOR SOLUTION INTRAMUSCULAR; INTRAVENOUS at 21:46

## 2021-01-01 RX ADMIN — SODIUM CHLORIDE: 9 INJECTION, SOLUTION INTRAVENOUS at 17:22

## 2021-01-01 RX ADMIN — Medication 10 ML: at 21:00

## 2021-01-01 RX ADMIN — GABAPENTIN 100 MG: 100 CAPSULE ORAL at 23:53

## 2021-01-01 RX ADMIN — Medication 100 MG: at 09:15

## 2021-01-01 RX ADMIN — CEFTRIAXONE SODIUM 2000 MG: 2 INJECTION, POWDER, FOR SOLUTION INTRAMUSCULAR; INTRAVENOUS at 09:27

## 2021-01-01 RX ADMIN — Medication 10 ML: at 10:57

## 2021-01-01 RX ADMIN — GABAPENTIN 100 MG: 100 CAPSULE ORAL at 11:29

## 2021-01-01 RX ADMIN — SODIUM BICARBONATE 1300 MG: 650 TABLET ORAL at 08:14

## 2021-01-01 RX ADMIN — DOLUTEGRAVIR SODIUM 50 MG: 50 TABLET, FILM COATED ORAL at 11:27

## 2021-01-01 RX ADMIN — ASPIRIN 81 MG CHEWABLE TABLET 81 MG: 81 TABLET CHEWABLE at 09:14

## 2021-01-01 RX ADMIN — SODIUM CHLORIDE, PRESERVATIVE FREE 10 ML: 5 INJECTION INTRAVENOUS at 09:48

## 2021-01-01 RX ADMIN — HEPARIN SODIUM 5000 UNITS: 10000 INJECTION INTRAVENOUS; SUBCUTANEOUS at 09:04

## 2021-01-01 RX ADMIN — Medication 100 MG: at 14:00

## 2021-01-01 RX ADMIN — CLOPIDOGREL 75 MG: 75 TABLET, FILM COATED ORAL at 09:03

## 2021-01-01 RX ADMIN — CEFTRIAXONE SODIUM 2000 MG: 2 INJECTION, POWDER, FOR SOLUTION INTRAMUSCULAR; INTRAVENOUS at 21:17

## 2021-01-01 RX ADMIN — Medication 10 ML: at 09:11

## 2021-01-01 RX ADMIN — SODIUM CHLORIDE: 9 INJECTION, SOLUTION INTRAVENOUS at 15:58

## 2021-01-01 RX ADMIN — DEXTROSE MONOHYDRATE: 50 INJECTION, SOLUTION INTRAVENOUS at 08:43

## 2021-01-01 RX ADMIN — GABAPENTIN 400 MG: 400 CAPSULE ORAL at 20:43

## 2021-01-01 RX ADMIN — DEXTROSE MONOHYDRATE: 50 INJECTION, SOLUTION INTRAVENOUS at 14:11

## 2021-01-01 RX ADMIN — DOLUTEGRAVIR SODIUM 50 MG: 50 TABLET, FILM COATED ORAL at 09:14

## 2021-01-01 RX ADMIN — SODIUM BICARBONATE 1300 MG: 650 TABLET ORAL at 12:49

## 2021-01-01 RX ADMIN — SODIUM BICARBONATE 1300 MG: 650 TABLET ORAL at 17:59

## 2021-01-01 RX ADMIN — EMTRICITABINE AND TENOFOVIR ALAFENAMIDE 1 TABLET: 200; 25 TABLET ORAL at 09:18

## 2021-01-01 RX ADMIN — SODIUM CHLORIDE: 9 INJECTION, SOLUTION INTRAVENOUS at 18:02

## 2021-01-01 RX ADMIN — SODIUM BICARBONATE 1300 MG: 650 TABLET ORAL at 09:35

## 2021-01-01 RX ADMIN — Medication 1 TABLET: at 11:29

## 2021-01-01 RX ADMIN — CEFTRIAXONE SODIUM 2000 MG: 2 INJECTION, POWDER, FOR SOLUTION INTRAMUSCULAR; INTRAVENOUS at 13:21

## 2021-01-01 RX ADMIN — AMPICILLIN SODIUM 2000 MG: 2 INJECTION, POWDER, FOR SOLUTION INTRAMUSCULAR; INTRAVENOUS at 02:32

## 2021-01-01 RX ADMIN — SODIUM BICARBONATE 1300 MG: 650 TABLET ORAL at 12:46

## 2021-01-01 RX ADMIN — Medication 10 ML: at 10:50

## 2021-01-01 RX ADMIN — OXYCODONE 5 MG: 5 TABLET ORAL at 04:02

## 2021-01-01 RX ADMIN — MIDAZOLAM HYDROCHLORIDE 0.5 MG: 1 INJECTION, SOLUTION INTRAMUSCULAR; INTRAVENOUS at 15:58

## 2021-01-01 RX ADMIN — SODIUM BICARBONATE 1300 MG: 650 TABLET ORAL at 21:42

## 2021-01-01 RX ADMIN — LORAZEPAM 2 MG: 2 INJECTION INTRAMUSCULAR; INTRAVENOUS at 19:35

## 2021-01-01 RX ADMIN — DEXTROSE MONOHYDRATE: 50 INJECTION, SOLUTION INTRAVENOUS at 09:09

## 2021-01-01 RX ADMIN — SODIUM BICARBONATE 1300 MG: 650 TABLET ORAL at 17:39

## 2021-01-01 RX ADMIN — Medication 2 MG: at 11:02

## 2021-01-01 RX ADMIN — GABAPENTIN 400 MG: 400 CAPSULE ORAL at 20:18

## 2021-01-01 RX ADMIN — Medication 6 MG: at 21:42

## 2021-01-01 RX ADMIN — ASPIRIN 81 MG CHEWABLE TABLET 81 MG: 81 TABLET CHEWABLE at 08:12

## 2021-01-01 RX ADMIN — HYDROCODONE BITARTRATE AND ACETAMINOPHEN 1 TABLET: 5; 325 TABLET ORAL at 11:09

## 2021-01-01 RX ADMIN — CEFTRIAXONE SODIUM 2000 MG: 2 INJECTION, POWDER, FOR SOLUTION INTRAMUSCULAR; INTRAVENOUS at 09:36

## 2021-01-01 RX ADMIN — DOLUTEGRAVIR SODIUM 50 MG: 50 TABLET, FILM COATED ORAL at 21:10

## 2021-01-01 RX ADMIN — VANCOMYCIN HYDROCHLORIDE 1000 MG: 1 INJECTION, POWDER, LYOPHILIZED, FOR SOLUTION INTRAVENOUS at 12:58

## 2021-01-01 RX ADMIN — Medication 100 MG: at 09:48

## 2021-01-01 RX ADMIN — WATER 1.2 ML: 1 INJECTION INTRAMUSCULAR; INTRAVENOUS; SUBCUTANEOUS at 03:40

## 2021-01-01 RX ADMIN — AMPICILLIN SODIUM 2000 MG: 2 INJECTION, POWDER, FOR SOLUTION INTRAMUSCULAR; INTRAVENOUS at 18:30

## 2021-01-01 RX ADMIN — SODIUM CHLORIDE 2421 ML: 9 INJECTION, SOLUTION INTRAVENOUS at 20:25

## 2021-01-01 RX ADMIN — Medication 2 MG: at 07:48

## 2021-01-01 RX ADMIN — SODIUM CHLORIDE: 4.5 INJECTION, SOLUTION INTRAVENOUS at 03:13

## 2021-01-01 RX ADMIN — Medication 2 MG: at 09:43

## 2021-01-01 RX ADMIN — OXYCODONE 5 MG: 5 TABLET ORAL at 09:16

## 2021-01-01 RX ADMIN — EMTRICITABINE AND TENOFOVIR ALAFENAMIDE 1 TABLET: 200; 25 TABLET ORAL at 08:15

## 2021-01-01 RX ADMIN — SODIUM BICARBONATE 1300 MG: 650 TABLET ORAL at 11:30

## 2021-01-01 RX ADMIN — SODIUM CHLORIDE, PRESERVATIVE FREE 10 ML: 5 INJECTION INTRAVENOUS at 14:31

## 2021-01-01 RX ADMIN — DOLUTEGRAVIR SODIUM 50 MG: 50 TABLET, FILM COATED ORAL at 23:53

## 2021-01-01 RX ADMIN — SODIUM BICARBONATE 1300 MG: 650 TABLET ORAL at 14:05

## 2021-01-01 RX ADMIN — SODIUM BICARBONATE 1300 MG: 650 TABLET ORAL at 16:28

## 2021-01-01 RX ADMIN — SODIUM CHLORIDE 500 ML: 9 INJECTION, SOLUTION INTRAVENOUS at 22:58

## 2021-01-01 RX ADMIN — CEFTRIAXONE SODIUM 2000 MG: 2 INJECTION, POWDER, FOR SOLUTION INTRAMUSCULAR; INTRAVENOUS at 09:37

## 2021-01-01 RX ADMIN — OXYCODONE 5 MG: 5 TABLET ORAL at 15:24

## 2021-01-01 RX ADMIN — AMPICILLIN SODIUM 2000 MG: 2 INJECTION, POWDER, FOR SOLUTION INTRAMUSCULAR; INTRAVENOUS at 18:33

## 2021-01-01 RX ADMIN — Medication 400 MG: at 14:19

## 2021-01-01 RX ADMIN — SODIUM BICARBONATE 1300 MG: 650 TABLET ORAL at 16:59

## 2021-01-01 RX ADMIN — SODIUM CHLORIDE: 9 INJECTION, SOLUTION INTRAVENOUS at 04:04

## 2021-01-01 RX ADMIN — SODIUM CHLORIDE: 9 INJECTION, SOLUTION INTRAVENOUS at 13:25

## 2021-01-01 RX ADMIN — DOLUTEGRAVIR SODIUM 50 MG: 50 TABLET, FILM COATED ORAL at 23:42

## 2021-01-01 RX ADMIN — OXYCODONE 5 MG: 5 TABLET ORAL at 16:24

## 2021-01-01 RX ADMIN — CEFEPIME HYDROCHLORIDE 2000 MG: 2 INJECTION, POWDER, FOR SOLUTION INTRAVENOUS at 03:07

## 2021-01-01 RX ADMIN — DEXTROSE MONOHYDRATE: 50 INJECTION, SOLUTION INTRAVENOUS at 04:25

## 2021-01-01 RX ADMIN — CLOPIDOGREL 75 MG: 75 TABLET, FILM COATED ORAL at 09:17

## 2021-01-01 RX ADMIN — EMTRICITABINE AND TENOFOVIR ALAFENAMIDE 1 TABLET: 200; 25 TABLET ORAL at 09:15

## 2021-01-01 RX ADMIN — SODIUM BICARBONATE 1300 MG: 650 TABLET ORAL at 16:58

## 2021-01-01 RX ADMIN — SODIUM CHLORIDE: 9 INJECTION, SOLUTION INTRAVENOUS at 10:50

## 2021-01-01 RX ADMIN — OLANZAPINE 5 MG: 10 INJECTION, POWDER, FOR SOLUTION INTRAMUSCULAR at 09:57

## 2021-01-01 RX ADMIN — CLOPIDOGREL 75 MG: 75 TABLET, FILM COATED ORAL at 09:48

## 2021-01-01 RX ADMIN — MEGESTROL ACETATE 40 MG: 20 TABLET ORAL at 21:46

## 2021-01-01 RX ADMIN — HEPARIN SODIUM 5000 UNITS: 10000 INJECTION INTRAVENOUS; SUBCUTANEOUS at 05:40

## 2021-01-01 RX ADMIN — POTASSIUM CHLORIDE 40 MEQ: 1500 TABLET, EXTENDED RELEASE ORAL at 08:58

## 2021-01-01 RX ADMIN — VANCOMYCIN HYDROCHLORIDE 1000 MG: 1 INJECTION, POWDER, LYOPHILIZED, FOR SOLUTION INTRAVENOUS at 17:11

## 2021-01-01 RX ADMIN — SODIUM BICARBONATE 1300 MG: 650 TABLET ORAL at 22:45

## 2021-01-01 RX ADMIN — Medication 10 ML: at 10:13

## 2021-01-01 RX ADMIN — HEPARIN SODIUM 5000 UNITS: 10000 INJECTION INTRAVENOUS; SUBCUTANEOUS at 14:30

## 2021-01-01 RX ADMIN — ASPIRIN 81 MG CHEWABLE TABLET 81 MG: 81 TABLET CHEWABLE at 08:13

## 2021-01-01 RX ADMIN — Medication 100 MG: at 08:12

## 2021-01-01 RX ADMIN — GABAPENTIN 100 MG: 100 CAPSULE ORAL at 22:50

## 2021-01-01 RX ADMIN — Medication 100 MG: at 08:57

## 2021-01-01 RX ADMIN — OXYCODONE 5 MG: 5 TABLET ORAL at 19:11

## 2021-01-01 RX ADMIN — Medication 6 MG: at 23:13

## 2021-01-01 RX ADMIN — OXYCODONE 5 MG: 5 TABLET ORAL at 05:56

## 2021-01-01 RX ADMIN — Medication 6 MG: at 22:45

## 2021-01-01 RX ADMIN — OXYCODONE 5 MG: 5 TABLET ORAL at 00:22

## 2021-01-01 RX ADMIN — Medication 1 TABLET: at 09:15

## 2021-01-01 RX ADMIN — HEPARIN SODIUM 5000 UNITS: 10000 INJECTION INTRAVENOUS; SUBCUTANEOUS at 09:17

## 2021-01-01 RX ADMIN — OXYCODONE 5 MG: 5 TABLET ORAL at 23:38

## 2021-01-01 RX ADMIN — SODIUM BICARBONATE 1300 MG: 650 TABLET ORAL at 17:35

## 2021-01-01 RX ADMIN — SODIUM BICARBONATE 1300 MG: 650 TABLET ORAL at 09:03

## 2021-01-01 RX ADMIN — HEPARIN SODIUM 5000 UNITS: 10000 INJECTION INTRAVENOUS; SUBCUTANEOUS at 18:49

## 2021-01-01 RX ADMIN — AMPICILLIN SODIUM 2000 MG: 2 INJECTION, POWDER, FOR SOLUTION INTRAMUSCULAR; INTRAVENOUS at 05:39

## 2021-01-01 RX ADMIN — HEPARIN SODIUM 5000 UNITS: 10000 INJECTION INTRAVENOUS; SUBCUTANEOUS at 09:28

## 2021-01-01 RX ADMIN — CEFTRIAXONE SODIUM 2000 MG: 2 INJECTION, POWDER, FOR SOLUTION INTRAMUSCULAR; INTRAVENOUS at 11:11

## 2021-01-01 RX ADMIN — SODIUM CHLORIDE: 9 INJECTION, SOLUTION INTRAVENOUS at 04:30

## 2021-01-01 RX ADMIN — CEFTRIAXONE SODIUM 2000 MG: 2 INJECTION, POWDER, FOR SOLUTION INTRAMUSCULAR; INTRAVENOUS at 01:02

## 2021-01-01 RX ADMIN — PIPERACILLIN AND TAZOBACTAM 3375 MG: 3; .375 INJECTION, POWDER, LYOPHILIZED, FOR SOLUTION INTRAVENOUS at 21:44

## 2021-01-01 RX ADMIN — AMPICILLIN SODIUM 2000 MG: 2 INJECTION, POWDER, FOR SOLUTION INTRAMUSCULAR; INTRAVENOUS at 18:31

## 2021-01-01 RX ADMIN — Medication 10 ML: at 08:28

## 2021-01-01 RX ADMIN — PIPERACILLIN AND TAZOBACTAM 3375 MG: 3; .375 INJECTION, POWDER, LYOPHILIZED, FOR SOLUTION INTRAVENOUS at 09:19

## 2021-01-01 RX ADMIN — OXYCODONE 5 MG: 5 TABLET ORAL at 23:53

## 2021-01-01 RX ADMIN — OXYCODONE 5 MG: 5 TABLET ORAL at 18:01

## 2021-01-01 RX ADMIN — CLOPIDOGREL 75 MG: 75 TABLET, FILM COATED ORAL at 09:40

## 2021-01-01 RX ADMIN — SODIUM CHLORIDE 500 ML: 9 INJECTION, SOLUTION INTRAVENOUS at 15:43

## 2021-01-01 RX ADMIN — SODIUM BICARBONATE 1300 MG: 650 TABLET ORAL at 18:30

## 2021-01-01 RX ADMIN — OXYCODONE 5 MG: 5 TABLET ORAL at 18:03

## 2021-01-01 RX ADMIN — SODIUM BICARBONATE 1300 MG: 650 TABLET ORAL at 18:00

## 2021-01-01 RX ADMIN — VANCOMYCIN HYDROCHLORIDE 500 MG: 500 INJECTION, POWDER, LYOPHILIZED, FOR SOLUTION INTRAVENOUS at 16:59

## 2021-01-01 RX ADMIN — SODIUM CHLORIDE, PRESERVATIVE FREE 10 ML: 5 INJECTION INTRAVENOUS at 22:54

## 2021-01-01 RX ADMIN — DOLUTEGRAVIR SODIUM 50 MG: 50 TABLET, FILM COATED ORAL at 11:14

## 2021-01-01 RX ADMIN — GABAPENTIN 400 MG: 400 CAPSULE ORAL at 09:35

## 2021-01-01 RX ADMIN — SODIUM CHLORIDE, PRESERVATIVE FREE 10 ML: 5 INJECTION INTRAVENOUS at 11:33

## 2021-01-01 RX ADMIN — HEPARIN SODIUM 5000 UNITS: 10000 INJECTION INTRAVENOUS; SUBCUTANEOUS at 06:00

## 2021-01-01 RX ADMIN — EMTRICITABINE AND TENOFOVIR ALAFENAMIDE 1 TABLET: 200; 25 TABLET ORAL at 09:56

## 2021-01-01 RX ADMIN — CEFTRIAXONE SODIUM 2000 MG: 2 INJECTION, POWDER, FOR SOLUTION INTRAMUSCULAR; INTRAVENOUS at 22:57

## 2021-01-01 RX ADMIN — ASPIRIN 81 MG CHEWABLE TABLET 81 MG: 81 TABLET CHEWABLE at 09:58

## 2021-01-01 RX ADMIN — LORAZEPAM 0.5 MG: 2 INJECTION INTRAMUSCULAR; INTRAVENOUS at 02:17

## 2021-01-01 RX ADMIN — Medication 10 ML: at 23:40

## 2021-01-01 RX ADMIN — SODIUM CHLORIDE: 9 INJECTION, SOLUTION INTRAVENOUS at 21:16

## 2021-01-01 RX ADMIN — SODIUM BICARBONATE 1300 MG: 650 TABLET ORAL at 09:15

## 2021-01-01 RX ADMIN — IOPAMIDOL 100 ML: 755 INJECTION, SOLUTION INTRAVENOUS at 23:31

## 2021-01-01 RX ADMIN — MIDAZOLAM HYDROCHLORIDE 0.5 MG: 1 INJECTION, SOLUTION INTRAMUSCULAR; INTRAVENOUS at 17:01

## 2021-01-01 RX ADMIN — SODIUM BICARBONATE 1300 MG: 650 TABLET ORAL at 08:55

## 2021-01-01 RX ADMIN — ASPIRIN 81 MG CHEWABLE TABLET 81 MG: 81 TABLET CHEWABLE at 10:42

## 2021-01-01 RX ADMIN — SODIUM CHLORIDE 500 ML: 9 INJECTION, SOLUTION INTRAVENOUS at 13:45

## 2021-01-01 RX ADMIN — PIPERACILLIN AND TAZOBACTAM 3375 MG: 3; .375 INJECTION, POWDER, LYOPHILIZED, FOR SOLUTION INTRAVENOUS at 09:04

## 2021-01-01 RX ADMIN — EMTRICITABINE AND TENOFOVIR ALAFENAMIDE 1 TABLET: 200; 25 TABLET ORAL at 09:03

## 2021-01-01 RX ADMIN — Medication 6 MG: at 23:53

## 2021-01-01 RX ADMIN — OXYCODONE 5 MG: 5 TABLET ORAL at 14:46

## 2021-01-01 RX ADMIN — MEGESTROL ACETATE 40 MG: 20 TABLET ORAL at 08:57

## 2021-01-01 RX ADMIN — HEPARIN SODIUM 5000 UNITS: 10000 INJECTION INTRAVENOUS; SUBCUTANEOUS at 06:13

## 2021-01-01 RX ADMIN — OXYCODONE 5 MG: 5 TABLET ORAL at 15:48

## 2021-01-01 RX ADMIN — GABAPENTIN 400 MG: 400 CAPSULE ORAL at 08:57

## 2021-01-01 RX ADMIN — Medication 6 MG: at 23:04

## 2021-01-01 RX ADMIN — Medication 1 TABLET: at 12:49

## 2021-01-01 RX ADMIN — GABAPENTIN 100 MG: 100 CAPSULE ORAL at 21:43

## 2021-01-01 RX ADMIN — HEPARIN SODIUM 5000 UNITS: 10000 INJECTION INTRAVENOUS; SUBCUTANEOUS at 01:04

## 2021-01-01 RX ADMIN — HALOPERIDOL LACTATE 5 MG: 5 INJECTION INTRAMUSCULAR at 22:58

## 2021-01-01 RX ADMIN — LORAZEPAM 0.5 MG: 2 INJECTION INTRAMUSCULAR; INTRAVENOUS at 01:35

## 2021-01-01 ASSESSMENT — PAIN DESCRIPTION - ORIENTATION
ORIENTATION: RIGHT;LEFT
ORIENTATION: LEFT

## 2021-01-01 ASSESSMENT — PAIN SCALES - GENERAL
PAINLEVEL_OUTOF10: 10
PAINLEVEL_OUTOF10: 9
PAINLEVEL_OUTOF10: 7
PAINLEVEL_OUTOF10: 8
PAINLEVEL_OUTOF10: 0
PAINLEVEL_OUTOF10: 7
PAINLEVEL_OUTOF10: 0
PAINLEVEL_OUTOF10: 5
PAINLEVEL_OUTOF10: 2
PAINLEVEL_OUTOF10: 0
PAINLEVEL_OUTOF10: 10
PAINLEVEL_OUTOF10: 0
PAINLEVEL_OUTOF10: 7
PAINLEVEL_OUTOF10: 0
PAINLEVEL_OUTOF10: 0
PAINLEVEL_OUTOF10: 3
PAINLEVEL_OUTOF10: 9
PAINLEVEL_OUTOF10: 8
PAINLEVEL_OUTOF10: 3
PAINLEVEL_OUTOF10: 8
PAINLEVEL_OUTOF10: 3
PAINLEVEL_OUTOF10: 2
PAINLEVEL_OUTOF10: 0
PAINLEVEL_OUTOF10: 8
PAINLEVEL_OUTOF10: 0
PAINLEVEL_OUTOF10: 0
PAINLEVEL_OUTOF10: 7
PAINLEVEL_OUTOF10: 10
PAINLEVEL_OUTOF10: 0
PAINLEVEL_OUTOF10: 7
PAINLEVEL_OUTOF10: 9
PAINLEVEL_OUTOF10: 0
PAINLEVEL_OUTOF10: 9
PAINLEVEL_OUTOF10: 10
PAINLEVEL_OUTOF10: 0
PAINLEVEL_OUTOF10: 6
PAINLEVEL_OUTOF10: 9
PAINLEVEL_OUTOF10: 0
PAINLEVEL_OUTOF10: 8
PAINLEVEL_OUTOF10: 10
PAINLEVEL_OUTOF10: 0
PAINLEVEL_OUTOF10: 10
PAINLEVEL_OUTOF10: 0
PAINLEVEL_OUTOF10: 10
PAINLEVEL_OUTOF10: 3
PAINLEVEL_OUTOF10: 8
PAINLEVEL_OUTOF10: 0
PAINLEVEL_OUTOF10: 0
PAINLEVEL_OUTOF10: 10
PAINLEVEL_OUTOF10: 10
PAINLEVEL_OUTOF10: 9
PAINLEVEL_OUTOF10: 0
PAINLEVEL_OUTOF10: 5
PAINLEVEL_OUTOF10: 6
PAINLEVEL_OUTOF10: 10
PAINLEVEL_OUTOF10: 0
PAINLEVEL_OUTOF10: 10
PAINLEVEL_OUTOF10: 0
PAINLEVEL_OUTOF10: 0
PAINLEVEL_OUTOF10: 7
PAINLEVEL_OUTOF10: 10
PAINLEVEL_OUTOF10: 7
PAINLEVEL_OUTOF10: 10
PAINLEVEL_OUTOF10: 3
PAINLEVEL_OUTOF10: 0
PAINLEVEL_OUTOF10: 0
PAINLEVEL_OUTOF10: 2
PAINLEVEL_OUTOF10: 0
PAINLEVEL_OUTOF10: 0
PAINLEVEL_OUTOF10: 5
PAINLEVEL_OUTOF10: 3
PAINLEVEL_OUTOF10: 0
PAINLEVEL_OUTOF10: 7
PAINLEVEL_OUTOF10: 7
PAINLEVEL_OUTOF10: 0
PAINLEVEL_OUTOF10: 9
PAINLEVEL_OUTOF10: 10
PAINLEVEL_OUTOF10: 9
PAINLEVEL_OUTOF10: 7
PAINLEVEL_OUTOF10: 0
PAINLEVEL_OUTOF10: 7
PAINLEVEL_OUTOF10: 0
PAINLEVEL_OUTOF10: 9
PAINLEVEL_OUTOF10: 9
PAINLEVEL_OUTOF10: 0

## 2021-01-01 ASSESSMENT — PAIN DESCRIPTION - PAIN TYPE
TYPE: ACUTE PAIN
TYPE: CHRONIC PAIN
TYPE: ACUTE PAIN
TYPE: NEUROPATHIC PAIN
TYPE: CHRONIC PAIN
TYPE: CHRONIC PAIN
TYPE: ACUTE PAIN

## 2021-01-01 ASSESSMENT — PULMONARY FUNCTION TESTS
PIF_VALUE: 0
PIF_VALUE: 1
PIF_VALUE: 0
PIF_VALUE: 1
PIF_VALUE: 0
PIF_VALUE: 0
PIF_VALUE: 1
PIF_VALUE: 0
PIF_VALUE: 1

## 2021-01-01 ASSESSMENT — PAIN DESCRIPTION - LOCATION
LOCATION: ARM
LOCATION: ARM
LOCATION: CHEST;ARM
LOCATION: CHEST;ARM
LOCATION: ARM;GENERALIZED
LOCATION: ARM
LOCATION: ARM
LOCATION: CHEST;ARM
LOCATION: GENERALIZED;ARM
LOCATION: ARM
LOCATION: GENERALIZED
LOCATION: GENERALIZED
LOCATION: ARM
LOCATION: HEAD;NECK
LOCATION: NECK

## 2021-01-01 ASSESSMENT — PAIN DESCRIPTION - DESCRIPTORS
DESCRIPTORS: THROBBING
DESCRIPTORS: ACHING;CONSTANT;DISCOMFORT
DESCRIPTORS: ACHING;THROBBING
DESCRIPTORS: ACHING;CONSTANT;DISCOMFORT
DESCRIPTORS: ACHING;DISCOMFORT;SORE
DESCRIPTORS: TIGHTNESS;THROBBING
DESCRIPTORS: ACHING;THROBBING
DESCRIPTORS: BURNING;TIGHTNESS
DESCRIPTORS: ACHING;THROBBING
DESCRIPTORS: BURNING;THROBBING;TIGHTNESS
DESCRIPTORS: ACHING;THROBBING;SHARP

## 2021-01-01 ASSESSMENT — ENCOUNTER SYMPTOMS
RHINORRHEA: 0
EYE PAIN: 0
ABDOMINAL PAIN: 0
NAUSEA: 0
COUGH: 0
COLOR CHANGE: 0
VOMITING: 0
SHORTNESS OF BREATH: 0
WHEEZING: 0
DIARRHEA: 0

## 2021-01-01 ASSESSMENT — PAIN DESCRIPTION - ONSET
ONSET: ON-GOING

## 2021-01-01 ASSESSMENT — PAIN SCALES - PAIN ASSESSMENT IN ADVANCED DEMENTIA (PAINAD)
TOTALSCORE: 0
CONSOLABILITY: 0
NEGVOCALIZATION: 0
BODYLANGUAGE: 0
FACIALEXPRESSION: 0
CONSOLABILITY: 0
FACIALEXPRESSION: 0
BODYLANGUAGE: 0
TOTALSCORE: 0
CONSOLABILITY: 0
TOTALSCORE: 0
BREATHING: 0
BODYLANGUAGE: 0
FACIALEXPRESSION: 0
BREATHING: 0
BREATHING: 0
NEGVOCALIZATION: 0
NEGVOCALIZATION: 0

## 2021-01-01 ASSESSMENT — PAIN SCALES - WONG BAKER
WONGBAKER_NUMERICALRESPONSE: 0

## 2021-01-01 ASSESSMENT — PAIN DESCRIPTION - FREQUENCY
FREQUENCY: INTERMITTENT
FREQUENCY: CONTINUOUS

## 2021-01-01 ASSESSMENT — PAIN DESCRIPTION - DIRECTION
RADIATING_TOWARDS: SHOULDER
RADIATING_TOWARDS: NECK

## 2021-01-01 ASSESSMENT — PAIN - FUNCTIONAL ASSESSMENT
PAIN_FUNCTIONAL_ASSESSMENT: PREVENTS OR INTERFERES SOME ACTIVE ACTIVITIES AND ADLS
PAIN_FUNCTIONAL_ASSESSMENT: PREVENTS OR INTERFERES WITH ALL ACTIVE AND SOME PASSIVE ACTIVITIES

## 2021-05-24 NOTE — Clinical Note
Patient Class: Inpatient [101]   REQUIRED: Diagnosis: Multiple organ failure with heart failure in  [025250]   Estimated Length of Stay: Estimated stay of more than 2 midnights   Telemetry/Cardiac Monitoring Required?: Yes

## 2021-05-24 NOTE — LETTER
[place] [employer] Medicaid  CERTIFICATION OF NECESSITY  FOR NON-EMERGENCY TRANSPORTATION   BY [facility]      Individual Information   1. Name: Juan Antonio Payton 2. [place] Medicaid Billing Number:    3. Address: [address] [place] [address] [address] [facility]      Transportation Provider Information   4. Provider Name: [id number]   5. [place] Medicaid Provider Number:  National Provider Identifier (NPI):      Certification  7. Criteria:  During transport, this individual requires:  [x] Medical treatment or continuous     supervision by an EMT. [x] The administration or regulation of oxygen by another person. [] Supervised protective restraint. 8. Period Beginning Date: 06/02/2021   9. Length  [x] Not more than 7 day(s)  [] One Year     Additional Information Relevant to Certification   10. Comments or Explanations, If Necessary or Appropriate     Acute Metabolic/Toxic encephalopathy, Sepsis, CHANEL, Cellulitis, Rhabdomyolysis, Alert to Self only. Certifying Practitioner Information   11. Name of Practitioner: Dr Jian Delgado   12. [place] Medicaid Provider Number, If Applicable:  [address] Provider Identifier (NPI):      Signature Information   14. Date of Signature: 06/02/2021 15. Name of Person Signing: Mirela Vazquez RN, BSN   16.  Signature and Professional Designation: Mirela Vazquez RN, BSN, Discharge Planner     [facility] [id number]  Rev. 7/2015

## 2021-05-25 PROBLEM — R41.82 AMS (ALTERED MENTAL STATUS): Status: ACTIVE | Noted: 2021-01-01

## 2021-05-25 NOTE — ED NOTES
Rosalinda Davidson, brother, is in the waiting room but also leaved his number to be updated at [phone].      Kyrie De Luna RN  05/24/21 7643

## 2021-05-25 NOTE — PROGRESS NOTES
Spoke to a nephew that called in asking for MRI checklist information.  The nephew is a few minutes away and is coming to bedside and can help find out this information

## 2021-05-25 NOTE — H&P
Inpatient H&P      PCP:  No primary care provider on file. Admitting Physician:  No admitting provider for patient encounter. Consultants:  Infectious disease  Chief Complaint:    Chief Complaint   Patient presents with    Cerebrovascular Accident     Patient arrives from home, family found patient to be too weak to get into vehicle to get to hospital d/t increased weaknss. Family reports they last seen patient normal sometime yesterday. EMS reports LUE and LLE weakness. ,       History of Present Illness  Juan Antonio Payton is a 56 y.o. male who presents to [facility] ER complaining of AMS. Juan Antonio Payton has a past medical history that includes HIV, hepatitis C, GERD    Patient unable to provide any history due to AMS, below is obtained from EMR and ER physician. Patient lethargic and a poor historian. Orville Kerns presents due to altered mental status and left-sided weakness; unknown last known well, stroke alert was called, CT imaging ultimately showed no perfusion mismatch or occlusion and patient determined not to be TPA candidate or candidate for thrombectomy as per stroke neurologist; patient given 324 p.o. aspirin as per neurology. Feel patient's presentation is more likely related to sepsis from unknown underlying infection, rectal temperature 100 °F, patient with labs showing acute renal failure with GFR 13 creatinine 5.6, elevated LFTs with AST 1108, ; UA positive for hematuria, no evidence for UTI. Lactic acid is 3.9. Leukocytosis 13.9, CBC does seem to show evidence for hemoconcentration. Patient given 1 L NS bolus. Rapid Covid is negative. CXR does bilateral opacifications concerning for possible pneumonia. Patient given broad-spectrum antibiotic treatment with vancomycin and cefepime. Labs also significant for elevated troponin and BNP; CRP 19, procalcitonin 3.77, elevated trop and CK. UDS positive for cocaine and fentanyl.   On physical exam patient appears to have diffuse erythema and warmth over his left lateral neck and left upper flank up to his left axilla, there also appears to be a central region of swelling and a central possible insect bite or laceration with mild serous drainage, there does not appear to be any crepitus, induration, or evidence of focal abscess. There is tenderness to palpation of these regions as well, concerns for possible cellulitis as infectious source. During this encounter patient did remain alert, he answers some questions but is oriented only to self, he is not able to provide very much history but is able to tell us where his pain is. He does not have any abdominal tenderness to palpation. Lungs with mild rales bilaterally scattered. Decision made to note this patient due to altered mental status, acute renal failure, transaminitis, dehydration, and sepsis with unknown source. Discussed results and plan with the patient's son, he voices understanding and is amenable.       Last Hospital Admission - 8/9/19    Cellulitis    Neuropathy    HIV infection (HCC)    HTN (hypertension)    Fracture of proximal end of ulna    Last Echocardiogram -   Nonein EMR     ED TRIAGE VITALS  BP: 122/80, Temp: 100 °F (37.8 °C), Pulse: 76, Resp: 16, SpO2: 94 %    Vitals:    05/24/21 2321 05/25/21 0116 05/25/21 0430   BP: (!) 126/95  122/80   Pulse: 78  76   Resp: 14  16   Temp: 98 °F (36.7 °C) 100 °F (37.8 °C)    TempSrc:  Rectal    SpO2: 98%  94%   Weight: 160 lb (72.6 kg)     Height: 5' 8\" (1.727 m)           Histories  Past Medical History:   Diagnosis Date    Abscess of hand, left 2009    Abscess of scrotum 2007    Bacterial meningitis 2010    Depression     GERD (gastroesophageal reflux disease)     Hepatitis C     Herpes zoster w/ nervous system complication 5141    neuropathy    Human immunodeficiency virus, type 2 (HIV 2) ([facility] [address] 2009    Inguinal hernia unilateral     left    Pneumonia 2010    Shingles (herpes zoster) polyneuropathy     Suicidal ideation 2008    Vitamin D deficiency      Past Surgical History:   Procedure Laterality Date    ABSCESS DRAINAGE  9/18/2007    scrotal. [facility]. Dr. Robert Berman  2008    testicle    INGUINAL HERNIA REPAIR  3/9/2012    left indirect hernia repaired with mesh, Dr. Billy Wilkes, [address] [place] [address] OTHER SURGICAL HISTORY  3/9/2012    Left Inguinal Hernia Repair;Removal of Foreign Object Left foot     Family History   Problem Relation Age of Onset    Cancer Father [de-identified]        bone marrow    Heart Disease Brother 48        heart attack       Home Medications  Prior to Admission medications    Medication Sig Start Date End Date Taking? Authorizing Provider   colchicine (COLCRYS) 0.6 MG tablet Take 1 tablet by mouth 2 times daily 11/15/20   CRISTHIAN Fontaine CNP   ibuprofen (IBU) 600 MG tablet Take 1 tablet by mouth every 6 hours as needed for Pain 7/5/20 7/12/20  CRISTHIAN Phelan CNP   naproxen (NAPROSYN) 500 MG tablet Take 1 tablet by mouth 2 times daily 5/25/20   Rebecca Lee PA-C   naproxen (NAPROSYN) 500 MG tablet Take 1 tablet by mouth 2 times daily (with meals) 3/19/20   MAURICIO Edwards   melatonin 3 MG TABS tablet Take 1 mg by mouth nightly as needed    Historical Provider, MD   TIVICAY 50 MG tablet Take 1 tablet by mouth daily 7/26/19   Historical Provider, MD   DESCOVY 200-25 MG TABS tablet Take 1 tablet by mouth daily 7/26/19   Historical Provider, MD   Multiple Vitamin (MULTIVITAMIN) tablet Take 1 tablet by mouth daily 7/26/19   Historical Provider, MD   Nutritional Supplements (ENSURE) LIQD Take 1 Can by mouth 3 times daily 7/26/19   Historical Provider, MD   gabapentin (NEURONTIN) 600 MG tablet Take 1,200 mg by mouth 2 times daily. Historical Provider, MD       Allergies  Patient has no known allergies.     Social Hx  Social History     Socioeconomic History    Marital status:      Spouse name: Not on file    Number of children: Not on file    Years of education: Not on file    Highest education level: Not on file   Occupational History    Not on file   Tobacco Use    Smoking status: Current Every Day Smoker     Packs/day: 0.50     Years: 20.00     Pack years: 10.00     Types: Cigarettes    Smokeless tobacco: Never Used   Vaping Use    Vaping Use: Never used   Substance and Sexual Activity    Alcohol use: Not Currently    Drug use: No    Sexual activity: Not Currently   Other Topics Concern    Not on file   Social History Narrative    Not on file     Social Determinants of Health     Financial Resource Strain:     Difficulty of Paying Living Expenses:    Food Insecurity:     Worried About Running Out of Food in the Last Year:     Ran Out of Food in the Last Year:    Transportation Needs:     Lack of Transportation (Medical):      Lack of Transportation (Non-Medical):    Physical Activity:     Days of Exercise per Week:     Minutes of Exercise per Session:    Stress:     Feeling of Stress :    Social Connections:     Frequency of Communication with Friends and Family:     Frequency of Social Gatherings with Friends and Family:     Attends [employer] Services:     Active Member of Clubs or Organizations:     Attends Club or Organization Meetings:     Marital Status:    Intimate Partner Violence:     Fear of Current or Ex-Partner:     Emotionally Abused:     Physically Abused:     Sexually Abused:        Review of Systems  Limited due to patient status    Physical Examination  Vitals:  /80   Pulse 76   Temp 100 °F (37.8 °C) (Rectal)   Resp 16   Ht 5' 8\" (1.727 m)   Wt 160 lb (72.6 kg)   SpO2 94%   BMI 24.33 kg/m²   General Appearance:  Lethargic but does awaken, answers his name but does not know where he is or his situation  HEENT:  NCAT; PERRL; conjunctiva pink, sclera clear mild swelling left neck  Neck:  no adenopathy, bruit, JVD, tenderness, masses, or nodules; supple, symmetrical, trachea midline, thyroid not enlarged  Lung:  clear to auscultation bilaterally; no use of accessory muscles; no rhonchi, rales, or crackles  Heart:  regular rate and regular rhythm without murmur, rub, or gallop  Abdomen:  soft, nontender, nondistended; normoactive bowel sounds; no organomegaly L flank area? insect bite with erythema  Extremities:  extremities normal, atraumatic, no cyanosis or edema  Musculokeletal:  no joint swelling, no muscle tenderness. ROM normal in all joints of extremities.    Neurologic:  mental status A&Ox1,   CN II-XII grossly intact; sensation intact, ; no slurred speech       Laboratory Data  Recent Results (from the past 24 hour(s))   POCT Glucose    Collection Time: 05/24/21 11:24 PM   Result Value Ref Range    Meter Glucose 162 (H) 74 - 99 mg/dL   CBC Auto Differential    Collection Time: 05/24/21 11:25 PM   Result Value Ref Range    WBC 13.9 (H) 4.5 - 11.5 E9/L    RBC 5.30 3.80 - 5.80 E12/L    Hemoglobin 18.2 (H) 12.5 - 16.5 g/dL    Hematocrit 54.6 (H) 37.0 - 54.0 %    .0 (H) 80.0 - 99.9 fL    MCH 34.3 26.0 - 35.0 pg    MCHC 33.3 32.0 - 34.5 %    RDW 13.2 11.5 - 15.0 fL    Platelets 231 787 - 268 E9/L    MPV 9.8 7.0 - 12.0 fL    Neutrophils % 85.3 (H) 43.0 - 80.0 %    Immature Granulocytes % 0.6 0.0 - 5.0 %    Lymphocytes % 6.7 (L) 20.0 - 42.0 %    Monocytes % 7.3 2.0 - 12.0 %    Eosinophils % 0.0 0.0 - 6.0 %    Basophils % 0.1 0.0 - 2.0 %    Neutrophils Absolute 11.83 (H) 1.80 - 7.30 E9/L    Immature Granulocytes # 0.08 E9/L    Lymphocytes Absolute 0.93 (L) 1.50 - 4.00 E9/L    Monocytes Absolute 1.02 (H) 0.10 - 0.95 E9/L    Eosinophils Absolute 0.00 (L) 0.05 - 0.50 E9/L    Basophils Absolute 0.02 0.00 - 0.20 E9/L   Comprehensive Metabolic Panel w/ Reflex to MG    Collection Time: 05/24/21 11:25 PM   Result Value Ref Range    Sodium 139 132 - 146 mmol/L    Potassium reflex Magnesium 5.1 (H) 3.5 - 5.0 mmol/L    Chloride 99 98 - 107 mmol/L    CO2 22 22 - 29 mmol/L    Anion Gap 18 (H) 7 - 16 mmol/L    Glucose 161 (H) 74 - 99 mg/dL    BUN 44 (H) 6 - 23 mg/dL CREATININE 5.6 (H) 0.7 - 1.2 mg/dL    GFR Non-African American 13 >=60 mL/min/1.73    GFR African American 13     Calcium 8.3 (L) 8.6 - 10.2 mg/dL    Total Protein 8.3 6.4 - 8.3 g/dL    Albumin 3.6 3.5 - 5.2 g/dL    Total Bilirubin 0.4 0.0 - 1.2 mg/dL    Alkaline Phosphatase 79 40 - 129 U/L     (H) 0 - 40 U/L    AST 1,108 (H) 0 - 39 U/L   Troponin    Collection Time: 05/24/21 11:25 PM   Result Value Ref Range    Troponin 0.48 (H) 0.00 - 0.03 ng/mL   Brain Natriuretic Peptide    Collection Time: 05/24/21 11:25 PM   Result Value Ref Range    Pro-BNP 8,594 (H) 0 - 125 pg/mL   Lactate, Sepsis    Collection Time: 05/24/21 11:25 PM   Result Value Ref Range    Lactic Acid, Sepsis 3.9 (HH) 0.5 - 1.9 mmol/L   Serum Drug Screen    Collection Time: 05/24/21 11:25 PM   Result Value Ref Range    Ethanol Lvl <10 mg/dL    Acetaminophen Level <5.0 (L) 10.0 - 36.1 mcg/mL    Salicylate, Serum <8.4 0.0 - 30.0 mg/dL    TCA Scrn NEGATIVE Cutoff:300 ng/mL   C-REACTIVE PROTEIN    Collection Time: 05/24/21 11:25 PM   Result Value Ref Range    CRP 19.0 (H) 0.0 - 0.4 mg/dL   Procalcitonin    Collection Time: 05/24/21 11:25 PM   Result Value Ref Range    Procalcitonin 3.77 (H) 0.00 - 0.08 ng/mL   SEDIMENTATION RATE    Collection Time: 05/24/21 11:25 PM   Result Value Ref Range    Sed Rate 18 (H) 0 - 15 mm/Hr   CK    Collection Time: 05/24/21 11:25 PM   Result Value Ref Range    Total CK >22,000 (H) 20 - 200 U/L   APTT    Collection Time: 05/24/21 11:29 PM   Result Value Ref Range    aPTT 29.8 24.5 - 35.1 sec   Protime-INR    Collection Time: 05/24/21 11:29 PM   Result Value Ref Range    Protime 13.0 (H) 9.3 - 12.4 sec    INR 1.2    EKG 12 Lead    Collection Time: 05/25/21 12:05 AM   Result Value Ref Range    Ventricular Rate 96 BPM    Atrial Rate 96 BPM    P-R Interval 144 ms    QRS Duration 86 ms    Q-T Interval 366 ms    QTc Calculation (Bazett) 462 ms    P Axis 62 degrees    R Axis 70 degrees    T Axis 63 degrees   Ammonia Collection Time: 05/25/21 12:33 AM   Result Value Ref Range    Ammonia 31.0 16.0 - 60.0 umol/L   POCT Glucose    Collection Time: 05/25/21 12:45 AM   Result Value Ref Range    Glucose 162 mg/dL    QC OK?  yes    Urinalysis, reflex to microscopic    Collection Time: 05/25/21  1:14 AM   Result Value Ref Range    Color, UA DARK YELLOW (A) Straw/Yellow    Clarity, UA Clear Clear    Glucose, Ur Negative Negative mg/dL    Bilirubin Urine SMALL (A) Negative    Ketones, Urine 15 (A) Negative mg/dL    Specific Gravity, UA 1.025 1.005 - 1.030    Blood, Urine LARGE (A) Negative    pH, UA 5.0 5.0 - 9.0    Protein, UA >=300 (A) Negative mg/dL    Urobilinogen, Urine 0.2 <2.0 E.U./dL    Nitrite, Urine Negative Negative    Leukocyte Esterase, Urine Negative Negative   URINE DRUG SCREEN    Collection Time: 05/25/21  1:14 AM   Result Value Ref Range    Amphetamine Screen, Urine NOT DETECTED Negative <1000 ng/mL    Barbiturate Screen, Ur NOT DETECTED Negative < 200 ng/mL    Benzodiazepine Screen, Urine NOT DETECTED Negative < 200 ng/mL    Cannabinoid Scrn, Ur NOT DETECTED Negative < 50ng/mL    Cocaine Metabolite Screen, Urine POSITIVE (A) Negative < 300 ng/mL    Opiate Scrn, Ur NOT DETECTED Negative < 300ng/mL    PCP Screen, Urine NOT DETECTED Negative < 25 ng/mL    Methadone Screen, Urine NOT DETECTED Negative <300 ng/mL    Oxycodone Urine NOT DETECTED Negative <100 ng/mL    FENTANYL SCREEN, URINE POSITIVE (A) Negative <1 ng/mL    Drug Screen Comment: see below    Microscopic Urinalysis    Collection Time: 05/25/21  1:14 AM   Result Value Ref Range    WBC, UA NONE 0 - 5 /HPF    RBC, UA 5-10 (A) 0 - 2 /HPF    Epithelial Cells, UA FEW /HPF    Bacteria, UA FEW (A) None Seen /HPF    Amorphous, UA MODERATE    COVID-19, Rapid    Collection Time: 05/25/21  2:21 AM    Specimen: Nasopharyngeal Swab   Result Value Ref Range    SARS-CoV-2, NAAT Not Detected Not Detected       Imaging  CT ABDOMEN PELVIS WO CONTRAST Additional Contrast? None    Result Date: 5/25/2021  EXAMINATION: CT OF THE CHEST WITHOUT CONTRAST; CT OF THE ABDOMEN AND PELVIS WITHOUT CONTRAST 5/25/2021 2:38 am TECHNIQUE: CT of the chest was performed without the administration of intravenous contrast. Multiplanar reformatted images are provided for review. Dose modulation, iterative reconstruction, and/or weight based adjustment of the mA/kV was utilized to reduce the radiation dose to as low as reasonably achievable.; CT of the abdomen and pelvis was performed without the administration of intravenous contrast. Multiplanar reformatted images are provided for review. Dose modulation, iterative reconstruction, and/or weight based adjustment of the mA/kV was utilized to reduce the radiation dose to as low as reasonably achievable. COMPARISON: Portable chest on 05/24/2021. CT abdomen and pelvis dated 11/02/2011 and CT chest dated 03/12/2010 HISTORY: ORDERING SYSTEM PROVIDED HISTORY: AMS, fever, infiltrates on XR TECHNOLOGIST PROVIDED HISTORY: Reason for exam:->AMS, fever, infiltrates on XR Decision Support Exception - unselect if not a suspected or confirmed emergency medical condition->Emergency Medical Condition (MA) What reading provider will be dictating this exam?->CRC; ORDERING SYSTEM PROVIDED HISTORY: AMS, fever, transaminitis and acute renal failure TECHNOLOGIST PROVIDED HISTORY: Reason for exam:->AMS, fever, transaminitis and acute renal failure Additional Contrast?->None Decision Support Exception - unselect if not a suspected or confirmed emergency medical condition->Emergency Medical Condition (MA) What reading provider will be dictating this exam?->CRC FINDINGS: CT chest: Exam is limited without intravenous contrast.  Partial visualization of ill-defined soft tissue thickening and fat stranding involving the base of the left neck and supraclavicular region. This extends throughout the left lateral chest wall. Some images are degraded by patient motion.   Heart size is excluded. Continued follow-up recommended. CT abdomen and pelvis: Exam limited by patient motion. Urinary bladder thickening may be related to underdistention. Cystitis not excluded. Other chronic appearing findings. Short-term follow-up if symptoms persist.     CT HEAD WO CONTRAST    Result Date: 5/25/2021  EXAMINATION: CT OF THE HEAD WITHOUT CONTRAST  5/24/2021 10:30 pm TECHNIQUE: CT of the head was performed without the administration of intravenous contrast. Dose modulation, iterative reconstruction, and/or weight based adjustment of the mA/kV was utilized to reduce the radiation dose to as low as reasonably achievable. COMPARISON: January 2014 HISTORY: ORDERING SYSTEM PROVIDED HISTORY: stroke like sxs TECHNOLOGIST PROVIDED HISTORY: Reason for exam:->stroke like sxs Has a \"code stroke\" or \"stroke alert\" been called? ->Yes Decision Support Exception - unselect if not a suspected or confirmed emergency medical condition->Emergency Medical Condition (MA) What reading provider will be dictating this exam?->CRC FINDINGS: BRAIN/VENTRICLES: There is no acute intracranial hemorrhage, mass effect or midline shift. No abnormal extra-axial fluid collection. The gray-white differentiation is maintained without evidence of an acute infarct. There is prominence of the ventricles and sulci due to global parenchymal volume loss. There are nonspecific areas of hypoattenuation within the periventricular and subcortical white matter, which likely represent chronic microvascular ischemic change. ORBITS: The visualized portion of the orbits demonstrate no acute abnormality. SINUSES: The visualized paranasal sinuses and mastoid air cells demonstrate no acute abnormality. SOFT TISSUES/SKULL: No acute abnormality of the visualized skull or soft tissues. No acute intracranial abnormality. Mild age-related loss of brain volume and chronic periventricular ischemic changes.  Findings reported to Dr. Lawrence Donald at 12:12 a.m.     CT CHEST WO CONTRAST    Result Date: 5/25/2021  EXAMINATION: CT OF THE CHEST WITHOUT CONTRAST; CT OF THE ABDOMEN AND PELVIS WITHOUT CONTRAST 5/25/2021 2:38 am TECHNIQUE: CT of the chest was performed without the administration of intravenous contrast. Multiplanar reformatted images are provided for review. Dose modulation, iterative reconstruction, and/or weight based adjustment of the mA/kV was utilized to reduce the radiation dose to as low as reasonably achievable.; CT of the abdomen and pelvis was performed without the administration of intravenous contrast. Multiplanar reformatted images are provided for review. Dose modulation, iterative reconstruction, and/or weight based adjustment of the mA/kV was utilized to reduce the radiation dose to as low as reasonably achievable. COMPARISON: Portable chest on 05/24/2021. CT abdomen and pelvis dated 11/02/2011 and CT chest dated 03/12/2010 HISTORY: ORDERING SYSTEM PROVIDED HISTORY: AMS, fever, infiltrates on XR TECHNOLOGIST PROVIDED HISTORY: Reason for exam:->AMS, fever, infiltrates on XR Decision Support Exception - unselect if not a suspected or confirmed emergency medical condition->Emergency Medical Condition (MA) What reading provider will be dictating this exam?->CRC; ORDERING SYSTEM PROVIDED HISTORY: AMS, fever, transaminitis and acute renal failure TECHNOLOGIST PROVIDED HISTORY: Reason for exam:->AMS, fever, transaminitis and acute renal failure Additional Contrast?->None Decision Support Exception - unselect if not a suspected or confirmed emergency medical condition->Emergency Medical Condition (MA) What reading provider will be dictating this exam?->CRC FINDINGS: CT chest: Exam is limited without intravenous contrast.  Partial visualization of ill-defined soft tissue thickening and fat stranding involving the base of the left neck and supraclavicular region. This extends throughout the left lateral chest wall. Some images are degraded by patient motion. Heart size is normal.  No pleural or pericardial effusion. Scattered vascular calcifications. Normal-size lymph nodes without adenopathy by size criteria. No pneumothorax. Moderate emphysematous changes. Assessment of lungs is degraded by patient motion. Patchy opacities in the left greater than right lower lobe, right middle lobe and lingula. Mild dependent densities in the upper lobes. A tiny pleural based nodule in the anterior base of the right upper lobe is likely stable. Probable small intra fissural lymph nodes involving the minor fissure. Degenerative changes are scattered in the spine. CT abdomen and pelvis: Exam is limited without intravenous contrast.  Many images are degraded by patient motion artifact. Liver is homogeneous. Gallbladder is unremarkable. Spleen is normal in size. Pancreas is unremarkable. No adrenal mass. No hydronephrosis, cyst or solid renal mass identified. No bowel obstruction. Appendix is normal.  Mild diverticulosis without evidence of acute diverticulitis. No obvious acute bowel related inflammatory change. Prostate gland is near the upper limits of normal.  The urinary bladder is largely decompressed with wall thickening and minimal adjacent fat stranding. Small fat containing bilateral inguinal hernias. Scattered vascular calcifications. No free fluid, free air or localized fluid collection. Degenerative changes are present in the spine and pelvis. CT chest: Ill-defined soft tissue thickening and fat stranding involving the visualized base of the left neck, supraclavicular region and throughout the left lateral chest wall. This is nonspecific. Infectious/inflammatory process such as cellulitis is a consideration. Ill-defined hematoma is also a consideration. Recommend correlation with clinical presentation. Emphysematous changes. Areas of atelectasis and patchy airspace opacities most evident in the lower lung zones.   Developing infiltrates from pneumonia not excluded. Continued follow-up recommended. CT abdomen and pelvis: Exam limited by patient motion. Urinary bladder thickening may be related to underdistention. Cystitis not excluded. Other chronic appearing findings. Short-term follow-up if symptoms persist.     XR CHEST PORTABLE    Result Date: 2021  EXAMINATION: ONE XRAY VIEW OF THE CHEST 2021 12:07 am COMPARISON: 2011 HISTORY: ORDERING SYSTEM PROVIDED HISTORY: AMS TECHNOLOGIST PROVIDED HISTORY: Reason for exam:->AMS What reading provider will be dictating this exam?->CRC FINDINGS: EKG leads overlie the chest.  The cardiac silhouette appears borderline prominent. Scattered vascular calcifications. No pneumothorax. Multifocal patchy airspace opacities in the lungs most evident in the mid to lower lung zones. No effusion identified. Degenerative changes are present in the spine and shoulders. Patchy bilateral parenchymal opacities may represent multifocal pneumonia or mild edema. Continued follow-up recommended. CTA NECK W CONTRAST    Result Date: 2021  Patient MRN:  [id number] : 1960 Age: 56 years Gender: Male Order Date:  2021 11:37 AM EXAM: CTA NECK W CONTRAST, CTA HEAD W CONTRAST NUMBER OF IMAGES:  200 INDICATION:  stroke like sxs stroke like sxs Decision Support Exception - unselect if not a suspected or confirmed emergency medical condition->Emergency Medical Condition (MA) What reading provider will be dictating this exam?->[facility] COMPARISON: None Technique: Low-dose CT  acquisition technique included one of following options; 1 . Automated exposure control, 2. Adjustment of MA and or KV according to patient's size or 3. Use of iterative reconstruction. Contiguous spiral images were obtained in the axial plane, following the administration of intravenous contrast using CT angiographic protocol. Sagittal and coronal images were reconstructed from the axial plane acquisition.  Additional MIP reconstructions were presented to aid in the interpretation of this study. Images were obtained from the skull base cranially. There is mild calcified plaque identified in the vessels compatible with atherosclerotic disease. There is aortic arch and great vessels demonstrate mild atherosclerotic disease. The right carotid is unremarkable. The left carotid is unremarkable. The right vertebral artery is unremarkable. The left vertebral artery is unremarkable. The basilar artery is unremarkable. The middle cerebral arteries are unremarkable. The anterior cerebral arteries are unremarkable. The posterior cerebral arteries are unremarkable. 1.  Mild atherosclerotic disease . 2. Estimated stenosis of the proximal right and left internal carotid artery by NASCET criteria is not hemodynamically significant This study was analyzed by the [address] ai algorithm. CT BRAIN PERFUSION    Result Date: 2021  Patient MRN: [id number] : 1960 Age:  56 years Gender: Male Order Date: 2021 Exam: CT BRAIN PERFUSION Number of Images: 333 views Indication:   stroke like sxs stroke like sxs Comparison: None. Findings: Perfusion images demonstrate symmetric blood volume Blood flow images demonstrate symmetric blood flow There is no significant ischemic penumbra identified. There is no significant core infarct identified. No significant ischemic penumbra identified This study was analyzed by the [employer] algorithm.      CTA HEAD W CONTRAST    Result Date: 2021  Patient MRN:  [id number] : 1960 Age: 56 years Gender: Male Order Date:  2021 11:37 AM EXAM: CTA NECK W CONTRAST, CTA HEAD W CONTRAST NUMBER OF IMAGES:  200 INDICATION:  stroke like sxs stroke like sxs Decision Support Exception - unselect if not a suspected or confirmed emergency medical condition->Emergency Medical Condition (MA) What reading provider will be dictating this exam?->[facility] COMPARISON: None Technique: Low-dose CT  acquisition technique C  · +Cocaine    · Routine labs in the morning. · DVT prophylaxis. · Please see orders for further management and care.         Da Vera DO DO  7:36 AM  5/25/2021

## 2021-05-25 NOTE — ED NOTES
Time Neurologist page:      Time Stroke Alert called: [id number]  :    Time Neurologist called back:    X-Ray/CT notified:     Ger Nash  05/24/21 [id number]

## 2021-05-25 NOTE — PROGRESS NOTES
Attempted to ask patient questions for MRI screening and patient is very drowsy and unable to keep eyes open. Patient drifting off with answers he attempts to say but does not answer most questions.

## 2021-05-25 NOTE — CONSULTS
complication [id number]    neuropathy    Human immunodeficiency virus, type 2 (HIV 2) ([facility] [address] 2009    Inguinal hernia unilateral     left    Pneumonia 2010    Shingles (herpes zoster) polyneuropathy     Suicidal ideation 2008    Vitamin D deficiency         Surgical History:   Past Surgical History:   Procedure Laterality Date    ABSCESS DRAINAGE  9/18/2007    scrotal. [facility].  Dr. Femi Connell  2008    testicle    INGUINAL HERNIA REPAIR  3/9/2012    left indirect hernia repaired with mesh, Dr. Devonte Jeong, [address] [place] [address] OTHER SURGICAL HISTORY  3/9/2012    Left Inguinal Hernia Repair;Removal of Foreign Object Left foot        Family History:   Family History   Problem Relation Age of Onset    Cancer Father [de-identified]        bone marrow    Heart Disease Brother 48        heart attack       Social History:  Social History     Tobacco Use    Smoking status: Current Every Day Smoker     Packs/day: 0.50     Years: 20.00     Pack years: 10.00     Types: Cigarettes    Smokeless tobacco: Never Used   Vaping Use    Vaping Use: Never used   Substance Use Topics    Alcohol use: Not Currently    Drug use: No        Current Medications:      Current Facility-Administered Medications   Medication Dose Route Frequency Provider Last Rate Last Admin    0.9 % sodium chloride infusion   Intravenous Continuous Rios Liu  mL/hr at 05/25/21 0916 New Bag at 05/25/21 0916    sodium chloride flush 0.9 % injection 5-40 mL  5-40 mL Intravenous 2 times per day Rios Liu MD        sodium chloride flush 0.9 % injection 5-40 mL  5-40 mL Intravenous PRN Rios Liu MD        0.9 % sodium chloride infusion  25 mL Intravenous PRN Rios Liu MD        heparin (porcine) injection 5,000 Units  5,000 Units Subcutaneous 3 times per day Rios Liu MD   5,000 Units at 05/25/21 0915    Post Zosyn Saline Infusion   Intravenous Q12H Varsha Gomez RPH        melatonin tablet 3 mg  3 mg Oral Nightly PRN Hungary M DO Joseph        emtricitabine-tenofovir alafenamide (DESCOVY) 200-25 MG per tablet 1 tablet  1 tablet Oral Daily Oli Garcia MD        dolutegravir sodium (TIVICAY) tablet 50 mg  50 mg Oral Daily Oli Garcia MD        sodium chloride flush 0.9 % injection 10 mL  10 mL Intravenous PRN Yomi Szymanski, DO         Current Outpatient Medications   Medication Sig Dispense Refill    TIVICAY 50 MG tablet Take 50 mg by mouth nightly       DESCOVY 200-25 MG TABS tablet Take 1 tablet by mouth nightly       Multiple Vitamin (MULTIVITAMIN) tablet Take 1 tablet by mouth daily      gabapentin (NEURONTIN) 800 MG tablet Take 800 mg by mouth 3 times daily. Allergies:      No Known Allergies     Physical Examination  Vitals   Vitals:    05/25/21 0830 05/25/21 0900 05/25/21 0930 05/25/21 1500   BP: 138/80 125/78 117/78    Pulse:    79   Resp:    13   Temp:       TempSrc:       SpO2:   95%    Weight:       Height:            General: Patient appears in no acute distress with a normal body habitus  HEENT: Normocephalic, atraumatic  Chest: no respiratory distress noted  Extremities: No edema noted    Neurologic Examination    Mental Status  Somnolent, oriented to person and place, but not month. Speech is fluent, able to follow commands. No definite aphasia noted. Attention and concentration impaired. Cranial Nerves  II. Visual fields full to confrontation bilaterally. III, IV, VI: Pupils equally round and reactive to light, 3 to 2 mm bilaterally. EOMs: full, no nystagmus. V.   VII: Facial movements symmetric   VIII: Hearing intact to voice  IX,X: Palate elevates symmetrically.  No dysarthria  XI: Shoulders appear symmetric bilaterally  XII: Tongue is midline    Motor     Right Left   Right Left   Deltoid 5 4+  Hip Flexion 5 5   Biceps      5  4+  Knee Extension 5 5   Triceps 5 4+  Knee Flexion 5 5   Handgrip 5 5  Ankle Dorsiflexion 5 5       Ankle Plantarflexion 5 5     Tone: Normal in all four limbs    Bulk: Normal in all four limbs with no evidence of atrophy    +left pronator drift    Sensation  · Light Touch: Intact distally in all four limbs    Reflexes     Right Left   Biceps 2 2   Brachioradialis 2 2   Triceps 2 2   Patellar 1 1   Achilles 1 1   ankle clonus none none     Toes down going bilaterally. Coordination  No resting tremor observed    Gait  Deferred due to safety concerns at present      Labs  Results for Rubens Rodriguez (MRN [id number]) as of 5/25/2021 15:52   Ref.  Range 5/25/2021 09:10   Sodium Latest Ref Range: 132 - 146 mmol/L 137   Potassium Latest Ref Range: 3.5 - 5.0 mmol/L 5.5 (H)   Chloride Latest Ref Range: 98 - 107 mmol/L 102   CO2 Latest Ref Range: 22 - 29 mmol/L 18 (L)   BUN Latest Ref Range: 6 - 23 mg/dL 54 (H)   Creatinine Latest Ref Range: 0.7 - 1.2 mg/dL 6.6 (H)   Anion Gap Latest Ref Range: 7 - 16 mmol/L 17 (H)   GFR Non- Latest Ref Range: >=60 mL/min/1.73 10   GFR  Unknown 10   Lactic Acid Latest Ref Range: 0.5 - 2.2 mmol/L 1.4   Glucose Latest Ref Range: 74 - 99 mg/dL 118 (H)   Calcium Latest Ref Range: 8.6 - 10.2 mg/dL 7.6 (L)   Total Protein Latest Ref Range: 6.4 - 8.3 g/dL 7.3   Procalcitonin Latest Ref Range: 0.00 - 0.08 ng/mL 5.31 (H)   Total CK Latest Ref Range: 20 - 200 U/L >22,000 (H)   Troponin Latest Ref Range: 0.00 - 0.03 ng/mL 0.52 (H)   Albumin Latest Ref Range: 3.5 - 5.2 g/dL 2.9 (L)   Alk Phos Latest Ref Range: 40 - 129 U/L 55   ALT Latest Ref Range: 0 - 40 U/L 319 (H)   AST Latest Ref Range: 0 - 39 U/L 748 (H)   Bilirubin Latest Ref Range: 0.0 - 1.2 mg/dL 0.4   Lipase Latest Ref Range: 13 - 60 U/L 17   WBC Latest Ref Range: 4.5 - 11.5 E9/L 10.4   RBC Latest Ref Range: 3.80 - 5.80 E12/L 4.79   Hemoglobin Quant Latest Ref Range: 12.5 - 16.5 g/dL 16.4   Hematocrit Latest Ref Range: 37.0 - 54.0 % 49.1   MCV Latest Ref Range: 80.0 - 99.9 fL 102.5 (H)   MCH Latest Ref Range: 26.0 - 35.0 pg 34.2   MCHC Latest Ref Range: 32.0 - 34.5 % 33.4   MPV Latest Ref Range: 7.0 - 12.0 fL 9.6   RDW Latest Ref Range: 11.5 - 15.0 fL 13.2   Platelet Count Latest Ref Range: 130 - 450 E9/L 212       Imaging  CT head w/o contrast     Impression   No acute intracranial abnormality.  Mild age-related loss of brain volume and   chronic periventricular ischemic changes.         CTA head/neck      Impression   1.  Mild atherosclerotic disease .    2. Estimated stenosis of the proximal right and left internal carotid   artery by NASCET criteria is not hemodynamically significant     CT brain perfusion     Impression       No significant ischemic penumbra identified           Electronically signed by: Ulises Waldron DO, 5/25/2021 3:45 PM

## 2021-05-25 NOTE — CONSULTS
Department of Internal Medicine  Infectious Diseases   Consult Note      Reason for Consult:  HIV infection, change in mental status     Requesting Physician: Dr Hardeep Barba:                This is a 56 yrs old male with hx of HIV infection ( CD4 count was 588 , undetectable viral load as of June 2020 - on Descovy and Tivicay ) , Hep C infection presented to the ER with left sided weakness and change in mental status . More awake now but not a good historian . He denied fever, chills, headache, abdomen pain . WBC was 13 K , CPK 22,000, procalctonin 3.77 , Cr 5.6  Urine drug screen + cocaine and fentanyl   CT head - no acute infarction   CT chest showed - soft tissue thickening and fat stranding left neck, left lateral chest wall   Bibasilar infiltrates   Pt was given vancomycin and cefepime      Past Medical History:      Past Medical History:   Diagnosis Date    Abscess of hand, left 2009    Abscess of scrotum 2007    Bacterial meningitis 2010    Depression     GERD (gastroesophageal reflux disease)     Hepatitis C     Herpes zoster w/ nervous system complication 0836    neuropathy    Human immunodeficiency virus, type 2 (HIV 2) ([facility] [address] 2009    Inguinal hernia unilateral     left    Pneumonia 2010    Shingles (herpes zoster) polyneuropathy     Suicidal ideation 2008    Vitamin D deficiency          Past Surgical History:      Past Surgical History:   Procedure Laterality Date    ABSCESS DRAINAGE  9/18/2007    scrotal. [facility]  Dr. Edilberto Davis  2008    testicle    INGUINAL HERNIA REPAIR  3/9/2012    left indirect hernia repaired with mesh, Dr. [facility], [address] [place] [address] OTHER SURGICAL HISTORY  3/9/2012    Left Inguinal Hernia Repair;Removal of Foreign Object Left foot         Current Medications:      Current Facility-Administered Medications   Medication Dose Route Frequency Provider Last Rate Last Admin    0.9 % sodium chloride infusion   Intravenous Continuous Danii Veda Cordero  mL/hr at 05/25/21 0916 New Bag at 05/25/21 0916    piperacillin-tazobactam (ZOSYN) 3,375 mg in dextrose 5 % 100 mL IVPB extended infusion (mini-bag)  3,375 mg Intravenous Q12H Lucrecia Iverson MD 25 mL/hr at 05/25/21 0915 3,375 mg at 05/25/21 0915    sodium chloride flush 0.9 % injection 5-40 mL  5-40 mL Intravenous 2 times per day Lucreica Iverson MD        sodium chloride flush 0.9 % injection 5-40 mL  5-40 mL Intravenous PRN Lucrecia Iverson MD        0.9 % sodium chloride infusion  25 mL Intravenous PRN Lucrecia Iverson MD        heparin (porcine) injection 5,000 Units  5,000 Units Subcutaneous 3 times per day Lucrecia Iverson MD   5,000 Units at 05/25/21 0915    Post Zosyn Saline Infusion   Intravenous Q12H Andrew Connell, [facility]        melatonin tablet 3 mg  3 mg Oral Nightly PRN Hector Gamez,         sodium chloride flush 0.9 % injection 10 mL  10 mL Intravenous PRN David Bermudez,          Current Outpatient Medications   Medication Sig Dispense Refill    colchicine (COLCRYS) 0.6 MG tablet Take 1 tablet by mouth 2 times daily 20 tablet 0    ibuprofen (IBU) 600 MG tablet Take 1 tablet by mouth every 6 hours as needed for Pain 28 tablet 0    naproxen (NAPROSYN) 500 MG tablet Take 1 tablet by mouth 2 times daily 60 tablet 0    naproxen (NAPROSYN) 500 MG tablet Take 1 tablet by mouth 2 times daily (with meals) 20 tablet 0    melatonin 3 MG TABS tablet Take 1 mg by mouth nightly as needed      TIVICAY 50 MG tablet Take 1 tablet by mouth daily      DESCOVY 200-25 MG TABS tablet Take 1 tablet by mouth daily      Multiple Vitamin (MULTIVITAMIN) tablet Take 1 tablet by mouth daily      Nutritional Supplements (ENSURE) LIQD Take 1 Can by mouth 3 times daily      gabapentin (NEURONTIN) 600 MG tablet Take 1,200 mg by mouth 2 times daily. Allergies:  Patient has no known allergies.     Social History:      Social History     Socioeconomic History    Marital status:      Spouse name: Not on file    Number of children: Not on file    Years of education: Not on file    Highest education level: Not on file   Occupational History    Not on file   Tobacco Use    Smoking status: Current Every Day Smoker     Packs/day: 0.50     Years: 20.00     Pack years: 10.00     Types: Cigarettes    Smokeless tobacco: Never Used   Vaping Use    Vaping Use: Never used   Substance and Sexual Activity    Alcohol use: Not Currently    Drug use: No    Sexual activity: Not Currently   Other Topics Concern    Not on file   Social History Narrative    Not on file     Social Determinants of Health     Financial Resource Strain:     Difficulty of Paying Living Expenses:    Food Insecurity:     Worried About Running Out of Food in the Last Year:     [address] in the Last Year:    Transportation Needs:     Lack of Transportation (Medical):      Lack of Transportation (Non-Medical):    Physical Activity:     Days of Exercise per Week:     Minutes of Exercise per Session:    Stress:     Feeling of Stress :    Social Connections:     Frequency of Communication with Friends and Family:     Frequency of Social Gatherings with Friends and Family:     Attends Mosque Services:     Active Member of Clubs or Organizations:     Attends Club or Organization Meetings:     Marital Status:    Intimate Partner Violence:     Fear of Current or Ex-Partner:     Emotionally Abused:     Physically Abused:     Sexually Abused:          Family History:     Family History   Problem Relation Age of Onset    Cancer Father [de-identified]        bone marrow    Heart Disease Brother 48        heart attack       REVIEW OF SYSTEMS:    CONSTITUTIONAL:  Denies fever   HEENT: Neck pain, denies headache, visual impairment   RESPIRATORY: denies cough, shortness of breath, sputum expectoration  CARDIOVASCULAR:  Denies palpitation  GASTROINTESTINAL:  Denies abdomen pain, diarrhea or constipation. GENITOURINARY:  Denies burning urination or frequency of urination  INTEGUMENT: denies wound , rash  HEMATOLOGIC/LYMPHATIC:  Denies lymph node swelling, gum bleeding or easy bruising. MUSCULOSKELETAL:  Denies leg pain , joint pain , joint swelling  NEUROLOGICAL:  Change in mental status     PHYSICAL EXAM:      Vitals:     /78   Pulse 76   Temp 100 °F (37.8 °C) (Rectal)   Resp 16   Ht 5' 8\" (1.727 m)   Wt 160 lb (72.6 kg)   SpO2 95%   BMI 24.33 kg/m²     General Appearance:    Awake, slow to respond . Head:    Normocephalic, atraumatic   Eyes:    No pallor, no icterus,   Ears:    No obvious deformity or drainage.    Nose:   No nasal drainage   Throat:   Mucosa moist, no oral thrush   Neck:   Supple, mild swelling left neck    Back:     no CVA tenderness   Lungs:     Clear to auscultation bilaterally, no wheeze    Heart:    Regular rate and rhythm, no murmur   Abdomen:     Soft, non-tender, bowel sounds present    Extremities:   No edema, no cyanosis ,no open wound   Pulses:   Dorsalis pedis palpable    Skin:   no rashes or lesions     CBC with Differential:      Lab Results   Component Value Date    WBC 10.4 05/25/2021    RBC 4.79 05/25/2021    HGB 16.4 05/25/2021    HCT 49.1 05/25/2021     05/25/2021    .5 05/25/2021    MCH 34.2 05/25/2021    MCHC 33.4 05/25/2021    RDW 13.2 05/25/2021    SEGSPCT 47 01/06/2014    LYMPHOPCT 6.7 05/24/2021    MONOPCT 7.3 05/24/2021    BASOPCT 0.1 05/24/2021    MONOSABS 1.02 05/24/2021    LYMPHSABS 0.93 05/24/2021    EOSABS 0.00 05/24/2021    BASOSABS 0.02 05/24/2021       CMP     Lab Results   Component Value Date     05/24/2021    K 5.1 05/24/2021    CL 99 05/24/2021    CO2 22 05/24/2021    BUN 44 05/24/2021    CREATININE 5.6 05/24/2021    GFRAA 13 05/24/2021    LABGLOM 13 05/24/2021    GLUCOSE 162 05/25/2021    GLUCOSE 83 01/26/2012    PROT 8.3 05/24/2021    LABALBU 3.6 05/24/2021    LABALBU 4.2 01/17/2012    CALCIUM 8.3 05/24/2021 BILITOT 0.4 05/24/2021    ALKPHOS 79 05/24/2021    AST 1,108 05/24/2021     05/24/2021         Hepatic Function Panel:    Lab Results   Component Value Date    ALKPHOS 79 05/24/2021     05/24/2021    AST 1,108 05/24/2021    PROT 8.3 05/24/2021    BILITOT 0.4 05/24/2021    BILIDIR 0.3 03/13/2015    IBILI 1.3 03/13/2015    LABALBU 3.6 05/24/2021    LABALBU 4.2 01/17/2012       PT/INR:    Lab Results   Component Value Date    PROTIME 13.0 05/24/2021    INR 1.2 05/24/2021       TSH:    Lab Results   Component Value Date    TSH 3.720 03/08/2017       U/A:    Lab Results   Component Value Date    COLORU DARK YELLOW 05/25/2021    PHUR 5.0 05/25/2021    LABCAST FEW  11/02/2011    WBCUA NONE 05/25/2021    WBCUA NONE 01/26/2012    RBCUA 5-10 05/25/2021    RBCUA NONE 01/26/2012    BACTERIA FEW 05/25/2021    CLARITYU Clear 05/25/2021    SPECGRAV 1.025 05/25/2021    LEUKOCYTESUR Negative 05/25/2021    UROBILINOGEN 0.2 05/25/2021    BILIRUBINUR SMALL 05/25/2021    BILIRUBINUR NEGATIVE 01/26/2012    BLOODU LARGE 05/25/2021    GLUCOSEU Negative 05/25/2021    GLUCOSEU NEGATIVE 01/26/2012    AMORPHOUS MODERATE 05/25/2021       ABG:  No results found for: IQY2YHN, BEART, D4YPZKZS, PHART, THGBART, KMI3PIX, PO2ART, KRK9GXC    MICROBIOLOGY:    Blood culture - pending     SARS CoV 2 NAAT neg       Radiology :    CT chest -       Ill-defined soft tissue thickening and fat stranding involving the visualized   base of the left neck, supraclavicular region and throughout the left lateral   chest wall.  This is nonspecific.  Infectious/inflammatory process such as   cellulitis is a consideration.  Ill-defined hematoma is also a consideration.    Recommend correlation with clinical presentation.       Emphysematous changes.       Areas of atelectasis and patchy airspace opacities most evident in the lower   lung zones.  Developing infiltrates from pneumonia not excluded.  Continued   follow-up recommended.       CT abdomen and pelvis:       Exam limited by patient motion.       Urinary bladder thickening may be related to underdistention.  Cystitis not   excluded.       Other chronic appearing findings.  Short-term follow-up if symptoms persist.         IMPRESSION:    1. HIV infection - controlled as of June 2020    2. Chronic Hep C infection   3. Rhabdomyolysis, ? Myositis , CHANEL , elevated procalcitonin   4. Encephalopathy, Cocaine use       RECOMMENDATIONS:      1. Vancomycin random level , renal consult   2. HIV viral load, CD4, Hep C viral load   3. Stop zosyn   4. Follow Cr /CPK   5.  Resume descovy and Jenny     Thank you Dr Belkys Stark for the consult

## 2021-05-25 NOTE — PROGRESS NOTES
Patient's brother on the phone and went to patient's house and was unable to find any medication bottles in patient's house. Patient's brother states \"He will never admit it to anyone but I think he is on some meds for HIV. When he was staying with me I found a bottle of meds and looked them up and they were for HIV but he would never admit it. I looked all over his house and couldn't find any anywhere. But I do know he goes to [facility] on [address]. \" Patient's brother asking if patient can just be held down to have labs drawn. Explained that this is not allowed and if patient was refusing then labs couldn't be drawn.

## 2021-05-25 NOTE — ED PROVIDER NOTES
Pharynx: Oropharynx is clear. Eyes:      Extraocular Movements: Extraocular movements intact. Conjunctiva/sclera: Conjunctivae normal.      Pupils: Pupils are equal, round, and reactive to light. Cardiovascular:      Rate and Rhythm: Normal rate and regular rhythm. Pulses: Normal pulses. Heart sounds: Normal heart sounds. Pulmonary:      Effort: Pulmonary effort is normal. No respiratory distress. Breath sounds: Rales present. No wheezing. Abdominal:      General: Abdomen is flat. Bowel sounds are normal. There is no distension. Palpations: Abdomen is soft. Tenderness: There is no abdominal tenderness. There is no right CVA tenderness, left CVA tenderness, guarding or rebound. Musculoskeletal:         General: Normal range of motion. Cervical back: Normal range of motion and neck supple. Tenderness (Left lateral, diffuse) present. No rigidity. Right lower leg: No edema. Left lower leg: No edema. Skin:     General: Skin is warm and dry. Capillary Refill: Capillary refill takes less than 2 seconds. Coloration: Skin is not jaundiced or pale. Findings: Erythema (Tender, diffuse over left neck and left upper flank with area of mild swelling and central area with possible insect bite versus laceration with mild serous drainage noted, no obvious abscess formation is appreciated, no induration or crepitus is noted) present. No bruising. Neurological:      General: No focal deficit present. Mental Status: He is alert. He is disoriented. Sensory: No sensory deficit. Motor: Weakness (Left upper extremity, left lower extremity) present. Psychiatric:         Mood and Affect: Mood normal.         Behavior: Behavior normal.         NIH Stroke Scale at time of initial evaluation:  1A: Level of Consciousness 0 - alert; keenly responsive   1B: Ask Month and Age 2 - answers neither question correctly   1C:  Tell Patient To Open and Close Eyes, then Hand  Squeeze 0 - performs both tasks correctly   2: Test Horizontal Extraocular Movements 0 - normal   3: Test Visual Fields 0 - no visual loss   4: Test Facial Palsy 0 - normal symmetric movement   5A: Test Left Arm Motor Drift 1 - drift, limb holds 90 (or 45) degrees but drifts down before full 10 seconds: does not hit bed   5B: Test Right Arm Motor Drift 0 - no drift, limb holds 90 (or 45) degrees for full 10 seconds   6A: Test Left Leg Motor Drift 0 - no drift; leg holds 30 degree position for full 5 seconds   6B: Test Right Leg Motor Drift 0 - no drift; leg holds 30 degree position for full 5 seconds   7: Test Limb Ataxia   (FNF/Heel-Shin) 0 - absent   8: Test Sensation 0 - normal; no sensory loss   9: Test Language/Aphasia 1 - mild to moderate aphasia; some obvious loss of fluency or facility of comprehension without significant limitation on ideas expressed or form of expression. Reduction of speech and/or comprehension, however, makes conversation about provided materials difficult or impossible. For example, in conversation about provided materials, examiner can identify picture or naming card content from patient's response. 10: Test Dysarthria 0 - normal   11: Test Extinction/Inattention 0 - no abnormality   Total 4       Procedures       MDM     Presents due to altered mental status and left-sided weakness; unknown last known well, stroke alert was called, CT imaging ultimately showed no perfusion mismatch or occlusion and patient determined not to be TPA candidate or candidate for thrombectomy as per stroke neurologist; patient given 324 p.o. aspirin as per neurology. Feel patient's presentation is more likely related to sepsis from unknown underlying infection, rectal temperature 100 °F, patient with labs showing acute renal failure with GFR 13 creatinine 5.6, elevated LFTs with AST 1108, ; UA positive for hematuria, no evidence for UTI. Lactic acid is 3.9.   Leukocytosis 13.9, CBC does seem to show evidence for hemoconcentration. Patient given 1 L NS bolus. Rapid Covid is negative. CXR does bilateral opacifications concerning for possible pneumonia. Patient given broad-spectrum antibiotic treatment with vancomycin and cefepime. Labs also significant for elevated troponin and BNP; CRP 19, procalcitonin 3.77. UDS positive for cocaine and fentanyl. On physical exam patient appears to have diffuse erythema and warmth over his left lateral neck and left upper flank up to his left axilla, there also appears to be a central region of swelling and a central possible insect bite or laceration with mild serous drainage, there does not appear to be any crepitus, induration, or evidence of focal abscess. There is tenderness to palpation of these regions as well, concerns for possible cellulitis as infectious source. During this encounter patient did remain alert, he answers some questions but is oriented only to self, he is not able to provide very much history but is able to tell us where his pain is. He does not have any abdominal tenderness to palpation. Lungs with mild rales bilaterally scattered. Decision made to note this patient due to altered mental status, acute renal failure, transaminitis, dehydration, and sepsis with unknown source. Discussed results and plan with the patient's son, he voices understanding and is amenable. I have discussed this patient with my attending, who has seen the patient and agrees with this disposition. Patient was seen and evaluated by myself and my attending Emre Dee MD. Assessment and Plan discussed with attending provider, please see attestation for final plan of care.      ED Course as of May 25 0442   Mon May 24, 2021   3659 Spoke with Dr. Milana Waller; there is no perfusion deficit, no occlusion noted; do not feel this patient is a tPA candidate given these findings and the fact that this timeline is questionable; patient would be at higher risk of bleeding; recommends aspirin and admission for stroke workup. [VG]   Tue May 25, 2021   0003 Patient reassessed, NIH is 2 due to left arm. And not answering month appropriately. Upon this reassessment patient is noted again to cry out in pain when he lifts his left upper extremity; asked again if he is having difficulty due to pain and he says yes, his left neck is noted to be slightly erythematous diffusely and diffusely swollen, there are no specific localized areas of induration, crepitus, or fluctuance, but there is moderate TTP to this region and it does appear abnormal compared to right neck. [VG]   0016 Patient was asked to his primary care doctor is, he is confused and does not seem to know who he sees or if he even sees anyone. [VG]   2432 Coudé catheter placed successfully, only about 5 cc urine output with sediment retrieved; catheter placement confirmed with bedside ultrasound, bladder is collapsed; UOP likely decreased in context of acute renal failure. [VG]   0123 Rectal temperature 100 °F; patient noted to have some sort of erythematous and tender/warm region over his left upper flank/back near his axilla, there is a central region that appears to be a possible bite or small laceration, patient is unsure of what this is from. It does appear infected. No obvious crepitus, induration, or localized area of fluctuance is noted. [VG]   0230 Spoke with Dr. Indiana Mark, discussed case, this patient is accepted for admission.     [VG]   [phone] EKG interpreted by the emergency department physician:    Rate is 96; rhythm is sinus; interpretation is normal sinus rhythm, normal NV interval, normal QRS duration, QTc 462 ms, no ST elevations or abnormal T wave inversions; no previous EKG available for comparison.     [VG]      ED Course User Index  [VG] Skyler Trejo,        --------------------------------------------- PAST HISTORY ---------------------------------------------  Past Medical History:  has a past medical history of Abscess of hand, left, Abscess of scrotum, Bacterial meningitis, Depression, GERD (gastroesophageal reflux disease), Hepatitis C, Herpes zoster w/ nervous system complication, Human immunodeficiency virus, type 2 (HIV 2) ([facility] [address] Inguinal hernia unilateral, Pneumonia, Shingles (herpes zoster) polyneuropathy, Suicidal ideation, and Vitamin D deficiency. Past Surgical History:  has a past surgical history that includes cyst removal (2008); Abscess Drainage (9/18/2007); Inguinal hernia repair (3/9/2012); and other surgical history (3/9/2012). Social History:  reports that he has been smoking cigarettes. He has a 10.00 pack-year smoking history. He has never used smokeless tobacco. He reports previous alcohol use. He reports that he does not use drugs. Family History: family history includes Cancer (age of onset: [de-identified]) in his father; Heart Disease (age of onset: 48) in his brother. The patients home medications have been reviewed. Allergies: Patient has no known allergies.     -------------------------------------------------- RESULTS -------------------------------------------------    LABS:  Results for orders placed or performed during the hospital encounter of 05/24/21   COVID-19, Rapid    Specimen: Nasopharyngeal Swab   Result Value Ref Range    SARS-CoV-2, NAAT Not Detected Not Detected   CBC Auto Differential   Result Value Ref Range    WBC 13.9 (H) 4.5 - 11.5 E9/L    RBC 5.30 3.80 - 5.80 E12/L    Hemoglobin 18.2 (H) 12.5 - 16.5 g/dL    Hematocrit 54.6 (H) 37.0 - 54.0 %    .0 (H) 80.0 - 99.9 fL    MCH 34.3 26.0 - 35.0 pg    MCHC 33.3 32.0 - 34.5 %    RDW 13.2 11.5 - 15.0 fL    Platelets 317 736 - 903 E9/L    MPV 9.8 7.0 - 12.0 fL    Neutrophils % 85.3 (H) 43.0 - 80.0 %    Immature Granulocytes % 0.6 0.0 - 5.0 %    Lymphocytes % 6.7 (L) 20.0 - 42.0 %    Monocytes % 7.3 2.0 - 12.0 %    Eosinophils % 0.0 0.0 - 6.0 %    Basophils % 0.1 0.0 - 2.0 %    Neutrophils Absolute 11.83 (H) 1.80 - 7.30 E9/L    Immature Granulocytes # 0.08 E9/L    Lymphocytes Absolute 0.93 (L) 1.50 - 4.00 E9/L    Monocytes Absolute 1.02 (H) 0.10 - 0.95 E9/L    Eosinophils Absolute 0.00 (L) 0.05 - 0.50 E9/L    Basophils Absolute 0.02 0.00 - 0.20 E9/L   Comprehensive Metabolic Panel w/ Reflex to MG   Result Value Ref Range    Sodium 139 132 - 146 mmol/L    Potassium reflex Magnesium 5.1 (H) 3.5 - 5.0 mmol/L    Chloride 99 98 - 107 mmol/L    CO2 22 22 - 29 mmol/L    Anion Gap 18 (H) 7 - 16 mmol/L    Glucose 161 (H) 74 - 99 mg/dL    BUN 44 (H) 6 - 23 mg/dL    CREATININE 5.6 (H) 0.7 - 1.2 mg/dL    GFR Non-African American 13 >=60 mL/min/1.73    GFR African American 13     Calcium 8.3 (L) 8.6 - 10.2 mg/dL    Total Protein 8.3 6.4 - 8.3 g/dL    Albumin 3.6 3.5 - 5.2 g/dL    Total Bilirubin 0.4 0.0 - 1.2 mg/dL    Alkaline Phosphatase 79 40 - 129 U/L     (H) 0 - 40 U/L    AST 1,108 (H) 0 - 39 U/L   Troponin   Result Value Ref Range    Troponin 0.48 (H) 0.00 - 0.03 ng/mL   Brain Natriuretic Peptide   Result Value Ref Range    Pro-BNP 8,594 (H) 0 - 125 pg/mL   Urinalysis, reflex to microscopic   Result Value Ref Range    Color, UA DARK YELLOW (A) Straw/Yellow    Clarity, UA Clear Clear    Glucose, Ur Negative Negative mg/dL    Bilirubin Urine SMALL (A) Negative    Ketones, Urine 15 (A) Negative mg/dL    Specific Gravity, UA 1.025 1.005 - 1.030    Blood, Urine LARGE (A) Negative    pH, UA 5.0 5.0 - 9.0    Protein, UA >=300 (A) Negative mg/dL    Urobilinogen, Urine 0.2 <2.0 E.U./dL    Nitrite, Urine Negative Negative    Leukocyte Esterase, Urine Negative Negative   Lactate, Sepsis   Result Value Ref Range    Lactic Acid, Sepsis 3.9 (HH) 0.5 - 1.9 mmol/L   URINE DRUG SCREEN   Result Value Ref Range    Amphetamine Screen, Urine NOT DETECTED Negative <1000 ng/mL    Barbiturate Screen, Ur NOT DETECTED Negative < 200 ng/mL    Benzodiazepine Screen, Urine NOT DETECTED Negative < 200 ng/mL    Cannabinoid Scrn, Ur NOT DETECTED Negative < 50ng/mL    Cocaine Metabolite Screen, Urine POSITIVE (A) Negative < 300 ng/mL    Opiate Scrn, Ur NOT DETECTED Negative < 300ng/mL    PCP Screen, Urine NOT DETECTED Negative < 25 ng/mL    Methadone Screen, Urine NOT DETECTED Negative <300 ng/mL    Oxycodone Urine NOT DETECTED Negative <100 ng/mL    FENTANYL SCREEN, URINE POSITIVE (A) Negative <1 ng/mL    Drug Screen Comment: see below    Serum Drug Screen   Result Value Ref Range    Ethanol Lvl <10 mg/dL    Acetaminophen Level <5.0 (L) 10.0 - 25.3 mcg/mL    Salicylate, Serum <0.5 0.0 - 30.0 mg/dL    TCA Scrn NEGATIVE Cutoff:300 ng/mL   Ammonia   Result Value Ref Range    Ammonia 31.0 16.0 - 60.0 umol/L   APTT   Result Value Ref Range    aPTT 29.8 24.5 - 35.1 sec   Protime-INR   Result Value Ref Range    Protime 13.0 (H) 9.3 - 12.4 sec    INR 1.2    Microscopic Urinalysis   Result Value Ref Range    WBC, UA NONE 0 - 5 /HPF    RBC, UA 5-10 (A) 0 - 2 /HPF    Epithelial Cells, UA FEW /HPF    Bacteria, UA FEW (A) None Seen /HPF    Amorphous, UA MODERATE    C-REACTIVE PROTEIN   Result Value Ref Range    CRP 19.0 (H) 0.0 - 0.4 mg/dL   Procalcitonin   Result Value Ref Range    Procalcitonin 3.77 (H) 0.00 - 0.08 ng/mL   SEDIMENTATION RATE   Result Value Ref Range    Sed Rate 18 (H) 0 - 15 mm/Hr   POCT Glucose   Result Value Ref Range    Glucose 162 mg/dL    QC OK?  yes    POCT Glucose   Result Value Ref Range    Meter Glucose 162 (H) 74 - 99 mg/dL   EKG 12 Lead   Result Value Ref Range    Ventricular Rate 96 BPM    Atrial Rate 96 BPM    P-R Interval 144 ms    QRS Duration 86 ms    Q-T Interval 366 ms    QTc Calculation (Bazett) 462 ms    P Axis 62 degrees    R Axis 70 degrees    T Axis 63 degrees       RADIOLOGY:  CT CHEST WO CONTRAST   Final Result   CT chest:      Ill-defined soft tissue thickening and fat stranding involving the visualized   base of the left neck, supraclavicular region and throughout the left lateral   chest wall. This is nonspecific. Infectious/inflammatory process such as   cellulitis is a consideration. Ill-defined hematoma is also a consideration. Recommend correlation with clinical presentation. Emphysematous changes. Areas of atelectasis and patchy airspace opacities most evident in the lower   lung zones. Developing infiltrates from pneumonia not excluded. Continued   follow-up recommended. CT abdomen and pelvis:      Exam limited by patient motion. Urinary bladder thickening may be related to underdistention. Cystitis not   excluded. Other chronic appearing findings. Short-term follow-up if symptoms persist.         CT ABDOMEN PELVIS WO CONTRAST Additional Contrast? None   Final Result   CT chest:      Ill-defined soft tissue thickening and fat stranding involving the visualized   base of the left neck, supraclavicular region and throughout the left lateral   chest wall. This is nonspecific. Infectious/inflammatory process such as   cellulitis is a consideration. Ill-defined hematoma is also a consideration. Recommend correlation with clinical presentation. Emphysematous changes. Areas of atelectasis and patchy airspace opacities most evident in the lower   lung zones. Developing infiltrates from pneumonia not excluded. Continued   follow-up recommended. CT abdomen and pelvis:      Exam limited by patient motion. Urinary bladder thickening may be related to underdistention. Cystitis not   excluded. Other chronic appearing findings. Short-term follow-up if symptoms persist.         XR CHEST PORTABLE   Final Result   Patchy bilateral parenchymal opacities may represent multifocal pneumonia or   mild edema. Continued follow-up recommended. CTA HEAD W CONTRAST   Final Result   1. Mild atherosclerotic disease .    2. Estimated stenosis of the proximal right and left internal carotid   artery by NASCET criteria is not hemodynamically significant         This study was analyzed by the [address] ai algorithm. CTA NECK W CONTRAST   Final Result   1. Mild atherosclerotic disease . 2. Estimated stenosis of the proximal right and left internal carotid   artery by NASCET criteria is not hemodynamically significant         This study was analyzed by the [address] ai algorithm. CT BRAIN PERFUSION   Final Result      No significant ischemic penumbra identified      This study was analyzed by the [employer] ai algorithm. CT HEAD WO CONTRAST   Final Result   No acute intracranial abnormality. Mild age-related loss of brain volume and   chronic periventricular ischemic changes. Findings reported to Dr. Yeimi Kurtz at 12:12 a.m.               ------------------------- NURSING NOTES AND VITALS REVIEWED ---------------------------  Date / Time Roomed:  5/24/2021 11:17 PM  ED Bed Assignment:  05/05    The nursing notes within the ED encounter and vital signs as below have been reviewed. Patient Vitals for the past 24 hrs:   BP Temp Temp src Pulse Resp SpO2 Height Weight   05/25/21 0430 122/80   (P) 76 (P) 16 94 %     05/25/21 0116  100 °F (37.8 °C) Rectal        05/24/21 2321 (!) 126/95 98 °F (36.7 °C)  78 14 98 % 5' 8\" (1.727 m) 160 lb (72.6 kg)       Oxygen Saturation Interpretation: Normal    ------------------------------------------ PROGRESS NOTES ------------------------------------------  Re-evaluation(s):  Please see ED course    Counseling:  I have spoken with the son and discussed todays results, in addition to providing specific details for the plan of care and counseling regarding the diagnosis and prognosis.   Their questions are answered at this time and they are agreeable with the plan of admission.    --------------------------------- ADDITIONAL PROVIDER NOTES ---------------------------------  Consultations:  Please see ED course    This patient's ED course included: a personal history and physicial examination, re-evaluation prior to disposition, multiple bedside re-evaluations, IV medications, cardiac monitoring, continuous pulse oximetry and complex medical decision making and emergency management    This patient has remained hemodynamically stable during their ED course. Diagnosis:  1. Altered mental status, unspecified altered mental status type    2. Acute renal failure superimposed on chronic kidney disease, unspecified CKD stage, unspecified acute renal failure type ([facility] [address]    3. Sepsis with acute renal failure without septic shock, due to unspecified organism, unspecified acute renal failure type ([facility]    4. Transaminitis    5. Dehydration    6. Cellulitis of other specified site    7. Cocaine abuse ([facility]        Disposition:  Patient's disposition: Admit to telemetry  Patient's condition is serious. Pauly Obando DO  Resident  05/25/21 DO Jess  Resident  05/25/21 [id number]  ATTENDING PROVIDER ATTESTATION:     I have personally performed and/or participated in the history, exam, medical decision making, and procedures and agree with all pertinent clinical information. I have also reviewed and agree with the past medical, family and social history unless otherwise noted. I have discussed this patient in detail with the resident, and provided the instruction and education regarding weakness. My findings/Plan: I was the primary provider for patient. Patient presenting here because of weakness to left side. Patient reportedly was last seen well yesterday. Patient is awake alert to person and place but not to time. Patient moving all extremities slightly weaker on left side. Patient has noted redness and swelling to left posterior neck. As well as upper back. Patient abdomen is soft and nontender. Heart and lung exam within normal limits. he is moving all extremities slightly weaker on left side.   Please see above for stroke scale. Patient was made a stroke alert due to concern for weakness. patient labs are reviewed as well as EKG as well as CTs. We did discuss with interventionalist.  Patient is not a candidate for TPA and/or intervention. Patient does have noted elevated white count he has a fever as well. Patient has noted what appears to be cellulitis and swelling to posterior neck as well as back. Patient ordered IV antibiotics as well as IV fluids. EKG noted reviewed show no ST elevation. He does have kidney injury on lab work and I suspect his troponin is elevated secondary to the kidney injury. Patient family at bedside made aware of findings and plan. Patient will be admitted to intermediate bed we did speak to on-call internal medicine. Patient vital signs remained stable here.        Naeem Hernandez MD  05/25/21 [address], MD  05/25/21 6725

## 2021-05-25 NOTE — PROGRESS NOTES
Patient's nephew at bedside and helping this nurse to ask patient questions for admission and for MRI checklist. Patient's brother and sister both on the phone as well and working together to convince patient to cooperate and also to answer questions. Patient states \"I don't have to tell you anything\" Patient also answering questions intermittently and saying \"I don't need you to do anything for me. \"

## 2021-05-25 NOTE — VIRTUAL HEALTH
Tele stroke note     This is a 18-year-old gentleman who presents with subtle left upper extremity drift and confusion. There are multiple differing timelines from the story with waxing and waning weakness that started earlier this morning. Another timeline where patient was last known well yesterday and a third timeline where patient reported onset of symptoms a few hours ago. Patient is not oriented to time place or person per  ED team    CT head reviewed CTA no large vessel occlusion, CT perfusion no significant perfusion deficit    Assessment  Strokelike symptoms     Recommendations  With multiple differing timelines not a TPA candidate as risk of hemorrhage is high if given outside the 4-1/2-hour window  No large vessel occlusion hence not an endovascular candidate  Stroke work-up, general neurology consult.     Discussed with the ER  team

## 2021-05-25 NOTE — ED NOTES
Unable to complete MRI screening, pt.  Altered mental status      Ramona Castañeda RN  05/25/21 2790

## 2021-05-26 PROBLEM — R53.1 LEFT-SIDED WEAKNESS: Status: ACTIVE | Noted: 2021-01-01

## 2021-05-26 NOTE — PLAN OF CARE
Problem: Non-Violent Restraints  Goal: No harm/injury to patient while restraints in use  Outcome: Met This Shift  Goal: Patient's dignity will be maintained  Outcome: Met This Shift     Problem: Skin Integrity:  Goal: Will show no infection signs and symptoms  Description: Will show no infection signs and symptoms  Outcome: Met This Shift  Goal: Absence of new skin breakdown  Description: Absence of new skin breakdown  Outcome: Met This Shift     Problem: Non-Violent Restraints  Goal: Removal from restraints as soon as assessed to be safe  Outcome: Not Met This Shift

## 2021-05-26 NOTE — CARE COORDINATION
Care coordination-  The patient was admitted from home after being found to week to get into the vehicle to come to the hospital. The patient is + for HIV, hepatitis c  And herpes. Wbc 13.9,bnp-8,594, liver enzymes are all elevated. LA is 3.9 ++ fentanyl Cocaine , crp 19 + blood cultures. Palliative care was consulted. Ck= 22,000. I spoke to the patients son who lives in [place] Jose Ramon Vidal @ [phone] and he told be his father lives in a 2 story home alone and has 2 steps to enter the home. His pharmacy is [facility] and he has no PCP.  I will follow

## 2021-05-26 NOTE — PROGRESS NOTES
Message sent to Dr. Matilda Whittaker for a diet order, new restraint order, meds to calm patient.  Also informed Dr. Matilda Whittaker that patients blood cultures are positive and BUN 74 Creatinine 8.6

## 2021-05-26 NOTE — CONSULTS
Inpatient Cardiology Consultation      Reason for Consult:  EKG Changes. Renal Failure. Cocaine Use    Consulting Physician: Dr Juanda Brittle    Requesting Physician:  Dr Chantell Cedeno     Date of Consultation: 5/26/2021    HISTORY OF PRESENT ILLNESS: 57 yo male not previously known to any cardiologist.  PMH: HIV, Hep C, HTN, GERD, crack cocaine use  SE-ED 5/24/2021 found by family weak unable to ambulate. L sided weakness. AMS. NIH 4. CT no perfusion mismatch or occlusion and not a candidate for any invasive neurologic procedure. Received  mg in ED T max in .0. CXR showing pneumonia received IV ATB's in ED. Diffuse erythema and warmth over his left lateral neck and left upper flank up to his left axilla. Na 139, K+ 5.1-->5.5-->4.7, Bun/Cr 44/5.6-->65/7.9-->74/8.6, Magnesium 2.4-->2.1, lactic acid 3.9-->1.6, pro-calcitonin 3.77-->5.31, CPK -->22,000-->>29584, CRP 19.0, p-BNP 8594, troponin 0.48-->0.52, T Chol 191, HDL 45, LDL 90, triglycerides 279, HS troponin 634-->721 NG.ML (WHICH SHOULD BE NG/DL), albumin 3.6-->2.9, HgbA1c 5.9, Ethanol <10, + fentanyl/cocaine, WBC 13.9-->9.3, Hgb 18.2-->14.3, Plt 206, UA few bacteria   1 liter NSS then placed on NSS at 150/hr  5/25/2021 Neurology--> ASA and Plavix x 21 days then ASA monotherapy  5/25/2021 ID Consult-->Vancomycin  5/25/2021 Renal Consult-->CHANEL-->placed on Bicarb gtt  5/25/2021 Placed in 4 point soft restraints  5/26/2021 Bicarb gtt stopped--> NSS at 200/hr for 2 liters then 100 ml/hr    TTE ordered by primary service    Patient awake and alert but making no sense when talking, confused to place/time/location. Remains in 4 point restraints. Denies CP when asked but does complain of SOB    Please note: past medical records were reviewed per electronic medical record (EMR) - see detailed reports under Past Medical/ Surgical History. Past Medical History:    1. Tobacco abuse  2. Crack Coaine use  3. HIV +  4. Hepatitis C  5. HTN  6. GERD  7.  Herpes zoster  8. Abscess L hand, scrotum  9. 1/3/2014 Psych admission (pink slipped) delusional, agitated, violent, and attacking people, and depressed. + Cocaine       Past Surgical History:  L Inguinal hernia repair, removal of foreign body from L foot, cyst removed from testicle, scrotal abscess drainage. Medications Prior to admit:  Prior to Admission medications    Medication Sig Start Date End Date Taking? Authorizing Provider   TIVICAY 50 MG tablet Take 50 mg by mouth nightly  7/26/19  Yes Historical Provider, MD   DESCOVY 200-25 MG TABS tablet Take 1 tablet by mouth nightly  7/26/19  Yes Historical Provider, MD   Multiple Vitamin (MULTIVITAMIN) tablet Take 1 tablet by mouth daily 7/26/19  Yes Historical Provider, MD   gabapentin (NEURONTIN) 800 MG tablet Take 800 mg by mouth 3 times daily.     Yes Historical Provider, MD       Current Medications:    Current Facility-Administered Medications: 0.9 % sodium chloride infusion, , Intravenous, Continuous  sodium chloride flush 0.9 % injection 5-40 mL, 5-40 mL, Intravenous, 2 times per day  sodium chloride flush 0.9 % injection 5-40 mL, 5-40 mL, Intravenous, PRN  0.9 % sodium chloride infusion, 25 mL, Intravenous, PRN  heparin (porcine) injection 5,000 Units, 5,000 Units, Subcutaneous, 3 times per day  Post Zosyn Saline Infusion, , Intravenous, Q12H  melatonin tablet 3 mg, 3 mg, Oral, Nightly PRN  emtricitabine-tenofovir alafenamide (DESCOVY) 200-25 MG per tablet 1 tablet, 1 tablet, Oral, Daily  dolutegravir sodium (TIVICAY) tablet 50 mg, 50 mg, Oral, Nightly  aspirin chewable tablet 81 mg, 81 mg, Oral, Daily  clopidogrel (PLAVIX) tablet 75 mg, 75 mg, Oral, Daily  perflutren lipid microspheres (DEFINITY) injection 1.65 mg, 1.5 mL, Intravenous, ONCE PRN  sodium bicarbonate 150 mEq in dextrose 5 % 1,000 mL infusion, , Intravenous, Continuous  sodium chloride flush 0.9 % injection 10 mL, 10 mL, Intravenous, PRN    Allergies:  NKDA per patient    Social History:    1/2 -1 ppd x 20 years  Hx ETOH  Crack cocaine use  Code Status: Full Code      Family History: Unable to obtain due to patients confusion      REVIEW OF SYSTEMS:     · Constitutional: Denies fatigue, fevers, chills or night sweats  · Eyes: Denies visual changes or drainage  · ENT: Denies headaches or hearing loss. No mouth sores or sore throat. No epistaxis   · Cardiovascular: Denies chest pain, pressure or palpitations. No lower extremity swelling. · Respiratory: + SOB. Denies cough, orthopnea or PND. No hemoptysis   · Gastrointestinal: Denies hematemesis or anorexia. No hematochezia or melena    · Genitourinary: Denies urgency, dysuria or hematuria. · Musculoskeletal: Denies gait disturbance, weakness or joint complaints  · Integumentary: Denies rash, hives or pruritis   · Neurological: Denies dizziness, headaches or seizures. No numbness or tingling  · Psychiatric: Denies anxiety or depression. · Endocrine: Denies temperature intolerance. No recent weight change. .  · Hematologic/Lymphatic: Denies abnormal bruising or bleeding. No swollen lymph nodes    PHYSICAL EXAM:   BP (!) 153/98   Pulse 102   Temp 98.9 °F (37.2 °C) (Temporal)   Resp 18   Ht 5' 8\" (1.727 m)   Wt 160 lb (72.6 kg)   SpO2 93%   BMI 24.33 kg/m²   CONST:  Well developed, thin ill appearing male  who appears of stated age. Awake, alert. Agitated during interview    HEENT:   Head- Normocephalic, atraumatic   Eyes- Conjunctivae pink, anicteric  Throat- Oral mucosa pink and moist  Neck-  No stridor, trachea midline, no jugular venous distention. No carotid bruit. CHEST: Chest symmetrical and non-tender to palpation. No accessory muscle use or intercostal retractions  RESPIRATORY: Lung sounds - coarse BS   CARDIOVASCULAR:     Heart Ausculation- Regular rate and rhythm, no murmur heard. PV: No lower extremity edema. No varicosities. Pedal pulses palpable, no clubbing or cyanosis   ABDOMEN: Soft, non-tender to light palpation.  Bowel sounds present. No palpable masses; no abdominal bruit  MS: Good muscle strength and tone. No atrophy or abnormal movements. : Orellana eladio urine  SKIN: Diffuse erythema and warmth over his left lateral neck and left upper flank up to his left axilla. NEURO / PSYCH: Oriented to person only. Speech clear but inappropriate. Follows some simple commands. Agitated    DATA:    ECG SR with NSSTT wave changes  Tele strips: 's    Diagnostic:    5/25/2021 Renal US: Increased renal parenchymal echogenicity indicating medical renal disease. No nephrolithiasis or hydronephrosis. CT Chest/Abdomen/Pelvis WO 5/25/2021: Ill-defined soft tissue thickening and fat stranding involving the visualized base of the left neck, supraclavicular region and throughout the left lateral chest wall.  This is nonspecific.  Infectious/inflammatory process such as cellulitis is a consideration.  Ill-defined hematoma is also a consideration. Recommend correlation with clinical presentation. Emphysematous changes. Areas of atelectasis and patchy airspace opacities most evident in the lower lung zones.  Developing infiltrates from pneumonia not excluded.  Continued follow-up recommended. CT abdomen and pelvis: Exam limited by patient motion. Urinary bladder thickening may be related to underdistention.  Cystitis not excluded.        Intake/Output Summary (Last 24 hours) at 5/26/2021 1008  Last data filed at 5/26/2021 0551  Gross per 24 hour   Intake 1800 ml   Output    Net 1800 ml       Labs:   CBC:   Recent Labs     05/25/21  0910 05/26/21  0651   WBC 10.4 9.3   HGB 16.4 14.3   HCT 49.1 41.3    206     BMP:   Recent Labs     05/25/21 2211 05/26/21  0651    138   K 5.3* 4.7  4.7   CO2 17* 21*   BUN 65* 74*   CREATININE 7.9* 8.6*   LABGLOM 8 8   CALCIUM 7.8* 7.5*     Mag:   Recent Labs     05/25/21 2211 05/26/21  0651   MG 2.4 2.1     Phos:   Recent Labs     05/25/21 2211 05/26/21  0651   PHOS 6.7* 5.6*     TFT:   Lab Results

## 2021-05-26 NOTE — PROGRESS NOTES
Jose Guidry is a 56 y.o.  male     Neurology is following for weakness    PMH: HIV, hep C, depression, bacterial meningitis, shingles, current smoking    The patient was admitted on 5/25 with new onset left arm weakness and altered mental status beginning on 5/24. CK markedly elevated. UDS positive for cocaine and fentanyl. Neuroimaging in the ER was unrevealing for ischemic events or LVO. He refused MRI of the brain last night. He is confused, uncooperative, restless, and restrained. Received Zyprexa with no benefit. Echo is pending. He was afebrile overnight and T-max yesterday was 100. Nephrology is now following for CHANEL and feels the patient would benefit from hospice--Cr 8.6. And ID is following with HIV and hep C viral loads pending. Repeat CK is pending and he still has transaminitis.   There is no family present    Current Facility-Administered Medications   Medication Dose Route Frequency Provider Last Rate Last Admin    0.9 % sodium chloride infusion   Intravenous Continuous Curt Garcia MD        0.9 % sodium chloride infusion   Intravenous Continuous Kit Barillas  mL/hr at 05/25/21 0916 New Bag at 05/25/21 0916    sodium chloride flush 0.9 % injection 5-40 mL  5-40 mL Intravenous 2 times per day Kit Barillas MD   10 mL at 05/26/21 0956    sodium chloride flush 0.9 % injection 5-40 mL  5-40 mL Intravenous PRN Kit Barillas MD        0.9 % sodium chloride infusion  25 mL Intravenous PRN Kit Barillas MD        heparin (porcine) injection 5,000 Units  5,000 Units Subcutaneous 3 times per day Kit Barillas MD   5,000 Units at 05/26/21 0540    Post Zosyn Saline Infusion   Intravenous Q12H Annetta Alvarez RPH   Stopped at 05/25/21 2340    melatonin tablet 3 mg  3 mg Oral Nightly PRN Dionne Horvath DO        emtricitabine-tenofovir alafenamide (DESCOVY) 200-25 MG per tablet 1 tablet  1 tablet Oral Daily Onesimo Rosales MD   1 tablet at 05/26/21 5190    dolutegravir sodium (TIVICAY) tablet 50 mg  50 mg Oral Nightly Oli Garcia MD        aspirin chewable tablet 81 mg  81 mg Oral Daily Gordon Adjutant, DO   81 mg at 05/26/21 0956    clopidogrel (PLAVIX) tablet 75 mg  75 mg Oral Daily Gordon Adjutant, DO   75 mg at 05/26/21 9017    perflutren lipid microspheres (DEFINITY) injection 1.65 mg  1.5 mL Intravenous ONCE PRN Gordon Adjutant, DO        sodium chloride flush 0.9 % injection 10 mL  10 mL Intravenous PRN Zbigniew Cheatham, DO         Objective:     BP (!) 153/98   Pulse 102   Temp 98.9 °F (37.2 °C) (Temporal)   Resp 18   Ht 5' 8\" (1.727 m)   Wt 160 lb (72.6 kg)   SpO2 93%   BMI 24.33 kg/m²     General appearance: Awake but keeps eyes closed appears stated age--restless and uncooperative; appears chronically ill   Head: normocephalic atraumatic--small abrasion bridge of nose  Eyes: resists forced eye opening  Neck: full ROM  Lungs: clear to auscultation bilaterally  Heart: tachycardic rate and rhythm. ST  Extremities: b/l soft wrist restraints, thin  Pulses: 2+ and symmetric  Skin: onychomycosis      Mental Status: awake but keeps eyes closed; oriented to person only. Very agitated and restless.  Will follow some commands, but not others    Speech: no dysarthria  Language: no obvious aphasias--keeps saying \"pull me up\"    Cranial Nerves:  I: smell NA   II: visual acuity  NA   II: visual fields Resists forced eye opening   II: pupils    III,VII: ptosis Keeps eyes squeezed shut   III,IV,VI: extraocular muscles     V: mastication    V: facial light touch sensation  Resists LT both sides   V,VII: corneal reflex     VII: facial muscle function - upper  Normal   VII: facial muscle function - lower Appears symmetric   VIII: hearing Normal   IX: soft palate elevation     IX,X: gag reflex    XI: trapezius strength  Moving both shoulders equally   XI: sternocleidomastoid strength Moving head around   XI: neck extension strength  Lifting head off pilow XII: tongue strength  Normal     Motor:  Moving both arms equally against restraints  Both legs weak--questionably L more than R  Decreased bulk; increased tone in legs    Sensory:  Responds to LT in all limbs    DTR:   No Babinskis  No Young's    No pathological reflexes    Laboratory/Radiology:     CBC with Differential:    Lab Results   Component Value Date    WBC 9.3 05/26/2021    RBC 4.20 05/26/2021    HGB 14.3 05/26/2021    HCT 41.3 05/26/2021     05/26/2021    MCV 98.3 05/26/2021    MCH 34.0 05/26/2021    MCHC 34.6 05/26/2021    RDW 13.2 05/26/2021    SEGSPCT 47 01/06/2014    LYMPHOPCT 1.8 05/26/2021    MONOPCT 3.6 05/26/2021    BASOPCT 0.3 05/26/2021    MONOSABS 0.37 05/26/2021    LYMPHSABS 0.19 05/26/2021    EOSABS 0.17 05/26/2021    BASOSABS 0.00 05/26/2021     CMP:    Lab Results   Component Value Date     05/26/2021    K 4.7 05/26/2021    K 4.7 05/26/2021    CL 96 05/26/2021    CO2 21 05/26/2021    BUN 74 05/26/2021    CREATININE 8.6 05/26/2021    GFRAA 8 05/26/2021    LABGLOM 8 05/26/2021    GLUCOSE 117 05/26/2021    GLUCOSE 83 01/26/2012    PROT 6.5 05/26/2021    LABALBU 2.9 05/26/2021    LABALBU 4.2 01/17/2012    CALCIUM 7.5 05/26/2021    BILITOT 0.4 05/26/2021    ALKPHOS 52 05/26/2021     05/26/2021     05/26/2021     HgBA1c:    Lab Results   Component Value Date    LABA1C 5.9 05/25/2021     FLP:    Lab Results   Component Value Date    TRIG 279 05/25/2021    HDL 45 05/25/2021    [address] 90 05/25/2021    LABVLDL 56 05/25/2021     CTA head and neck: Mild atherosclerotic disease .  2. Estimated stenosis of the proximal right and left internal carotid artery by NASCET criteria is not hemodynamically significant     MRI brain pending    Echo pending    All labs and images personally reviewed today    Assessment:     AMS and L sided weakness in a pt with HIV and rhabdo: patient uncooperative and encephalopathic for exam, but appears to be moving all limbs-- still somewhat weaker on the L. Concern for ischemic event vs CNS or cocaine-induced vasculitis. Seizure with Scottie's paralysis is also possible  PML should remain on the differential, though less likely.  Poor overall prognosis in light of his comorbidities    ARF: nephro recommends hospice    HIV: on Tivicay and Descovy; viral loads pending    Rhabdo: markedly elevated CKs with transaminitis    Hep C    Hx bacterial meningitis     Substance abuse    Plan:     Nephro recommends hospice    Re-try for MRI brain if pt does not go hospice--IV Ativan 1 mg pre-med ordered    ID following    Continue DAPT for 21 days then reduce to ASA 81 mg alone    Statin deferred due to elevated LFTs    Could consider EEG pending final goals of care and MRI results    PCDs    Needs PT/OT/ST when less agitated    Discussed with Dr. Brenda San    Will follow    CRISTHIAN Mccormack - CNP  10:37 AM  5/26/2021

## 2021-05-26 NOTE — PROGRESS NOTES
Hospitalist Progress Note      SYNOPSIS: Patient admitted on 5/24/2021 for altered mental status  Brynn Maldonado has a past medical history that includes HIV, hepatitis C, GERD    On admission; patient lethargic and a poor historian.   Elise Lofton presents due to altered mental status and left-sided weakness; unknown last known well, stroke alert was called, CT imaging ultimately showed no perfusion mismatch or occlusion and patient determined not to be TPA candidate or candidate for thrombectomy as per stroke neurologist; patient given 324 p.o. aspirin as per neurology. Rectal temperature was 100 °F, patient with labs showing acute renal failure with GFR 13 creatinine 5.6, elevated LFTs with AST 1108, ; UA positive for hematuria, no evidence for UTI.  Lactic acid is 3.9.  Leukocytosis 13.9.  Patient given 1 L NS bolus.  Rapid Covid is negative.   CXR does bilateral opacifications concerning for possible pneumonia.  Patient given broad-spectrum antibiotic treatment with vancomycin and cefepime.  Labs also significant for elevated troponin and BNP; CRP 19, procalcitonin 3.77, elevated trop and CK. UDS positive for cocaine and fentanyl.  On physical exam patient appears to have diffuse erythema and warmth over his left lateral neck and left upper flank up to his left axilla, there also appears to be a central region of swelling and a central possible insect bite or laceration with mild serous drainage, there does not appear to be any crepitus, induration, or evidence of focal abscess.  There is tenderness to palpation of these regions as well, concerns for possible cellulitis as infectious source.  During this encounter patient did remain alert, he answered some questions but is oriented only to self, he is not able to provide very much history but is able to tell us where his pain is.  He does not have any abdominal tenderness to palpation.  Lungs with mild rales bilaterally scattered.  Decision made to note this patient due to altered mental status, acute renal failure, transaminitis, dehydration, and sepsis with unknown source.  Results and plan we are discussed with the patient's son by the admitting physician, he voiced understanding and is amenable. SUBJECTIVE:    Patient seen and examined  Mentation seems a bit better though still agitated  Reported left-sided chest and arm pain  No nausea vomiting    Records reviewed. Stable overnight. No other overnight issues reported. Temp (24hrs), Av.6 °F (37 °C), Min:98 °F (36.7 °C), Max:100 °F (37.8 °C)    DIET: DIET RENAL;  CODE: Full Code    Intake/Output Summary (Last 24 hours) at 2021 1322  Last data filed at 2021 0551  Gross per 24 hour   Intake 1800 ml   Output    Net 1800 ml       OBJECTIVE:    BP (!) 142/82   Pulse 85   Temp 98.1 °F (36.7 °C) (Temporal)   Resp 16   Ht 5' 8\" (1.727 m)   Wt 160 lb (72.6 kg)   SpO2 96%   BMI 24.33 kg/m²     General appearance: No apparent distress, appears stated age and cooperative. HEENT:  Conjunctivae/corneas clear. Neck: Supple. No jugular venous distention. Respiratory: Clear to auscultation bilaterally, normal respiratory effort  Cardiovascular: Regular rate rhythm, normal S1-S2  Abdomen: Soft, nontender, nondistended  Musculoskeletal: No clubbing, cyanosis, no bilateral lower extremity edema. Brisk capillary refill. Skin:   Left sided chest and lateral chest wall erythematous rash. Mildly tender. No obvious discharge seen  Neurologic: awake, alert and following commands.   Mild left-sided weakness    ASSESSMENT:    1) Acute Metabolic/Toxic encephalopathy  -CT head no acute intracranial abnormality  -UDS positive for fentanyl and cocaine  -Continue infection and kidney failure management    2) Sepsis with staph bacteremia secondary to left-sided chest wall cellulitis  -Blood culture done on 2021 growing 2 out of 2 staph   -Repeat blood culture today  -CRP and procalcitonin significantly elevated; will cycle  -ID on board continue IV vancomycin to continue this due to his known    3) Rhabdomyolysis  -Might be drug induced (cocaine and fentanyl), nontraumatic  -CK significantly elevated  -On IV fluid normal saline  -Monitor closely for fluid overload    4) CHANEL  -Likely due to ATN from rhabdo and or contrast-induced nephropathy  -Renal ultrasound showed increased renal parenchymal echogenicity indicating medical renal disease  -Avoid nephrotoxic medications  -Likely need dialysis as kidney function keeps worsening  -Prognosis is poor; palliative consult placed    5) Left-sided weakness   -CTA head and neck: No significant occlusion  -MRI brain to be done when mentation improves  -Neurology on board; continue DAPT for 21 days and then ASA afterward    6) Elevated transaminases  -AST/ALT significantly elevated; ALP normal  -This is likely due to rhabdomyolysis as alkaline phosphatase is normal  -No further GI work-up needed  -Continue to trend levels    Other chronic medical conditions, medication resumed unless contraindicated    HIV infection; resume meds ID  Chronic hepatitis C  Polysubstance use disorder      DISPOSITION:     Medications:  REVIEWED DAILY    Infusion Medications    sodium chloride 200 mL/hr at 05/26/21 1050    sodium chloride       Scheduled Medications    LORazepam  1 mg Intravenous See Admin Instructions    sodium chloride flush  5-40 mL Intravenous 2 times per day    heparin (porcine)  5,000 Units Subcutaneous 3 times per day    emtricitabine-tenofovir alafenamide  1 tablet Oral Daily    dolutegravir sodium  50 mg Oral Nightly    aspirin  81 mg Oral Daily     PRN Meds: sodium chloride flush, sodium chloride, melatonin, perflutren lipid microspheres, sodium chloride flush    Labs:     Recent Labs     05/24/21  2325 05/25/21  0910 05/26/21  0651   WBC 13.9* 10.4 9.3   HGB 18.2* 16.4 14.3   HCT 54.6* 49.1 41.3    212 206       Recent Labs     05/25/21  0910 05/25/21 2211 05/26/21  0651    137 138   K 5.5* 5.3* 4.7  4.7    99 96*   CO2 18* 17* 21*   BUN 54* 65* 74*   CREATININE 6.6* 7.9* 8.6*   CALCIUM 7.6* 7.8* 7.5*   PHOS  --  6.7* 5.6*       Recent Labs     05/24/21 2325 05/25/21  0910 05/26/21  0651   PROT 8.3 7.3 6.5   ALKPHOS 79 55 52   * 319* 324*   AST 1,108* 748* 784*   BILITOT 0.4 0.4 0.4   LIPASE  --  17  --        Recent Labs     05/24/21 2329 05/26/21  0651   INR 1.2 1.4       Recent Labs     05/24/21 2325 05/25/21 0910 05/25/21 2211 05/26/21  0651   CKTOTAL >22,000* >22,000* SEE NOTE* see note*   TROPONINI 0.48* 0.52*  --   --        Chronic labs:    Lab Results   Component Value Date    CHOL 191 05/25/2021    TRIG 279 (H) 05/25/2021    HDL 45 05/25/2021    LDLCALC 90 05/25/2021    TSH 3.720 03/08/2017    PSA SEE NOTE (AA) 03/08/2017    PSA 0.47 03/08/2017    INR 1.4 05/26/2021    LABA1C 5.9 (H) 05/25/2021       Radiology: REVIEWED DAILY    +++++++++++++++++++++++++++++++++++++++++++++++++  Leigh Cates MD  [facility], [place]  +++++++++++++++++++++++++++++++++++++++++++++++++  NOTE: This report was transcribed using voice recognition software. Every effort was made to ensure accuracy; however, inadvertent computerized transcription errors may be present.

## 2021-05-26 NOTE — PROGRESS NOTES
MRI called- stated the patient refused MRI and his heart monitor and that they are sending him back up.

## 2021-05-26 NOTE — PROGRESS NOTES
Pt thrashing around in the bed, trying to go over the rails. Pt screaming \"call my brother, call my brother. \"     This nurse attempted to call brother- there was no answer. Pt notified. Screamed at nurse \"call my brother- call him. \"

## 2021-05-26 NOTE — CONSULTS
Palliative Care Department  [phone]  Palliative Care Initial Consult  Alexandrea Guerrero APRN-CNS, [address]  [id number]  Hospital Day: 3    Date of Initial Consult: 5/26/2019  Referring Provider: Dr. Aries Dillon was consulted for assistance with: goals of care, code status discussion and symptom management. HPI:   Madison Winter is a 56 y.o. with a past medical history of HTN, HLD, HIV, hepatitis C, GERD, mood disorder, who presents the ED due to CHIEF COMPLAINT of altered mental status and left-sided weakness. Per EMS patient lives alone, his family states that the last time they saw him  \"normal\" was the night prior to arrival. They came to check on him at his house and noticed that he was not acting normally and seemed to have left-sided weakness; patient initially told EMS that his weakness had started sometime earlier today, on arrival to the ED states that his left arm feels weak and it started a few minutes ago; he is oriented to self but not to place, time, or purpose. Workup included: CT of head showed no perfusion mismatch or occlusion and patient determined not to be TPA candidate or candidate for thrombectomy; stroke alert called. Pt was started on ASA. CXR noting bilateral opacifications for possible pneumonia. Labs noting acute renal failure;elevated LFTS; leukocytosis and elevated troponin and pro calcitonin; +for cocaine and fentanyl. He was started on IV fluids and antibiotics. He was admitted on 5/24/2021 for further care for acute renal failure, transaminitis, dehydration and sepsis. Neurology, Cardiology, Nephrology and ID services have been consulted. Per Neurology concern for ischemic event vs CNS or drug induced vasculitis.        ASSESSMENT/PLAN:     Pertinent Hospital Diagnoses    Acute renal failure- Nephrology consulted; IV fluids; monitor labs   Metabolic encephalopathy-continue to monitor; Neurology following; plan for MRI of brain   Left sided weakness; neurology consulted   Chronic Hepatitis/HIV- continues descovy and tivicay per ID   Rhabdomyolysis-continues on fluids; Nephrology following   Elevated troponin-Cardiology consulted-consider Plavix/ASA as per Neurology      Palliative Care Encounter / Counseling Regarding Goals of [place], Does have capacity for medical decision-making (with assistance of family who were present). Capacity is time limited and situation/question specific   During encounter brothjohn Murillo was surrogate medical decision-maker   Outcome of goals of care meeting: with family present; pt wishes to continue with medical management; would accept dialysis; agrees to MRI; hopeful to return home and be independent   Code status: changed to limited; MEDS ONLY; NO to CPR; NO SHOCK; NO INTUBATION   Advanced Directives: no HPOA or Living Will noted in chart (would like to complete when able; wants brothjohn Murillo as decision maker who accepts   Surrogate/Legal NOK:   Fabiano Giordano @  [facility] @ [phone]; has son Russ Courser    Details of Conversation:    5/26/21 Afebrile; continues on oxygen per n/c 5L; SPO2 96%. Has worsening kidney function BUN/creat 74/8. 6. Has been refusing MRI of brain but pt states he is willing to get it (family requesting pt be medicated to complete procedure). Nephrology recommending consideration of hospice with creatinine of 8.6. Pt is alert and answering questions appropriately but unsure of date and circumstances. Pt seen along with Palliative Medicine SSW. Brother Julian Murillo and sister in law, Ashanti Raymundo were present during discussion. Explained role of Palliative Medicine. Pt was cooperative. States he wishes to continue medical treatment but would not want \"life support\". He agreed to change code status to  Limited; MEDS ONLY. Order placed for limited code- NO CPR; NO SHOCK; NO INTUBATION but YES TO  MEDS. Brothjohn Murillo agrees who is present.  Pt names Julian Murillo to be medical decision maker for him if he is unable to make decisions. He has no advance directives in place. Will attempt to assist in completion prior to discharge. Spiritual assessment: no spiritual distress identified  Bereavement and grief: to be determined  Referrals to: none today    SUBJECTIVE:     Active Hospital Problems    Diagnosis Date Noted    Left-sided weakness [R53.1] 05/26/2021    AMS (altered mental status) [R41.82] 05/25/2021           OBJECTIVE:   Prognosis: unknown    Physical Exam:  BP (!) 142/82   Pulse 85   Temp 98.1 °F (36.7 °C) (Temporal)   Resp 16   Ht 5' 8\" (1.727 m)   Wt 160 lb (72.6 kg)   SpO2 96%   BMI 24.33 kg/m²   Gen:   NAD, awake, alert; oriented to person/place; confused to events  HEENT:  Normocephalic, atraumatic, mucosa moist, EOMI  Neck:  Supple, trachea midline, no JVD  Lungs:  CTA bilaterally, no audible rhonchi or wheezes noted, respirations unlabored  Heart: NSR;  RRR, distant heart tones, no murmur, rub, or gallop noted during exam  Abd:  Soft, non tender, non distended, bowel sounds present  :  Orellana catheter  Ext:  Weak; Moving all extremities, + edema, pulses present  Skin:  Warm and dry  Neuro:  PERRL, Alert, oriented to person/place; following commands    Past Medical History:   Diagnosis Date    Abscess of hand, left 2009    Abscess of scrotum 2007    Bacterial meningitis 2010    Depression     GERD (gastroesophageal reflux disease)     Hepatitis C     Herpes zoster w/ nervous system complication 7518    neuropathy    [facility], [facility]    Inguinal hernia unilateral     left    Pneumonia 2010    Shingles (herpes zoster) polyneuropathy     Suicidal ideation 2008    Vitamin D deficiency      Past Surgical History:   Procedure Laterality Date    ABSCESS DRAINAGE  9/18/2007    scrotal. [facility].  Dr. Osbaldo Wright  2008    testicle    INGUINAL HERNIA REPAIR  3/9/2012    left indirect hernia repaired with mesh, Dr. Scanlon Brochure, [facility] OTHER SURGICAL HISTORY  3/9/2012    Left Inguinal Hernia Repair;Removal of Foreign Object Left foot       Social History:   The patient currently lives at home; independently  TOBACCO:  reports that he has been smoking cigarettes. He has a 10.00 pack-year smoking history. He has never used smokeless tobacco.  ETOH:  reports previous alcohol use. Objective data reviewed: labs, images, records, medication use, vitals and chart    Discussed patient and the plan of care with the other IDT members: Palliative Medicine IDT Team    Time/Communication  Greater than 50% of time spent, total 50 minutes in counseling and coordination of care at the bedside regarding goals of care, symptom management, diagnosis and prognosis and see above. Thank you for allowing Palliative Medicine to participate in the care of Jada Calix.   Casandra MORROW-CHEPE, ACHPN

## 2021-05-26 NOTE — PROGRESS NOTES
Pt screaming \"I can't breathe, I can't breathe. \"  Nurse went to assess pt, pt screamed that again then said \". .and you know what happens when a black man can't breathe. \"  Pt 02 sat 95% on 5L 02 via nc.

## 2021-05-26 NOTE — PROGRESS NOTES
[facility] MD Bryn Bello, MD Alma Jung, MD Pavan Alberto, MD Kwabena Morrow, CNP   Jana Cha, ANP  Progress Note    5/26/2021    SUBJECTIVE:   (-) c/o's  (-) sob/elkins/cp/palp   Patient still confused, yelling with no apparent shortness of breath. Persistent altered mental status due to multiple medical problems and iatrogenic consumption history of cocaine/fentanyl. PROBLEM LIST:    Active Problems:    AMS (altered mental status)  Resolved Problems:    * No resolved hospital problems.  *         DIET:    DIET RENAL;     MEDS (scheduled):    LORazepam  1 mg Intravenous See Admin Instructions    sodium chloride flush  5-40 mL Intravenous 2 times per day    heparin (porcine)  5,000 Units Subcutaneous 3 times per day    emtricitabine-tenofovir alafenamide  1 tablet Oral Daily    dolutegravir sodium  50 mg Oral Nightly    aspirin  81 mg Oral Daily    clopidogrel  75 mg Oral Daily       MEDS (infusions):   sodium chloride 200 mL/hr at 05/26/21 1050    sodium chloride         MEDS (prn):  sodium chloride flush, sodium chloride, melatonin, perflutren lipid microspheres, sodium chloride flush    PHYSICAL EXAM:     Patient Vitals for the past 24 hrs:   BP Temp Temp src Pulse Resp SpO2   05/26/21 0800 (!) 153/98 98.9 °F (37.2 °C) Temporal 102 18 93 %   05/26/21 0545      94 %   05/26/21 0135      95 %   05/26/21 0048      98 %   05/25/21 2203 (!) 158/89 98 °F (36.7 °C) Temporal 110 18 98 %   05/25/21 2130 (!) 171/104 98 °F (36.7 °C) Temporal 110 18 98 %   05/25/21 1701 134/86 98.4 °F (36.9 °C)  79 18 96 %   05/25/21 1615    92 19    05/25/21 1545 (!) 154/91 100 °F (37.8 °C)  76 13 96 %   05/25/21 1500    79 13    @      Intake/Output Summary (Last 24 hours) at 5/26/2021 1130  Last data filed at 5/26/2021 0551  Gross per 24 hour   Intake 1800 ml   Output    Net 1800 ml         Wt Readings from Last 3 Encounters:   05/24/21 160 lb (72.6 kg)   11/14/20 160 lb (72.6 kg)   07/05/20 150 lb (68 kg)       Constitutional:  in no acute distress  HEENT: NC/AT, EOMI, sclera and conjunctiva are clear and anicteric, mucus membranes moist  Neck: Trachea midline, no JVD  Cardiovascular: S1, S2 regular rhythm, no murmur,or rub  Respiratory:  No crackles, no wheeze  Gastrointestinal:  Soft, nontender, nondistended, NABS  Ext: no edema, feet warm  Skin: dry, no rash  Neuro: awake, alert, interactive      DATA:    Recent Labs     05/24/21 2325 05/25/21 0910 05/26/21  0651   WBC 13.9* 10.4 9.3   HGB 18.2* 16.4 14.3   HCT 54.6* 49.1 41.3   .0* 102.5* 98.3    212 206     Recent Labs     05/24/21 2325 05/24/21 2325 05/25/21 0910 05/25/21 2211 05/26/21  0651     --  137 137 138   K 5.1*   < > 5.5* 5.3* 4.7  4.7   CL 99  --  102 99 96*   CO2 22  --  18* 17* 21*   MG  --   --   --  2.4 2.1   PHOS  --   --   --  6.7* 5.6*   BUN 44*  --  54* 65* 74*   CREATININE 5.6*  --  6.6* 7.9* 8.6*   *  --  319*  --  324*   AST 1,108*  --  748*  --  784*   BILITOT 0.4  --  0.4  --  0.4   ALKPHOS 79  --  55  --  52    < > = values in this interval not displayed. Lab Results   Component Value Date    LABPROT 4.2 (H) 05/25/2021    LABPROT 4.2 05/25/2021       ASSESSMENT / RECOMMENDATIONS:    1. CHANEL with history of metabolic acidosis and current oliguria / anuria   Probable ATN from presumptive sepsis and toxic substances. We will discontinue sodium bicarb, continue with normal saline at 200 cc an hour IV infusion for the next 2 L, then will go down to 100 cc an hour. 2. Anemia: Due to sepsis and CHANEL. 3.  Rhabdomyolysis on aggressive fluid hydration with frequent lab reviews. With BMP and CPK  4. Presumed sepsis with turbid urine noted in his Orellana catheter. C&S pending   See orders  5. History of HIV and hepatitis C, following with ID.  6.  Altered mental status still current, neurology is following.   We will follow labs both

## 2021-05-26 NOTE — PROGRESS NOTES
Department of Internal Medicine  Infectious Diseases  Progress Note    C/C :  HIV infection, change in mental status     Pt is more awake and alert  Reports neck pain   Denies fever     Discussed with the family       Current Facility-Administered Medications   Medication Dose Route Frequency Provider Last Rate Last Admin    0.9 % sodium chloride infusion   Intravenous Continuous Yolie Betancur  mL/hr at 05/26/21 1050 New Bag at 05/26/21 1050    LORazepam (ATIVAN) injection 1 mg  1 mg Intravenous See Admin Instructions Nancy Mccabe APRN - CNP        clopidogrel (PLAVIX) tablet 75 mg  75 mg Oral Daily Sarah Tolentino.  Donavoner, APN        sodium chloride flush 0.9 % injection 5-40 mL  5-40 mL Intravenous 2 times per day Bradley Reddy MD   10 mL at 05/26/21 0956    sodium chloride flush 0.9 % injection 5-40 mL  5-40 mL Intravenous PRN Bradley Reddy MD        0.9 % sodium chloride infusion  25 mL Intravenous PRN Bradley Reddy MD        heparin (porcine) injection 5,000 Units  5,000 Units Subcutaneous 3 times per day Bradley Reddy MD   5,000 Units at 05/26/21 0540    melatonin tablet 3 mg  3 mg Oral Nightly PRN Yadira Wheeler DO        emtricitabine-tenofovir alafenamide (DESCOVY) 200-25 MG per tablet 1 tablet  1 tablet Oral Daily Jazzy Hernandez MD   1 tablet at 05/26/21 0956    dolutegravir sodium (TIVICAY) tablet 50 mg  50 mg Oral Nightly Oli Garcia MD        aspirin chewable tablet 81 mg  81 mg Oral Daily Ephriam Dago, DO   81 mg at 05/26/21 2240    perflutren lipid microspheres (DEFINITY) injection 1.65 mg  1.5 mL Intravenous ONCE PRN Ephriam Dago, DO        sodium chloride flush 0.9 % injection 10 mL  10 mL Intravenous PRN Zbigniew Cheatham DO             REVIEW OF SYSTEMS:    CONSTITUTIONAL:  Denies fever   HEENT: Neck pain   RESPIRATORY: denies cough, shortness of breath, sputum expectoration  CARDIOVASCULAR:  Denies palpitation  GASTROINTESTINAL:  Denies abdomen pain, diarrhea or constipation. GENITOURINARY:  Denies burning urination or frequency of urination  INTEGUMENT: denies wound , rash  HEMATOLOGIC/LYMPHATIC:  Denies lymph node swelling, gum bleeding or easy bruising. MUSCULOSKELETAL:  Denies leg pain , joint pain , joint swelling  NEUROLOGICAL:  Change in mental status     PHYSICAL EXAM:      Vitals:     BP (!) 142/82   Pulse 85   Temp 98.1 °F (36.7 °C) (Temporal)   Resp 16   Ht 5' 8\" (1.727 m)   Wt 160 lb (72.6 kg)   SpO2 96%   BMI 24.33 kg/m²     General Appearance:     More awake and alert  . Head:    Normocephalic, atraumatic   Eyes:    No pallor, no icterus,   Ears:    No obvious deformity or drainage.    Nose:   No nasal drainage   Throat:   Mucosa moist, no oral thrush   Neck:   Supple, mild swelling , erythema and tenderness in the left neck    Lungs:     Clear to auscultation bilaterally, no wheeze    Heart:    Regular rate and rhythm, no murmur   Abdomen:     Soft, non-tender, bowel sounds present    Extremities:   No edema, no cyanosis ,no open wound   Pulses:   Dorsalis pedis palpable    Skin:   no rashes or lesions     CBC with Differential:      Lab Results   Component Value Date    WBC 9.3 05/26/2021    RBC 4.20 05/26/2021    HGB 14.3 05/26/2021    HCT 41.3 05/26/2021     05/26/2021    MCV 98.3 05/26/2021    MCH 34.0 05/26/2021    MCHC 34.6 05/26/2021    RDW 13.2 05/26/2021    SEGSPCT 47 01/06/2014    LYMPHOPCT 1.8 05/26/2021    MONOPCT 3.6 05/26/2021    BASOPCT 0.3 05/26/2021    MONOSABS 0.37 05/26/2021    LYMPHSABS 0.19 05/26/2021    EOSABS 0.17 05/26/2021    BASOSABS 0.00 05/26/2021       CMP     Lab Results   Component Value Date     05/26/2021    K 4.7 05/26/2021    K 4.7 05/26/2021    CL 96 05/26/2021    CO2 21 05/26/2021    BUN 74 05/26/2021    CREATININE 8.6 05/26/2021    GFRAA 8 05/26/2021    LABGLOM 8 05/26/2021    GLUCOSE 117 05/26/2021    GLUCOSE 83 01/26/2012    PROT 6.5 05/26/2021    LABALBU 2.9 05/26/2021 Detected    Candida glabrata by PCR Not Detected    Candida krusei by PCR Not Detected    Candida parapsilosis by PCR Not Detected    Candida tropicalis by PCR Not Detected     Enterobacteriaceae by PCR Not Detected    Enterococcus by PCR Not Detected    Haemophilus Influenzae by PCR Not Detected    Listeria monocytogenes by PCR Not Detected    Neisseria meningitidis by PCR Not Detected    Pseudomonas aeruginosa by PCR Not Detected    Staphylococcus species by PCR DETECTEDPanic      Streptococcus species by PCR Not Detected    Methicillin Resistance mecA/C  by PCR Not Detected   Narrative:           SARS CoV 2 NAAT neg       Radiology :    CT chest -       Ill-defined soft tissue thickening and fat stranding involving the visualized   base of the left neck, supraclavicular region and throughout the left lateral   chest wall.  This is nonspecific.  Infectious/inflammatory process such as   cellulitis is a consideration.  Ill-defined hematoma is also a consideration. Recommend correlation with clinical presentation.       Emphysematous changes.       Areas of atelectasis and patchy airspace opacities most evident in the lower   lung zones.  Developing infiltrates from pneumonia not excluded.  Continued   follow-up recommended.       CT abdomen and pelvis:       Exam limited by patient motion.       Urinary bladder thickening may be related to underdistention.  Cystitis not   excluded.       Other chronic appearing findings.  Short-term follow-up if symptoms persist.     Vanco random level 21      IMPRESSION:    1. HIV infection - controlled as of June 2020    2. Chronic Hep C infection   3. Rhabdomyolysis, Myositis r/o abscess neck , CHANEL , elevated procalcitonin   4. Encephalopathy, Cocaine use   5. CONS bacteremia - contamination       RECOMMENDATIONS:      1. Vancomycin random level  2. HIV viral load, CD4, Hep C viral load   3. MRI of Neck to r/o abscess   4. Follow Cr /CPK   5.  Resume descovy and Tivicay

## 2021-05-26 NOTE — CONSULTS
down, though it was only about 4 inches below the level of the bed rail. He was placed back in bed. BUN and creatinine on presentation last night were 44 and 5.6, respectively, which compares with a BUN/creatinine of 9 and 1.4, respectively on 11/14/2020. BUN/creatinine worsened as of this morning to 54 and 6.6, respectively. Serum potassium of 5.1 on presentation has worsened to 5.5 mmol/L as of this morning. The acidosis with which he presented has also worsened. Total CK on presentation was \">22,000,\" as was the repeat total CK this morning. Toxicology screen was positive for cocaine and fentanyl. He was given 2 separate 500 cc normal saline boluses in the ER, and an IV normal saline drip at 150 cc/h was ordered, though apparently did not run for very long and was not running upon his arrival to the floor, and he would not allow his nurse to start it. Past Medical History:   Diagnosis Date    Abscess of hand, left 2009    Abscess of scrotum 2007    Bacterial meningitis 2010    Depression     GERD (gastroesophageal reflux disease)     Hepatitis C     Herpes zoster w/ nervous system complication 1009    neuropathy    Human immunodeficiency virus, type 2 (HIV 2) ([address] 2009    Inguinal hernia unilateral     left    Pneumonia 2010    Shingles (herpes zoster) polyneuropathy     Suicidal ideation 2008    Vitamin D deficiency        Past Surgical History:   Procedure Laterality Date    ABSCESS DRAINAGE  9/18/2007    scrotal. [facility]. Dr. Miller Burkitt  2008    testicle    INGUINAL HERNIA REPAIR  3/9/2012    left indirect hernia repaired with mesh, Dr. Landry [facility], [address] [place] [address] OTHER SURGICAL HISTORY  3/9/2012    Left Inguinal Hernia Repair;Removal of Foreign Object Left foot       Family History   Problem Relation Age of Onset    Cancer Father [de-identified]        bone marrow    Heart Disease Brother 48        heart attack        reports that he has been smoking cigarettes.  He has a 10.00 pack-year smoking history. He has never used smokeless tobacco. He reports previous alcohol use. He reports that he does not use drugs. Allergies:  Patient has no known allergies. Current Medications:    0.9 % sodium chloride infusion, Continuous  sodium chloride flush 0.9 % injection 5-40 mL, 2 times per day  sodium chloride flush 0.9 % injection 5-40 mL, PRN  0.9 % sodium chloride infusion, PRN  heparin (porcine) injection 5,000 Units, 3 times per day  Post Zosyn Saline Infusion, Q12H  melatonin tablet 3 mg, Nightly PRN  emtricitabine-tenofovir alafenamide (DESCOVY) 200-25 MG per tablet 1 tablet, Daily  dolutegravir sodium (TIVICAY) tablet 50 mg, Nightly  [START ON 5/26/2021] aspirin chewable tablet 81 mg, Daily  clopidogrel (PLAVIX) tablet 75 mg, Daily  perflutren lipid microspheres (DEFINITY) injection 1.65 mg, ONCE PRN  sodium chloride flush 0.9 % injection 10 mL, PRN        Review of Systems:   Review of systems not obtained due to patient factors. --Combination of confusion and lack of cooperation, refusing to answer most questions, though unable to ascertain at this time whether that is voluntary or not    Physical exam:   /86   Pulse 79   Temp 98.4 °F (36.9 °C)   Resp 18   Ht 5' 8\" (1.727 m)   Wt 160 lb (72.6 kg)   SpO2 96%   BMI 24.33 kg/m²   Age-appropriate thin -American gentleman no apparent distress  Ecchymoses on the left side of his head superior to his ear  EOMI, sclera and conjunctiva are hyperemic, and icteric.     Mucous membranes are moist  Neck soft supple trachea midline; would not cooperate for carotid or examination to evaluate JVD  Limited due to lack of cooperation, though bases are clear bilaterally  Regular rhythm, S1 and S2 could not appreciate murmur though, again, difficult without his cooperation  Abdomen soft nontender nondistended  Ecchymoses on his left arm buttocks lateral upper lower extremity, tender to palpation throughout most of his left side, mild edema at these sites, erythema and calor, as well  Erythema on his chest without edema, also tender, mild calor  No swelling distal lower extremities  Moves all 4 extremities.   Would not cooperate with more detailed neurologic examination but he is moving his left upper extremity and hands, which apparently he was not doing to the same degree on his presentation to the floor per his bedside RN  Pulses present radial and dorsalis pedis  Limited cranial nerve examination grossly nonfocal  Awake does respond to some questions, oriented to person and place now though not time or clinical scenario though this seems to be improving per bedside RN    Data:   Labs:  CBC with Differential:    Lab Results   Component Value Date    WBC 10.4 05/25/2021    RBC 4.79 05/25/2021    HGB 16.4 05/25/2021    HCT 49.1 05/25/2021     05/25/2021    .5 05/25/2021    MCH 34.2 05/25/2021    MCHC 33.4 05/25/2021    RDW 13.2 05/25/2021    SEGSPCT 47 01/06/2014    LYMPHOPCT 6.7 05/24/2021    MONOPCT 7.3 05/24/2021    BASOPCT 0.1 05/24/2021    MONOSABS 1.02 05/24/2021    LYMPHSABS 0.93 05/24/2021    EOSABS 0.00 05/24/2021    BASOSABS 0.02 05/24/2021     CMP:    Lab Results   Component Value Date     05/25/2021    K 5.5 05/25/2021    K 5.1 05/24/2021     05/25/2021    CO2 18 05/25/2021    BUN 54 05/25/2021    CREATININE 6.6 05/25/2021    GFRAA 10 05/25/2021    LABGLOM 10 05/25/2021    GLUCOSE 118 05/25/2021    GLUCOSE 83 01/26/2012    PROT 7.3 05/25/2021    LABALBU 2.9 05/25/2021    LABALBU 4.2 01/17/2012    CALCIUM 7.6 05/25/2021    BILITOT 0.4 05/25/2021    ALKPHOS 55 05/25/2021     05/25/2021     05/25/2021     Ionized Calcium:  No results found for: IONCA  Magnesium:    Lab Results   Component Value Date    MG 1.9 08/10/2019     Phosphorus:    Lab Results   Component Value Date    PHOS 2.1 08/10/2019     U/A:    Lab Results   Component Value Date    COLORU DARK YELLOW 05/25/2021    PHUR 5.0 05/25/2021    LABCAST throughout the left lateral   chest wall. This is nonspecific. Infectious/inflammatory process such as   cellulitis is a consideration. Ill-defined hematoma is also a consideration. Recommend correlation with clinical presentation. Emphysematous changes. Areas of atelectasis and patchy airspace opacities most evident in the lower   lung zones. Developing infiltrates from pneumonia not excluded. Continued   follow-up recommended. CT abdomen and pelvis:      Exam limited by patient motion. Urinary bladder thickening may be related to underdistention. Cystitis not   excluded. Other chronic appearing findings. Short-term follow-up if symptoms persist.         XR CHEST PORTABLE   Final Result   Patchy bilateral parenchymal opacities may represent multifocal pneumonia or   mild edema. Continued follow-up recommended. CTA HEAD W CONTRAST   Final Result   1. Mild atherosclerotic disease . 2. Estimated stenosis of the proximal right and left internal carotid   artery by NASCET criteria is not hemodynamically significant         This study was analyzed by the [address] ai algorithm. CTA NECK W CONTRAST   Final Result   1. Mild atherosclerotic disease . 2. Estimated stenosis of the proximal right and left internal carotid   artery by NASCET criteria is not hemodynamically significant         This study was analyzed by the [address] ai algorithm. CT BRAIN PERFUSION   Final Result      No significant ischemic penumbra identified      This study was analyzed by the [employer] algorithm. CT HEAD WO CONTRAST   Final Result   No acute intracranial abnormality. Mild age-related loss of brain volume and   chronic periventricular ischemic changes. Findings reported to Dr. Shi Mon at 12:12 a.m. MRI BRAIN WO CONTRAST    (Results Pending)       Assessment  1.  Acute kidney injury, likely multifactorial, due to rhabdomyolysis, likely the hemodynamic impact of cocaine intoxication, volume contraction, possible exacerbated by contrast-induced nephropathy from CTA of the neck and head. Urine studies have been ordered, though he has refused to provide a sample. Repeat laboratories have also been ordered, though he has refused to allow phlebotomy. CT scan did not reveal hydronephrosis, or apparent renal trauma. Renal ultrasound is notable for increased renal echogenicity suggestive of chronic medical renal disease    2. Chronic kidney disease, presumptive, in part based on the increased echogenicity seen on ultrasound. Last known creatinine was 1.4 mg/dL 11/2020. As a suspect poor adherence to his HIV regimen, we might consider HIV associated nephropathy, though at this point that would be purely speculative    3. Rhabdomyolysis at the least he has had a fall and prolonged recumbency on a hard surface, and he also had a seizure given his cocaine and fentanyl intoxication    4. Cocaine and fentanyl intoxication    5. Hyperkalemia secondary to acute kidney injury, decreased effective circulating volume due to volume contraction, rhabdomyolysis      Recommendations  1. laboratory studies this evening: BMP, magnesium, phosphorus CBC, total CK with total quantification   2. Check urine sodium, chloride, creatinine  3. Check random urine protein:creatinine ratio  4. Check urine eosinophils  5. IV fluid: We will start on isotonic bicarbonate drip at 200 cc/h, transition to normal saline tomorrow  6. Await results of MRI   7. If unable to obtain urine specimen, place Orellana catheter  8. Follow labs, UO  9. Continue supportive care  10. If repeat potassium is elevated, plan to treat accordingly    I discussed the importance of allowing us to obtain laboratory studies and urine samples ordered. He agreed to allow his care team to do so. Thank you for the opportunity to participate in the care of your pleasant patient.   We look forward to following along with you.        Electronically signed by Jack Montalvo MD on 5/25/2021

## 2021-05-26 NOTE — PROGRESS NOTES
Pt is agitated  and confused. He still tries to pull at line and tries to get out of bed. 4 point bilateral restraints are still indicated at this time.

## 2021-05-26 NOTE — PROCEDURES
5/26 1pm: Mri remains on hold d/t patient's previous refusal. Per Dr Munoz Rang- waiting to see if patient is going to Hospice or not, if not, they he would like the MRI re-attempted with Navjot peoples, Terry SARKAR notified and will let us know what the decision is.

## 2021-05-26 NOTE — PROGRESS NOTES
Patient found to be laying on back on the floor next to bed with both siderails on that side still up. Patient complains of pain in head and arm .  VS obtained and patient 94% on 5L /76

## 2021-05-27 NOTE — PROGRESS NOTES
MRI called due to patient being unable to complete MRI. Techs sending him back up and will attempt again in the morning. Dr. Reynaldo Dawkins notified about the need for morning medications. Will continue to monitor patient.

## 2021-05-27 NOTE — PROGRESS NOTES
Patient continues to pull at lines and attempt to get out of bed when restraints removed for assessment. 4 point bilateral soft restraints continued for patient safety.  Will continue to monitor

## 2021-05-27 NOTE — PROGRESS NOTES
Department of Internal Medicine  Infectious Diseases  Progress Note    C/C :  HIV infection, change in mental status     Pt is more awake and alert  Reports neck pain   Denies fever     Discussed with the family       Current Facility-Administered Medications   Medication Dose Route Frequency Provider Last Rate Last Admin    0.9 % sodium chloride infusion   Intravenous Continuous Yolie Mandujano  mL/hr at 21 New Bag at 21    clopidogrel (PLAVIX) tablet 75 mg  75 mg Oral Daily TeresaPOLI Santizo        sodium chloride flush 0.9 % injection 5-40 mL  5-40 mL Intravenous 2 times per day Kiesha Wilson MD   5 mL at 21    sodium chloride flush 0.9 % injection 5-40 mL  5-40 mL Intravenous PRN Kiesha Wilson MD        0.9 % sodium chloride infusion  25 mL Intravenous PRN Kiesha Wilson MD        heparin (porcine) injection 5,000 Units  5,000 Units Subcutaneous 3 times per day Kiesha Wilson MD   5,000 Units at 21 0521    melatonin tablet 3 mg  3 mg Oral Nightly PRN Frantz Iniguez DO        emtricitabine-tenofovir alafenamide (DESCOVY) 200-25 MG per tablet 1 tablet  1 tablet Oral Daily Piero Grady MD   1 tablet at 21 0956    dolutegravir sodium (TIVICAY) tablet 50 mg  50 mg Oral Nightly Oli Garcia MD        aspirin chewable tablet 81 mg  81 mg Oral Daily Jamal ReDO aster   81 mg at 21 4505    perflutren lipid microspheres (DEFINITY) injection 1.65 mg  1.5 mL Intravenous ONCE PRN Jamal Egan DO        sodium chloride flush 0.9 % injection 10 mL  10 mL Intravenous PRN Zbigniew Cheatham DO             REVIEW OF SYSTEMS:    CONSTITUTIONAL:  Denies fever   HEENT: Neck pain   RESPIRATORY: denies cough, shortness of breath, sputum expectoration  CARDIOVASCULAR:  Denies palpitation  GASTROINTESTINAL:  Denies abdomen pain, diarrhea or constipation.   GENITOURINARY:  Denies burning urination or frequency of urination  INTEGUMENT: denies wound , rash  HEMATOLOGIC/LYMPHATIC:  Denies lymph node swelling, gum bleeding or easy bruising. MUSCULOSKELETAL:  Denies leg pain , joint pain , joint swelling  NEUROLOGICAL:  Change in mental status     PHYSICAL EXAM:      Vitals:     BP (!) 154/141   Pulse 88   Temp 97.5 °F (36.4 °C) (Temporal)   Resp 15   Ht 5' 8\" (1.727 m)   Wt 166 lb (75.3 kg)   SpO2 97%   BMI 25.24 kg/m²     General Appearance:     More awake  . Head:    Normocephalic, atraumatic   Eyes:    No pallor, no icterus,   Ears:    No obvious deformity or drainage.    Nose:   No nasal drainage   Throat:   Mucosa moist, no oral thrush   Neck:   Supple, mild swelling , erythema and tenderness in the left neck    Lungs:     Scattered rhonchi     Heart:    Regular rate and rhythm, no murmur   Abdomen:     Soft, non-tender, bowel sounds present    Extremities:   Left upper arm erythema, edema    Pulses:   Dorsalis pedis palpable    Skin:   no rashes or lesions     CBC with Differential:      Lab Results   Component Value Date    WBC 7.4 05/27/2021    RBC 3.98 05/27/2021    HGB 13.5 05/27/2021    HCT 40.1 05/27/2021     05/27/2021    .8 05/27/2021    MCH 33.9 05/27/2021    MCHC 33.7 05/27/2021    RDW 13.0 05/27/2021    SEGSPCT 47 01/06/2014    LYMPHOPCT 5.2 05/27/2021    MONOPCT 9.3 05/27/2021    BASOPCT 0.5 05/27/2021    MONOSABS 0.68 05/27/2021    LYMPHSABS 0.38 05/27/2021    EOSABS 0.26 05/27/2021    BASOSABS 0.04 05/27/2021       CMP     Lab Results   Component Value Date     05/27/2021    K 5.1 05/27/2021    CL 94 05/27/2021    CO2 23 05/27/2021    BUN 78 05/27/2021    CREATININE 9.9 05/27/2021    GFRAA 6 05/27/2021    LABGLOM 6 05/27/2021    GLUCOSE 71 05/27/2021    GLUCOSE 83 01/26/2012    PROT 6.4 05/27/2021    LABALBU 2.7 05/27/2021    LABALBU 4.2 01/17/2012    CALCIUM 7.3 05/27/2021    BILITOT 0.4 05/27/2021    ALKPHOS 51 05/27/2021     05/27/2021     05/27/2021         Hepatic Function Panel: Lab Results   Component Value Date    ALKPHOS 51 05/27/2021     05/27/2021     05/27/2021    PROT 6.4 05/27/2021    BILITOT 0.4 05/27/2021    BILIDIR 0.3 03/13/2015    IBILI 1.3 03/13/2015    LABALBU 2.7 05/27/2021    LABALBU 4.2 01/17/2012       PT/INR:    Lab Results   Component Value Date    PROTIME 14.9 05/26/2021    INR 1.4 05/26/2021       TSH:    Lab Results   Component Value Date    TSH 3.720 03/08/2017       U/A:    Lab Results   Component Value Date    COLORU DARK YELLOW 05/25/2021    PHUR 5.0 05/25/2021    LABCAST FEW  11/02/2011    WBCUA NONE 05/25/2021    WBCUA NONE 01/26/2012    RBCUA 5-10 05/25/2021    RBCUA NONE 01/26/2012    BACTERIA FEW 05/25/2021    CLARITYU Clear 05/25/2021    SPECGRAV 1.025 05/25/2021    LEUKOCYTESUR Negative 05/25/2021    UROBILINOGEN 0.2 05/25/2021    BILIRUBINUR SMALL 05/25/2021    BILIRUBINUR NEGATIVE 01/26/2012    BLOODU LARGE 05/25/2021    GLUCOSEU Negative 05/25/2021    GLUCOSEU NEGATIVE 01/26/2012    AMORPHOUS MODERATE 05/25/2021       ABG:  No results found for: Nobie Stack, I1SSNBOB, PHART, THGBART, GIL1GSS, PO2ART, LCM2JQD    MICROBIOLOGY:    Blood culture -     Bottle Type Anaerobic    Source of Blood Culture Antecubital-Lef    Order Number [id number]    Enterobacter cloacae complex by PCR Not Detected    Escherichia coli by PCR Not Detected    Klebsiella oxytoca by PCR Not Detected    Klebsiella pneumoniae group by PCR Not Detected    Proteus species by PCR Not Detected    Streptococcus agalactiae by PCR Not Detected    Staphylococcus aureus by PCR Not Detected    Serratia marcescens by PCR Not Detected    Streptococcus pneumoniae by PCR Not Detected    Streptococcus pyogenes  by PCR Not Detected    Acinetobacter baumannii by PCR Not Detected    Candida albicans by PCR Not Detected    Candida glabrata by PCR Not Detected    Candida krusei by PCR Not Detected    Candida parapsilosis by PCR Not Detected    Candida tropicalis by PCR Not Detected

## 2021-05-27 NOTE — PLAN OF CARE
Problem: Non-Violent Restraints  Goal: Removal from restraints as soon as assessed to be safe  5/27/2021 0058 by Gabino Abrams RN  Outcome: Not Met This Shift     Problem: Non-Violent Restraints  Goal: No harm/injury to patient while restraints in use  5/27/2021 0058 by Gabino Abrams RN  Outcome: Met This Shift     Problem: Non-Violent Restraints  Goal: Patient's dignity will be maintained  5/27/2021 0058 by Gabino Abrams RN  Outcome: Met This Shift     Problem: Skin Integrity:  Goal: Will show no infection signs and symptoms  Description: Will show no infection signs and symptoms  5/27/2021 0058 by Gabino Abrams RN  Outcome: Met This Shift     Problem: Skin Integrity:  Goal: Absence of new skin breakdown  Description: Absence of new skin breakdown  5/27/2021 0058 by Gabino Abrams RN  Outcome: Met This Shift     Problem: Falls - Risk of:  Goal: Will remain free from falls  Description: Will remain free from falls  Outcome: Met This Shift     Problem: Falls - Risk of:  Goal: Absence of physical injury  Description: Absence of physical injury  Outcome: Met This Shift

## 2021-05-27 NOTE — PROGRESS NOTES
Patient continues to attempt to throw legs over bed rails and screams to be let out of the restraints so that he can get out of here. At this time, restraints are required to keep patient safe. Will continue to monitor and assess patients abilities to be removed from restraints.

## 2021-05-27 NOTE — PROGRESS NOTES
[facility] MD Roxy Schuler, MD Mariama Workman, MD Annie Saunders, MD Genia Reynolds, CNP   Jana Cha, ANP  Progress Note    5/27/2021    SUBJECTIVE:   (-) c/o's  (-) sob/elkins/cp/palp , increased alertness since yesterday. Currently on soft arm restraints. Patient still confused, yelling with no apparent shortness of breath. Persistent altered mental status due to multiple medical problems and iatrogenic consumption history of cocaine/fentanyl. PROBLEM LIST:    Active Problems:    AMS (altered mental status)    Left-sided weakness    Goals of care, counseling/discussion    Palliative care by specialist  Resolved Problems:    * No resolved hospital problems.  *         DIET:    DIET RENAL;     MEDS (scheduled):    clopidogrel  75 mg Oral Daily    sodium chloride flush  5-40 mL Intravenous 2 times per day    heparin (porcine)  5,000 Units Subcutaneous 3 times per day    emtricitabine-tenofovir alafenamide  1 tablet Oral Daily    dolutegravir sodium  50 mg Oral Nightly    aspirin  81 mg Oral Daily       MEDS (infusions):   sodium chloride 100 mL/hr at 05/26/21 2116    sodium chloride         MEDS (prn):  sodium chloride flush, sodium chloride, melatonin, perflutren lipid microspheres, sodium chloride flush    PHYSICAL EXAM:     Patient Vitals for the past 24 hrs:   BP Temp Temp src Pulse Resp SpO2 Weight   05/27/21 0800 (!) 148/98 97.7 °F (36.5 °C) Temporal 73 16 96 %    05/27/21 0405       166 lb (75.3 kg)   05/27/21 0400 (!) 154/141 97.5 °F (36.4 °C) Temporal 88 15 97 %    05/27/21 0015 (!) 164/88         05/27/21 0000 (!) 185/98 98.6 °F (37 °C) Temporal 95 22 94 %    05/26/21 2000 (!) 164/84 97.6 °F (36.4 °C) Temporal 93 20 97 %    05/26/21 1545 (!) 144/98 98 °F (36.7 °C) Temporal 94 16 98 %    @      Intake/Output Summary (Last 24 hours) at 5/27/2021 1411  Last data filed at 5/27/2021 0521  Gross per 24 hour   Intake 4709 ml   Output 200 ml   Net 4509 ml         Wt Readings from Last 3 Encounters:   05/27/21 166 lb (75.3 kg)   11/14/20 160 lb (72.6 kg)   07/05/20 150 lb (68 kg)       Constitutional:  in no acute distress  HEENT: NC/AT, EOMI, sclera and conjunctiva are clear and anicteric, mucus membranes moist  Neck: Trachea midline, no JVD  Cardiovascular: S1, S2 regular rhythm, no murmur,or rub  Respiratory:  No crackles, no wheeze  Gastrointestinal:  Soft, nontender, nondistended, NABS  Ext: no edema, feet warm  Skin: dry, no rash  Neuro: awake, alert, interactive      DATA:    Recent Labs     05/26/21  0651 05/26/21  1414 05/27/21  0143   WBC 9.3 8.5 7.4   HGB 14.3 14.4 13.5   HCT 41.3 42.2 40.1   MCV 98.3 99.8 100.8*    193 188     Recent Labs     05/24/21  2325 05/25/21  2211 05/26/21  0651 05/26/21  1414 05/27/21  0143   NA   < > 137 138 135 135   K   < > 5.3* 4.7  4.7 4.8 5.1*   CL   < > 99 96* 94* 94*   CO2   < > 17* 21* 23 23   MG  --  2.4 2.1  --   --    PHOS  --  6.7* 5.6*  --   --    BUN   < > 65* 74* 77* 78*   CREATININE   < > 7.9* 8.6* 8.9* 9.9*   ALT   < >  --  324* 322* 294*   AST   < >  --  784* 831* 696*   BILITOT   < >  --  0.4 0.5 0.4   ALKPHOS   < >  --  52 75 51    < > = values in this interval not displayed. Lab Results   Component Value Date    LABPROT 4.2 (H) 05/25/2021    LABPROT 4.2 05/25/2021       ASSESSMENT / RECOMMENDATIONS:    1. CHANEL with history of metabolic acidosis and current oliguria / anuria   Probable ATN from presumptive sepsis and toxic substances. We will discontinue sodium bicarb, continue with normal saline at 200 cc an hour IV infusion , based on worsening BUN/creatinine values and persistent oligoanuria. 2. Anemia: Due to sepsis and CHANEL. 3.  Rhabdomyolysis on aggressive fluid hydration with frequent lab reviews. Probably toxic induced   With BMP and CPK  4. Presumed sepsis with turbid urine noted in his Orellana catheter. C&S pending   See orders  5.   History of HIV and hepatitis C, following with ID.  6.  Altered mental status still current, neurology is following. 7.  Abnormalities liver function tests and transaminitis  8. Bacteremia with staph species, followed by ID. 9.  Polysubstance abuse, with metabolic encephalopathy. No need for dialysis today, but will be having difficult discussion with the family patient overall his medical condition and poor substance abuse if his kidney labs keeps deteriorating.       Electronically signed by Werner Reaves MD on 5/27/2021 at 2:11 PM

## 2021-05-27 NOTE — PROGRESS NOTES
alafenamide (DESCOVY) 200-25 MG per tablet 1 tablet  1 tablet Oral Daily Gary Carlson MD   1 tablet at 05/26/21 0956    dolutegravir sodium (TIVICAY) tablet 50 mg  50 mg Oral Nightly Oli Garcia MD        aspirin chewable tablet 81 mg  81 mg Oral Daily Tal Elizabeth, DO   81 mg at 05/26/21 4492    perflutren lipid microspheres (DEFINITY) injection 1.65 mg  1.5 mL Intravenous ONCE PRN Tal Elizabeth DO        sodium chloride flush 0.9 % injection 10 mL  10 mL Intravenous PRN Zbigniew Cheatham DO         Objective:     BP (!) 148/98   Pulse 73   Temp 97.7 °F (36.5 °C) (Temporal)   Resp 16   Ht 5' 8\" (1.727 m)   Wt 166 lb (75.3 kg)   SpO2 96%   BMI 25.24 kg/m²     General appearance: Alert, appears stated age, in no acute distress  Head: normocephalic atraumatic--small abrasion bridge of nose  Eyes: Sclera clear  Neck: full ROM  Lungs: clear to auscultation bilaterally  Heart: Regular rate and rhythmNSR  Extremities:4 point soft wrist restraints, thin  Pulses: 2+ and symmetric  Skin: onychomycosis to toes      Mental Status: Alert, oriented to person, knows he is in the hospital, states it is April or May, knows his age.     Good attention and concentration  Intermittent confusion    Speech: Dysarthric  Language: no obvious aphasias    Cranial Nerves:  I: smell NA   II: visual acuity  NA   II: visual fields  full   II: pupils  PERRLA   III,VII: ptosis  none   III,IV,VI: extraocular muscles   full range of motion without nystagmus   V: mastication  normal   V: facial light touch sensation   decreased left   V,VII: corneal reflex     VII: facial muscle function - upper  Normal   VII: facial muscle function - lower  left mouth droop that improves with smiling   VIII: hearing Normal   IX: soft palate elevation   normal   IX,X: gag reflex    XI: trapezius strength   5/5 right; 4/5 left   XI: sternocleidomastoid strength  5/5   XI: neck extension strength  5/5   XII: tongue strength  Normal Motor: Left hemiparesis3/5 left arm; 4/5 left leg  5/5 right side  Decreased bulk; increased tone in legs    Sensory:  LT decreased left arm and leg    Coordination:  FFM slower on left relative to weakness    DTR:   2+ throughout  No Babinskis  No Young's    No pathological reflexes    Laboratory/Radiology:     CBC with Differential:    Lab Results   Component Value Date    WBC 7.4 05/27/2021    RBC 3.98 05/27/2021    HGB 13.5 05/27/2021    HCT 40.1 05/27/2021     05/27/2021    .8 05/27/2021    MCH 33.9 05/27/2021    MCHC 33.7 05/27/2021    RDW 13.0 05/27/2021    SEGSPCT 47 01/06/2014    LYMPHOPCT 5.2 05/27/2021    MONOPCT 9.3 05/27/2021    BASOPCT 0.5 05/27/2021    MONOSABS 0.68 05/27/2021    LYMPHSABS 0.38 05/27/2021    EOSABS 0.26 05/27/2021    BASOSABS 0.04 05/27/2021     CMP:    Lab Results   Component Value Date     05/27/2021    K 5.1 05/27/2021    CL 94 05/27/2021    CO2 23 05/27/2021    BUN 78 05/27/2021    CREATININE 9.9 05/27/2021    GFRAA 6 05/27/2021    LABGLOM 6 05/27/2021    GLUCOSE 71 05/27/2021    GLUCOSE 83 01/26/2012    PROT 6.4 05/27/2021    LABALBU 2.7 05/27/2021    LABALBU 4.2 01/17/2012    CALCIUM 7.3 05/27/2021    BILITOT 0.4 05/27/2021    ALKPHOS 51 05/27/2021     05/27/2021     05/27/2021     HgBA1c:    Lab Results   Component Value Date    LABA1C 5.9 05/25/2021     FLP:    Lab Results   Component Value Date    TRIG 279 05/25/2021    HDL 45 05/25/2021    [address] 90 05/25/2021    LABVLDL 56 05/25/2021     CTA head and neck: Mild atherosclerotic disease . 2. Estimated stenosis of the proximal right and left internal carotid artery by NASCET criteria is not hemodynamically significant     MRI brain pending    Echo pending interpretation    All labs and images personally reviewed today    Assessment:     L sided weakness in a pt with HIV: in the setting of severe rhabdo and ARF. Concern for acute stroke vs vasculitis.   Cannot entirely rule out seizure with Scottie's paralysis. PML should also remain on the differential, though unlikely. He does have left hemiparesis and dysarthria today, but is more cooperative today.      Acute metabolic encephalopathy: calmer and more cooperative today    ARF    Neck abscess    HIV    Rhabdo    Hep C    Hx bacterial meningitis     Substance abuse    Plan:     Follow up MRI brain (no ROBERT d/t renal fx)--pre med ordered    F/u echo report    Continue DAPT; statin on hold due to elevated LFTs    ID, nephro, palliative following    Could consider EEG pending MRI results    PCDs    PT/OT/ST ordered    Discussed with patient's brother    Will follow    CRISTHIAN Rosales CNP  9:00 AM  5/27/2021

## 2021-05-28 NOTE — CONSULTS
Vascular Surgery Consultation Note    Reason for Consult: Temporary dialysis catheter placement    HPI :    This is a 56 y.o. male the past medical history of HIV, hepatitis C, mood disorder, polysubstance abuse who was admitted for altered mental status. He was seen by nephrology. He was found to have an CHANEL with probable ATN from toxic substances. ROS : Negative if blank [], Positive if [x]  General Vascular   [] Fevers [] Claudication (Blocks)   [] Chills [] Rest Pain   [] Weight Loss [] Tissue Loss   [] Chest Pain [] Clotting Disorder    [] SOB at rest [] Leg Swelling   [] SOB with exertion [] DVT/PE      [] Nausea    [] Vomitting [] Stroke/TIA   [] Abdominal Pain [x] Focal weakness   [] Melena [] Slurred Speech   [] Hematochezia [] Vision Changes   [] Hematuria    [] Dysuria [] Hx of Central Catheters   [] Wears Glasses/Contacts  [] Dialysis and If so date initiated   [] Blindness     [x] Right Hand Dominant   [] Difficulty swallowing        Past Medical History:   Diagnosis Date    Abscess of hand, left 2009    Abscess of scrotum 2007    Bacterial meningitis 2010    Depression     GERD (gastroesophageal reflux disease)     Hepatitis C     Herpes zoster w/ nervous system complication 2923    neuropathy    Human immunodeficiency virus, type 2 (HIV 2) ([facility] [address] 2009    Inguinal hernia unilateral     left    Pneumonia 2010    Shingles (herpes zoster) polyneuropathy     Suicidal ideation 2008    Vitamin D deficiency         Past Surgical History:   Procedure Laterality Date    ABSCESS DRAINAGE  9/18/2007    scrotal. [facility].  Dr. Suzanne Damico  2008    testicle    INGUINAL HERNIA REPAIR  3/9/2012    left indirect hernia repaired with mesh, Dr. Matthew Baxter, [address] [place] [address] OTHER SURGICAL HISTORY  3/9/2012    Left Inguinal Hernia Repair;Removal of Foreign Object Left foot     Current Medications:    sodium chloride 100 mL/hr at 05/26/21 2116    sodium chloride        sodium chloride flush, sodium chloride, melatonin, perflutren lipid microspheres, sodium chloride flush    LORazepam  0.5 mg Intravenous Once    sodium bicarbonate  1,300 mg Oral 4x Daily    clopidogrel  75 mg Oral Daily    sodium chloride flush  5-40 mL Intravenous 2 times per day    heparin (porcine)  5,000 Units Subcutaneous 3 times per day    emtricitabine-tenofovir alafenamide  1 tablet Oral Daily    dolutegravir sodium  50 mg Oral Nightly    aspirin  81 mg Oral Daily        Allergies:  Patient has no known allergies. Social History     Socioeconomic History    Marital status:      Spouse name: Not on file    Number of children: Not on file    Years of education: Not on file    Highest education level: Not on file   Occupational History    Not on file   Tobacco Use    Smoking status: Current Every Day Smoker     Packs/day: 0.50     Years: 20.00     Pack years: 10.00     Types: Cigarettes    Smokeless tobacco: Never Used   Vaping Use    Vaping Use: Never used   Substance and Sexual Activity    Alcohol use: Not Currently    Drug use: No    Sexual activity: Not Currently   Other Topics Concern    Not on file   Social History Narrative    Not on file     Social Determinants of Health     Financial Resource Strain:     Difficulty of Paying Living Expenses:    Food Insecurity:     Worried About Running Out of Food in the Last Year:     [address] in the Last Year:    Transportation Needs:     Lack of Transportation (Medical):      Lack of Transportation (Non-Medical):    Physical Activity:     Days of Exercise per Week:     Minutes of Exercise per Session:    Stress:     Feeling of Stress :    Social Connections:     Frequency of Communication with Friends and Family:     Frequency of Social Gatherings with Friends and Family:     Attends Yazidism Services:     Active Member of Clubs or Organizations:     Attends Club or Organization Meetings:     Marital Status:    Intimate Partner Violence:     Fear of

## 2021-05-28 NOTE — PROGRESS NOTES
Patient off the floor for MRI.  Neuro will follow up later as time permits, or see tomorrow    Keke Prieto, CRISTHIAN - CORINNA  1:27 PM

## 2021-05-28 NOTE — PROGRESS NOTES
@      Intake/Output Summary (Last 24 hours) at 5/28/2021 1427  Last data filed at 5/28/2021 1045  Gross per 24 hour   Intake 3818 ml   Output 550 ml   Net 3268 ml         Wt Readings from Last 3 Encounters:   05/28/21 180 lb 4.8 oz (81.8 kg)   11/14/20 160 lb (72.6 kg)   07/05/20 150 lb (68 kg)       Constitutional:  in no acute distress  HEENT: NC/AT, EOMI, sclera and conjunctiva are clear and anicteric, mucus membranes moist  Neck: Trachea midline, no JVD  Cardiovascular: S1, S2 regular rhythm, no murmur,or rub  Respiratory:  No crackles, no wheeze  Gastrointestinal:  Soft, nontender, nondistended, NABS  Ext: no edema, feet warm  Skin: dry, no rash  Neuro: awake, alert, interactive      DATA:    Recent Labs     05/26/21  1414 05/27/21  0143 05/28/21  0108   WBC 8.5 7.4 6.3   HGB 14.4 13.5 12.8   HCT 42.2 40.1 37.7   MCV 99.8 100.8* 99.0    188 177     Recent Labs     05/25/21  2211 05/25/21  2211 05/26/21  0651 05/26/21  1414 05/27/21  0143 05/28/21  0108     --  138 135 135 137   K 5.3*  --  4.7  4.7 4.8 5.1* 4.7   CL 99  --  96* 94* 94* 100   CO2 17*  --  21* 23 23 17*   MG 2.4  --  2.1  --   --   --    PHOS 6.7*  --  5.6*  --   --   --    BUN 65*  --  74* 77* 78* 93*   CREATININE 7.9*  --  8.6* 8.9* 9.9* 10.8*   ALT  --    < > 324* 322* 294* 238*   AST  --    < > 784* 831* 696* 494*   BILITOT  --    < > 0.4 0.5 0.4 0.4   ALKPHOS  --    < > 52 75 51 47    < > = values in this interval not displayed. Lab Results   Component Value Date    LABPROT 4.2 (H) 05/25/2021    LABPROT 4.2 05/25/2021       ASSESSMENT / RECOMMENDATIONS:    1. CHANEL with history of metabolic acidosis and current oliguria / anuria   Probable ATN from presumptive sepsis and toxic substances. persistent oligoanuria, worsening CHANEL with worsening acidosis and stating initiation of hemodialysis.        Case was discussed thoroughly with floor nurses and hemodialysis nurses will need to be getting contact with, family member either brother or son to get consent for placement of acute hemodialysis catheter, ASAP so we can start dialysis as early as tomorrow morning. Aggressive hydration did not help reverse the course of his CHANEL still has worsening his BUN/creatinine also has still evidence of transaminitis by his labs. 2. Anemia: Due to sepsis and CHANEL. 3.  Rhabdomyolysis on aggressive fluid hydration with frequent lab reviews. Probably toxic induced   With BMP and CPK  4. Presumed sepsis with turbid urine noted in his [place] catheter. C&S pending   See orders  5. History of HIV and hepatitis C, following with ID.  6.  Altered mental status still current, neurology is following. 7.  Abnormalities liver function tests and transaminitis  8. Bacteremia with staph species, followed by ID. 9.  Polysubstance abuse, with metabolic encephalopathy. No need for dialysis today, but will be having difficult discussion with the family patient overall his medical condition and poor substance abuse if his kidney labs keeps deteriorating.       Electronically signed by Lambert James MD on 5/28/2021 at 2:27 PM

## 2021-05-28 NOTE — CARE COORDINATION
Spoke with therapy after working with the patient. He is Mod to Max assist for most activities, Dependent for a few. Patient will need rehab prior to returning home. Call placed to the patient's son Flaco Reyna to discuss transition of care planning, for when the patient is medically stable to transition. No answer and detailed VM left for return call. [place]. [place]  [address], Creat 10.8, BIN 93, albumin 2.7. IV fluids continue @ 200 cc/hr. MRI of the brain and MRI of the Neck soft tissues ordered and testing to be completed today. Per neurology would consider EEG pending MRI results. Nephrology following for kidney function. Will continue to follow.       Angelo Bains RN.  [place]  [phone]

## 2021-05-28 NOTE — PROGRESS NOTES
SPEECH/LANGUAGE PATHOLOGY  SPEECH/LANGUAGE/COGNITIVE EVALUATION   and PLAN OF CARE      PATIENT NAME:  Kim Tena  (male)     MRN:  [id number]    :  1960  (62 y.o.)  STATUS:  Inpatient: Room 8507/8507-A    TODAY'S DATE:  2021  REFERRING PROVIDER:  CRISTHIAN Raymond CNP   SPECIFIC PROVIDER ORDER: SLP eval and treat  Date of order:  21  REASON FOR REFERRAL: confusion  EVALUATING THERAPIST: ALIREZA Freeman    ADMITTING DIAGNOSIS: Multiple organ failure with heart failure in  [P29.0]  AMS (altered mental status) [R41.82]    VISIT DIAGNOSIS:   Visit Diagnoses       Codes    Altered mental status, unspecified altered mental status type    -  Primary R41.82    Acute renal failure superimposed on chronic kidney disease, unspecified CKD stage, unspecified acute renal failure type ([facility] [address]     N17.9, N18.9    Sepsis with acute renal failure without septic shock, due to unspecified organism, unspecified acute renal failure type ([facility] [address]     A41.9, R65.20, N17.9    Transaminitis     R74.01    Dehydration     E86.0    Cocaine abuse ([facility] [address]     F14.10           SPEECH THERAPY  PLAN OF CARE   The speech therapy  POC is established based on physician order, speech pathology diagnosis and results of clinical assessment     SPEECH PATHOLOGY DIAGNOSIS:    Moderate cognitive deficits    Speech Pathology intervention is recommended 3-6 times per week for LOS or when goals are met with emphasis on the following:      Conditions Requiring Skilled Therapeutic Intervention for speech, language and/or cognition    Decreased short term memory  Decreased thought organization    Specific Speech Therapy Interventions to Include: Therapeutic tasks for Cognition    Specific instructions for next treatment:  STM and thought organization tasks    SHORT/LONG TERM GOALS  Pt will improve orientation to spatial and temporal surroundings with use of external memory aides.   Pt will improve immediate, short term, recent memory during structured and unstructured tasks with 75% accuracy   Pt will improve problem solving thought organization during structured and unstructured tasks with 80% accuracy w       Patient stated goals: Agreed with above,     Rehabilitation Potential/Prognosis: fair                CLINICAL ASSESSMENT:  MOTOR SPEECH       Oral Peripheral Examination   Generalized oral weakness    Parameters of Speech Production  Respiration:  Adequate for speech production  Articulation:  Distortion  Resonance:  Within functional limits  Quality:   Within functional limits  Pitch: Within functional limits  Intensity: Within functional limits  Fluency:  Intact  Prosody Intact    RECEPTIVE LANGUAGE    Comprehension of Yes/No Questions:    Within functional limits    Process  Simple Verbal Commands:   Within functional limits  Process Intermediate Verbal Commands:   Within functional limits  Process Complex Verbal Commands:     Incomplete    Comprehension of Conversation:      Within functional limits      EXPRESSIVE LANGUAGE     Serials: Functional    Imitation:  Words   Functional   Sentences Functional    Naming:  (Modality used:  Verbal)  Confrontation Naming  Functional  Functional Description  Functional  Response Naming: Functional    Conversation:      Confusion was noted during conversation    COGNITION     Attention/Orientation  Attention: Easily Distracted  Orientation:  Oriented to Person, Place, Reason for hospitalization    Memory   Immediate Recall: Repeated 3/3    Delayed Recall:   Recalled 2/3    Long Term Recall:   Recalled Address, Birthdate, Age and Family    Organization/Problem Solving/Reasoning   Verbal Sequencing:   Impaired        Verbal Problem solving:   Impaired          CLINICAL OBSERVATIONS NOTED DURING THE EVALUATION  Latent responses                  EDUCATION:   The Speech Language Pathologist (SLP) completed education regarding results of evaluation and that intervention is warranted at this time.  Learner: Patient  Education: Reviewed results and recommendations of this evaluation  Evaluation of Education:  Verbalizes understanding    Explained that Speech Pathology intervention is warranted at this time   This plan may be re-evaluated and revised as warranted. Treatment goals discussed with Patient   The Patient understand(s) the diagnosis, prognosis and plan of care     Evaluation Time includes thorough review of current medical information, gathering information on past medical history/social history and prior level of function, completion of standardized testing/informal observation of tasks, assessment of data and education on plan of care and goals. CPT code:    65469  eval speech sound lang comprehension      The admitting diagnosis and active problem list, as listed below have been reviewed prior to initiation of this evaluation.         ACTIVE PROBLEM LIST:   Patient Active Problem List   Diagnosis    Neuropathy    HIV infection ([facility] [address]    HTN (hypertension)    Unilateral inguinal hernia    Chronic hepatitis B (HCC)    Mood disorder (HCC)    Cellulitis    Fracture of proximal end of ulna    Septic olecranon bursitis of left elbow    CHANEL (acute kidney injury) ([address]    Arm edema    AMS (altered mental status)    Left-sided weakness    Goals of care, counseling/discussion    Palliative care by specialist

## 2021-05-28 NOTE — PROGRESS NOTES
Hospitalist Progress Note      SYNOPSIS: Patient admitted on 5/24/2021 for altered mental status  Jada Calix has a past medical history that includes HIV, hepatitis C, GERD    On admission; patient lethargic and a poor historian.   Kings Grimm presents due to altered mental status and left-sided weakness; unknown last known well, stroke alert was called, CT imaging ultimately showed no perfusion mismatch or occlusion and patient determined not to be TPA candidate or candidate for thrombectomy as per stroke neurologist; patient given 324 p.o. aspirin as per neurology. Rectal temperature was 100 °F, patient with labs showing acute renal failure with GFR 13 creatinine 5.6, elevated LFTs with AST 1108, ; UA positive for hematuria, no evidence for UTI.  Lactic acid is 3.9.  Leukocytosis 13.9.  Patient given 1 L NS bolus.  Rapid Covid is negative.   CXR does bilateral opacifications concerning for possible pneumonia.  Patient given broad-spectrum antibiotic treatment with vancomycin and cefepime.  Labs also significant for elevated troponin and BNP; CRP 19, procalcitonin 3.77, elevated trop and CK. UDS positive for cocaine and fentanyl.  On physical exam patient appears to have diffuse erythema and warmth over his left lateral neck and left upper flank up to his left axilla, there also appears to be a central region of swelling and a central possible insect bite or laceration with mild serous drainage, there does not appear to be any crepitus, induration, or evidence of focal abscess.  There is tenderness to palpation of these regions as well, concerns for possible cellulitis as infectious source.  During this encounter patient did remain alert, he answered some questions but is oriented only to self, he is not able to provide very much history but is able to tell us where his pain is.  He does not have any abdominal tenderness to palpation.  Lungs with mild rales bilaterally scattered.  Decision made to note this patient due to altered mental status, acute renal failure, transaminitis, dehydration, and sepsis with unknown source.  Results and plan were discussed with the patient's brother by the admitting physician, he voiced understanding and is amenable. SUBJECTIVE:    Patient seen and examined   Mentation continues to improve  Still having LUE weakness and stiffness of both hands  No nausea vomiting    Records reviewed. Stable overnight. No other overnight issues reported. Temp (24hrs), Av.8 °F (36.6 °C), Min:97.2 °F (36.2 °C), Max:98.2 °F (36.8 °C)    DIET: DIET RENAL;  CODE: Limited    Intake/Output Summary (Last 24 hours) at 2021 1129  Last data filed at 2021 1045  Gross per 24 hour   Intake 3818 ml   Output 550 ml   Net 3268 ml       OBJECTIVE:    BP (!) 187/97   Pulse 80   Temp 98.1 °F (36.7 °C) (Temporal)   Resp 18   Ht 5' 8\" (1.727 m)   Wt 180 lb 4.8 oz (81.8 kg)   SpO2 96%   BMI 27.41 kg/m²     General appearance: No apparent distress, appears stated age and cooperative. HEENT:  Conjunctivae/corneas clear. Neck: Supple. No jugular venous distention. Respiratory: Clear to auscultation bilaterally, normal respiratory effort  Cardiovascular: Regular rate rhythm, normal S1-S2  Abdomen: Soft, nontender, nondistended  Musculoskeletal: No clubbing, cyanosis, no bilateral lower extremity edema. Brisk capillary refill. Skin:   Left sided chest and lateral chest wall erythematous rash. Mildly tender. No obvious discharge seen  Neurologic: awake, alert and following commands.   Mild left-sided weakness especially in the upper extremity    ASSESSMENT:    1) Acute Metabolic/Toxic encephalopathy  -CT head no acute intracranial abnormality  -UDS positive for fentanyl and cocaine  -Continue infection and kidney failure management    2) Sepsis with staph bacteremia secondary to left-sided chest wall cellulitis  -Check MRI neck soft tissue  -Blood culture done on 2021 growing 2 out of 2 staph (coag Neg-might be contaminant per ID)  -Follow-up repeat blood culture NGTD  -CRP and procalcitonin significantly elevated; will cycle  -ID on board; appreciate recommendations    3) Rhabdomyolysis  -Might be drug induced (cocaine and fentanyl), nontraumatic  -CK significantly elevated but trending down  -On IVF normal saline    4) CHANEL  -Likely due to ATN from rhabdo and or contrast-induced nephropathy  -Renal ultrasound showed increased renal parenchymal echogenicity indicating medical renal disease  -Avoid nephrotoxic medications  -Likely need dialysis as kidney function keeps worsening  -Prognosis is poor; palliative on board  -Nephrology on board    5) LUE weakness   -CTA head and neck: No significant occlusion  -MRI brain ordered  -Neurology on board; continue DAPT for 21 days and then ASA afterward    6) LUE Swelling  -Seems to be worsening and tense  -Radial pulse present and palpated  -Consult Surg to evaluate due to possible early compartment syndrome     7) Elevated transaminases  -AST/ALT significantly elevated; ALP normal  -This is likely due to rhabdomyolysis as alkaline phosphatase is normal  -No further GI work-up needed  -Continue to trend levels    Other chronic medical conditions, medication resumed unless contraindicated    HIV infection; resumed meds ID  Chronic hepatitis C  Polysubstance use disorder      DISPOSITION: TBD    Medications:  REVIEWED DAILY    Infusion Medications    sodium chloride 100 mL/hr at 05/26/21 2116    sodium chloride       Scheduled Medications    LORazepam  0.5 mg Intravenous Once    clopidogrel  75 mg Oral Daily    sodium chloride flush  5-40 mL Intravenous 2 times per day    heparin (porcine)  5,000 Units Subcutaneous 3 times per day    emtricitabine-tenofovir alafenamide  1 tablet Oral Daily    dolutegravir sodium  50 mg Oral Nightly    aspirin  81 mg Oral Daily     PRN Meds: sodium chloride flush, sodium chloride, melatonin, perflutren lipid microspheres, sodium chloride flush    Labs:     Recent Labs     05/26/21  1414 05/27/21  0143 05/28/21  0108   WBC 8.5 7.4 6.3   HGB 14.4 13.5 12.8   HCT 42.2 40.1 37.7    188 177       Recent Labs     05/25/21  2211 05/26/21  0651 05/26/21  1414 05/27/21  0143 05/28/21  0108    138 135 135 137   K 5.3* 4.7  4.7 4.8 5.1* 4.7   CL 99 96* 94* 94* 100   CO2 17* 21* 23 23 17*   BUN 65* 74* 77* 78* 93*   CREATININE 7.9* 8.6* 8.9* 9.9* 10.8*   CALCIUM 7.8* 7.5* 7.3* 7.3* 7.2*   PHOS 6.7* 5.6*  --   --   --        Recent Labs     05/26/21  1414 05/27/21  0143 05/28/21  0108   PROT 6.6 6.4 6.1*   ALKPHOS 75 51 47   * 294* 238*   * 696* 494*   BILITOT 0.5 0.4 0.4       Recent Labs     05/26/21  0651   INR 1.4       Recent Labs     05/27/21  0655 05/27/21 1952 05/28/21  0614   CKTOTAL SEE BELOW* SEE COMMENT* see comment*       Chronic labs:    Lab Results   Component Value Date    CHOL 191 05/25/2021    TRIG 279 (H) 05/25/2021    HDL 45 05/25/2021    LDLCALC 90 05/25/2021    TSH 3.720 03/08/2017    PSA SEE NOTE (AA) 03/08/2017    PSA 0.47 03/08/2017    INR 1.4 05/26/2021    LABA1C 5.9 (H) 05/25/2021       Radiology: REVIEWED DAILY    +++++++++++++++++++++++++++++++++++++++++++++++++  Rubi Gross MD  [facility], [place]  +++++++++++++++++++++++++++++++++++++++++++++++++  NOTE: This report was transcribed using voice recognition software. Every effort was made to ensure accuracy; however, inadvertent computerized transcription errors may be present.

## 2021-05-28 NOTE — PROGRESS NOTES
Palliative Care Department  [phone]  Progress Note  Cherelle Trimble APRN-CNS, [address]  [id number]  Hospital Day: 5    Date of Initial Consult: 5/26/2019  Referring Provider: Dr. Mckayla Escalera was consulted for assistance with: goals of care, code status discussion and symptom management. HPI:   Lester Mehta is a 56 y.o. with a past medical history of HTN, HLD, HIV, hepatitis C, GERD, mood disorder, who presents the ED due to CHIEF COMPLAINT of altered mental status and left-sided weakness. Workup noting CHANEL, rhabdomyocytis; sepsis UTI, bacteremia and polysubstance abuse, transaminitis, and dehydration and sepsis; no ischemic events on imaging but MRI still pending. Neurology, Cardiology, Nephrology and ID services have been consulted. ASSESSMENT/PLAN:     Pertinent Hospital Diagnoses    Acute renal failure- worsening; Nephrology following; IV fluids; monitor labs   Metabolic encephalopathy-continue to monitor; Neurology following; continue to obtain MRI of brain; consider EEG   Left sided weakness; neurology following   Chronic Hepatitis/HIV- continues descovy and tivicay per ID   Rhabdomyolysis-continues on fluids; Nephrology following   Elevated troponin-Cardiology following-consider Plavix/ASA as per Neurology   Left shoulder pain; Ortho consulted; further workup in progress      Palliative Care Encounter / Counseling Regarding Goals of [place], Does have capacity for medical decision-making (with assistance of family who were present).   Capacity is time limited and situation/question specific   During encounter brother Jenise Storey was present at bedside   Outcome of goals of care meeting: with family present; pt wishes to continue with medical management; would accept dialysis; agrees to MRI; hopeful to return home and be independent; workup continues   Code status:  limited; MEDS ONLY; NO to CPR; NO SHOCK; NO INTUBATION   Advanced Directives: no agreed to change code status to  Limited; MEDS ONLY. Order placed for limited code- NO CPR; NO SHOCK; NO INTUBATION but YES TO  MEDS. Brother Olimpia Mota agrees who is present. Pt names Olimpia Mota to be medical decision maker for him if he is unable to make decisions. He has no advance directives in place. Will attempt to assist in completion prior to discharge. Active Hospital Problems    Diagnosis Date Noted    Left-sided weakness [R53.1] 05/26/2021    Goals of care, counseling/discussion [Z71.89]     Palliative care by specialist [Z51.5]     AMS (altered mental status) [R41.82] 05/25/2021       OBJECTIVE:   Prognosis: poor    Physical Exam:  BP (!) 187/97   Pulse 80   Temp 98.1 °F (36.7 °C)   Resp 18   Ht 5' 8\" (1.727 m)   Wt 180 lb 4.8 oz (81.8 kg)   SpO2 96%   BMI 27.41 kg/m²   Gen:  awake, alert; oriented to person/place; confused to events; c/o neck and LUE pain  HEENT:  Normocephalic, atraumatic, mucosa moist, EOMI  Neck:  Supple, trachea midline, no JVD  Lungs:  CTA bilaterally, no audible rhonchi or wheezes noted, respirations unlabored  Heart: NSR; RRR, distant heart tones, no murmur, rub, or gallop noted during exam  Abd:  Soft, non tender, non distended, bowel sounds present  :  Orellana catheter  Ext:  Weak; LUE edematous and painful; moves LE  Skin:  Warm and dry  Neuro:  PERRL, Alert, oriented to person/place; following commands      Social History:   The patient currently lives at home; independently  TOBACCO:  reports that he has been smoking cigarettes. He has a 10.00 pack-year smoking history. He has never used smokeless tobacco.  ETOH:  reports previous alcohol use.     Objective data reviewed: labs, images, records, medication use, vitals and chart    Discussed patient and the plan of care with the other IDT members: Palliative Medicine IDT Team    Time/Communication  Greater than 50% of time spent, total 25 minutes in counseling and coordination of care at the bedside regarding goals of care, symptom management, diagnosis and prognosis and see above. Thank you for allowing Palliative Medicine to participate in the care of Ashlie Amaro.   Marito January APRN-CNS, ACHPN

## 2021-05-28 NOTE — PROGRESS NOTES
OCCUPATIONAL THERAPY INITIAL EVALUATION     [facility] [employer] [address] [facility]                                                  Patient Name: Claude Wincehster  MRN: [id number]  : 1960  Room: [address] [place], [place] [address]    Evaluating OT: [address] [place] [address]    Referring Provider[de-identified] Keke Prieto APRN - CNP    Specific Provider Orders/Date: OT evaluation and treatment   21 1115  OT eval and treat            Diagnosis: multiple organ failure       Pertinent Medical History: hep C, HIV, neuropathy, GERD,         Precautions:  Fall Risk, left sided weakness, bed alarm, TSM, TAPS, incontinence bowel, rae, O2, HIV, Hep C,     Assessment of current deficits   [x] Functional mobility   [x]ADLs  [x] Strength               [x]Cognition   [x] Functional transfers   [] IADLs         [x] Safety Awareness   [x]Endurance   [x] Fine Coordination              [x] Balance      [] Vision/perception   [x]Sensation    [x]Gross Motor Coordination  [x] ROM  [] Delirium                   [x] Motor Control     OT PLAN OF CARE   OT POC based on physician orders, patient diagnosis and results of clinical assessment    Frequency/Duration  2-5 days/wk for 2 weeks PRN   Specific OT Treatment to include:    Instruction/training on adapted ADL techniques and AE recommendations to increase functional independence within precautions  Functional transfer/mobility training/DME recommendations for increased independence, safety, and fall prevention  Patient/Family education to increase follow through with safety techniques and functional independence  Cognitive retraining/development of therapeutic activities to improve problem solving, judgement, memory, and attention for increased safety/participation in ADL/IADL tasks  Sensory re-education to improve body/limb awareness, maintain/improve skin integrity, and improve hand/UE motor function  Therapeutic exercise change hospital gown, cues for sequencing and dependent placement of LUE into sleeve d/t severe pain  Minimal Assist    LB Dressing Dependent   Moderate Assist    Bathing Maximal Assist  Minimal Assist    Toileting Dependent   Incontinent of bowel after stand to sit   Moderate Assist    Bed Mobility  Rolling: max A to R, mod A to Lefl  Supine to sit: Maximal Assist   Sit to supine: Maximal Assist   Supine to sit: Minimal Assist   Sit to supine: Minimal Assist    Functional Transfers Sit to stand: Moderate Assist x 2  Stand to sit: Moderate Assist   Stand pivot: NT   Minimal Assist    Functional Mobility Mod A x 2  3-4 side steps with hand held assist/side held assist  Min A with AAD short distance   Balance Sitting:     Static:  Mod A progressing to SBA, c/o dizziness initally     Dynamic:mod A  Standing: mod A x 2  Sitting:     Static:  SBA    Dynamic:SBA  Standing: min A   Activity Tolerance poor  Fair+   Visual/  Perceptual Glasses: no          BUE  ROM/Strength/  Fine motor Coordination Hand dominance: R    RUE: ROM limited in shoulder and hands  But generally WFL     Strength: grossly 3+/5      Strength:  WFL     Coordination:  fair    LUE: ROM no shoulder flex/abd noted, minimal elbow flex/ext, minimal finger flex/ext     Strength: grossly 2+/5      Strength: poor     Coordination: poor  Pt will increase LUE AROM to partial ROM in shoulder, elbow and hand for improved indep with ADLS     Hearing: WFL   Sensation:  c/o numbness or tingling B hands  Tone: impaired LUE,  WFL RUUE  Edema: moderate LUEedema noted    Comments:   RN cleared patient for OT. Upon arrival patient in bed. Pt was cooperative and able to follow directions throughout session. At end of session, patient in bed with call light and phone within reach, all lines and tubes intact. Overall patient demonstrated  decreased independence and safety during completion of ADL/functional transfer/mobility tasks.   Pt would benefit from continued skilled OT to increase safety and independence with completion of ADL tasks and functional mobility for improved quality of life. Treatment: OT treatment provided this date includes:    ADL-  Instruction/training on safety and adapted techniques for completion of ADLs: bathing, dressing toileting     Mobility-  Instruction/training on safety and improved independence with bed mobility/functional transfers  and functional mobility with.max cues for sequencing and weight shifting     Sitting EOB x approx 15 minutes to improve dynamic sitting balance and activity tolerance during ADLs.    Neuromuscular Reeducation to facilitate balance/righting reactions for increased function with ADLs tasks: pt reported dizziness initially upon sitting EOB, dizziness subsided after approx 3-5 minutes    Skilled positioning/alignment-  Proper Positioning/Alignment in bed with pillow under LUE to reduce edema and pain    Skilled monitoring of O2 sats-   SpO2 at rest 98%, SpO2 during activity 97%, SpO2 at end of session       Rehab Potential: Good  for established goals     Patient / Family Goal:  Not stated    Patient and/or family were instructed on functional diagnosis, prognosis/goals and OT plan of care. Demonstrated fair understanding. ·  Eval Complexity: Mod Complexity  · History: Expanded review of medical records and additional review of physical, cognitive, or psychosocial history related to current functional performance  · Exam: 5+ performance deficits  · Assistance/Modification: mod/max assistance or modifications required to perform tasks. May have comorbidities that affect occupational performance.       Time In: 9:49  Time Out: 10:30  Total Treatment Time: 25 minutes    Min Units   OT Eval Low 74480       OT Eval Medium 66218  x    OT Eval High 85716       OT Re-Eval 15214       Therapeutic Ex 59796       Therapeutic Activities 61184  10 1   ADL/Self Care 13624  15 1   Orthotic Management 04904 Neuro Re-Ed 50805       Non-Billable Time          Evaluation Time includes thorough review of current medical information, gathering information on past medical history/social history and prior level of function, completion of standardized testing/informal observation of tasks, assessment of data and education on plan of care and goals.             [place], [place] [address]

## 2021-05-28 NOTE — PROGRESS NOTES
in R UE. Patient education  Pt educated on importance of activity while in hospital.     Patient response to education:   Pt verbalized understanding Pt demonstrated skill Pt requires further education in this area   Yes Yes Reinforce     ASSESSMENT:    Conditions Requiring Skilled Therapeutic Intervention:    [x]Decreased strength     []Decreased ROM  [x]Decreased functional mobility  [x]Decreased balance   [x]Decreased endurance   [x]Decreased posture  []Decreased sensation  []Decreased coordination   []Decreased vision  [x]Decreased safety awareness   [x]Increased pain       Comments:    Pt was supine in bed with brother upon arrival, agreeable to PT evaluation. Pt completed supine to sit transfer with assistance and sat EOB. Pt presented with poor posture with rounded shoulders and forward, right deviating head position. Pt had moderate R sided lean as pt pulled on bed rail. Pt corrected hand positioning onto bed and lean improved. Pt sat EOB and required less assistance over time to maintain upright seated position. Pt completed sit to stand transfer with [id number] assist and stood EOB. Pt completed side stepping at EOB with [id number] assist. L knee block was used however no knee buckling was noted. Pt was assisted with weightshifting to L and R but pt was able to advance B LE without assistance. Pt stood for 5 minutes. An episode of incontinence occurred while transferring from stand to sit transfer. Pt was transferred to supine position and rolled. Pt had increase complaints of pain when rolled on L side. Pt was assisted with self care and was repositioned comfortably. Pt was left in bed with all needs met at end of session. Treatment:  Patient practiced and was instructed in the following treatment:     Bed Mobility: Pt completed supine to sit transfer, challenging strength and seated balance. Pt required cuing for hand placement and positioning during transfer.  Pt sat EOB for >10 minutes, challenging endurance and static seated balance and posture. Pt completed x2 rolling for management of incontinence and positioning in bed, challenging endurance, strength, and tolerance to activity.  Transfers: Pt completed sit to stand transfer, challenging strength and standing balance. Pt required cuing for hand placement for safety and technique. Pt completed stand to sit transfer, challenging strength.  Ambulation: Pt completed side stepping with [id number] assist for dynamic standing balance. Pt required cuing for weightshifting and sequencing when sidestepping at EOB.  Skillful repositioning with pillows and TAPS to protect skin/joint integrity. Pt's/family goals:  1. Return Home. Prognosis is Fair for reaching above PT goals. Patient and or family understand(s) diagnosis, prognosis, and plan of care. Yes. PHYSICAL THERAPY PLAN OF CARE:    PT POC is established based on physician order and patient diagnosis     Referring provider/PT Order:    Start   Ordering Provider    21  PT eval and treat Start: 21, End: 21, ONE TIME, Standing Count: 1 Occurrences, R      Manual Meth, APRN - CNP        Diagnosis:  Multiple organ failure with heart failure in  [P29.0]  AMS (altered mental status) [R41.82]  Specific instructions for next treatment:  Ambulate within room with AAD.     Current Treatment Recommendations:     [x] Strengthening to improve independence with functional mobility   [] ROM to improve independence with functional mobility   [x] Balance Training to improve static/dynamic balance and to reduce fall risk  [x] Endurance Training to improve activity tolerance during functional mobility   [x] Transfer Training to improve safety and independence with all functional transfers   [x] Gait Training to improve gait mechanics, endurance and asses need for appropriate assistive device  [x] Stair Training in preparation for safe discharge home and/or into the community   [x] Positioning to prevent skin breakdown and contractures  [x] Safety and Education Training   [x] Patient/Caregiver Education   [] HEP  [] Other     PT long term treatment goals are located in above grid    Frequency of treatments: 2-5x/week x 3-5 days. Time in  1000  Time out  1030    Total Treatment Time: 15 minutes     Evaluation Time includes thorough review of current medical information, gathering information on past medical history/social history and prior level of function, completion of standardized testing/informal observation of tasks, assessment of data and education on plan of care and goals.     CPT codes:  [x] Low Complexity PT evaluation 95712  [] Moderate Complexity PT evaluation 49762  [] High Complexity PT evaluation 74369  [] PT Re-evaluation 89411  [] Gait training 88912 0 minutes  [] Manual therapy 66056 0 minutes  [x] Therapeutic activities 62289 15 minutes  [] Therapeutic exercises 75531 0 minutes  [] Neuromuscular reeducation 09107 0 minutes     Pema Page, PT, DPT  [id number]    Martina Rowan, SPT

## 2021-05-28 NOTE — PROCEDURES
Triston Byrne is a 56 y.o. male patient. 1. Altered mental status, unspecified altered mental status type    2. Acute renal failure superimposed on chronic kidney disease, unspecified CKD stage, unspecified acute renal failure type ([facility].)    3. Sepsis with acute renal failure without septic shock, due to unspecified organism, unspecified acute renal failure type ([facility]    4. Transaminitis    5. Dehydration    6. Cellulitis of other specified site    7. Cocaine abuse [facility])      Past Medical History:   Diagnosis Date    Abscess of hand, left 2009    Abscess of scrotum 2007    Bacterial meningitis 2010    Depression     GERD (gastroesophageal reflux disease)     Hepatitis C     Herpes zoster w/ nervous system complication 3437    neuropathy    Human immunodeficiency virus, type 2 (HIV 2) ([facility] 2009    Inguinal hernia unilateral     left    Pneumonia 2010    Shingles (herpes zoster) polyneuropathy     Suicidal ideation 2008    Vitamin D deficiency      Blood pressure (!) 171/118, pulse 82, temperature 98.4 °F (36.9 °C), temperature source Oral, resp. rate 20, height 5' 8\" (1.727 m), weight 180 lb 4.8 oz (81.8 kg), SpO2 96 %.     Central Line    Date/Time: 5/28/2021 5:30 PM  Performed by: Deb Duff MD  Authorized by: Deb Duff MD   Indications: vascular access  Preparation: skin prepped with ChloraPrep and skin prepped with 2% chlorhexidine  Skin prep agent dried: skin prep agent completely dried prior to procedure  Sterile barriers: all five maximum sterile barriers used - cap, mask, sterile gown, sterile gloves, and large sterile sheet  Hand hygiene: hand hygiene performed prior to central venous catheter insertion  Location details: right femoral  Patient position: flat  Catheter type: double lumen  Catheter size: 14 Fr  Ultrasound guidance: yes  Sterile ultrasound techniques: sterile gel and sterile probe covers were used  Number of attempts: 1  Successful placement: yes  Post-procedure: line sutured and dressing applied  Assessment: blood return through all ports and free fluid flow  Patient tolerance: patient tolerated the procedure well with no immediate complications          Laquita Dunne MD  5/28/2021    Poncho Patel MD

## 2021-05-29 PROBLEM — Z99.2 ENCOUNTER REGARDING VASCULAR ACCESS FOR DIALYSIS FOR ESRD (HCC): Status: ACTIVE | Noted: 2021-01-01

## 2021-05-29 PROBLEM — N18.6 ENCOUNTER REGARDING VASCULAR ACCESS FOR DIALYSIS FOR ESRD (HCC): Status: ACTIVE | Noted: 2021-01-01

## 2021-05-29 NOTE — PROGRESS NOTES
Patient refusing to wear hiwot pillow at this time.  Will encourage use and continue to monitor and assess

## 2021-05-29 NOTE — PROGRESS NOTES
Vascular Surgery Progress Note    Pt is being seen in f/u today regarding temporary hemodialysis line placed 5/28    Subjective  Pt s/e. No complaints. Right femoral HD catheter with no hematoma. 2+ distal pulses. Current Medications:    sodium chloride 100 mL/hr at 05/26/21 2116    sodium chloride        sodium chloride flush, sodium chloride, melatonin, perflutren lipid microspheres, sodium chloride flush    LORazepam  0.5 mg Intravenous Once    sodium bicarbonate  1,300 mg Oral 4x Daily    clopidogrel  75 mg Oral Daily    sodium chloride flush  5-40 mL Intravenous 2 times per day    heparin (porcine)  5,000 Units Subcutaneous 3 times per day    emtricitabine-tenofovir alafenamide  1 tablet Oral Daily    dolutegravir sodium  50 mg Oral Nightly    aspirin  81 mg Oral Daily        PHYSICAL EXAM:    BP (!) 170/94   Pulse 79   Temp 96.6 °F (35.9 °C) (Temporal)   Resp 22   Ht 5' 8\" (1.727 m)   Wt 180 lb 12.8 oz (82 kg)   SpO2 97%   BMI 27.49 kg/m²     Intake/Output Summary (Last 24 hours) at 5/29/2021 0719  Last data filed at 5/29/2021 2523  Gross per 24 hour   Intake 4349.93 ml   Output 850 ml   Net 3499.93 ml        GENERAL:  NAD. A&Ox3. HEAD:  Normocephalic. Atraumatic. EYES:   No scleral icterus. PERRL. LUNGS:  No increased work of breathing. CARDIOVASCULAR: RR  ABDOMEN:  Soft, non-distended, non-tender. No guarding, rigidity, rebound.   EXTREMITIES:   Left upper extremity swelling with ecchymosis and bullae on skin  SKIN:  Warm and dry               R radial 2+ L radial 2+   R femoral 2+ L femoral 2+             R posterior tibial 2+ L posterior tibial 2+   R dorsalis pedis 2+ L dorsalis pedis 2+          LABS:    Lab Results   Component Value Date    WBC 6.7 05/29/2021    HGB 12.1 (L) 05/29/2021    HCT 35.8 (L) 05/29/2021     05/29/2021    PROTIME 14.9 (H) 05/26/2021    INR 1.4 05/26/2021    APTT 29.8 05/24/2021    K 4.3 05/29/2021    BUN 88 (H) 05/29/2021    CREATININE 11.1 ([facility]) 05/29/2021       A/P  56 y.o. male CHANEL with ATN requiring hemodialysis      Right femoral hemodialysis catheter placed at bedside 5/28  Kishor to use hemodialysis catheter    Amelie Dancer, MD

## 2021-05-29 NOTE — PROCEDURES
5/28 8:29 PM - spoke to [facility] and notified her that we were only able to get a partial scan of soft tissue neck and sent that to PACS and patient refused to finish. Unable to scan any images of patient's brain. Pt in a lot of pain, very claustrophobic and very definite he will not have another MRI.

## 2021-05-29 NOTE — PROGRESS NOTES
[facility] MD Howard Morales MD Glenice Sauger, MD Ada Spear, MD Sajan Jackson, CORINNA Cha, AUGUSTINE  Progress Note    5/29/2021    SUBJECTIVE:   (-) c/o's  (-) sob/elkins/cp/palp , increased alertness since yesterday. Currently on soft arm restraints. Patient still confused, yelling with no apparent shortness of breath. Persistent altered mental status due to multiple medical problems and iatrogenic consumption history of cocaine/fentanyl. Patient is currently getting evaluation for any fractures to the shoulder and arm pain with orthopedic consultation. 5/29: Patient doing better more awake and alert this morning, family is at the bedside both sister and brother with whom I had thorough discussion for future care in terms of renal failure another comorbid conditions. Patient scheduled for CT with contrast as per orthopedics, to follow-up with hemodialysis initiation this afternoon. Second treatment hemodialysis will be Monday. PROBLEM LIST:    Active Problems:    AMS (altered mental status)    Left-sided weakness    Goals of care, counseling/discussion    Palliative care by specialist  Resolved Problems:    * No resolved hospital problems.  *         DIET:    No diet orders on file     MEDS (scheduled):    sodium chloride flush  5-40 mL Intravenous 2 times per day    thiamine  100 mg Oral Daily    multivitamin  1 tablet Oral Daily    [START ON 5/30/2021] melatonin  6 mg Oral Nightly    LORazepam  0.5 mg Intravenous Once    sodium bicarbonate  1,300 mg Oral 4x Daily    clopidogrel  75 mg Oral Daily    heparin (porcine)  5,000 Units Subcutaneous 3 times per day    emtricitabine-tenofovir alafenamide  1 tablet Oral Daily    dolutegravir sodium  50 mg Oral Nightly    aspirin  81 mg Oral Daily       MEDS (infusions):   sodium chloride      sodium chloride 100 mL/hr at 05/29/21 0931       MEDS (prn):  sodium chloride flush, sodium chloride, LORazepam **OR** LORazepam **OR** LORazepam **OR** LORazepam **OR** LORazepam **OR** LORazepam **OR** LORazepam **OR** LORazepam, oxyCODONE **OR** oxyCODONE, perflutren lipid microspheres    PHYSICAL EXAM:     Patient Vitals for the past 24 hrs:   BP Temp Temp src Pulse Resp SpO2 Weight   05/29/21 0800 (!) 160/98 98 °F (36.7 °C) Temporal 82 20 98 %    05/29/21 0615       180 lb 12.8 oz (82 kg)   05/29/21 0440 (!) 170/94 96.6 °F (35.9 °C) Temporal 79 22 97 %    05/28/21 2330 (!) 168/98 96.5 °F (35.8 °C) Temporal 77 16 98 %    05/28/21 2110 (!) 168/94 97.9 °F (36.6 °C) Oral 76 16 94 %    05/28/21 1530 (!) 171/118 98.4 °F (36.9 °C) Oral 82 20     @      Intake/Output Summary (Last 24 hours) at 5/29/2021 1315  Last data filed at 5/29/2021 0620  Gross per 24 hour   Intake 4109.93 ml   Output 850 ml   Net 3259.93 ml         Wt Readings from Last 3 Encounters:   05/29/21 180 lb 12.8 oz (82 kg)   11/14/20 160 lb (72.6 kg)   07/05/20 150 lb (68 kg)       Constitutional:  in no acute distress  HEENT: NC/AT, EOMI, sclera and conjunctiva are clear and anicteric, mucus membranes moist  Neck: Trachea midline, no JVD  Cardiovascular: S1, S2 regular rhythm, no murmur,or rub  Respiratory:  No crackles, no wheeze  Gastrointestinal:  Soft, nontender, nondistended, NABS  Ext: no edema, feet warm  Skin: dry, no rash  Neuro: awake, alert, interactive      DATA:    Recent Labs     05/27/21  0143 05/28/21  0108 05/29/21  0231   WBC 7.4 6.3 6.7   HGB 13.5 12.8 12.1*   HCT 40.1 37.7 35.8*   .8* 99.0 100.3*    177 152     Recent Labs     05/27/21  0143 05/28/21  0108 05/29/21  0231    137 137   K 5.1* 4.7 4.3   CL 94* 100 103   CO2 23 17* 19*   BUN 78* 93* 88*   CREATININE 9.9* 10.8* 11.1*   * 238* 198*   * 494* 319*   BILITOT 0.4 0.4 0.4   ALKPHOS 51 47 45       Lab Results   Component Value Date    LABPROT 4.2 (H) 05/25/2021    LABPROT 4.2 05/25/2021       ASSESSMENT / RECOMMENDATIONS:    1. CHANEL with history of metabolic acidosis and current oliguria / anuria   Probable ATN from presumptive sepsis and toxic substances. persistent oligoanuria, worsening CHANEL with worsening acidosis and stating initiation of hemodialysis. Case was discussed thoroughly with floor nurses and hemodialysis nurses will need to be getting contact with, family member either brother or son to get consent for placement of acute hemodialysis catheter, ASAP so we can start dialysis as early as tomorrow morning. Aggressive hydration did not help reverse the course of his CHANEL still has worsening his BUN/creatinine also has still evidence of transaminitis by his labs. 2. Anemia: Due to sepsis and CHANEL. 3.  Rhabdomyolysis on aggressive fluid hydration with frequent lab reviews. Probably toxic induced   With BMP and CPK  4. Presumed sepsis with turbid urine noted in his [place] catheter. C&S pending   See orders  5. History of HIV and hepatitis C, following with ID.  6.  Altered mental status still current, neurology is following. 7.  Abnormalities liver function tests and transaminitis  8. Bacteremia with staph species, followed by ID. 9.  Polysubstance abuse, with metabolic encephalopathy. We will need  dialysis initiation today, post CT with contrast as per orthopedic surgery. I spent ample time with brother and sister today counseling about dialysis acute need and further progression, leaving them all the options of continuation of withdrawal as deemed necessary by them.       Electronically signed by Hill Coley MD on 5/29/2021 at 1:15 PM

## 2021-05-29 NOTE — PROGRESS NOTES
Hospitalist Progress Note      SYNOPSIS: Patient admitted on 5/24/2021 for weakness, altered mental status    Hospital Course: Jada Calix is a 56 y.o. male with a medical history that includes HIV, hepatitis C, GERD, bacterial meningitis who presented to [facility] ER with left sided weakness, AMS. Patient unable to provide any history due to AMS, below is obtained from EMR and ER physician. Patient lethargic and a poor historian. Unknown last known well, stroke alert was called. CT imaging ultimately showed no perfusion mismatch or occlusion and patient was determined not to be TPA candidate or candidate for thrombectomy as per stroke neurologist.  Patient given 324 p.o. aspirin as per neurology.  Feel patient's presentation is more likely related to sepsis from unknown underlying infection, rectal temperature 100 °F, patient with labs showing acute renal failure with GFR 13 creatinine 5.6, elevated LFTs with AST 1108, ; UA positive for hematuria, no evidence for UTI.  Lactic acid is 3.9.  Leukocytosis 13.9, CBC does seem to show evidence for hemoconcentration.  Patient given 1 L NS bolus.  Rapid Covid is negative.   CXR does bilateral opacifications concerning for possible pneumonia.  Patient given broad-spectrum antibiotic treatment with vancomycin and cefepime.  Labs also significant for elevated troponin and BNP; CRP 19, procalcitonin 3.77, elevated trop and CK. UDS positive for cocaine and fentanyl.  On physical exam patient appears to have diffuse erythema and warmth over his left lateral neck and left upper flank up to his left axilla, there also appears to be a central region of swelling and a central possible insect bite or laceration with mild serous drainage, there does not appear to be any crepitus, induration, or evidence of focal abscess.  There is tenderness to palpation of these regions as well, concerns for possible cellulitis as infectious source.  During this encounter patient did remain alert, he answers some questions but is oriented only to self, he is not able to provide very much history but is able to tell us where his pain is.  He does not have any abdominal tenderness to palpation.  Lungs with mild rales bilaterally scattered.  Decision made to note this patient due to altered mental status, acute renal failure, transaminitis, dehydration, and sepsis with unknown source.  Discussed results and plan with the patient's son, he voices understanding and is amenable    SUBJECTIVE:    Patient seen and examined, reports severe pain left neck/arm/flank. Doesn't remember events that brought him to the hospital, admits to 24-48 ounces beer daily and is feeling anxious. Denies ever having a withdrawal seizuure. Reports once to twice monthly smoking cocaine, did use injection drugs in the past but denies any recent use. Records reviewed. Patient's sister at bedside, questions answered. Stable overnight. No other overnight issues reported. Temp (24hrs), Av.5 °F (36.4 °C), Min:96.5 °F (35.8 °C), Max:98.4 °F (36.9 °C)    DIET: Diet NPO Effective Now  CODE: Limited    Intake/Output Summary (Last 24 hours) at 2021 0910  Last data filed at 2021 7460  Gross per 24 hour   Intake 4349.93 ml   Output 850 ml   Net 3499.93 ml       Review of Systems  All bolded are positive; please see HPI  General:  Fever, chills, diaphoresis, fatigue, malaise, night sweats, weight loss  Psychological:  Anxiety, disorientation, hallucinations. ENT:  Epistaxis, headaches, vertigo, visual changes. Cardiovascular:  Chest pain, irregular heartbeats, palpitations, paroxysmal nocturnal dyspnea. Respiratory:  Shortness of breath, coughing, sputum production, hemoptysis, wheezing, orthopnea.   Gastrointestinal:  Nausea, vomiting, diarrhea, heartburn, constipation, abdominal pain, hematemesis, hematochezia, melena, acholic stools  Genito-Urinary:  Dysuria, urgency, frequency, hematuria  Musculoskeletal: images obtained concerning for extensive sq edema, recommended CT soft tissue neck with contrast- due to CHANEL concern with contrast  - temporary dialysis catheter placed by vascular surgery  - did not respond to aggressive hydration  - ID follows patient outpatient for HIV and hepatitis C  - staph bacteremia, ID following  - [facility] protocol  - oxy 2.5-5mg prn pain, hold for sedation  - plan for CT with contrast neck soft tissues prior to dialysis today  - neurology plans for repeat CT head since patient refuses MRI  - consider EEG    DISPOSITION:   DVT Prophylaxis: heparin  Diet: Diet NPO Effective Now  Code Status: Limited    PT/OT Eval Status: PRN    Dispo - to be determined pending clinical improvement, work-up and clearance by consulting services.     Medications:  REVIEWED DAILY    Infusion Medications    sodium chloride 100 mL/hr at 05/26/21 2116    sodium chloride       Scheduled Medications    LORazepam  0.5 mg Intravenous Once    sodium bicarbonate  1,300 mg Oral 4x Daily    clopidogrel  75 mg Oral Daily    sodium chloride flush  5-40 mL Intravenous 2 times per day    heparin (porcine)  5,000 Units Subcutaneous 3 times per day    emtricitabine-tenofovir alafenamide  1 tablet Oral Daily    dolutegravir sodium  50 mg Oral Nightly    aspirin  81 mg Oral Daily     PRN Meds: sodium chloride flush, sodium chloride, melatonin, perflutren lipid microspheres, sodium chloride flush    Labs:     Recent Labs     05/27/21 0143 05/28/21 0108 05/29/21  0231   WBC 7.4 6.3 6.7   HGB 13.5 12.8 12.1*   HCT 40.1 37.7 35.8*    177 152       Recent Labs     05/27/21 0143 05/28/21 0108 05/29/21  0231    137 137   K 5.1* 4.7 4.3   CL 94* 100 103   CO2 23 17* 19*   BUN 78* 93* 88*   CREATININE 9.9* 10.8* 11.1*   CALCIUM 7.3* 7.2* 7.3*       Recent Labs     05/27/21 0143 05/28/21 0108 05/29/21  0231   PROT 6.4 6.1* 5.8*   ALKPHOS 51 47 45   * 238* 198*   * 494* 319*   BILITOT 0.4 0.4 0.4

## 2021-05-29 NOTE — PLAN OF CARE
Plan of care    Patient was to have MRI brain completed yesterday. MRV of neck was completed. Patient is unable to tolerate MRI and refuses to go back to MRI machine. We will repeat CT of head to rule out any acute findings. Family at bedside. Assessment:      L sided weakness in a pt with HIV: in the setting of severe rhabdo and ARF. Concern for acute stroke vs vasculitis. Cannot entirely rule out seizure with Scottie's paralysis. PML should also remain on the differential, though unlikely.   He does have left hemiparesis and dysarthria today, but is more cooperative today.      Acute metabolic encephalopathy: calmer and more cooperative today     ARF     Neck abscess     HIV    Rhabdo     Hep C     Hx bacterial meningitis      Substance abuse     Plan:      Discontinued MRI brain  Ordered CTH  F/u echo report  Continue DAPT; statin on hold due to elevated LFTs  ID, nephro, palliative following  Could consider EEG pending MRI results  PCDs  PT/OT/ST ordered  Neurology to follow

## 2021-05-29 NOTE — FLOWSHEET NOTE
Next HD tx for 5/31 2. 5hours. Nithya 2 hours well.    05/29/21 1700   Vital Signs   BP (!) 163/98   Temp 98.1 °F (36.7 °C)   Pulse 82   Resp 20   Weight 178 lb 9.2 oz (81 kg)   Weight Method Estimated   Percent Weight Change -1.22   Post-Hemodialysis Assessment   Post-Treatment Procedures Blood returned;Catheter capped, clamped with Citrate x 2 ports   Machine Disinfection Process Acid/Vinegar Clean;Bleach; Exterior Machine Disinfection   Rinseback Volume (ml) 300 ml   Total Liters Processed (l/min) 22.4 l/min   Dialyzer Clearance Lightly streaked   Duration of Treatment (minutes) 210 minutes   Hemodialysis Intake (ml) 300 ml   Hemodialysis Output (ml) 1800 ml   NET Removed (ml) 1500 ml   Tolerated Treatment Good   Patient Response to Treatment nithya 2 hour HD well. Net uf 1500cc. Post catheter care completed. Report to floor RN   Bilateral Breath Sounds Diminished   Edema Generalized; Left upper extremity   Edema Generalized +2   Physician Notified?  No

## 2021-05-30 NOTE — PROGRESS NOTES
[facility] MD Keisha Crawford, MD Fanny Tinajero, MD Roldan Glez, MD Julia Martinez, CNP   Jana Cha, AUGUSTINE  Progress Note    5/30/2021    SUBJECTIVE:   (-) c/o's  (-) sob/elkins/cp/palp , increased alertness since yesterday. Currently on soft arm restraints. Patient still confused, yelling with no apparent shortness of breath. Persistent altered mental status due to multiple medical problems and iatrogenic consumption history of cocaine/fentanyl. Patient is currently getting evaluation for any fractures to the shoulder and arm pain with orthopedic consultation. 5/29: Patient doing better more awake and alert this morning, family is at the bedside both sister and brother with whom I had thorough discussion for future care in terms of renal failure another comorbid conditions. Patient scheduled for CT with contrast as per orthopedics, to follow-up with hemodialysis initiation this afternoon. Second treatment hemodialysis will be Monday. 5/30: Patient doing remarkably better, in terms of his orientation, anxiety. PROBLEM LIST:    Active Problems:    AMS (altered mental status)    Left-sided weakness    Goals of care, counseling/discussion    Palliative care by specialist    Encounter regarding vascular access for dialysis for ESRD [facility])  Resolved Problems:    * No resolved hospital problems.  *         DIET:    DIET RENAL;     MEDS (scheduled):    vancomycin  1,000 mg Intravenous Once    sodium chloride flush  5-40 mL Intravenous 2 times per day    thiamine  100 mg Oral Daily    multivitamin  1 tablet Oral Daily    melatonin  6 mg Oral Nightly    LORazepam  0.5 mg Intravenous Once    sodium bicarbonate  1,300 mg Oral 4x Daily    clopidogrel  75 mg Oral Daily    heparin (porcine)  5,000 Units Subcutaneous 3 times per day    emtricitabine-tenofovir alafenamide  1 tablet Oral Daily    dolutegravir sodium  50 mg Oral Nightly    aspirin  81 mg Oral Daily MEDS (infusions):   sodium chloride      sodium chloride 50 mL/hr at 05/30/21 0956       MEDS (prn):  sodium chloride flush, sodium chloride, LORazepam **OR** LORazepam **OR** LORazepam **OR** LORazepam **OR** LORazepam **OR** LORazepam **OR** LORazepam **OR** LORazepam, oxyCODONE **OR** oxyCODONE, perflutren lipid microspheres    PHYSICAL EXAM:     Patient Vitals for the past 24 hrs:   BP Temp Temp src Pulse Resp SpO2 Weight   05/30/21 0730 (!) 161/96 98.3 °F (36.8 °C) Temporal 96 16 95 %    05/30/21 0415 (!) 172/99 98 °F (36.7 °C) Temporal 87 16 92 %    05/30/21 0019 (!) 188/107 98.1 °F (36.7 °C) Temporal 92 16 92 %    05/29/21 1717 (!) 150/90 98.3 °F (36.8 °C) Temporal 85 20 97 %    05/29/21 1700 (!) 163/98 98.1 °F (36.7 °C)  82 20  178 lb 9.2 oz (81 kg)   05/29/21 1630 (!) 162/102   81      05/29/21 1600 (!) 163/104   79      05/29/21 1530 (!) 188/110   84      05/29/21 1500 (!) 189/109   86      05/29/21 1445 (!) 183/110 98.2 °F (36.8 °C)  81 22  180 lb 12.4 oz (82 kg)   @      Intake/Output Summary (Last 24 hours) at 5/30/2021 1205  Last data filed at 5/30/2021 1281  Gross per 24 hour   Intake 2211 ml   Output 2800 ml   Net -589 ml         Wt Readings from Last 3 Encounters:   05/29/21 178 lb 9.2 oz (81 kg)   11/14/20 160 lb (72.6 kg)   07/05/20 150 lb (68 kg)       Constitutional:  in no acute distress  HEENT: NC/AT, EOMI, sclera and conjunctiva are clear and anicteric, mucus membranes moist  Neck: Trachea midline, no JVD  Cardiovascular: S1, S2 regular rhythm, no murmur,or rub  Respiratory:  No crackles, no wheeze  Gastrointestinal:  Soft, nontender, nondistended, NABS  Ext: no edema, feet warm  Skin: dry, no rash  Neuro: awake, alert, interactive      DATA:    Recent Labs     05/28/21  0108 05/29/21  0231 05/30/21  0144   WBC 6.3 6.7 8.7   HGB 12.8 12.1* 12.3*   HCT 37.7 35.8* 37.1   MCV 99.0 100.3* 100.5*    152 154     Recent Labs     05/28/21  0108 05/29/21  0231 05/30/21  0144    137 141   K 4.7 4.3 4.6    103 107   CO2 17* 19* 19*   BUN 93* 88* 77*   CREATININE 10.8* 11.1* 9.5*   * 198* 191*   * 319* 301*   BILITOT 0.4 0.4 0.5   ALKPHOS 47 45 52       Lab Results   Component Value Date    LABPROT 4.2 (H) 05/25/2021    LABPROT 4.2 05/25/2021       ASSESSMENT / RECOMMENDATIONS:    1. CHANEL with history of metabolic acidosis and current oliguria / anuria   Probable ATN from presumptive sepsis and toxic substances. persistent oligoanuria, worsening CHANEL with worsening acidosis and stating initiation of hemodialysis. Case was discussed thoroughly with floor nurses and hemodialysis nurses will need to be getting contact with, family member either brother or son to get consent for placement of acute hemodialysis catheter, ASAP so we can start dialysis as early as tomorrow morning. Aggressive hydration did not help reverse the course of his CHANEL still has worsening his BUN/creatinine also has still evidence of transaminitis by his labs. Patient was started on dialysis on Saturday, with next treatment scheduled for Monday morning. No need for urgent dialysis today, no signs of uremia. 2. Anemia: Due to sepsis and CHANEL. 3.  Rhabdomyolysis on aggressive fluid hydration with frequent lab reviews. Probably toxic induced   With BMP and CPK  4. Presumed sepsis with turbid urine noted in his O[place]ana catheter. C&S pending   See orders  5. History of HIV and hepatitis C, following with ID.  6.  Altered mental status still current, neurology is following. 7.  Abnormalities liver function tests and transaminitis  8. Bacteremia with staph species, followed by ID. 9.  Polysubstance abuse, with metabolic encephalopathy. We will need  dialysis initiation today, post CT with contrast as per orthopedic surgery.   I spent ample time with brother and sister today counseling about dialysis acute need and further progression, leaving them all the options of continuation of withdrawal as deemed necessary by them.       Electronically signed by Werner Reaves MD on 5/30/2021 at 12:05 PM

## 2021-05-30 NOTE — PROGRESS NOTES
Hospitalist Progress Note      SYNOPSIS: Patient admitted on 5/24/2021 for weakness, altered mental status    Hospital Course: Leanne Canas is a 56 y.o. male with a medical history that includes HIV, hepatitis C, GERD, bacterial meningitis who presented to [facility] ER with left sided weakness, AMS. Patient unable to provide any history due to AMS, below is obtained from EMR and ER physician. Patient lethargic and a poor historian. Unknown last known well, stroke alert was called. CT imaging ultimately showed no perfusion mismatch or occlusion and patient was determined not to be TPA candidate or candidate for thrombectomy as per stroke neurologist.  Patient given 324 p.o. aspirin as per neurology.  Feel patient's presentation is more likely related to sepsis from unknown underlying infection, rectal temperature 100 °F, patient with labs showing acute renal failure with GFR 13 creatinine 5.6, elevated LFTs with AST 1108, ; UA positive for hematuria, no evidence for UTI.  Lactic acid is 3.9.  Leukocytosis 13.9, CBC does seem to show evidence for hemoconcentration.  Patient given 1 L NS bolus.  Rapid Covid is negative.   CXR does bilateral opacifications concerning for possible pneumonia.  Patient given broad-spectrum antibiotic treatment with vancomycin and cefepime.  Labs also significant for elevated troponin and BNP; CRP 19, procalcitonin 3.77, elevated trop and CK. UDS positive for cocaine and fentanyl.  On physical exam patient appears to have diffuse erythema and warmth over his left lateral neck and left upper flank up to his left axilla, there also appears to be a central region of swelling and a central possible insect bite or laceration with mild serous drainage, there does not appear to be any crepitus, induration, or evidence of focal abscess.  There is tenderness to palpation of these regions as well, concerns for possible cellulitis as infectious source.  During this encounter patient did remain ROM, peripheral edema, mottling      OBJECTIVE:    BP (!) 161/96   Pulse 96   Temp 98.3 °F (36.8 °C) (Temporal)   Resp 16   Ht 5' 8\" (1.727 m)   Wt 178 lb 9.2 oz (81 kg)   SpO2 95%   BMI 27.15 kg/m²     General appearance:  Sleeping on his left side with head turned toward the left; appears older than documented age; NAD, no labored breathing  HEENT:  Conjunctivae/corneas clear. Mucous membranes moist.  EOMI. No scleral icterus. Neck: No JVD. Trachea midline. Respiratory: symmetrical; clear to auscultation bilaterally; no wheezes, rhonchi or rales  Cardiovascular: rhythm regular; rate controlled; no murmurs, rubs or gallop  Abdomen: Soft, nontender, nondistended  Extremities:  peripheral pulses present; no ulcers  Musculoskeletal: No clubbing, cyanosis.  Left arm/neck swelling/erythema  Skin:  Left neck/left upper extremity/left flank/torso erythema/heat/edema/ecchymosis improved from yesterday, still has some blistering and open weeping areas noted LUE  Neurologic: sleeping, easily arousable, follows commands, limited movement left side  Psychiatric: non-anxious affect, cooperative    ASSESSMENT    Altered mental status with left sided weakness  Acute metabolic encephalopathy secondary to acute renal failure  CHANEL on CKD, likely secondary to ATN requiring dialysis  Transaminitis  Dehydration  Cellulitis  Cocaine use disorder  HIV  Hepatitis C  Rhabdomyolysis likely drug induced  Early compartment syndrome versus brachial plexopathy versus acute stroke  Alcohol use disorder    PLAN:    - Continue home medications for chronic stable medical conditions  - ortho does not feel this is compartment syndrome left arm  - neurology consulted, wanted MRI brain but patient not able to tolerate, ordered CT head instead  - nephrology consulted  - PT/OT/speech evaluations ordered  - patient unable to tolerate full MRI, images obtained concerning for extensive sq edema, recommended CT soft tissue neck with contrast- due to CHANEL concern with contrast  - temporary dialysis catheter placed by vascular surgery  - did not respond to aggressive hydration  - ID follows patient outpatient for HIV and hepatitis C  - staph bacteremia, ID following  - [facility] protocol  - oxy 2.5-5mg prn pain, hold for sedation, patient reports pain better today  - CT with contrast neck soft tissues consistent with rhabdo  - consider EEG    DISPOSITION:   DVT Prophylaxis: heparin  Diet: DIET RENAL;  Code Status: Limited    PT/OT Eval Status: PRN    Dispo - to be determined pending clinical improvement, work-up and clearance by consulting services.     Medications:  REVIEWED DAILY    Infusion Medications    sodium chloride      sodium chloride 100 mL/hr at 05/30/21 0430     Scheduled Medications    sodium chloride flush  5-40 mL Intravenous 2 times per day    thiamine  100 mg Oral Daily    multivitamin  1 tablet Oral Daily    melatonin  6 mg Oral Nightly    LORazepam  0.5 mg Intravenous Once    sodium bicarbonate  1,300 mg Oral 4x Daily    clopidogrel  75 mg Oral Daily    heparin (porcine)  5,000 Units Subcutaneous 3 times per day    emtricitabine-tenofovir alafenamide  1 tablet Oral Daily    dolutegravir sodium  50 mg Oral Nightly    aspirin  81 mg Oral Daily     PRN Meds: sodium chloride flush, sodium chloride, LORazepam **OR** LORazepam **OR** LORazepam **OR** LORazepam **OR** LORazepam **OR** LORazepam **OR** LORazepam **OR** LORazepam, oxyCODONE **OR** oxyCODONE, perflutren lipid microspheres    Labs:     Recent Labs     05/28/21 0108 05/29/21 0231 05/30/21  0144   WBC 6.3 6.7 8.7   HGB 12.8 12.1* 12.3*   HCT 37.7 35.8* 37.1    152 154       Recent Labs     05/28/21 0108 05/29/21 0231 05/30/21  0144    137 141   K 4.7 4.3 4.6    103 107   CO2 17* 19* 19*   BUN 93* 88* 77*   CREATININE 10.8* 11.1* 9.5*   CALCIUM 7.2* 7.3* 7.6*       Recent Labs     05/28/21 0108 05/29/21 0231 05/30/21  0144   PROT 6.1* 5.8* 6.2*   ALKPHOS 47 45 52   * 198* 191*   * 319* 301*   BILITOT 0.4 0.4 0.5       No results for input(s): INR in the last 72 hours.     Recent Labs     05/29/21  0653 05/29/21  1912 05/30/21  0646   CKTOTAL 20,451* 16,280* 14,307*       Chronic labs:    Lab Results   Component Value Date    CHOL 191 05/25/2021    TRIG 279 (H) 05/25/2021    HDL 45 05/25/2021    LDLCALC 90 05/25/2021    TSH 3.720 03/08/2017    PSA SEE NOTE (AA) 03/08/2017    PSA 0.47 03/08/2017    INR 1.4 05/26/2021    LABA1C 5.9 (H) 05/25/2021       Radiology: REVIEWED DAILY    +++++++++++++++++++++++++++++++++++++++++++++++++  Jhon Cuevas, [address], [place]  +++++++++++++++++++++++++++++++++++++++++++++++++

## 2021-05-30 NOTE — PLAN OF CARE
Plan of care    Patient unable to tolerate MRI brain as his MRV he was unable to tolerate and only few slices were able to be completed. Repeat CT head was completed which was negative for acute findings. No further neurologic work-up. Discussed with Dr. Srinivasan Cui. Patient's left-sided weakness likely due to severe rhabdo and ARF.     Plan:  DAPT x 21 days followed by aspirin thereafter  Neurology to sign off

## 2021-05-30 NOTE — PROGRESS NOTES
Department of Internal Medicine  Infectious Diseases  Progress Note    C/C :  HIV infection, change in mental status     Pt is more awake and alert  Reports neck pain   Denies fever   Reports left arm pain,swelling   Afebrile       Current Facility-Administered Medications   Medication Dose Route Frequency Provider Last Rate Last Admin    sodium chloride flush 0.9 % injection 5-40 mL  5-40 mL Intravenous 2 times per day Martinez Pontiff, DO   10 mL at 05/29/21 2110    sodium chloride flush 0.9 % injection 5-40 mL  5-40 mL Intravenous PRN Martinez Pontiff, DO        0.9 % sodium chloride infusion  25 mL Intravenous PRN Martinez Pontiff, DO        thiamine mononitrate tablet 100 mg  100 mg Oral Daily Martinez Pontiff, DO   100 mg at 05/30/21 7186    multivitamin 1 tablet  1 tablet Oral Daily Martinez Pontiff, DO   1 tablet at 05/30/21 2065    LORazepam (ATIVAN) tablet 1 mg  1 mg Oral Q1H PRN Martinez Pontiff, DO        Or    LORazepam (ATIVAN) injection 1 mg  1 mg Intravenous Q1H PRN Martinez Pontiff, DO   1 mg at 05/30/21 9383    Or    LORazepam (ATIVAN) tablet 2 mg  2 mg Oral Q1H PRN Martinez Pontiff, DO        Or    LORazepam (ATIVAN) injection 2 mg  2 mg Intravenous Q1H PRN Martinez Pontiff, DO        Or    LORazepam (ATIVAN) tablet 3 mg  3 mg Oral Q1H PRN Martinez Pontiff, DO        Or    LORazepam (ATIVAN) injection 3 mg  3 mg Intravenous Q1H PRN Martinez Pontiff, DO        Or    LORazepam (ATIVAN) tablet 4 mg  4 mg Oral Q1H PRN Martinez Pontiff, DO        Or    LORazepam (ATIVAN) injection 4 mg  4 mg Intravenous Q1H PRN Martinez Pontiff, DO        melatonin tablet 6 mg  6 mg Oral Nightly Martinez Pontiff, DO        oxyCODONE (ROXICODONE) immediate release tablet 2.5 mg  2.5 mg Oral Q6H PRN Martinez Pontiff, DO        Or    oxyCODONE (ROXICODONE) immediate release tablet 5 mg  5 mg Oral Q6H PRN Martinez Pontiff, DO   5 mg at 05/30/21 0620    LORazepam (ATIVAN) injection 0.5 mg  0.5 mg Intravenous Once Katya Bolanos MD        sodium bicarbonate tablet 1,300 mg  1,300 mg Oral 4x Daily Yolie Betancur MD   1,300 mg at 05/30/21 0814    0.9 % sodium chloride infusion   Intravenous Continuous Yolie Al MD 50 mL/hr at 05/30/21 0956 Rate Change at 05/30/21 0956    clopidogrel (PLAVIX) tablet 75 mg  75 mg Oral Daily Prudence [place]lee [place]. [place], APN   75 mg at 05/30/21 0815    heparin (porcine) injection 5,000 Units  5,000 Units Subcutaneous 3 times per day Mehnaz Jimenez MD   5,000 Units at 05/30/21 0620    emtricitabine-tenofovir alafenamide (DESCOVY) 200-25 MG per tablet 1 tablet  1 tablet Oral Daily Joanna Giordano MD   1 tablet at 05/30/21 0815    dolutegravir sodium (TIVICAY) tablet 50 mg  50 mg Oral Nightly Oli Garcia MD   50 mg at 05/29/21 2110    aspirin chewable tablet 81 mg  81 mg Oral Daily Verlinda Repine, DO   81 mg at 05/30/21 8021    perflutren lipid microspheres (DEFINITY) injection 1.65 mg  1.5 mL Intravenous ONCE PRN Verlinda Repine, DO             REVIEW OF SYSTEMS:    CONSTITUTIONAL:  Denies fever   HEENT: Neck pain   RESPIRATORY: denies cough, shortness of breath, sputum expectoration  CARDIOVASCULAR:  Denies palpitation  GASTROINTESTINAL:  Denies abdomen pain, diarrhea or constipation. GENITOURINARY:  Denies burning urination or frequency of urination  INTEGUMENT: denies wound , rash  HEMATOLOGIC/LYMPHATIC:  Denies lymph node swelling, gum bleeding or easy bruising. MUSCULOSKELETAL:  Denies leg pain , joint pain , joint swelling  NEUROLOGICAL:  Change in mental status     PHYSICAL EXAM:      Vitals:     BP (!) 161/96   Pulse 96   Temp 98.3 °F (36.8 °C) (Temporal)   Resp 16   Ht 5' 8\" (1.727 m)   Wt 178 lb 9.2 oz (81 kg)   SpO2 95%   BMI 27.15 kg/m²     General Appearance:     More awake  . Head:    Normocephalic, atraumatic   Eyes:    No pallor, no icterus,   Ears:    No obvious deformity or drainage.    Nose:   No nasal drainage   Throat: Mucosa moist, no oral thrush   Neck:   Supple, mild swelling    Lungs:     Scattered rhonchi     Heart:    Regular rate and rhythm, no murmur   Abdomen:     Soft, non-tender, bowel sounds present    Extremities:   Left upper arm erythema,diffuse edema    Pulses:   Dorsalis pedis palpable    Skin:   no rashes or lesions     CBC with Differential:      Lab Results   Component Value Date    WBC 8.7 05/30/2021    RBC 3.69 05/30/2021    HGB 12.3 05/30/2021    HCT 37.1 05/30/2021     05/30/2021    .5 05/30/2021    MCH 33.3 05/30/2021    MCHC 33.2 05/30/2021    RDW 13.6 05/30/2021    SEGSPCT 47 01/06/2014    LYMPHOPCT 13.2 05/30/2021    MONOPCT 12.0 05/30/2021    BASOPCT 0.7 05/30/2021    MONOSABS 1.04 05/30/2021    LYMPHSABS 1.15 05/30/2021    EOSABS 0.52 05/30/2021    BASOSABS 0.06 05/30/2021       CMP     Lab Results   Component Value Date     05/30/2021    K 4.6 05/30/2021     05/30/2021    CO2 19 05/30/2021    BUN 77 05/30/2021    CREATININE 9.5 05/30/2021    GFRAA 7 05/30/2021    LABGLOM 7 05/30/2021    GLUCOSE 95 05/30/2021    GLUCOSE 83 01/26/2012    PROT 6.2 05/30/2021    LABALBU 2.6 05/30/2021    LABALBU 4.2 01/17/2012    CALCIUM 7.6 05/30/2021    BILITOT 0.5 05/30/2021    ALKPHOS 52 05/30/2021     05/30/2021     05/30/2021         Hepatic Function Panel:    Lab Results   Component Value Date    ALKPHOS 52 05/30/2021     05/30/2021     05/30/2021    PROT 6.2 05/30/2021    BILITOT 0.5 05/30/2021    BILIDIR 0.3 03/13/2015    IBILI 1.3 03/13/2015    LABALBU 2.6 05/30/2021    LABALBU 4.2 01/17/2012       PT/INR:    Lab Results   Component Value Date    PROTIME 14.9 05/26/2021    INR 1.4 05/26/2021       TSH:    Lab Results   Component Value Date    TSH 3.720 03/08/2017       U/A:    Lab Results   Component Value Date    COLORU DARK YELLOW 05/25/2021    PHUR 5.0 05/25/2021    LABCAST FEW  11/02/2011    WBCUA NONE 05/25/2021    WBCUA NONE 01/26/2012    RBCUA 5-10 05/25/2021    RBCUA NONE 01/26/2012    BACTERIA FEW 05/25/2021    CLARITYU Clear 05/25/2021    SPECGRAV 1.025 05/25/2021    LEUKOCYTESUR Negative 05/25/2021    UROBILINOGEN 0.2 05/25/2021    BILIRUBINUR SMALL 05/25/2021    BILIRUBINUR NEGATIVE 01/26/2012    BLOODU LARGE 05/25/2021    GLUCOSEU Negative 05/25/2021    GLUCOSEU NEGATIVE 01/26/2012    AMORPHOUS MODERATE 05/25/2021       ABG:  No results found for: Clista [place], PHART, THGBART, [id number], PO2ART, [id number]    MICROBIOLOGY:    Blood culture -     Bottle Type Anaerobic    Source of Blood Culture Antecubital-Lef    Order Number [id number]    Enterobacter cloacae complex by PCR Not Detected    Escherichia coli by PCR Not Detected    Klebsiella oxytoca by PCR Not Detected    Klebsiella pneumoniae group by PCR Not Detected    Proteus species by PCR Not Detected    Streptococcus agalactiae by PCR Not Detected    Staphylococcus aureus by PCR Not Detected    Serratia marcescens by PCR Not Detected    Streptococcus pneumoniae by PCR Not Detected    Streptococcus pyogenes  by PCR Not Detected    Acinetobacter baumannii by PCR Not Detected    Candida albicans by PCR Not Detected    Candida glabrata by PCR Not Detected    Candida krusei by PCR Not Detected    Candida parapsilosis by PCR Not Detected    Candida tropicalis by PCR Not Detected     Enterobacteriaceae by PCR Not Detected    Enterococcus by PCR Not Detected    Haemophilus Influenzae by PCR Not Detected    Listeria monocytogenes by PCR Not Detected    Neisseria meningitidis by PCR Not Detected    Pseudomonas aeruginosa by PCR Not Detected    Staphylococcus species by PCR DETECTEDPanic      Streptococcus species by PCR Not Detected    Methicillin Resistance mecA/C  by PCR Not Detected   Narrative:           SARS CoV 2 NAAT neg       Radiology :    CT chest -       Ill-defined soft tissue thickening and fat stranding involving the visualized   base of the left neck, supraclavicular region and throughout the left lateral   chest wall.  This is nonspecific.  Infectious/inflammatory process such as   cellulitis is a consideration.  Ill-defined hematoma is also a consideration. Recommend correlation with clinical presentation.       Emphysematous changes.       Areas of atelectasis and patchy airspace opacities most evident in the lower   lung zones.  Developing infiltrates from pneumonia not excluded.  Continued   follow-up recommended.       CT abdomen and pelvis:       Exam limited by patient motion.       Urinary bladder thickening may be related to underdistention.  Cystitis not   excluded.       Other chronic appearing findings.  Short-term follow-up if symptoms persist.           Impression:     1.  Diffuse nonspecific soft tissue stranding and superficial edema about the   mid to distal aspect of the left humerus particularly along the posterior and   lateral aspects.  No intramuscular fluid collections identified on this exam.     2.  No acute osseous findings about the left humerus. 3.  Mild left shoulder and elbow degenerative changes. RECOMMENDATIONS:   These imaging features are nonspecific.  If there is high concern for acute   compartment syndrome based on clinical history and clinical findings         Vanco random 11.2      IMPRESSION:    1. HIV infection - controlled as of June 2020    2. Chronic Hep C infection   3. Rhabdomyolysis   4. Encephalopathy, Cocaine use   5. CONS bacteremia - contamination       RECOMMENDATIONS:      1. Vancomycin 1 gram X 1  2. HIV viral load, CD4, Hep C viral load   3.   [place]

## 2021-05-31 NOTE — PROGRESS NOTES
Hospitalist Progress Note      SYNOPSIS: Patient admitted on 5/24/2021 for weakness, altered mental status    Hospital Course: Kim Tena is a 56 y.o. male with a medical history that includes HIV, hepatitis C, GERD, bacterial meningitis who presented to [address] [place] [address] ER with left sided weakness, AMS. Patient unable to provide any history due to AMS, below is obtained from EMR and ER physician. Patient lethargic and a poor historian. Unknown last known well, stroke alert was called. CT imaging ultimately showed no perfusion mismatch or occlusion and patient was determined not to be TPA candidate or candidate for thrombectomy as per stroke neurologist.  Patient given 324 p.o. aspirin as per neurology.  Feel patient's presentation is more likely related to sepsis from unknown underlying infection, rectal temperature 100 °F, patient with labs showing acute renal failure with GFR 13 creatinine 5.6, elevated LFTs with AST 1108, ; UA positive for hematuria, no evidence for UTI.  Lactic acid is 3.9.  Leukocytosis 13.9, CBC does seem to show evidence for hemoconcentration.  Patient given 1 L NS bolus.  Rapid Covid is negative.   CXR does bilateral opacifications concerning for possible pneumonia.  Patient given broad-spectrum antibiotic treatment with vancomycin and cefepime.  Labs also significant for elevated troponin and BNP; CRP 19, procalcitonin 3.77, elevated trop and CK. UDS positive for cocaine and fentanyl.  On physical exam patient appears to have diffuse erythema and warmth over his left lateral neck and left upper flank up to his left axilla, there also appears to be a central region of swelling and a central possible insect bite or laceration with mild serous drainage, there does not appear to be any crepitus, induration, or evidence of focal abscess.  There is tenderness to palpation of these regions as well, concerns for possible cellulitis as infectious source.  During this encounter patient did remain alert, he answers some questions but is oriented only to self, he is not able to provide very much history but is able to tell us where his pain is.  He does not have any abdominal tenderness to palpation.  Lungs with mild rales bilaterally scattered.  Decision made to note this patient due to altered mental status, acute renal failure, transaminitis, dehydration, and sepsis with unknown source.  Discussed results and plan with the patient's son, he voices understanding and is amenable    SUBJECTIVE:    Patient seen and examined, reports improvement in his left sided arm/neck pain, able to lay on that side, turn his head and sleep on that side. Records reviewed. Pt less confused than previous  Has LUE swelling that he feels is worsened   Patient's sister at bedside, questions answered. Stable overnight. No other overnight issues reported. Temp (24hrs), Av.1 °F (36.7 °C), Min:98 °F (36.7 °C), Max:98.2 °F (36.8 °C)    DIET: DIET RENAL;  CODE: Limited    Intake/Output Summary (Last 24 hours) at 2021 1024  Last data filed at 2021 1021  Gross per 24 hour   Intake 2053.83 ml   Output 825 ml   Net 1228.83 ml       OBJECTIVE:    BP (!) 167/103   Pulse 101   Temp 98.2 °F (36.8 °C) (Temporal)   Resp 20   Ht 5' 8\" (1.727 m)   Wt 178 lb 9.2 oz (81 kg)   SpO2 93%   BMI 27.15 kg/m²     General appearance:  Sleeping on his left side with head turned toward the left; appears older than documented age; NAD, no labored breathing  HEENT:  Conjunctivae/corneas clear. Mucous membranes moist.  EOMI. No scleral icterus. Neck: No JVD. Trachea midline. Respiratory: symmetrical; clear to auscultation bilaterally; no wheezes, rhonchi or rales  Cardiovascular: rhythm regular; rate controlled; no murmurs, rubs or gallop  Abdomen: Soft, nontender, nondistended  Extremities:  peripheral pulses present; no ulcers  Musculoskeletal: No clubbing, cyanosis.  Left arm/neck swelling/erythema  Skin:  Left neck/left upper extremity/left flank/torso erythema/heat/edema/ecchymosis improved from yesterday, still has some blistering and open weeping areas noted LUE  Neurologic: sleeping, easily arousable, follows commands, limited movement left side  Psychiatric: non-anxious affect, cooperative    ASSESSMENT    Altered mental status with left sided weakness  Acute metabolic encephalopathy secondary to acute renal failure  CHANEL on CKD, likely secondary to ATN requiring dialysis  Transaminitis  Dehydration  Cellulitis  Cocaine use disorder  HIV  Hepatitis C  Rhabdomyolysis likely drug induced  Early compartment syndrome versus brachial plexopathy versus acute stroke  Alcohol use disorder  Suspect DVT of LUE    PLAN:    - Continue home medications for chronic stable medical conditions  - ortho does not feel this is compartment syndrome left arm  - nephrology consulted; appreciate recs  - PT/OT/speech evaluations ordered  - temporary dialysis catheter placed by vascular surgery and pt underwent dialysis 2 days ago, with next dialysis scheduled for today   - did not respond to aggressive hydration  - ID follows patient outpatient for HIV and hepatitis C  - staph bacteremia, ID following  - CIWA protocol discontinued  -MRI brain reordered as patient is less confused  -US to R/O DVT of LUE; will start on heparin drip in interim given high suspicion of DVT   - oxy 2.5-5mg prn pain, hold for sedation, patient reports pain better today  - CT with contrast neck soft tissues consistent with rhabdo  - Per neurology pt to get DAPT x 21 days followed by aspirin thereafter     DISPOSITION:   DVT Prophylaxis: heparin  Diet: DIET RENAL;  Code Status: Limited    PT/OT Eval Status: PRN    Dispo - to be determined pending clinical improvement, work-up and clearance by consulting services.     Medications:  REVIEWED DAILY    Infusion Medications    sodium chloride      sodium chloride 50 mL/hr at 05/31/21 8840     Scheduled Medications    sodium

## 2021-05-31 NOTE — PROGRESS NOTES
Received call from patient' sister inquiring about some information about patient. Per patient's sister she has medical power of  and will be providing paperwork to the hospital. Explained that [facility] [employer] access could be set up for power of  if paperwork was provided.

## 2021-05-31 NOTE — PROGRESS NOTES
Department of Internal Medicine  Infectious Diseases  Progress Note    C/C :  HIV infection, change in mental status     Pt is more awake and alert  Reports neck pain   Denies fever   Reports left arm pain,swelling   Afebrile       Current Facility-Administered Medications   Medication Dose Route Frequency Provider Last Rate Last Admin    gabapentin (NEURONTIN) capsule 100 mg  100 mg Oral TID Anusha Soto MD        heparin (porcine) injection 6,480 Units  80 Units/kg Intravenous Once Anusha Soto MD        heparin (porcine) injection 6,480 Units  80 Units/kg Intravenous PRN Anusha Soto MD        heparin (porcine) injection 3,240 Units  40 Units/kg Intravenous PRN Anusha Soto MD        heparin 25,000 units in dextrose 5% 250 mL (premix) infusion  5-30 Units/kg/hr Intravenous Continuous Anusha Soto MD        LORazepam (ATIVAN) injection 2 mg  2 mg Intravenous Once Anusha Soto MD        sodium chloride flush 0.9 % injection 5-40 mL  5-40 mL Intravenous 2 times per day Rad Catherine, DO   10 mL at 05/29/21 2110    sodium chloride flush 0.9 % injection 5-40 mL  5-40 mL Intravenous PRN Rad Catherine, DO        0.9 % sodium chloride infusion  25 mL Intravenous PRN Rad Catherine, DO        thiamine mononitrate tablet 100 mg  100 mg Oral Daily Rad Catherine, DO   100 mg at 05/31/21 6827    multivitamin 1 tablet  1 tablet Oral Daily Rad Catherine, DO   1 tablet at 05/31/21 0146    melatonin tablet 6 mg  6 mg Oral Nightly Rad Catherine, DO   6 mg at 05/30/21 2245    oxyCODONE (ROXICODONE) immediate release tablet 2.5 mg  2.5 mg Oral Q6H PRN Rad Catherine, DO        Or    oxyCODONE (ROXICODONE) immediate release tablet 5 mg  5 mg Oral Q6H PRN Rad Catherine, DO   5 mg at 05/31/21 9486    LORazepam (ATIVAN) injection 0.5 mg  0.5 mg Intravenous Once Rani Lara MD        sodium bicarbonate tablet 1,300 mg  1,300 mg Oral 4x Daily Yolie Reyes MD   1,300 mg at 05/31/21 0584    0.9 % sodium chloride infusion   Intravenous Continuous Melkon Odalis Aldana MD 50 mL/hr at 05/31/21 0625 Rate Verify at 05/31/21 0625    clopidogrel (PLAVIX) tablet 75 mg  75 mg Oral Daily Stephie Melton. POLI Zavala   75 mg at 05/31/21 2842    emtricitabine-tenofovir alafenamide (DESCOVY) 200-25 MG per tablet 1 tablet  1 tablet Oral Daily Wayne Cranker, MD   1 tablet at 05/31/21 1067    dolutegravir sodium (TIVICAY) tablet 50 mg  50 mg Oral Nightly Oli Garcia MD   50 mg at 05/30/21 2245    aspirin chewable tablet 81 mg  81 mg Oral Daily Jose David Parisi, DO   81 mg at 05/31/21 7700    perflutren lipid microspheres (DEFINITY) injection 1.65 mg  1.5 mL Intravenous ONCE PRN Jose David Parisi,              REVIEW OF SYSTEMS:    CONSTITUTIONAL:  Denies fever   HEENT: Neck pain   RESPIRATORY: denies cough, shortness of breath, sputum expectoration  CARDIOVASCULAR:  Denies palpitation  GASTROINTESTINAL:  Denies abdomen pain, diarrhea or constipation. GENITOURINARY:  Denies burning urination or frequency of urination  INTEGUMENT: denies wound , rash  HEMATOLOGIC/LYMPHATIC:  Denies lymph node swelling, gum bleeding or easy bruising. MUSCULOSKELETAL:  Denies leg pain , joint pain , joint swelling  NEUROLOGICAL:  Change in mental status     PHYSICAL EXAM:      Vitals:     BP (!) 143/96   Pulse 98   Temp 97.1 °F (36.2 °C)   Resp 20   Ht 5' 8\" (1.727 m)   Wt 182 lb 12.2 oz (82.9 kg)   SpO2 93%   BMI 27.79 kg/m²     General Appearance:     More awake  . Head:    Normocephalic, atraumatic   Eyes:    No pallor, no icterus,   Ears:    No obvious deformity or drainage.    Nose:   No nasal drainage   Throat:   Mucosa moist, no oral thrush   Neck:   Supple, mild swelling    Lungs:     Scattered rhonchi     Heart:    Regular rate and rhythm, no murmur   Abdomen:     Soft, non-tender, bowel sounds present    Extremities:   Left upper arm erythema,diffuse edema    Pulses:   Dorsalis pedis palpable    Skin:   no rashes or lesions     CBC with Differential:      Lab Results   Component Value Date    WBC 9.5 05/31/2021    RBC 3.58 05/31/2021    HGB 12.2 05/31/2021    HCT 36.3 05/31/2021     05/31/2021    .4 05/31/2021    MCH 34.1 05/31/2021    MCHC 33.6 05/31/2021    RDW 13.6 05/31/2021    SEGSPCT 47 01/06/2014    LYMPHOPCT 14.4 05/31/2021    MONOPCT 10.4 05/31/2021    BASOPCT 0.7 05/31/2021    MONOSABS 0.99 05/31/2021    LYMPHSABS 1.37 05/31/2021    EOSABS 0.65 05/31/2021    BASOSABS 0.07 05/31/2021       CMP     Lab Results   Component Value Date     05/31/2021    K 4.3 05/31/2021     05/31/2021    CO2 21 05/31/2021    BUN 76 05/31/2021    CREATININE 9.4 05/31/2021    GFRAA 7 05/31/2021    LABGLOM 7 05/31/2021    GLUCOSE 90 05/31/2021    GLUCOSE 83 01/26/2012    PROT 5.9 05/31/2021    LABALBU 2.5 05/31/2021    LABALBU 4.2 01/17/2012    CALCIUM 7.7 05/31/2021    BILITOT 0.6 05/31/2021    ALKPHOS 52 05/31/2021     05/31/2021     05/31/2021         Hepatic Function Panel:    Lab Results   Component Value Date    ALKPHOS 52 05/31/2021     05/31/2021     05/31/2021    PROT 5.9 05/31/2021    BILITOT 0.6 05/31/2021    BILIDIR 0.3 03/13/2015    IBILI 1.3 03/13/2015    LABALBU 2.5 05/31/2021    LABALBU 4.2 01/17/2012       PT/INR:    Lab Results   Component Value Date    PROTIME 14.9 05/26/2021    INR 1.4 05/26/2021       TSH:    Lab Results   Component Value Date    TSH 3.720 03/08/2017       U/A:    Lab Results   Component Value Date    COLORU DARK YELLOW 05/25/2021    PHUR 5.0 05/25/2021    LABCAST FEW  11/02/2011    WBCUA NONE 05/25/2021    WBCUA NONE 01/26/2012    RBCUA 5-10 05/25/2021    RBCUA NONE 01/26/2012    BACTERIA FEW 05/25/2021    CLARITYU Clear 05/25/2021    SPECGRAV 1.025 05/25/2021    LEUKOCYTESUR Negative 05/25/2021    UROBILINOGEN 0.2 05/25/2021    BILIRUBINUR SMALL 05/25/2021    BILIRUBINUR NEGATIVE 01/26/2012    BLOODU LARGE 05/25/2021    GLUCOSEU Negative opacities most evident in the lower   lung zones.  Developing infiltrates from pneumonia not excluded.  Continued   follow-up recommended.       CT abdomen and pelvis:       Exam limited by patient motion.       Urinary bladder thickening may be related to underdistention.  Cystitis not   excluded.       Other chronic appearing findings.  Short-term follow-up if symptoms persist.           Impression:     1.  Diffuse nonspecific soft tissue stranding and superficial edema about the   mid to distal aspect of the left humerus particularly along the posterior and   lateral aspects.  No intramuscular fluid collections identified on this exam.     2.  No acute osseous findings about the left humerus. 3.  Mild left shoulder and elbow degenerative changes. RECOMMENDATIONS:   These imaging features are nonspecific.  If there is high concern for acute   compartment syndrome based on clinical history and clinical findings         Vanco random 11.2      Absolute CD 3 247Low   570 - 2400 cells/uL Final 05/25/2021 10:41 PM [facility]   Absolute CD 8 (Supp) 199Low   210 - 1200 cells/uL Final 05/25/2021 10:41 PM [facility]   CD4/CD8 Ratio 0.23Low   0.80 - 3.90 ratio Final 05/25/2021 10:41 PM [facility]   Absolute CD 4 [place] 45Low   430 - 1800 cells/uL Final 05/25/2021 10:41 PM [facility]   Testing Performed By        IMPRESSION:    1. HIV infection / AIDS (CD 4 45 )   2. Chronic Hep C infection - cleared   3. Rhabdomyolysis   4. Encephalopathy, Cocaine use   5. CONS bacteremia - contamination       RECOMMENDATIONS:      1. Vancomycin PRN   2. Await HIV viral load - start prophylaxis if viral load not suppressed   3.   Malcolm Green

## 2021-05-31 NOTE — FLOWSHEET NOTE
05/31/21 1512   Vital Signs   BP (!) 143/96   Temp 97.1 °F (36.2 °C)   Pulse 98   Resp 18   Weight 181 lb 7 oz (82.3 kg)   Weight Method Bed scale   Percent Weight Change -0.72   Pain Assessment   Pain Assessment 0-10   Pain Level 0   Post-Hemodialysis Assessment   Post-Treatment Procedures Blood returned;Catheter capped, clamped and heparinized x 2 ports   Machine Disinfection Process Exterior Machine Disinfection;Bleach; Machine Absence of Bleach Machine;Acid/Vinegar Clean   Rinseback Volume (ml) 300 ml   Total Liters Processed (l/min) 25.6 l/min   Dialyzer Clearance Moderately streaked   Duration of Treatment (minutes) 150 minutes   Heparin amount administered during treatment (units) 0 units   Hemodialysis Intake (ml) 300 ml   Hemodialysis Output (ml) 1235 ml   NET Removed (ml) 935 ml   Tolerated Treatment Good   Patient Response to Treatment tolerated well profile b, refill noted 935cc fluid removal   Bilateral Breath Sounds Diminished   Physician Notified?  No

## 2021-05-31 NOTE — PROGRESS NOTES
[facility] MD Juan Wall, MD Gee Carrillo, MD Gregory Hood, CORINNA Cha, AUGUSTINE  Progress Note    5/31/2021    SUBJECTIVE:   (-) c/o's  (-) sob/elkins/cp/palp , increased alertness since yesterday. Currently on soft arm restraints. Patient still confused, yelling with no apparent shortness of breath. Persistent altered mental status due to multiple medical problems and iatrogenic consumption history of cocaine/fentanyl. Patient is currently getting evaluation for any fractures to the shoulder and arm pain with orthopedic consultation. 5/29: Patient doing better more awake and alert this morning, family is at the bedside both sister and brother with whom I had thorough discussion for future care in terms of renal failure another comorbid conditions. Patient scheduled for CT with contrast as per orthopedics, to follow-up with hemodialysis initiation this afternoon. Second treatment hemodialysis will be Monday. 5/30: Patient doing remarkably better, in terms of his orientation, anxiety. 5/31: Patient was seen in his room, interviewed and examined. Today he is more focused on the swelling in his left upper extremity that got much worse than baseline. With inability of the patient to move it except using his right arm for his motion. Case will be discussed with primary care physician the next few minutes, to proceed with asking venous duplex scan to rule out DVT. PROBLEM LIST:    Active Problems:    AMS (altered mental status)    Left-sided weakness    Goals of care, counseling/discussion    Palliative care by specialist    Encounter regarding vascular access for dialysis for ESRD [facility])  Resolved Problems:    * No resolved hospital problems.  *         DIET:    DIET RENAL;     MEDS (scheduled):    gabapentin  100 mg Oral TID    heparin (porcine)  80 Units/kg Intravenous Once    LORazepam  2 mg Intravenous Once    sodium chloride flush  5-40 mL Intravenous 2 times per day    thiamine  100 mg Oral Daily    multivitamin  1 tablet Oral Daily    melatonin  6 mg Oral Nightly    LORazepam  0.5 mg Intravenous Once    sodium bicarbonate  1,300 mg Oral 4x Daily    clopidogrel  75 mg Oral Daily    emtricitabine-tenofovir alafenamide  1 tablet Oral Daily    dolutegravir sodium  50 mg Oral Nightly    aspirin  81 mg Oral Daily       MEDS (infusions):   heparin (PORCINE) Infusion      sodium chloride      sodium chloride 50 mL/hr at 05/31/21 7070       MEDS (prn):  heparin (porcine), heparin (porcine), sodium chloride flush, sodium chloride, oxyCODONE **OR** oxyCODONE, perflutren lipid microspheres    PHYSICAL EXAM:     Patient Vitals for the past 24 hrs:   BP Temp Temp src Pulse Resp SpO2   05/31/21 0818 (!) 167/103 98.2 °F (36.8 °C) Temporal 101 20 93 %   05/30/21 2225 (!) 184/102 98 °F (36.7 °C) Oral 84 18 100 %   05/30/21 1512 (!) 158/94 98.1 °F (36.7 °C) Temporal 83 16 95 %   @      Intake/Output Summary (Last 24 hours) at 5/31/2021 1239  Last data filed at 5/31/2021 1021  Gross per 24 hour   Intake 2053.83 ml   Output 825 ml   Net 1228.83 ml         Wt Readings from Last 3 Encounters:   05/29/21 178 lb 9.2 oz (81 kg)   11/14/20 160 lb (72.6 kg)   07/05/20 150 lb (68 kg)       Constitutional:  in no acute distress  HEENT: NC/AT, EOMI, sclera and conjunctiva are clear and anicteric, mucus membranes moist  Neck: Trachea midline, no JVD  Cardiovascular: S1, S2 regular rhythm, no murmur,or rub  Respiratory:  No crackles, no wheeze  Gastrointestinal:  Soft, nontender, nondistended, NABS  Ext: no edema, feet warm  Skin: dry, no rash  Neuro: awake, alert, interactive      DATA:    Recent Labs     05/29/21  0231 05/30/21  0144 05/31/21  0450   WBC 6.7 8.7 9.5   HGB 12.1* 12.3* 12.2*   HCT 35.8* 37.1 36.3*   .3* 100.5* 101.4*    154 168     Recent Labs     05/29/21  0231 05/30/21  0144 05/31/21  0450    141 143   K 4.3 4.6 4.3  107 107   CO2 19* 19* 21*   BUN 88* 77* 76*   CREATININE 11.1* 9.5* 9.4*   * 191* 150*   * 301* 204*   BILITOT 0.4 0.5 0.6   ALKPHOS 45 52 52       Lab Results   Component Value Date    LABPROT 4.2 (H) 05/25/2021    LABPROT 4.2 05/25/2021       ASSESSMENT / RECOMMENDATIONS:    1. CHANEL with history of metabolic acidosis and current oliguria / anuria   Probable ATN from presumptive sepsis and toxic substances. persistent oligoanuria, worsening CHANEL with worsening acidosis and stating initiation of hemodialysis. Case was discussed thoroughly with floor nurses and hemodialysis nurses will need to be getting contact with, family member either brother or son to get consent for placement of acute hemodialysis catheter, ASAP so we can start dialysis as early as tomorrow morning. Aggressive hydration did not help reverse the course of his CHANEL still has worsening his BUN/creatinine also has still evidence of transaminitis by his labs. Patient was started on dialysis on Saturday, with next treatment scheduled for Monday morning. N  Patient will be getting his second hemodialysis treatment today, orders already given, will proceed forward this afternoon. Next treatments can be tomorrow morning. 2. Anemia: Due to sepsis and CHANEL. 3.  Rhabdomyolysis on aggressive fluid hydration with frequent lab reviews. Probably toxic induced   With BMP and CPK  4. Presumed sepsis with turbid urine noted in his Orellana catheter. C&S pending   See orders  5. History of HIV and hepatitis C, following with ID.  6.  Altered mental status still current, neurology is following. 7.  Abnormalities liver function tests and transaminitis  8. Bacteremia with staph species, followed by ID. 9.  Polysubstance abuse, with metabolic encephalopathy. 10.  Worsening swelling of his left arm, distally when he fell on initially.   Will obtain venous duplex to rule out DVT, case discussed with the primary care physician she was considering starting heparin IV. I spent ample time with brother and sister today counseling about dialysis acute need and further progression, leaving them all the options of continuation of withdrawal as deemed necessary by them.       Electronically signed by Prabhjot Sandhu MD on 5/31/2021 at 12:39 PM

## 2021-05-31 NOTE — PLAN OF CARE
Problem: Non-Violent Restraints  Goal: Removal from restraints as soon as assessed to be safe  Outcome: Met This Shift  Goal: No harm/injury to patient while restraints in use  Outcome: Met This Shift  Goal: Patient's dignity will be maintained  Outcome: Met This Shift     Problem: Skin Integrity:  Goal: Will show no infection signs and symptoms  Description: Will show no infection signs and symptoms  Outcome: Met This Shift  Goal: Absence of new skin breakdown  Description: Absence of new skin breakdown  Outcome: Met This Shift     Problem: Falls - Risk of:  Goal: Will remain free from falls  Description: Will remain free from falls  Outcome: Met This Shift  Goal: Absence of physical injury  Description: Absence of physical injury  Outcome: Met This Shift     Problem: Pain:  Goal: Pain level will decrease  Description: Pain level will decrease  Outcome: Met This Shift  Goal: Control of acute pain  Description: Control of acute pain  Outcome: Met This Shift  Goal: Control of chronic pain  Description: Control of chronic pain  Outcome: Met This Shift

## 2021-06-01 NOTE — PROGRESS NOTES
Physical Therapy    Physical Therapy Treatment Note    Name: Elo Barahonaite  : 1960  MRN: [id number]    Date of Service: 2021    Evaluating PT: Krzysztof Blood, PT, DPT [id number]    Room #:  3129/5144-E  Diagnosis:  Multiple organ failure with heart failure in  [P29.0]  AMS (altered mental status) [R41.82]  PMHx/PSHx:  HIV, Herpes, GERD, Depression, Suicidal Ideation, Hep C, Pneumonia, Shingles  Precautions:  Fall Risk, Alarm, O2, Orellana, TAPS, TSM, HIV, Hep C, L Hemiparesis, Incontinence    SUBJECTIVE:  Pt lives alone in a 2 story house with 2 stair(s) and 0 rail(s) to enter. Bed is on the first floor and bath is on the first floor. Full flight of stair(s) and no railing(s) to access second floor. Pt ambulated independently prior to admission. OBJECTIVE:   Initial Evaluation  Date: 21 Treatment Date: 21 Short Term/ Long Term   Goals   AM-PAC 6 Clicks 98/64 78/79    Was pt agreeable to Eval/treatment? Yes Yes    Does pt have pain? 7/10 pain in head and L neck 8-10/10 in L shoulder/neck prior to activity  5-6/10 in shoulder/neck post-activity    Bed Mobility  Rolling: Mod A to L, Min A to R  Supine to sit: Max A  Sit to supine: Max A  Scooting: Max A Rolling: Min A  Supine to sit: Min A  Sit to supine: Min A  Scooting: Min A Rolling: Supervision  Supine to sit: Min A  Sit to supine: Min A  Scooting: Min A   Transfers Sit to stand: Mod A x2  Stand to sit: Mod A x2  Stand pivot: NT Sit to stand: Min A  Stand to sit: Min A  Stand pivot:  Mod A with [employer]  Mod A without AD Sit to stand: Min A  Stand to sit: Min A  Stand pivot: Min A with AAD   Ambulation   Side stepped 3-4 steps with [id number] assistance with Mod A x2 Pt ambulated 50 feet with [employer] with Mod A >15 feet with Min A with AAD   Stair negotiation: ascended and descended NT NT 3 step(s) with 1 rail(s) with Min A   ROM BUE: See OT Note  BLE: WFL BUE: See OT Note  BLE: WFL    Strength BUE: See OT Note  BLE: 4+/5 on R  4/5 on L BUE: See OT

## 2021-06-01 NOTE — PROGRESS NOTES
Comprehensive Nutrition Assessment    Type and Reason for Visit:  Initial    Nutrition Recommendations/Plan: Continue current diet, Start Ensure HP BID    Nutrition Assessment:  Pt admit w/ acute encephalopathy, rhabdo, sepsis, CHANEL now on HD. Hx HIV/Hep C and polysubstance abuse. Noted poor PO intake since admit. Will add ONS and monitor. Malnutrition Assessment:  Malnutrition Status: At risk for malnutrition (Comment)    Context:  Acute Illness     Findings of the 6 clinical characteristics of malnutrition:  Energy Intake:  1 - 75% or less of estimated energy requirements for 7 or more days  Weight Loss:  Unable to assess (d/t fluids/no longterm wt hx on file)     Body Fat Loss:  No significant body fat loss     Muscle Mass Loss:  No significant muscle mass loss    Fluid Accumulation:  No significant fluid accumulation     Strength:  Not Performed    Estimated Daily Nutrient Needs:  Energy (kcal):  8747-5358; Weight Used for Energy Requirements:  Admission     Protein (g):  105-125; Weight Used for Protein Requirements:  Ideal (1.5-1.8)        Fluid (ml/day):  per renal management; Method Used for Fluid Requirements:  1 ml/kcal      Nutrition Related Findings:  A&Ox3, abd WDL, missing teeth, +2/4 generalized edema, +I/Os, HD, substance abuse      Wounds:  None       Current Nutrition Therapies:    DIET RENAL; Anthropometric Measures:  · Height: 5' 8\" (172.7 cm)  · Current Body Weight: 166 lb (75.3 kg) (5/27 admit wt - CBW elevated w/ +fluids)   · Admission Body Weight: 166 lb (75.3 kg) (5/27 bed)    · Usual Body Weight:  (UTO, no wt hx on file)     · Ideal Body Weight: 154 lbs; % Ideal Body Weight 107.8 %   · BMI: 25.2  · BMI Categories: Overweight (BMI 25.0-29. 9)       Nutrition Diagnosis:   · Inadequate oral intake related to catabolic illness as evidenced by intake 26-50%, dialysis    Nutrition Interventions:   Food and/or Nutrient Delivery:  Continue Current Diet, Start Oral Nutrition Supplement  Nutrition Education/Counseling:  Education not indicated   Coordination of Nutrition Care:  Continue to monitor while inpatient    Goals:  pt to consume >75% meals/ONS       Nutrition Monitoring and Evaluation:   Food/Nutrient Intake Outcomes:  Food and Nutrient Intake, Supplement Intake  Physical Signs/Symptoms Outcomes:  GI Status, Biochemical Data, Fluid Status or Edema, Nutrition Focused Physical Findings, Skin, Weight     Discharge Planning:    Continue Oral Nutrition Supplement     Electronically signed by Shanda Goldberg, MS, RD, LD on 6/1/21 at 4:13 PM EDT    Contact: [phone]

## 2021-06-01 NOTE — PROGRESS NOTES
Department of Internal Medicine  Infectious Diseases  Progress Note    C/C :  HIV infection, change in mental status     Pt is more awake and alert  Reports neck pain   Denies fever   Left arm swelling some improvement   Afebrile       Current Facility-Administered Medications   Medication Dose Route Frequency Provider Last Rate Last Admin    gabapentin (NEURONTIN) capsule 100 mg  100 mg Oral TID Anusha Soto MD   100 mg at 06/01/21 1129    LORazepam (ATIVAN) injection 2 mg  2 mg Intravenous Once Anusha Soto MD        sodium chloride flush 0.9 % injection 5-40 mL  5-40 mL Intravenous 2 times per day Pawan Caper, DO   10 mL at 06/01/21 1133    sodium chloride flush 0.9 % injection 5-40 mL  5-40 mL Intravenous PRN Pawan Caper, DO        0.9 % sodium chloride infusion  25 mL Intravenous PRN Pawan Caper, DO        thiamine mononitrate tablet 100 mg  100 mg Oral Daily Pawan Caper, DO   100 mg at 06/01/21 1127    multivitamin 1 tablet  1 tablet Oral Daily Pawan Caper, DO   1 tablet at 06/01/21 1129    melatonin tablet 6 mg  6 mg Oral Nightly Pawan Caper, DO   6 mg at 05/31/21 2304    oxyCODONE (ROXICODONE) immediate release tablet 2.5 mg  2.5 mg Oral Q6H PRN Pawan Caper, DO        Or    oxyCODONE (ROXICODONE) immediate release tablet 5 mg  5 mg Oral Q6H PRN Pawan Caper, DO   5 mg at 06/01/21 0556    LORazepam (ATIVAN) injection 0.5 mg  0.5 mg Intravenous Once Paulette Nichole MD        sodium bicarbonate tablet 1,300 mg  1,300 mg Oral 4x Daily Yolie Betancur MD   1,300 mg at 06/01/21 1130    0.9 % sodium chloride infusion   Intravenous Continuous Yolie Ferrera MD 50 mL/hr at 06/01/21 0355 Rate Verify at 06/01/21 0355    clopidogrel (PLAVIX) tablet 75 mg  75 mg Oral Daily Albert Taylor.  Sally, APN   75 mg at 06/01/21 1130    emtricitabine-tenofovir alafenamide (DESCOVY) 200-25 MG per tablet 1 tablet  1 tablet Oral Daily Oli Garcia MD   1 tablet at Not Detected    Klebsiella oxytoca by PCR Not Detected    Klebsiella pneumoniae group by PCR Not Detected    Proteus species by PCR Not Detected    Streptococcus agalactiae by PCR Not Detected    Staphylococcus aureus by PCR Not Detected    Serratia marcescens by PCR Not Detected    Streptococcus pneumoniae by PCR Not Detected    Streptococcus pyogenes  by PCR Not Detected    Acinetobacter baumannii by PCR Not Detected    Candida albicans by PCR Not Detected    Candida glabrata by PCR Not Detected    Candida krusei by PCR Not Detected    Candida parapsilosis by PCR Not Detected    Candida tropicalis by PCR Not Detected     Enterobacteriaceae by PCR Not Detected    Enterococcus by PCR Not Detected    Haemophilus Influenzae by PCR Not Detected    Listeria monocytogenes by PCR Not Detected    Neisseria meningitidis by PCR Not Detected    Pseudomonas aeruginosa by PCR Not Detected    Staphylococcus species by PCR DETECTEDPanic      Streptococcus species by PCR Not Detected    Methicillin Resistance mecA/C  by PCR Not Detected   Narrative:           SARS CoV 2 NAAT neg       Radiology :    CT chest -       Ill-defined soft tissue thickening and fat stranding involving the visualized   base of the left neck, supraclavicular region and throughout the left lateral   chest wall.  This is nonspecific.  Infectious/inflammatory process such as   cellulitis is a consideration.  Ill-defined hematoma is also a consideration.    Recommend correlation with clinical presentation.       Emphysematous changes.       Areas of atelectasis and patchy airspace opacities most evident in the lower   lung zones.  Developing infiltrates from pneumonia not excluded.  Continued   follow-up recommended.       CT abdomen and pelvis:       Exam limited by patient motion.       Urinary bladder thickening may be related to underdistention.  Cystitis not   excluded.       Other chronic appearing findings.  Short-term follow-up if symptoms persist. Impression:     1.  Diffuse nonspecific soft tissue stranding and superficial edema about the   mid to distal aspect of the left humerus particularly along the posterior and   lateral aspects.  No intramuscular fluid collections identified on this exam.     2.  No acute osseous findings about the left humerus. 3.  Mild left shoulder and elbow degenerative changes. RECOMMENDATIONS:   These imaging features are nonspecific.  If there is high concern for acute   compartment syndrome based on clinical history and clinical findings         Vanco random 11.2      Absolute CD 3 247Low   570 - 2400 cells/uL Final 05/25/2021 10:41 PM [facility]   Absolute CD 8 (Supp) 199Low   210 - 1200 cells/uL Final 05/25/2021 10:41 PM [facility]   CD4/CD8 Ratio 0.23Low   0.80 - 3.90 ratio Final 05/25/2021 10:41 PM [facility]   Absolute CD 4 [place] 45Low   430 - 1800 cells/uL Final 05/25/2021 10:41 PM [facility]   Testing Performed By        IMPRESSION:    1. HIV infection / AIDS (CD 4 45 )   2. Chronic Hep C infection - cleared   3. Rhabdomyolysis   4. Encephalopathy, Cocaine use   5. CONS bacteremia - contamination       RECOMMENDATIONS:      1. Vancomycin PRN -  2. Await HIV viral load - start prophylaxis if viral load not suppressed   3.   Pricilla Phelan

## 2021-06-01 NOTE — PROGRESS NOTES
[facility] MD Nuha Pearson MD [place] Rosier, MD Levin Shadow, MD Bernida Glaze, CNP   Jana Cha, AUGUSTINE  Progress Note    6/1/2021    SUBJECTIVE:   (-) c/o's  (-) sob/elkins/cp/palp , increased alertness since yesterday. Currently on soft arm restraints. Patient still confused, yelling with no apparent shortness of breath. Persistent altered mental status due to multiple medical problems and iatrogenic consumption history of cocaine/fentanyl. Patient is currently getting evaluation for any fractures to the shoulder and arm pain with orthopedic consultation. 5/29: Patient doing better more awake and alert this morning, family is at the bedside both sister and brother with whom I had thorough discussion for future care in terms of renal failure another comorbid conditions. Patient scheduled for CT with contrast as per orthopedics, to follow-up with hemodialysis initiation this afternoon. Second treatment hemodialysis will be Monday. 5/30: Patient doing remarkably better, in terms of his orientation, anxiety. 5/31: Patient was seen in his room, interviewed and examined. Today he is more focused on the swelling in his left upper extremity that got much worse than baseline. With inability of the patient to move it except using his right arm for his motion. Case will be discussed with primary care physician the next few minutes, to proceed with asking venous duplex scan to rule out DVT. 6/1: Ongoing pain in multiple sites. Denies dyspnea. Appetite remains poor though improved compared to yesterday.   Left-sided, including left upper extremity, left lower extremity, lower posterior thorax, sacrum and thigh, swelling remains substantial        PROBLEM LIST:    Active Problems:    AMS (altered mental status)    Left-sided weakness    Goals of care, counseling/discussion    Palliative care by specialist    Encounter regarding vascular access for dialysis known creatinine was 1.4 mg/dL 11/2020. As a suspect poor adherence to his HIV regimen, we might consider HIV associated nephropathy, though at this point that would be purely speculative     3. Rhabdomyolysis due to at the least a fall and prolonged recumbency on a hard surface, though he may have also had an unwitnessed seizure given his cocaine and fentanyl intoxication (the latter would be speculative). Total CK continues to improve     4. Cocaine and fentanyl intoxication     5. Hyperkalemia secondary to acute kidney injury, decreased effective circulating volume due to volume contraction, rhabdomyolysis       6. HIV and hepatitis C, following with ID.    7. Altered mental status still current, neurology is following. Improving    8. Abnormalities liver function tests and transaminitis     9. Bacteremia with staph species, followed by ID. 10. Polysubstance abuse, with metabolic encephalopathy. 11. Marked edema of not only his left arm, but also throughout the left side of his body. 5/27 venous duplex of left upper extremity did not reveal DVT. The result of trauma from the fall, question seizure      No sign of renal recovery at this point  Total CK continues to improve  Swelling is improving with ultrafiltration on dialysis       Recommendations  1. Continue IHD support for solute and volume clearance. Next treatment tomorrow  2. Follow labs, UO  3. Continue supportive care  4.  Otherwise as above    Electronically signed by Bryn Navarrete MD on 6/1/2021

## 2021-06-01 NOTE — PROGRESS NOTES
Hospitalist Progress Note      SYNOPSIS: Patient admitted on 5/24/2021 for altered mental status  Elo Wilson has a past medical history that includes HIV, hepatitis C, GERD    On admission; patient lethargic and a poor historian.   Scott Lord presents due to altered mental status and left-sided weakness; unknown last known well, stroke alert was called, CT imaging ultimately showed no perfusion mismatch or occlusion and patient determined not to be TPA candidate or candidate for thrombectomy as per stroke neurologist; patient given 324 p.o. aspirin as per neurology. Rectal temperature was 100 °F, patient with labs showing acute renal failure with GFR 13 creatinine 5.6, elevated LFTs with AST 1108, ; UA positive for hematuria, no evidence for UTI.  Lactic acid is 3.9.  Leukocytosis 13.9.  Patient given 1 L NS bolus.  Rapid Covid is negative.   CXR does bilateral opacifications concerning for possible pneumonia.  Patient given broad-spectrum antibiotic treatment with vancomycin and cefepime.  Labs also significant for elevated troponin and BNP; CRP 19, procalcitonin 3.77, elevated trop and CK. UDS positive for cocaine and fentanyl.  On physical exam patient appears to have diffuse erythema and warmth over his left lateral neck and left upper flank up to his left axilla, there also appears to be a central region of swelling and a central possible insect bite or laceration with mild serous drainage, there does not appear to be any crepitus, induration, or evidence of focal abscess.  There is tenderness to palpation of these regions as well, concerns for possible cellulitis as infectious source.  During this encounter patient did remain alert, he answered some questions but is oriented only to self, he is not able to provide very much history but is able to tell us where his pain is.  He does not have any abdominal tenderness to palpation.  Lungs with mild rales bilaterally scattered.  Decision made to note this patient due to altered mental status, acute renal failure, transaminitis, dehydration, and sepsis with unknown source.  Results and plan were discussed with the patient's brother by the admitting physician, he voiced understanding and is amenable. SUBJECTIVE:    Patient seen and examined   Mentation is better  LUE still weak and swollen  Records reviewed. Stable overnight. No other overnight issues reported. Temp (24hrs), Av.6 °F (36.4 °C), Min:96.9 °F (36.1 °C), Max:98.2 °F (36.8 °C)    DIET: DIET RENAL;  CODE: Limited    Intake/Output Summary (Last 24 hours) at 2021 1541  Last data filed at 2021 1506  Gross per 24 hour   Intake 1140 ml   Output 3550 ml   Net -2410 ml       OBJECTIVE:    BP (!) 141/91   Pulse 91   Temp 98.2 °F (36.8 °C) (Oral)   Resp 18   Ht 5' 8\" (1.727 m)   Wt 174 lb 13.2 oz (79.3 kg)   SpO2 95%   BMI 26.58 kg/m²     General appearance: No apparent distress, appears stated age and cooperative. HEENT:  Conjunctivae/corneas clear. Neck: Supple. No jugular venous distention. Respiratory: Clear to auscultation bilaterally, normal respiratory effort  Cardiovascular: Regular rate rhythm, normal S1-S2  Abdomen: Soft, nontender, nondistended  Musculoskeletal: No clubbing, cyanosis, no bilateral lower extremity edema. Brisk capillary refill. Skin:   Left sided chest and lateral chest wall erythematous rash improved  Neurologic: awake, alert and following commands.  LUE paresis and edema    ASSESSMENT:    1) Acute Metabolic/Toxic encephalopathy  -CT head no acute intracranial abnormality  -UDS positive for fentanyl and cocaine  -Continue infection and kidney failure management    2) Sepsis with staph bacteremia secondary to left-sided chest wall cellulitis  -Check MRI neck soft tissue  -Blood culture done on 2021 growing 2 out of 2 staph (coag Neg-might be contaminant per ID)  -Follow-up repeat blood culture NGTD  -CRP and procalcitonin significantly elevated; trended mg Oral Daily     PRN Meds: sodium chloride flush, sodium chloride, oxyCODONE **OR** oxyCODONE, perflutren lipid microspheres    Labs:     Recent Labs     05/31/21  0450 05/31/21  1508 06/01/21  0212   WBC 9.5 7.6 8.1   HGB 12.2* 12.7 11.6*   HCT 36.3* 38.0 34.4*    162 182       Recent Labs     05/30/21  0144 05/31/21  0450 06/01/21  0212    143 138   K 4.6 4.3 4.3    107 103   CO2 19* 21* 23   BUN 77* 76* 53*   CREATININE 9.5* 9.4* 7.1*   CALCIUM 7.6* 7.7* 7.7*       Recent Labs     05/30/21  0144 05/31/21  0450 06/01/21  0212   PROT 6.2* 5.9* 5.7*   ALKPHOS 52 52 48   * 150* 120*   * 204* 147*   BILITOT 0.5 0.6 0.5       No results for input(s): INR in the last 72 hours. Recent Labs     05/31/21  0806 05/31/21  1725 06/01/21  0654   CKTOTAL 9,420* 7,157* 4,869*       Chronic labs:    Lab Results   Component Value Date    CHOL 191 05/25/2021    TRIG 279 (H) 05/25/2021    HDL 45 05/25/2021    LDLCALC 90 05/25/2021    TSH 3.720 03/08/2017    PSA SEE NOTE (AA) 03/08/2017    PSA 0.47 03/08/2017    INR 1.4 05/26/2021    LABA1C 5.9 (H) 05/25/2021       Radiology: REVIEWED DAILY    +++++++++++++++++++++++++++++++++++++++++++++++++  Jyoti Harper MD  [address] [place] [address]  +++++++++++++++++++++++++++++++++++++++++++++++++  NOTE: This report was transcribed using voice recognition software. Every effort was made to ensure accuracy; however, inadvertent computerized transcription errors may be present.

## 2021-06-01 NOTE — PLAN OF CARE
Problem: Skin Integrity:  Goal: Will show no infection signs and symptoms  Description: Will show no infection signs and symptoms  Outcome: Met This Shift  Goal: Absence of new skin breakdown  Description: Absence of new skin breakdown  Outcome: Met This Shift     Problem: Falls - Risk of:  Goal: Will remain free from falls  Description: Will remain free from falls  Outcome: Met This Shift  Goal: Absence of physical injury  Description: Absence of physical injury  Outcome: Met This Shift     Problem: Pain:  Goal: Pain level will decrease  Description: Pain level will decrease  Outcome: Met This Shift  Goal: Control of acute pain  Description: Control of acute pain  Outcome: Met This Shift  Goal: Control of chronic pain  Description: Control of chronic pain  Outcome: Met This Shift     Problem: Non-Violent Restraints  Goal: Removal from restraints as soon as assessed to be safe  Outcome: Completed  Goal: No harm/injury to patient while restraints in use  Outcome: Completed  Goal: Patient's dignity will be maintained  Outcome: Completed

## 2021-06-01 NOTE — PLAN OF CARE
Problem: Falls - Risk of:  Goal: Will remain free from falls  6/1/2021 0518 by Alberto House RN  Outcome: Met This Shift  5/31/2021 2356 by Francy Schafer RN  Outcome: Met This Shift  Goal: Absence of physical injury  6/1/2021 0518 by Alberto House RN  Outcome: Met This Shift  5/31/2021 2356 by Francy Schafer RN  Outcome: Met This Shift

## 2021-06-01 NOTE — CARE COORDINATION
Patient mentation improving but still confused per nursing. Call placed to the patient's sister Sang Guerra, listed as the first contact now, to discuss transition of care planning. Reviewed input from therapy with the patient and explained that the patient would benefit from rehab prior to returning home. Sang Gurera is in agreement. Reviewed the [facility] list with the patient's sister in the area and she would like 1.) [facility] or 2.) [facility]. Call placed to Minnie Espinoza, liaison with [facility]. and detailed VM left with referral information. Await return call with acceptance. Will continue to follow.      Angelo Bains RN.  [place]  [phone]

## 2021-06-01 NOTE — PROGRESS NOTES
hand/UE motor function  Therapeutic exercise to improve motor endurance, ROM, and functional strength for ADLs/functional transfers  Therapeutic activities to facilitate/challenge dynamic balance, stand tolerance for increased safety and independence with ADLs  Therapeutic activities to facilitate gross/fine motor skills for increased independence with ADLs  Neuro-muscular re-education: facilitation of righting/equilibrium reactions, midline orientation, scapular stability/mobility, normalization of muscle tone, and facilitation of volitional active controled movement  Positioning to improve skin integrity, interaction with environment and functional independence     Modified Re[place] Spring Scale   Score     Description  0             No symptoms  1             No significant disability despite symptoms  2             Slight disability; able to look after own affairs  3             Moderate disability; able to ambulate without assist/ requires assist with ADLs  4             Moderate/Severe disability;requires assist to ambulate/assist with ADLs  5             Severe disability;bedridden/incontinent   6               Score:   4     Recommended Adaptive Equipment:  TBD      Home Living: Pt lives alone in a 3 story home with  2 RAUL with no rail.    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, hx of working in construction     Pain Level: 10/10 B hands due to not taking his medication for neuropathy and  8/10 L side of neck, L shoulder decreased after treatment 5-6/10    Cognition: A&O: x 4, pleasant & cooperative, highly motivated   Follows 2 step directions: fair               Memory:  fair               Sequencing: fair              Problem solving: fair              Judgement/safety: fair     Functional Assessment:   AM-PAC Daily Activity Raw Score:        Initial Eval Status  Date: 5/28/21 Treatment Status  Date:  6/1/21 STG=LTG  Time frame: 10-14 days   Feeding Moderate Assist  SBA  Taking several drinks seated   Supervision    Grooming Maximal Assist   Min A  Washing off face & hands  Minimal Assist    UB Dressing Maximal Assist   To change hospital gown, cues for sequencing and dependent placement of LUE into sleeve d/t severe pain  Mod A  Doff/shira gown due to significant edema in L UE, instructed on modified techniques  Minimal Assist    LB Dressing Dependent   Max A   with pt able to lift legs to assist with tasks Moderate Assist    Bathing Maximal Assist  Max A  simulated Minimal Assist    Toileting Dependent   Incontinent of bowel after stand to sit   Dep  Orellana present  Moderate Assist    Bed Mobility  Rolling: max A to R, mod A to Lefl  Supine to sit: Maximal Assist   Sit to supine: Maximal Assist  Min A  Supine < > sit   Supine to sit: Minimal Assist   Sit to supine: Minimal Assist    Functional Transfers Sit to stand:  Moderate Assist x 2  Stand to sit: Moderate Assist   Stand pivot: NT  Min A  Sit < > stand  Mod A  Stand pivot  With walker, difficultly with grasp using L hand  Minimal Assist    Functional Mobility Mod A x 2  3-4 side steps with hand held assist/side held assist  Mod A  With walker, again difficulty maintaining grasp of L hand, short distance  Min A with AAD short distance   Balance Sitting:     Static:  Mod A progressing to SBA, c/o dizziness initally     Dynamic:mod A  Standing: mod A x 2  Sitting: SBA  Standing: Min A  With walker  Sitting:     Static:  SBA    Dynamic:SBA  Standing: min A   Activity Tolerance poor Fair  Fair+   Visual/  Perceptual Glasses: no             BUE  ROM/Strength/  Fine motor Coordination Hand dominance: R     RUE: ROM limited in shoulder and hands  But generally WFL     Strength: grossly 3+/5      Strength:  WFL     Coordination:  fair     LUE: ROM no shoulder flex/abd noted, minimal elbow flex/ext, minimal finger flex/ext     Strength: grossly 2+/5      Strength: poor     Coordination: poor       Education:  Pt was educated through out treatment regarding proper technique & safety with bed mobility, functional transfers & mobility, walker management & ADL compensatory strategies to ease tasks, improve safety & prevent falls. Educated pt on gentle upgrade massage on L UE due to severe edema & importance of positioning L UE at all times to decrease edema. Pt also instructed on gentle neck exercises due to positioning & stiffness noted along with shoulder shrugs, rolls & scapular movement/re-traction with improvement in cervical alignment at end of session & pt reporting increased comfort & decreased pain. Comments: Upon arrival pt was in bed & agreeable for therapy. At end of session pt was seated in chair, but MRI called and will be taking down, therefore nsg requested to return pt back to bed, all lines and tubes intact, call light within reach. · Pt has made Good progress towards set goals. · Continue with current plan of care    Treatment Time In: 1:15            Treatment Time Out: 1:40           Treatment Charges: Mins Units   Ther Ex  68776     Manual Therapy 08074     Thera Activities 23753 10 1   ADL/Home Mgt 47624 15 1   Neuro Re-ed 21010     Group Therapy      Orthotic manage/training  35421     Non-Billable Time     Total Timed Treatment 25 2       Neeru CHRIS  [address] [place] [address] Drive, [address] [place] [address]

## 2021-06-01 NOTE — PROGRESS NOTES
Palliative Care Department  [phone]  Progress Note  CRISTHIAN Maxwell CNP    Eddie Tyler  [id number]  Hospital Day: 9    Date of Initial Consult: 5/26/2019  Referring Provider: Dr. Doralee Soulier was consulted for assistance with: goals of care, code status discussion and symptom management. HPI:   Eddie Tyler is a 56 y.o. with a past medical history of HTN, HLD, HIV, hepatitis C, GERD, mood disorder, who presents the ED due to CHIEF COMPLAINT of altered mental status and left-sided weakness. Workup noting CHANEL, rhabdomyocytis; sepsis UTI, bacteremia and polysubstance abuse, transaminitis, and dehydration and sepsis; no ischemic events on imaging but MRI still pending. Neurology, Cardiology, Nephrology and ID services have been consulted. ASSESSMENT/PLAN:     Pertinent Hospital Diagnoses    Acute renal failure- worsening; Nephrology following; IV fluids; monitor labs   Metabolic encephalopathy-continue to monitor; Neurology following; continue to obtain MRI of brain; consider EEG   Left sided weakness; neurology following   Chronic Hepatitis/HIV- continues descovy and tivicay per ID   Rhabdomyolysis-continues on fluids; Nephrology following   Elevated troponin-Cardiology following-consider Plavix/ASA as per Neurology   Left shoulder pain; Ortho consulted; further workup in progress      Palliative Care Encounter / Counseling Regarding Goals of [place], Does Not have capacity for medical decision-making.   Capacity is time limited and situation/question specific   Outcome of goals of care meeting: Continue current management   Code status:  limited; MEDS ONLY; NO to CPR; NO SHOCK; NO INTUBATION   Advanced Directives: no HPOA or Living Will noted in chart (would like to complete when able; wants brother Chelo Chowdhury as decision maker who accepts   Surrogate/Legal NOK:   Fabiano Coleman @  [facility] @ [phone]; has son Laverne      Spiritual assessment: no spiritual distress identified  Bereavement and grief: to be determined  Referrals to: none today    SUBJECTIVE:     Details of Conversation:    Chart reviewed and patient seen. Patient resting in bed, alert. Patient denies needs from palliative medicine standpoint at this time. Discharge planning noted. Palliative medicine continues to follow for ongoing goals of care and CODE STATUS discussions. Active Hospital Problems    Diagnosis Date Noted    Encounter regarding vascular access for dialysis for ESRD ([facility] [address] [N18.6, Z99.2] 05/29/2021    Left-sided weakness [R53.1] 05/26/2021    Goals of care, counseling/discussion [Z71.89]     Palliative care by specialist [Z51.5]     AMS (altered mental status) [R41.82] 05/25/2021       OBJECTIVE:   Prognosis: poor    Physical Exam:  BP (!) 141/91   Pulse 91   Temp 98.2 °F (36.8 °C) (Oral)   Resp 18   Ht 5' 8\" (1.727 m)   Wt 174 lb 13.2 oz (79.3 kg)   SpO2 95%   BMI 26.58 kg/m²   Gen:  Awake, alert  HEENT:  Normocephalic, atraumatic, mucosa moist, EOMI  Neck:  Supple, trachea midline, no JVD  Lungs:  CTA bilaterally, no audible rhonchi or wheezes noted, respirations unlabored  Heart:  RRR  Abd:  Soft, non tender, non distended, bowel sounds present  :  Orellana catheter  Ext:  Weak; LUE edematous and painful; +edema BLE  Skin:  Warm and dry  Neuro:  PERRL, Alert, oriented to person/place; following commands      Social History:   The patient currently lives at home; independently  TOBACCO:  reports that he has been smoking cigarettes. He has a 10.00 pack-year smoking history. He has never used smokeless tobacco.  ETOH:  reports previous alcohol use.     Objective data reviewed: labs, images, records, medication use, vitals and chart    Discussed patient and the plan of care with the other IDT members: Palliative Medicine IDT Team    Time/Communication  Greater than 50% of time spent, total 15 minutes in counseling and coordination of care at the bedside regarding goals of care, symptom management, diagnosis and prognosis and see above. Thank you for allowing Palliative Medicine to participate in the care of Torrie Hardy.   CRISTHIAN Bocanegra CNP

## 2021-06-02 NOTE — CARE COORDINATION
Message received from Nikhil Monet, liaison with Nere[facility] Brewster at [facility]. They are unable to accept the patient for transition of care planning. Call placed to the patient's sister Vipin Napoles to discuss HOLLY options. Explained that d/t the patient being postitive for Fentanyl and Cocaine on admission, his transition of care plan would be limited. Explained that [facility] and [facility], both sister facilities to [address] would be able to accept the patient. Discussed location and the Vipin Napoles is agreeable with [facility]. Also discussed the possibility of an LTAC for transition of care planning, especially considering the patient is again in restraints and still receiving daily HD treatments. Vipin Napoles would be agreeable to this option if needed and would prefer [facility] in [facility] as it is closer to her. Call placed to Marcel July, liaison with [facility]. to notify of HOLLY choice. PA completed, paper N-17, and request for level of care faxed to the [address] [place] [address] at [phone]. Patient will need to be out of restraints for 24 hours, HD will need to be scheduled 3 days a week and arrangements for outpatient HD will need to be made prior to transition of care, and patient will need a permanent HD line placed if HD is to be continued. Call also placed to Minh Hooker, liaison with [facility] and referral made for LTAC. She will review the case as well. For patient to transition to LTAC, he will need to be in only 2 pt restraints and he will either need a permanent HD access or clearance from the nephrologist to continue to use the temporary HD line after transition to LTAC. Reviewed above transition of care with charge nurse Jailene Whatley and Enedina Hudson. Will continue to follow for further treatment plans and transition of care planning needs.      Amrita Garcia RN.  [facility]  [phone]

## 2021-06-02 NOTE — PROGRESS NOTES
When this RN was preforming hourly rounding, this RN found Pt attempting to climb out of bed and telesitter alarming. Pt then attempted to jump out of bed. When this RN helped pt get back into bed, pt screamed \"get the hell off of me\". Pt started to hit and kick staff, thrashing his body around screaming \"get the hell away from me\". Attempted to redirect pt and explain that he is at the hospital and the staff is trying to keep him safe. Pt continued to hit, kick, climb out of bed and rip out lines and tubes. Pt placed in four point soft wrist restraints for pt safety.  Dr. Miguel Ventura and orders received for bilat wrist and bilat ankle soft restraints and ativan

## 2021-06-02 NOTE — PROGRESS NOTES
Messaged Dr. Reynaga Body regarding pt still extremely agitated screaming and thrashing around. gave Ativan at 0130.  pt HR sustaining 120's, /82. had to place pt on 4 L o2 now 92%, was 88% room air  Orders received

## 2021-06-02 NOTE — ANESTHESIA PRE PROCEDURE
Department of Anesthesiology  Preprocedure Note       Name:  Meenakshi Branch   Age:  56 y.o.  :  1960                                          MRN:  [id number]         Date:  2021      Surgeon: Shante Gonzalez):  Severa Hatch, MD    Procedure: Procedure(s):  INSERTION TUNNELED HEMODIALYSIS CATHETER    Medications prior to admission:   Prior to Admission medications    Medication Sig Start Date End Date Taking? Authorizing Provider   TIVICAY 50 MG tablet Take 50 mg by mouth nightly  19  Yes Historical Provider, MD   DESCOVY 200-25 MG TABS tablet Take 1 tablet by mouth nightly  19  Yes Historical Provider, MD   Multiple Vitamin (MULTIVITAMIN) tablet Take 1 tablet by mouth daily 19  Yes Historical Provider, MD   gabapentin (NEURONTIN) 800 MG tablet Take 800 mg by mouth 3 times daily.     Yes Historical Provider, MD       Current medications:    Current Facility-Administered Medications   Medication Dose Route Frequency Provider Last Rate Last Admin    vancomycin 1000 mg IVPB in 250 mL D5W addavial  1,000 mg Intravenous Once per day on  Oli Garcia MD   Stopped at 21 1052    gabapentin (NEURONTIN) capsule 100 mg  100 mg Oral TID Anusha Soto MD   100 mg at 21 0919    sodium chloride flush 0.9 % injection 5-40 mL  5-40 mL Intravenous 2 times per day Jhon Bora, DO   10 mL at 21 09    sodium chloride flush 0.9 % injection 5-40 mL  5-40 mL Intravenous PRN Jhon Bora, DO        0.9 % sodium chloride infusion  25 mL Intravenous PRN Jhno Bora, DO        thiamine mononitrate tablet 100 mg  100 mg Oral Daily Jhon Bora, DO   100 mg at 21 0915    multivitamin 1 tablet  1 tablet Oral Daily Jhon Bora, DO   1 tablet at 21 0915    melatonin tablet 6 mg  6 mg Oral Nightly Jhon Bora, DO   6 mg at 21 2210    oxyCODONE (ROXICODONE) immediate release tablet 2.5 mg  2.5 mg Oral Q6H PRN Jhon Bora, DO        Or  oxyCODONE (ROXICODONE) immediate release tablet 5 mg  5 mg Oral Q6H PRN Magalis Mercedes DO   5 mg at 06/02/21 1524    sodium bicarbonate tablet 1,300 mg  1,300 mg Oral 4x Daily Yolie Betancur MD   1,300 mg at 06/02/21 1637    0.9 % sodium chloride infusion   Intravenous Continuous Yolie Vences MD 50 mL/hr at 06/01/21 0355 Rate Verify at 06/01/21 0355    clopidogrel (PLAVIX) tablet 75 mg  75 mg Oral Daily POLI Noland   75 mg at 06/02/21 0914    emtricitabine-tenofovir alafenamide (DESCOVY) 200-25 MG per tablet 1 tablet  1 tablet Oral Daily Georgia Upton MD   1 tablet at 06/02/21 0915    dolutegravir sodium (TIVICAY) tablet 50 mg  50 mg Oral Nightly Oli Garcia MD   50 mg at 06/02/21 7458    aspirin chewable tablet 81 mg  81 mg Oral Daily Svetlana Naranjo DO   81 mg at 06/02/21 3279    perflutren lipid microspheres (DEFINITY) injection 1.65 mg  1.5 mL Intravenous ONCE PRN Svetlana Naranjo DO           Allergies:  No Known Allergies    Problem List:    Patient Active Problem List   Diagnosis Code    Neuropathy G62.9    HIV infection ([facility] [address] B20    HTN (hypertension) I10    Unilateral inguinal hernia K40.90    Chronic hepatitis B ([facility] [address] B18.1    Mood disorder ([facility] [address] F39    Cellulitis L03.90    Fracture of proximal end of ulna S52.009A    Septic olecranon bursitis of left elbow M71.122    CHANEL (acute kidney injury) ([facility] [facility] N17.9    Arm edema R60.0    AMS (altered mental status) R41.82    Left-sided weakness R53.1    Goals of care, counseling/discussion Z71.89    Palliative care by specialist Z51.5    Encounter regarding vascular access for dialysis for ESRD ([facility] N18.6, Z99.2    Acute renal failure superimposed on chronic kidney disease ([facility] [address] N17.9, N18.9       Past Medical History:        Diagnosis Date    Abscess of hand, left 2009    Abscess of scrotum 2007    Bacterial meningitis 2010    Depression     Encounter regarding vascular access for dialysis for ESRD ([facility] 5/29/2021    GERD (gastroesophageal reflux disease)     Hepatitis C     Herpes zoster w/ nervous system complication 4330    neuropathy    Human immunodeficiency virus, type 2 (HIV 2) ([facility] 2009    Inguinal hernia unilateral     left    Pneumonia 2010    Shingles (herpes zoster) polyneuropathy     Suicidal ideation 2008    Vitamin D deficiency        Past Surgical History:        Procedure Laterality Date    ABSCESS DRAINAGE  9/18/2007    scrotal. [facility]. Dr. Norbert Luna  2008    testicle    INGUINAL HERNIA REPAIR  3/9/2012    left indirect hernia repaired with mesh, Dr. Naomy Jorgensen, [address] [place] [address] OTHER SURGICAL HISTORY  3/9/2012    Left Inguinal Hernia Repair;Removal of Foreign Object Left foot       Social History:    Social History     Tobacco Use    Smoking status: Current Every Day Smoker     Packs/day: 0.50     Years: 20.00     Pack years: 10.00     Types: Cigarettes    Smokeless tobacco: Never Used   Substance Use Topics    Alcohol use: Not Currently                                Ready to quit: Not Answered  Counseling given: Not Answered      Vital Signs (Current):   Vitals:    06/02/21 0300 06/02/21 0615 06/02/21 0800 06/02/21 1400   BP: (!) 156/82 112/70 (!) 164/96 (!) 146/91   Pulse: 120 108 122 97   Resp: 20 18 20 17   Temp: 37.2 °C (99 °F) 37 °C (98.6 °F) 37.6 °C (99.6 °F) 37.1 °C (98.8 °F)   TempSrc: Temporal Temporal Temporal Temporal   SpO2: 92% 96% 96% 98%   Weight:       Height:                                                  BP Readings from Last 3 Encounters:   06/02/21 (!) 146/91   11/14/20 (!) 151/95   07/05/20 132/88       NPO Status:                                                                                 BMI:   Wt Readings from Last 3 Encounters:   06/01/21 174 lb 13.2 oz (79.3 kg)   11/14/20 160 lb (72.6 kg)   07/05/20 150 lb (68 kg)     Body mass index is 26.58 kg/m².     CBC:   Lab Results   Component Value Date    WBC 7.8 06/02/2021    RBC 3.07 06/02/2021    HGB 10.5 06/02/2021    HCT 31.0 06/02/2021    .0 06/02/2021    RDW 13.9 06/02/2021     06/02/2021       CMP:   Lab Results   Component Value Date     06/02/2021    K 3.8 06/02/2021    K 3.8 06/02/2021     06/02/2021    CO2 23 06/02/2021    BUN 43 06/02/2021    CREATININE 5.8 06/02/2021    GFRAA 12 06/02/2021    LABGLOM 12 06/02/2021    GLUCOSE 81 06/02/2021    GLUCOSE 83 01/26/2012    PROT 5.4 06/02/2021    CALCIUM 7.6 06/02/2021    BILITOT 0.5 06/02/2021    ALKPHOS 44 06/02/2021     06/02/2021    ALT 99 06/02/2021       POC Tests: No results for input(s): POCGLU, POCNA, POCK, POCCL, POCBUN, POCHEMO, POCHCT in the last 72 hours. Coags:   Lab Results   Component Value Date    PROTIME 14.9 05/26/2021    INR 1.4 05/26/2021    APTT 34.6 05/31/2021       HCG (If Applicable): No results found for: PREGTESTUR, PREGSERUM, HCG, HCGQUANT     ABGs: No results found for: PHART, PO2ART, UVF8OOH, PLB2AGH, BEART, L8EZEPCO     Type & Screen (If Applicable):  No results found for: LABABO, LABRH    Drug/Infectious Status (If Applicable):  Lab Results   Component Value Date    HEPCAB REACTIVE 01/06/2014       COVID-19 Screening (If Applicable):   Lab Results   Component Value Date    COVID19 Not Detected 05/25/2021           Anesthesia Evaluation  Patient summary reviewed and Nursing notes reviewed no history of anesthetic complications:   Airway: Mallampati: II  TM distance: >3 FB   Neck ROM: full  Mouth opening: > = 3 FB Dental:    (+) edentulous      Pulmonary:   (+) pneumonia: resolved,            Patient did not smoke on day of surgery.                 PE comment: Breath sounds coarse  Cardiovascular:  Exercise tolerance: poor (<4 METS),   (+) hypertension: moderate, peripheral edema (Left Arm +4 edema ),       ECG reviewed  Rhythm: regular  Rate: normal    Stress test reviewed                Neuro/Psych:   (+) neuromuscular disease:, psychiatric history:            GI/Hepatic/Renal:   (+) GERD:,

## 2021-06-02 NOTE — PROGRESS NOTES
Pt seen for moderate cognitive communication. Pt stated he did not want services and his \"speech, language and memory is fine\". Pt completed problem solving scenarios with good+ completion. He was independent with finding the problem and stating what you should do in 3/5 trials. Pt was good- with completion of understanding and applying information. He was able to independently demonstrate his understanding of the scenario but needed minimum verbal cues (e.g., 3 options) to apply specific information to the scenarios. Delayed responses and word finding difficulty was noted.

## 2021-06-02 NOTE — PROGRESS NOTES
[facility] MD Jasper Zuniga, MD Mandeep Atkins, MD Blessing Banks, MD Nia Paula, CNP   Jana hCa, AUGUSTINE  Progress Note    6/2/2021    SUBJECTIVE:   (-) c/o's  (-) sob/elkins/cp/palp , increased alertness since yesterday. Currently on soft arm restraints. Patient still confused, yelling with no apparent shortness of breath. Persistent altered mental status due to multiple medical problems and iatrogenic consumption history of cocaine/fentanyl. Patient is currently getting evaluation for any fractures to the shoulder and arm pain with orthopedic consultation. 5/29: Patient doing better more awake and alert this morning, family is at the bedside both sister and brother with whom I had thorough discussion for future care in terms of renal failure another comorbid conditions. Patient scheduled for CT with contrast as per orthopedics, to follow-up with hemodialysis initiation this afternoon. Second treatment hemodialysis will be Monday. 5/30: Patient doing remarkably better, in terms of his orientation, anxiety. 5/31: Patient was seen in his room, interviewed and examined. Today he is more focused on the swelling in his left upper extremity that got much worse than baseline. With inability of the patient to move it except using his right arm for his motion. Case will be discussed with primary care physician the next few minutes, to proceed with asking venous duplex scan to rule out DVT. 6/1: Ongoing pain in multiple sites. Denies dyspnea. Appetite remains poor though improved compared to yesterday. Left-sided, including left upper extremity, left lower extremity, lower posterior thorax, sacrum and thigh, swelling remains substantial  6/2: No major change in clinical status, definitely improving urine output today compared to the past.  Hemodialysis scheduled for tomorrow.   I am hesitant about, tunneled catheter placement at this point, hoping his ATN 1000 ml   Net -400 ml         Wt Readings from Last 3 Encounters:   06/01/21 174 lb 13.2 oz (79.3 kg)   11/14/20 160 lb (72.6 kg)   07/05/20 150 lb (68 kg)       Constitutional:  in no acute distress  HEENT: NC/AT, EOMI, sclera and conjunctiva are clear and anicteric, mucus membranes moist  Neck: Trachea midline, no bruit  Cardiovascular: S1, S2 regular rhythm, no murmur,or rub  Respiratory: Basilar crackles, worse on the left  Gastrointestinal:  Soft, nontender, nondistended, NABS  Ext: 2-3+ left upper extremity edema, 2+ left lower back, sacrum, thigh and left lower extremity edema  Skin: dry, no rash  Neuro: awake, alert, interactive      DATA:    Recent Labs     05/31/21  1508 06/01/21  0212 06/02/21  0654   WBC 7.6 8.1 7.8   HGB 12.7 11.6* 10.5*   HCT 38.0 34.4* 31.0*   .0* 99.4 101.0*    182 200     Recent Labs     05/31/21  0450 06/01/21  0212 06/02/21  0654    138 138   K 4.3 4.3 3.8  3.8    103 103   CO2 21* 23 23   MG  --   --  1.8   PHOS  --   --  3.6   BUN 76* 53* 43*   CREATININE 9.4* 7.1* 5.8*   * 120* 99*   * 147* 129*   BILITOT 0.6 0.5 0.5   ALKPHOS 52 48 44       Lab Results   Component Value Date    LABPROT 4.2 (H) 05/25/2021    LABPROT 4.2 05/25/2021         Assessment  1. Acute kidney injury, multifactorial, due to rhabdomyolysis, likely the hemodynamic impact of cocaine and fentanyl intoxication, which may have caused the seizure which may have caused rhabdomyolysis, exacerbated by volume contraction, further exacerbated by contrast-induced nephropathy from CTA of the neck and head. Sepsis of urinary likely also playing a role. Likely evolved into acute tubular necrosis given the anuria. Pyelonephritis may have played a role. CT scan did not reveal hydronephrosis, or apparent renal trauma. Renal ultrasound is notable for increased renal echogenicity suggestive of chronic medical renal disease.       2.  Chronic kidney disease, presumptive, in part based on the increased echogenicity seen on ultrasound. Last known creatinine was 1.4 mg/dL 11/2020. As a suspect poor adherence to his HIV regimen, we might consider HIV associated nephropathy, though at this point that would be purely speculative     3. Rhabdomyolysis due to at the least a fall and prolonged recumbency on a hard surface, though he may have also had an unwitnessed seizure given his cocaine and fentanyl intoxication (the latter would be speculative). Total CK continues to improve     4. Cocaine and fentanyl intoxication     5. Hyperkalemia secondary to acute kidney injury, decreased effective circulating volume due to volume contraction, rhabdomyolysis       6. HIV and hepatitis C, following with ID.    7. Altered mental status still current, neurology is following. Improving    8. Abnormalities liver function tests and transaminitis     9. Bacteremia with staph species, followed by ID. 10. Polysubstance abuse, with metabolic encephalopathy. 11. Marked edema of not only his left arm, but also throughout the left side of his body. 5/27 venous duplex of left upper extremity did not reveal DVT. The result of trauma from the fall, question seizure      No sign of renal recovery at this point  Total CK continues to improve  Swelling is improving with ultrafiltration on dialysis       Recommendations  1. Continue IHD support for solute and volume clearance. Next treatment tomorrow  2. Follow labs, UO  3. Continue supportive care  4.  Otherwise as above    Electronically signed by Vinay Jensen MD on 6/2/2021

## 2021-06-02 NOTE — PROGRESS NOTES
Department of Internal Medicine  Infectious Diseases  Progress Note    C/C :  HIV infection, change in mental status     Pt is more awake and alert  Reports neck pain   Denies fever   Left arm swelling some improvement   Afebrile       Current Facility-Administered Medications   Medication Dose Route Frequency Provider Last Rate Last Admin    vancomycin 1000 mg IVPB in 250 mL D5W addavial  1,000 mg Intravenous Once per day on Mon Wed Fri Oli Garcia MD   Stopped at 06/02/21 1052    gabapentin (NEURONTIN) capsule 100 mg  100 mg Oral TID Anusha Soto MD   100 mg at 06/02/21 0919    sodium chloride flush 0.9 % injection 5-40 mL  5-40 mL Intravenous 2 times per day [place] Martha, DO   10 mL at 06/02/21 0919    sodium chloride flush 0.9 % injection 5-40 mL  5-40 mL Intravenous PRN [place] Martha, DO        0.9 % sodium chloride infusion  25 mL Intravenous PRN [place] Alder, DO        thiamine mononitrate tablet 100 mg  100 mg Oral Daily [facility]   100 mg at 06/02/21 0915    multivitamin 1 tablet  1 tablet Oral Daily [place] Martha, DO   1 tablet at 06/02/21 0915    melatonin tablet 6 mg  6 mg Oral Nightly [place] Martha, DO   6 mg at 06/01/21 2210    oxyCODONE (ROXICODONE) immediate release tablet 2.5 mg  2.5 mg Oral Q6H PRN [place] Alder, DO        Or    oxyCODONE (ROXICODONE) immediate release tablet 5 mg  5 mg Oral Q6H PRN [place] Martha, DO   5 mg at 06/02/21 0916    sodium bicarbonate tablet 1,300 mg  1,300 mg Oral 4x Daily Yolie Betancur MD   1,300 mg at 06/02/21 0915    0.9 % sodium chloride infusion   Intravenous Continuous Yolie Reed MD 50 mL/hr at 06/01/21 0355 Rate Verify at 06/01/21 0355    clopidogrel (PLAVIX) tablet 75 mg  75 mg Oral Daily Cydne POLI Foss   75 mg at 06/02/21 0914    emtricitabine-tenofovir alafenamide (DESCOVY) 200-25 MG per tablet 1 tablet  1 tablet Oral Daily Leydi Ouch, MD   1 tablet at 06/02/21 0926    dolutegravir sodium (TIVICAY) tablet 50 mg  50 mg Oral Nightly Oli Garcia MD   50 mg at 06/02/21 0914    aspirin chewable tablet 81 mg  81 mg Oral Daily Hussain Pacer, DO   81 mg at 06/02/21 5857    perflutren lipid microspheres (DEFINITY) injection 1.65 mg  1.5 mL Intravenous ONCE PRN Hussain Pacer, DO             REVIEW OF SYSTEMS:    CONSTITUTIONAL:  Denies fever   HEENT: Neck pain   RESPIRATORY: denies cough, shortness of breath, sputum expectoration  CARDIOVASCULAR:  Denies palpitation  GASTROINTESTINAL:  Denies abdomen pain, diarrhea or constipation. GENITOURINARY:  Denies burning urination or frequency of urination  INTEGUMENT: denies wound , rash  HEMATOLOGIC/LYMPHATIC:  Denies lymph node swelling, gum bleeding or easy bruising. MUSCULOSKELETAL:  Denies leg pain , joint pain , joint swelling  NEUROLOGICAL:   Awake and alert    PHYSICAL EXAM:      Vitals:     BP (!) 164/96   Pulse 122   Temp 99.6 °F (37.6 °C) (Temporal)   Resp 20   Ht 5' 8\" (1.727 m)   Wt 174 lb 13.2 oz (79.3 kg)   SpO2 96%   BMI 26.58 kg/m²     General Appearance:     Awake and alert  . Head:    Normocephalic, atraumatic   Eyes:    No pallor, no icterus,   Ears:    No obvious deformity or drainage.    Nose:   No nasal drainage   Throat:   Mucosa moist, no oral thrush   Neck:   Supple, mild swelling    Lungs:     Bilateral rhonchi    Heart:    Regular rate and rhythm, no murmur   Abdomen:     Soft, non-tender, bowel sounds present    Extremities:   Left upper arm still swollen and indurated    Pulses:   Dorsalis pedis palpable    Skin:   no rashes or lesions     CBC with Differential:      Lab Results   Component Value Date    WBC 7.8 06/02/2021    RBC 3.07 06/02/2021    HGB 10.5 06/02/2021    HCT 31.0 06/02/2021     06/02/2021    .0 06/02/2021    MCH 34.2 06/02/2021    MCHC 33.9 06/02/2021    RDW 13.9 06/02/2021    SEGSPCT 47 01/06/2014    LYMPHOPCT 12.7 06/02/2021    MONOPCT 10.6 06/02/2021    BASOPCT 0.8 06/02/2021    MONOSABS 0.82 06/02/2021    LYMPHSABS 0.99 06/02/2021    EOSABS 0.27 06/02/2021    BASOSABS 0.06 06/02/2021       CMP     Lab Results   Component Value Date     06/02/2021    K 3.8 06/02/2021    K 3.8 06/02/2021     06/02/2021    CO2 23 06/02/2021    BUN 43 06/02/2021    CREATININE 5.8 06/02/2021    GFRAA 12 06/02/2021    LABGLOM 12 06/02/2021    GLUCOSE 81 06/02/2021    GLUCOSE 83 01/26/2012    PROT 5.4 06/02/2021    LABALBU 2.4 06/02/2021    LABALBU 4.2 01/17/2012    CALCIUM 7.6 06/02/2021    BILITOT 0.5 06/02/2021    ALKPHOS 44 06/02/2021     06/02/2021    ALT 99 06/02/2021         Hepatic Function Panel:    Lab Results   Component Value Date    ALKPHOS 44 06/02/2021    ALT 99 06/02/2021     06/02/2021    PROT 5.4 06/02/2021    BILITOT 0.5 06/02/2021    BILIDIR 0.3 03/13/2015    IBILI 1.3 03/13/2015    LABALBU 2.4 06/02/2021    LABALBU 4.2 01/17/2012       PT/INR:    Lab Results   Component Value Date    PROTIME 14.9 05/26/2021    INR 1.4 05/26/2021       TSH:    Lab Results   Component Value Date    TSH 3.720 03/08/2017       U/A:    Lab Results   Component Value Date    COLORU Yellow 06/02/2021    PHUR 6.0 06/02/2021    LABCAST FEW  11/02/2011    WBCUA 2-5 06/02/2021    WBCUA NONE 01/26/2012    RBCUA 10-20 06/02/2021    RBCUA NONE 01/26/2012    BACTERIA FEW 06/02/2021    CLARITYU Clear 06/02/2021    SPECGRAV 1.020 06/02/2021    LEUKOCYTESUR SMALL 06/02/2021    UROBILINOGEN 0.2 06/02/2021    BILIRUBINUR Negative 06/02/2021    BILIRUBINUR NEGATIVE 01/26/2012    BLOODU LARGE 06/02/2021    GLUCOSEU Negative 06/02/2021    GLUCOSEU NEGATIVE 01/26/2012    AMORPHOUS MODERATE 05/25/2021       ABG:  No results found for: IEU3XDO, BEART, P9KNLYEH, PHART, THGBART, WIT1ZTD, PO2ART, VIT7VQM    MICROBIOLOGY:    Blood culture -     Bottle Type Anaerobic    Source of Blood Culture Antecubital-Lef    Order Number [id number]    Enterobacter cloacae complex by PCR Not Detected    Escherichia coli by PCR Not Detected    Klebsiella oxytoca by PCR Not Detected    Klebsiella pneumoniae group by PCR Not Detected    Proteus species by PCR Not Detected    Streptococcus agalactiae by PCR Not Detected    Staphylococcus aureus by PCR Not Detected    Serratia marcescens by PCR Not Detected    Streptococcus pneumoniae by PCR Not Detected    Streptococcus pyogenes  by PCR Not Detected    Acinetobacter baumannii by PCR Not Detected    Candida albicans by PCR Not Detected    Candida glabrata by PCR Not Detected    Candida krusei by PCR Not Detected    Candida parapsilosis by PCR Not Detected    Candida tropicalis by PCR Not Detected     Enterobacteriaceae by PCR Not Detected    Enterococcus by PCR Not Detected    Haemophilus Influenzae by PCR Not Detected    Listeria monocytogenes by PCR Not Detected    Neisseria meningitidis by PCR Not Detected    Pseudomonas aeruginosa by PCR Not Detected    Staphylococcus species by PCR DETECTEDPanic      Streptococcus species by PCR Not Detected    Methicillin Resistance mecA/C  by PCR Not Detected   Narrative:           SARS CoV 2 NAAT neg       Radiology :    CT chest -       Ill-defined soft tissue thickening and fat stranding involving the visualized   base of the left neck, supraclavicular region and throughout the left lateral   chest wall.  This is nonspecific.  Infectious/inflammatory process such as   cellulitis is a consideration.  Ill-defined hematoma is also a consideration.    Recommend correlation with clinical presentation.       Emphysematous changes.       Areas of atelectasis and patchy airspace opacities most evident in the lower   lung zones.  Developing infiltrates from pneumonia not excluded.  Continued   follow-up recommended.       CT abdomen and pelvis:       Exam limited by patient motion.       Urinary bladder thickening may be related to underdistention.  Cystitis not   excluded.       Other chronic appearing findings.  Short-term follow-up if symptoms persist.           Impression:     1.  Diffuse nonspecific soft tissue stranding and superficial edema about the   mid to distal aspect of the left humerus particularly along the posterior and   lateral aspects.  No intramuscular fluid collections identified on this exam.     2.  No acute osseous findings about the left humerus. 3.  Mild left shoulder and elbow degenerative changes. RECOMMENDATIONS:   These imaging features are nonspecific.  If there is high concern for acute   compartment syndrome based on clinical history and clinical findings         Vanco random 11.2      Absolute CD 3 247Low   570 - 2400 cells/uL Final 05/25/2021 10:41 PM [facility]   Absolute CD 8 (Supp) 199Low   210 - 1200 cells/uL Final 05/25/2021 10:41 PM [facility]   CD4/CD8 Ratio 0.23Low   0.80 - 3.90 ratio Final 05/25/2021 10:41 PM [facility]   Absolute CD 4 Sa[place]emin 45Low   430 - 1800 cells/uL Final 05/25/2021 10:41 PM [facility]   Testing Performed By        IMPRESSION:    1. HIV infection / AIDS (CD 4 45 )   2. Chronic Hep C infection - cleared   3. Rhabdomyolysis   4. Encephalopathy, Cocaine use   5. CONS bacteremia - contamination       RECOMMENDATIONS:      1. Vancomycin PRN -  2.   Alisson Blanco

## 2021-06-02 NOTE — PROGRESS NOTES
Dr. Mcfadden Scriver regarding MRI positive for Tiny, 5 mm focus of acute or early subacute infarction in the right cerebellar hemisphere. pt received 2 mg Ativan prior to MRI, still lethargic intermittently following commands.  Not squeezing fingers on L side but is moving arm

## 2021-06-02 NOTE — PROGRESS NOTES
Patient calm, cooperative, pleasant. Understands not to pull at lines and tubes, and to call if he needs to get up. 2 point bilateral wrist restraints removed at this time. Bed alarm on and Telesitter at the bedside.

## 2021-06-02 NOTE — PLAN OF CARE
Problem: Non-Violent Restraints  Goal: Removal from restraints as soon as assessed to be safe  Outcome: Ongoing  Goal: No harm/injury to patient while restraints in use  Outcome: Met This Shift  Goal: Patient's dignity will be maintained  Outcome: Met This Shift

## 2021-06-02 NOTE — PLAN OF CARE
Problem: Non-Violent Restraints  Goal: Removal from restraints as soon as assessed to be safe  6/2/2021 1428 by Janene Claw Wilms, RN  Outcome: Not Met This Shift  6/2/2021 0148 by Deena Moreno RN  Outcome: Ongoing  Goal: No harm/injury to patient while restraints in use  6/2/2021 1428 by Janene Claw Wilms, RN  Outcome: Met This Shift  6/2/2021 0148 by Deena Moreno RN  Outcome: Met This Shift  Goal: Patient's dignity will be maintained  6/2/2021 1428 by Janene Claw Wilms, RN  Outcome: Met This Shift  6/2/2021 0148 by Deena Moreno RN  Outcome: Met This Shift

## 2021-06-02 NOTE — PROGRESS NOTES
Sister Tyler Ivey called to update with no answer and voicemail left   Attempted to called heather as well, with no answer   Spoke with Diego. Informed that pt was now in restraints and was given medication for pt ronald

## 2021-06-02 NOTE — PROGRESS NOTES
Performed ROM, patient somewhat cooperative, but agitated. Wants out of restraints, patient attempts to get out of bed, grabbing at tubes and wires. Continued need for 4 point restraints for patient's safety.

## 2021-06-02 NOTE — PROGRESS NOTES
Occupational Therapy     Date:2021  Patient Name: Jovany Bahena  MRN: [id number]  : 1960  Room: [facility]     Completed chart review & spoke with nsg, requesting to hold therapy. Pt appeared to have adverse reaction to ativan from yesterday per nsg & had to be re-strained 4 pt down improving to 2 pt at this time but still slightly agitated, therefore requesting to see pt tomorrow. Will attempt to see pt at another time. Missy Frederick.  [address] [place] [address], [address] [place] [address]

## 2021-06-02 NOTE — PROGRESS NOTES
left-sided chest wall cellulitis  -MRI neck soft tissue done; results noted, swelling improving   -Blood culture done on 05/24/2021 growing 2 out of 2 staph (coag Neg-might be contaminant per ID)  -Follow-up repeat blood culture NGTD  -CRP and procalcitonin significantly elevated; trended down  -ID on board; on intermittent IV Vanc    3) Rhabdomyolysis  -Likely drug induced (cocaine and fentanyl), nontraumatic  -CK significantly elevated but trending down  -Received IVF NS for hydration    4) CHANEL  -Likely due to ATN from rhabdo and or contrast-induced nephropathy  -Renal ultrasound showed increased renal parenchymal echogenicity indicating medical renal disease  -Avoid nephrotoxic medications  -Cr did not improve with IVF hydration  -Nephrology on board; started on IHD; not currently requiring daily dialysis     5) LUE weakness   -CTA head and neck: No significant occlusion  -MRI brain ordered to rule out CVA  -Neurology on board; continue DAPT for 21 days and then ASA afterward    6) LUE Swelling  -LUE Duplex neg for DVT  -Radial pulse present and palpated  -Ortho does not think its compartment syndrome.  If swelling does not improve, may reconsult tomorrow   -Reconsult ortho if pain is worsening or radial pulse is diminished     7) Elevated transaminases  -AST/ALT significantly elevated; ALP normal  -This is likely due to rhabdomyolysis as alkaline phosphatase is normal  -No further GI work-up needed  -Trending down    Other chronic medical conditions, medication resumed unless contraindicated  HIV infection; resumed meds ID  Chronic hepatitis C  Polysubstance use disorder    Family updated on the phone on 6/2/21      DISPOSITION: TBD    Medications:  REVIEWED DAILY    Infusion Medications    sodium chloride      sodium chloride 50 mL/hr at 06/01/21 0355     Scheduled Medications    vancomycin  1,000 mg Intravenous Once per day on Mon Wed Fri    gabapentin  100 mg Oral TID    sodium chloride flush  5-40 mL

## 2021-06-02 NOTE — PROGRESS NOTES
Message sent to Dr. Cole Lofton regarding if patient could potentially transfer to [facility] tomorrow with a temp tessio or if the patient need a permanent line prior to going to [facility].  Awaiting answer

## 2021-06-02 NOTE — PROGRESS NOTES
Vascular        Chief complaint : Patient seen for insertion of a tunneled dialysis catheter    The patient was seen by Dr. Juan Jose Norris on 28 May, for the placement of temporary dialysis catheter    Today, nephrology service, Dr. Marquis rOo has requested vascular service for the placement of tunneled dialysis catheter because of long-term need for continued hemodialysis    Patient has multiple risk factors, including history of HIV, hepatitis C, polysubstance drug use, significant swelling of the left arm, in fact was present when he came in with some blister ration ulceration, felt to be due to rhabdomyolysis and has undergone venous ultrasound testing, CT scans, MRIs,    When I came to see the patient today, patient is somewhat sleepy but awake, alert oriented, asked all pertinent appropriate questions      Subjective: No new C/O, other than pain weakness of the left arm    Objective:    BP (!) 146/91   Pulse 97   Temp 98.8 °F (37.1 °C) (Temporal)   Resp 17   Ht 5' 8\" (1.727 m)   Wt 174 lb 13.2 oz (79.3 kg)   SpO2 98%   BMI 26.58 kg/m²     General: alert and oriented. Neck:  Carotid bruits: Right No  Left No    Chest: Patient does have some induration of the left side of the neck and the shoulder and the left upper arm, was present on admission    Respiratory:  No rales or rhonchi . Cardiovascular:  Normal Sinus Rhythm. No murmur. Abdomen:  Soft, no masses palpable. No tenderness. Extremities:  No lower extremity edema. Both the feet are warm to touch.     Patient has a right femoral dialysis catheter    Patient does have significant swelling of the left arm, with firmness, most likely resolving compartment syndrome with rhabdomyolysis, superficial skin blisters that are healing, range of motion is limited, hand grasp is somewhat weaker      Pulses Right  Left    Radial       Brachial 3 3  3=normal   Femoral 2 2  2=diminished   Popliteal    1=barely palpable   Dorsalis pedis    0=absent   Posterior tibial    4=aneurysmal         Neuro: The speech is intact. Moving all extremities. No evidence of any acute focal lateralizing neurological deficit. Other pertinent information:     1. The history physical, multiple consultation notes including vascular, infectious disease, nephrology and neurology were reviewed    Labs and X-ray reviewed:    1. The total CK is trending down, with no improvement in the renal functions, still with GFR of 12    Assessment:    1. Acute kidney injury most likely due to rhabdomyolysis    2. History of HIV, hepatitis C    Plan:    I had a long detailed discussion the patient, options, risks benefits and alternatives explained to the patient, patient was informed, that he needs long-term hemodialysis, the temporary catheter in the right groin cannot be used for that, he will need replacement, with a tunneled dialysis catheter, preferably through the right jugular vein    He understands, that the procedure will be done by Dr. Jaskaran Reyes and associates     The risks, benefits, options and alternatives were explained including cardiopulmonary complications including chances of major complications which the patient understands and consents for surgery.  All questions answered              Nicanor Graf MD

## 2021-06-03 NOTE — ANESTHESIA POSTPROCEDURE EVALUATION
Department of Anesthesiology  Postprocedure Note    Patient: Lloyd Calvillo  MRN: [id number]  YOB: 1960  Date of evaluation: 6/3/2021  Time:  2:41 PM     Procedure Summary     Date: 06/03/21 Room / Location: [facility] OR 07 / [facility]    Anesthesia Start: 8668 Anesthesia Stop:     Procedure: INSERTION TUNNELED HEMODIALYSIS CATHETER, REMOVAL OF TEMPORARY CATHETER (N/A Chest) Diagnosis: (DIALYSIS)    Surgeons: Jose López MD Responsible Provider: Zain Denis MD    Anesthesia Type: MAC ASA Status: 3          Anesthesia Type: No value filed. Zelda Phase I:      Zelda Phase II:      Last vitals: Reviewed and per EMR flowsheets.        Anesthesia Post Evaluation    Patient location during evaluation: PACU  Patient participation: complete - patient participated  Level of consciousness: awake  Pain score: 3  Airway patency: patent  Nausea & Vomiting: no nausea and no vomiting  Complications: no  Cardiovascular status: blood pressure returned to baseline  Respiratory status: acceptable  Hydration status: euvolemic

## 2021-06-03 NOTE — PLAN OF CARE
Problem: Skin Integrity:  Goal: Will show no infection signs and symptoms  Description: Will show no infection signs and symptoms  Outcome: Met This Shift  Goal: Absence of new skin breakdown  Description: Absence of new skin breakdown  Outcome: Met This Shift     Problem: Falls - Risk of:  Goal: Will remain free from falls  Description: Will remain free from falls  Outcome: Met This Shift  Goal: Absence of physical injury  Description: Absence of physical injury  Outcome: Met This Shift     Problem: Pain:  Goal: Pain level will decrease  Description: Pain level will decrease  Outcome: Met This Shift  Goal: Control of acute pain  Description: Control of acute pain  Outcome: Met This Shift  Goal: Control of chronic pain  Description: Control of chronic pain  Outcome: Met This Shift     Problem: Non-Violent Restraints  Goal: Removal from restraints as soon as assessed to be safe  6/3/2021 0314 by Hunter Okeefe RN  Outcome: Completed  6/2/2021 1428 by Ricky Papas Wilms, RN  Outcome: Not Met This Shift  Goal: No harm/injury to patient while restraints in use  6/3/2021 0314 by Hunter Okeefe RN  Outcome: Completed  6/2/2021 1428 by Ricky Papas Wilms, RN  Outcome: Met This Shift  Goal: Patient's dignity will be maintained  6/3/2021 0314 by Hunter Okeefe RN  Outcome: Completed  6/2/2021 1428 by Ricky Papas Wilms, RN  Outcome: Met This Shift

## 2021-06-03 NOTE — PROGRESS NOTES
to recover soon. 6/3: Status post tunneled dialysis catheter today. No complications. Resting comfortably. Mild discomfort at the tunnel, but otherwise denies complaint. Left-sided swelling somewhat improved compared to 6/1      PROBLEM LIST:    Active Problems:    AMS (altered mental status)    Left-sided weakness    Goals of care, counseling/discussion    Palliative care by specialist    Encounter regarding vascular access for dialysis for ESRD [facility])    Acute renal failure superimposed on chronic kidney disease ([address]  Resolved Problems:    * No resolved hospital problems.  *         DIET:    DIET RENAL;     MEDS (scheduled):    vancomycin  1,000 mg Intravenous Once per day on Mon Wed Fri    gabapentin  100 mg Oral TID    sodium chloride flush  5-40 mL Intravenous 2 times per day    thiamine  100 mg Oral Daily    multivitamin  1 tablet Oral Daily    melatonin  6 mg Oral Nightly    sodium bicarbonate  1,300 mg Oral 4x Daily    clopidogrel  75 mg Oral Daily    emtricitabine-tenofovir alafenamide  1 tablet Oral Daily    dolutegravir sodium  50 mg Oral Nightly    aspirin  81 mg Oral Daily       MEDS (infusions):   sodium chloride      sodium chloride 50 mL/hr at 06/03/21 0523       MEDS (prn):  sodium chloride flush, sodium chloride, oxyCODONE **OR** oxyCODONE, perflutren lipid microspheres    PHYSICAL EXAM:     Patient Vitals for the past 24 hrs:   BP Temp Temp src Pulse Resp SpO2   06/03/21 1545 132/84 97.5 °F (36.4 °C) Temporal 82 18 92 %   06/03/21 1451 126/78 97.3 °F (36.3 °C) Temporal 76 16 93 %   06/03/21 1145 127/88 97.4 °F (36.3 °C) Temporal 88 18 96 %   06/03/21 0810 (!) 146/84 98.6 °F (37 °C) Temporal 87 18 93 %   06/03/21 0020 (!) 143/90 98.4 °F (36.9 °C) Oral 89 20 90 %   @      Intake/Output Summary (Last 24 hours) at 6/3/2021 1801  Last data filed at 6/3/2021 1444  Gross per 24 hour   Intake 975 ml   Output 1900 ml   Net -925 ml         Wt Readings from Last 3 Encounters:   06/01/21 174 was 1.4 mg/dL 11/2020. As a suspect poor adherence to his HIV regimen, we might consider HIV associated nephropathy, though at this point that would be purely speculative     3. Rhabdomyolysis due to at the least a fall and prolonged recumbency on a hard surface, though he may have also had an unwitnessed seizure given his cocaine and fentanyl intoxication (the latter would be speculative). Total CK continues to improve     4. Cocaine and fentanyl intoxication     5. Hyperkalemia secondary to acute kidney injury, decreased effective circulating volume due to volume contraction, rhabdomyolysis       6. HIV and hepatitis C, following with ID.    7. Altered mental status still current, neurology is following. Improving    8. Abnormalities liver function tests and transaminitis     9. Bacteremia with staph species, followed by ID. 10. Polysubstance abuse, with metabolic encephalopathy. 11. Marked edema of not only his left arm, but also throughout the left side of his body. 5/27 venous duplex of left upper extremity did not reveal DVT. The result of trauma from the fall, question seizure      No sign of renal recovery at this point  Total CK continues to improve  Swelling is improving with ultrafiltration on dialysis  S/p tunneled dialysis catheter today     Recommendations  1. Continue IHD support for solute and volume clearance. Next treatment tomorrow, Friday  2. Follow labs, UO  3. Continue supportive care  4. Otherwise as above  5. Okay for discharge to LTAC now that [facility] is in place.   Continue to follow, continue IHD as warranted while there    Electronically signed by Bishop Aspen MD on 6/3/2021

## 2021-06-03 NOTE — PROGRESS NOTES
Patient came with telemonitor to surgery. Monitor was placed in patient's chart and will return with the patient to the floor.

## 2021-06-03 NOTE — PROGRESS NOTES
Hospitalist Progress Note      SYNOPSIS: Patient admitted on 5/24/2021 for altered mental status  Lili Garner has a past medical history that includes HIV, hepatitis C, GERD    On admission; patient lethargic and a poor historian.   Fausto Roberts presents due to altered mental status and left-sided weakness; unknown last known well, stroke alert was called, CT imaging ultimately showed no perfusion mismatch or occlusion and patient determined not to be TPA candidate or candidate for thrombectomy as per stroke neurologist; patient given 324 p.o. aspirin as per neurology. Rectal temperature was 100 °F, patient with labs showing acute renal failure with GFR 13 creatinine 5.6, elevated LFTs with AST 1108, ; UA positive for hematuria, no evidence for UTI.  Lactic acid is 3.9.  Leukocytosis 13.9.  Patient given 1 L NS bolus.  Rapid Covid is negative.   CXR does bilateral opacifications concerning for possible pneumonia.  Patient given broad-spectrum antibiotic treatment with vancomycin and cefepime.  Labs also significant for elevated troponin and BNP; CRP 19, procalcitonin 3.77, elevated trop and CK. UDS positive for cocaine and fentanyl.  On physical exam patient appears to have diffuse erythema and warmth over his left lateral neck and left upper flank up to his left axilla, there also appears to be a central region of swelling and a central possible insect bite or laceration with mild serous drainage, there does not appear to be any crepitus, induration, or evidence of focal abscess.  There is tenderness to palpation of these regions as well, concerns for possible cellulitis as infectious source.  During this encounter patient did remain alert, he answered some questions but is oriented only to self, he is not able to provide very much history but is able to tell us where his pain is.  He does not have any abdominal tenderness to palpation.  Lungs with mild rales bilaterally scattered.  Decision made to note this patient due to altered mental status, acute renal failure, transaminitis, dehydration, and sepsis with unknown source.  Results and plan were discussed with the patient's brother by the admitting physician, he voiced understanding and is amenable. SUBJECTIVE:    Confusion improved  For tunneled dialysis catheter placement today     Stable overnight. No other overnight issues reported. Temp (24hrs), Av.8 °F (36.6 °C), Min:97.3 °F (36.3 °C), Max:98.6 °F (37 °C)    DIET: DIET RENAL;  CODE: Limited    Intake/Output Summary (Last 24 hours) at 6/3/2021 1811  Last data filed at 6/3/2021 1444  Gross per 24 hour   Intake 975 ml   Output 1900 ml   Net -925 ml       OBJECTIVE:    /84   Pulse 82   Temp 97.5 °F (36.4 °C) (Temporal)   Resp 18   Ht 5' 8\" (1.727 m)   Wt 174 lb 13.2 oz (79.3 kg)   SpO2 92%   BMI 26.58 kg/m²     General appearance: No apparent distress, appears stated age and cooperative. HEENT:  Conjunctivae/corneas clear. Neck: Supple. No jugular venous distention. Respiratory: Clear to auscultation bilaterally, normal respiratory effort  Cardiovascular: Regular rate rhythm, normal S1-S2  Abdomen: Soft, nontender, nondistended  Musculoskeletal: No clubbing, cyanosis, no bilateral lower extremity edema. Brisk capillary refill. Skin:   Left sided chest and lateral chest wall erythematous rash improved  Neurologic: awake, alert and following commands.  LUE paresis and edema    ASSESSMENT:    1) Acute Metabolic/Toxic encephalopathy with new small acute cerebellar stroke   -CT head no acute intracranial abnormality  -UDS positive for fentanyl and cocaine  -Continue infection and kidney failure management  -Agitation currently likely due to ativan - this has been discontinued   -Continues on DAPT    2) Sepsis with staph bacteremia secondary to left-sided chest wall cellulitis  -MRI neck soft tissue done; results noted, swelling improving   -Blood culture done on 2021 growing 2 out of 2 staph (coag Neg-might be contaminant per ID)  -Follow-up repeat blood culture NGTD  -CRP and procalcitonin significantly elevated; trended down  -ID on board; on intermittent IV Vanc    3) Rhabdomyolysis  -Likely drug induced (cocaine and fentanyl), nontraumatic  -CK significantly elevated but trending down  -Received IVF NS for hydration    4) CHANEL  -Likely due to ATN from rhabdo and or contrast-induced nephropathy  -Renal ultrasound showed increased renal parenchymal echogenicity indicating medical renal disease  -Avoid nephrotoxic medications  -Cr did not improve with IVF hydration  -Nephrology on board; started on IHD; not currently requiring daily dialysis   -For tunneled catheter placement today     5) LUE weakness   -CTA head and neck: No significant occlusion  -MRI brain ordered to rule out CVA  -Neurology on board; continue DAPT for 21 days and then ASA afterward    6) LUE Swelling  -LUE Duplex neg for DVT  -Radial pulse present and palpated  -Ortho does not think its compartment syndrome.  If swelling does not improve, may reconsult tomorrow   -Reconsult ortho if pain is worsening or radial pulse is diminished     7) Elevated transaminases  -AST/ALT significantly elevated; ALP normal  -This is likely due to rhabdomyolysis as alkaline phosphatase is normal  -No further GI work-up needed  -Trending down    Other chronic medical conditions, medication resumed unless contraindicated  HIV infection; resumed meds ID  Chronic hepatitis C  Polysubstance use disorder    Family updated on the phone on 6/2/21      DISPOSITION: Await SNF placement as pt needs 3 sessions of dialysis prior to discharge     Medications:  REVIEWED DAILY    Infusion Medications    sodium chloride      sodium chloride 50 mL/hr at 06/03/21 9213     Scheduled Medications    vancomycin  1,000 mg Intravenous Once per day on Mon Wed Fri    gabapentin  100 mg Oral TID    sodium chloride flush  5-40 mL Intravenous 2 times per day    thiamine 100 mg Oral Daily    multivitamin  1 tablet Oral Daily    melatonin  6 mg Oral Nightly    sodium bicarbonate  1,300 mg Oral 4x Daily    clopidogrel  75 mg Oral Daily    emtricitabine-tenofovir alafenamide  1 tablet Oral Daily    dolutegravir sodium  50 mg Oral Nightly    aspirin  81 mg Oral Daily     PRN Meds: sodium chloride flush, sodium chloride, oxyCODONE **OR** oxyCODONE, perflutren lipid microspheres    Labs:     Recent Labs     06/01/21 0212 06/02/21  0654 06/03/21  0642   WBC 8.1 7.8 7.3   HGB 11.6* 10.5* 11.2*   HCT 34.4* 31.0* 33.3*    200 244       Recent Labs     06/01/21 0212 06/02/21  0654 06/03/21  0642    138 140   K 4.3 3.8  3.8 4.0    103 105   CO2 23 23 25   BUN 53* 43* 41*   CREATININE 7.1* 5.8* 5.2*   CALCIUM 7.7* 7.6* 7.6*   PHOS  --  3.6  --        Recent Labs     06/01/21 0212 06/02/21  0654 06/03/21  0642   PROT 5.7* 5.4* 5.5*   ALKPHOS 48 44 45   * 99* 83*   * 129* 116*   BILITOT 0.5 0.5 0.6       No results for input(s): INR in the last 72 hours. Recent Labs     06/02/21  0654 06/02/21 1922 06/03/21  0642   CKTOTAL 5,028* 4,002* 3,235*       Chronic labs:    Lab Results   Component Value Date    CHOL 191 05/25/2021    TRIG 279 (H) 05/25/2021    HDL 45 05/25/2021    LDLCALC 90 05/25/2021    TSH 3.720 03/08/2017    PSA SEE NOTE (AA) 03/08/2017    PSA 0.47 03/08/2017    INR 1.4 05/26/2021    LABA1C 5.9 (H) 05/25/2021       Radiology: REVIEWED DAILY    +++++++++++++++++++++++++++++++++++++++++++++++++  Anusha Tran MD  [facility] Emergency Department Physician - Hospitalist  [address] [place] [address]  +++++++++++++++++++++++++++++++++++++++++++++++++  NOTE: This report was transcribed using voice recognition software. Every effort was made to ensure accuracy; however, inadvertent computerized transcription errors may be present.

## 2021-06-03 NOTE — PROGRESS NOTES
Pt was seen for mod cog deficits. Pt completed illogical sentences with good- completion. The SLP would read a sentence to the pt and he would choose the word that did not belong and use a new word that made the sentence logical. Inconsistent min v/c were successful during this task. Pt completed a paragraph comprehension task with good+ completion. The SLP would read a short paragraph to the pt and he would answer questions about specific information from the paragraph. No cues were needed to answer the questions, the pt was independent with 100% accuracy. The pt was given three words to remember and asked to recall them 15 minutes later. The patient independently recalled all three words.

## 2021-06-03 NOTE — OP NOTE
satisfactory condition. CC : No primary care provider on file.    Dr. Cole Lofton    Previous Vascular Procedures  6/3/21 TEODORO Madden Smart hd cath

## 2021-06-03 NOTE — PROGRESS NOTES
[address] [facility], [address] [place] [address]   Patient Name: Pamela Lewis  MRN: [id number]  : 1960  Room: [address] [place] [address]-A     Per OT Eval:    Evaluating OT: Kalyan Mckenna, OTR/L [id number]     Referring Provider[de-identified] CRISTHIAN Mercedes CNP                         Specific Provider Orders/Date: OT evaluation and treatment   21 1115   OT eval and treat         Diagnosis: multiple organ failure      Pertinent Medical History: hep C, HIV, neuropathy, GERD,       Precautions:  Fall Risk, left sided weakness & edema, bed alarm, TSM, TAPS,  rae, O2, HIV, Hep C,      Assessment of current deficits   [x]? Functional mobility             [x]?ADLs           [x]? Strength                  [x]? Cognition   [x]? Functional transfers           []? IADLs         [x]? Safety Awareness   [x]? Endurance   [x]? Fine Coordination              [x]? Balance      []? Vision/perception   [x]? Sensation     [x]? Gross Motor Coordination  [x]? ROM           []? Delirium                   [x]?  Motor Control      OT PLAN OF CARE   OT POC based on physician orders, patient diagnosis and results of clinical assessment     Frequency/Duration  2-5 days/wk for 2 weeks PRN   Specific OT Treatment to include:   Instruction/training on adapted ADL techniques and AE recommendations to increase functional independence within precautions  Functional transfer/mobility training/DME recommendations for increased independence, safety, and fall prevention  Patient/Family education to increase follow through with safety techniques and functional independence  Cognitive retraining/development of therapeutic activities to improve problem solving, judgement, memory, and attention for increased safety/participation in ADL/IADL tasks  Sensory re-education to improve body/limb awareness, maintain/improve skin integrity, and improve hand/UE motor function  Therapeutic exercise to improve motor endurance, ROM, and functional strength for ADLs/functional transfers  Therapeutic activities to facilitate/challenge dynamic balance, stand tolerance for increased safety and independence with ADLs  Therapeutic activities to facilitate gross/fine motor skills for increased independence with ADLs  Neuro-muscular re-education: facilitation of righting/equilibrium reactions, midline orientation, scapular stability/mobility, normalization of muscle tone, and facilitation of volitional active controled movement  Positioning to improve skin integrity, interaction with environment and functional independence     Modified [place] Scale   Score     Description  0             No symptoms  1             No significant disability despite symptoms  2             Slight disability; able to look after own affairs  3             Moderate disability; able to ambulate without assist/ requires assist with ADLs  4             Moderate/Severe disability;requires assist to ambulate/assist with ADLs  5             Severe disability;bedridden/incontinent   6               Score:   4     Recommended Adaptive Equipment:  TBD      Home Living: Pt lives alone in a 3 story home with  2 RAUL with no rail.    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, hx of working in construction     Pain Level: did not report any pain    Cognition: A&O: x 4, pleasant & cooperative, highly motivated   Follows 2 step directions: fair               Memory:  fair               Sequencing: fair              Problem solving: fair              Judgement/safety: fair     Functional Assessment:   AM-PAC Daily Activity Raw Score:        Initial Eval Status  Date: 21 Treatment Status  Date:  6/3/21 STG=LTG  Time frame: 10-14 days   Feeding Moderate Assist  SBA  Clear liquid diet   Supervision    Grooming Maximal Assist   Min A  Seated in chair with grooming tasks  Minimal Assist    UB Dressing Maximal Assist   To change hospital gown, cues for sequencing and dependent placement of LUE into sleeve d/t severe pain  Min-Mod A  Doff/shira gown seated in chair, educated on modified techniques due to L UE edema & decreased FMS Minimal Assist    LB Dressing Dependent   Mod/Max A  Doff/shira socks, simulated pants, rae still present Moderate Assist    Bathing Maximal Assist UB: Mod A  Due to edema, Poor  strength, decreased use of L hand  LB: Min A  Seated for washing legs, then standing for carmenza-care Minimal Assist    Toileting Dependent   Incontinent of bowel after stand to sit   Max A  Rae present, recommended BSC Moderate Assist    Bed Mobility  Rolling: max A to R, mod A to Lefl  Supine to sit: Maximal Assist   Sit to supine: Maximal Assist  Min A  Supine > sit   Supine to sit: Minimal Assist   Sit to supine: Minimal Assist    Functional Transfers Sit to stand:  Moderate Assist x 2  Stand to sit: Moderate Assist   Stand pivot: NT  Min A  Sit < > stand  Mod A  Stand pivot  With hand held assist Minimal Assist    Functional Mobility Mod A x 2  3-4 side steps with hand held assist/side held assist  Mod A  Taking a few steps from EOB to chair Min A with AAD short distance   Balance Sitting:     Static:  Mod A progressing to SBA, c/o dizziness initally     Dynamic:mod A  Standing: mod A x 2  Sitting: SBA  Standing: Min A  With walker  Sitting:     Static:  SBA    Dynamic:SBA  Standing: min A   Activity Tolerance poor Fair+  Moderate tasks seated   Standing 3-4 minutes during ADL tasks  Fair+   Visual/  Perceptual Glasses: no             BUE  ROM/Strength/  Fine motor Coordination Hand dominance: R     RUE: ROM limited in shoulder and hands  But generally WFL     Strength: grossly 3+/5      Strength:  WFL     Coordination:  fair     LUE: ROM no shoulder flex/abd noted, minimal elbow flex/ext, minimal finger flex/ext     Strength: grossly 2+/5      Strength: poor     Coordination: poor       Education:  Pt was educated through out treatment regarding proper technique & safety with bed mobility, functional transfers & ADL compensatory strategies to ease tasks, improve safety & prevent falls. Re-educated pt on gentle massage & AAROM/AROM on L UE due to mod edema & importance of positioning L UE at all times to decrease edema with Fair+ understanding. Improvement noted with cervical alignment, educated pt on continuing with AROM with cervical spine. Comments: Upon arrival pt was in bed & agreeable for therapy. At end of session pt was seated in chair tray table in front, all lines and tubes intact, call light within reach. nsg aware pt was up in chair, chair alarm set, TSM present as well. Pt demonstrating Good understanding not to get up without assist.     · Pt has made Good progress towards set goals. · Continue with current plan of care    Treatment Time In: 9:47          Treatment Time Out: 10:25           Treatment Charges: Mins Units   Ther Ex  [id number]     Manual Therapy [id number] [facility]     Thera Activities [id number] 10 1   ADL/Home Mgt [id number] 28 2   Neuro Re-ed 11341     Group Therapy      Orthotic manage/training  17141     Non-Billable Time     Total Timed Treatment 38 3       Neeru BORGESDarryl  [address] [place] [address] Drive, [address] [place] [address]

## 2021-06-03 NOTE — PROGRESS NOTES
Palliative Care Department  [phone]  Progress Note  Kevin Barros APRN-CNS, ACHPN    Pt seen. Alert and awake. Sitting up in a chair. Doing well at present. C/o of LUE pain; arm still swollen. Just ambulated and working with O.T. Had placement of hemodialysis catheter 6/3/21. Total CK continues to trend down. Plan for HOLLY. Spoke with staff and . Goals and Code status addressed:   Palliative Medicine will now sign off. No further needs at this time. Feel free to re consult if needed. Thank you for allowing Palliative Medicine to participate in the care of Jose Guidry.   Kevin Barros APRN-CNS, ACHPN

## 2021-06-03 NOTE — PROGRESS NOTES
Department of Internal Medicine  Infectious Diseases  Progress Note    C/C :  HIV infection, rhabdomyolysis      Pt is more awake and alert  Reports neck pain   Denies fever   Left arm swelling - improving   Afebrile       Current Facility-Administered Medications   Medication Dose Route Frequency Provider Last Rate Last Admin    vancomycin 1000 mg IVPB in 250 mL D5W addavial  1,000 mg Intravenous Once per day on Mon Wed Fri Oli Garcia MD   Stopped at 06/02/21 1052    gabapentin (NEURONTIN) capsule 100 mg  100 mg Oral TID Anusha Soto MD   100 mg at 06/03/21 0813    sodium chloride flush 0.9 % injection 5-40 mL  5-40 mL Intravenous 2 times per day Ghassan Meeter, DO   10 mL at 06/03/21 0813    sodium chloride flush 0.9 % injection 5-40 mL  5-40 mL Intravenous PRN Ghassan Meeter, DO        0.9 % sodium chloride infusion  25 mL Intravenous PRN Ghassan Meeter, DO        thiamine mononitrate tablet 100 mg  100 mg Oral Daily Ghassan Meeter, DO   100 mg at 06/03/21 7233    multivitamin 1 tablet  1 tablet Oral Daily Ghassan Meeter, DO   1 tablet at 06/03/21 7110    melatonin tablet 6 mg  6 mg Oral Nightly Ghassan Meeter, DO   6 mg at 06/02/21 2353    oxyCODONE (ROXICODONE) immediate release tablet 2.5 mg  2.5 mg Oral Q6H PRN Ghassan Meeter, DO        Or    oxyCODONE (ROXICODONE) immediate release tablet 5 mg  5 mg Oral Q6H PRN Ghassan Meeter, DO   5 mg at 06/02/21 2353    sodium bicarbonate tablet 1,300 mg  1,300 mg Oral 4x Daily Yolie Betancur MD   1,300 mg at 06/03/21 0813    0.9 % sodium chloride infusion   Intravenous Continuous Yolie Servin MD 50 mL/hr at 06/03/21 0523 Rate Verify at 06/03/21 0523    clopidogrel (PLAVIX) tablet 75 mg  75 mg Oral Daily POLI Driscoll   75 mg at 06/03/21 0813    emtricitabine-tenofovir alafenamide (DESCOVY) 200-25 MG per tablet 1 tablet  1 tablet Oral Daily Amber Tran MD   1 tablet at 06/03/21 0813    dolutegravir sodium (TIVICAY) tablet 50 mg  50 mg Oral Nightly Oli Garcia MD   50 mg at 06/02/21 2353    aspirin chewable tablet 81 mg  81 mg Oral Daily Torie Gao DO   81 mg at 06/03/21 8470    perflutren lipid microspheres (DEFINITY) injection 1.65 mg  1.5 mL Intravenous ONCE PRN Torie Gao DO         Facility-Administered Medications Ordered in Other Encounters   Medication Dose Route Frequency Provider Last Rate Last Admin    0.9 % sodium chloride infusion   Intravenous Continuous PRN Suzan Mccracken RN   New Bag at 06/03/21 1325    propofol injection   Intravenous Continuous PRN Suzan Mccracken RN 35.685 mL/hr at 06/03/21 1347 75 mcg/kg/min at 06/03/21 1347    midazolam (VERSED) injection   Intravenous PRN Suzan Mccracken RN   2 mg at 06/03/21 1325    fentaNYL (SUBLIMAZE) injection   Intravenous PRN uSzan Mccracken RN   50 mcg at 06/03/21 1344         REVIEW OF SYSTEMS:    CONSTITUTIONAL:  Denies fever   HEENT: Neck pain   RESPIRATORY: denies cough, shortness of breath, sputum expectoration  CARDIOVASCULAR:  Denies palpitation  GASTROINTESTINAL:  Denies abdomen pain, diarrhea or constipation. GENITOURINARY:  Denies burning urination or frequency of urination  INTEGUMENT: denies wound , rash  HEMATOLOGIC/LYMPHATIC:  Denies lymph node swelling, gum bleeding or easy bruising. MUSCULOSKELETAL:  Denies leg pain , joint pain , joint swelling  NEUROLOGICAL:   Awake and alert    PHYSICAL EXAM:      Vitals:     /88   Pulse 88   Temp 97.4 °F (36.3 °C) (Temporal)   Resp 18   Ht 5' 8\" (1.727 m)   Wt 174 lb 13.2 oz (79.3 kg)   SpO2 96%   BMI 26.58 kg/m²     General Appearance:     Awake and alert  . Head:    Normocephalic, atraumatic   Eyes:    No pallor, no icterus,   Ears:    No obvious deformity or drainage.    Nose:   No nasal drainage   Throat:   Mucosa moist, no oral thrush   Neck:   Supple, mild swelling    Lungs:     Bilateral rhonchi    Heart:    Regular rate and rhythm, no murmur   Abdomen:     Soft, non-tender, bowel sounds present    Extremities:   Left upper arm swollen, indurated     Pulses:   Dorsalis pedis palpable    Skin:   no rashes or lesions     CBC with Differential:      Lab Results   Component Value Date    WBC 7.3 06/03/2021    RBC 3.26 06/03/2021    HGB 11.2 06/03/2021    HCT 33.3 06/03/2021     06/03/2021    .1 06/03/2021    MCH 34.4 06/03/2021    MCHC 33.6 06/03/2021    RDW 13.9 06/03/2021    SEGSPCT 47 01/06/2014    LYMPHOPCT 16.9 06/03/2021    MONOPCT 11.2 06/03/2021    BASOPCT 1.1 06/03/2021    MONOSABS 0.82 06/03/2021    LYMPHSABS 1.24 06/03/2021    EOSABS 0.43 06/03/2021    BASOSABS 0.08 06/03/2021       CMP     Lab Results   Component Value Date     06/03/2021    K 4.0 06/03/2021     06/03/2021    CO2 25 06/03/2021    BUN 41 06/03/2021    CREATININE 5.2 06/03/2021    GFRAA 14 06/03/2021    LABGLOM 14 06/03/2021    GLUCOSE 78 06/03/2021    GLUCOSE 83 01/26/2012    PROT 5.5 06/03/2021    LABALBU 2.4 06/03/2021    LABALBU 4.2 01/17/2012    CALCIUM 7.6 06/03/2021    BILITOT 0.6 06/03/2021    ALKPHOS 45 06/03/2021     06/03/2021    ALT 83 06/03/2021         Hepatic Function Panel:    Lab Results   Component Value Date    ALKPHOS 45 06/03/2021    ALT 83 06/03/2021     06/03/2021    PROT 5.5 06/03/2021    BILITOT 0.6 06/03/2021    BILIDIR 0.3 03/13/2015    IBILI 1.3 03/13/2015    LABALBU 2.4 06/03/2021    LABALBU 4.2 01/17/2012       PT/INR:    Lab Results   Component Value Date    PROTIME 14.9 05/26/2021    INR 1.4 05/26/2021       TSH:    Lab Results   Component Value Date    TSH 3.720 03/08/2017       U/A:    Lab Results   Component Value Date    COLORU Yellow 06/02/2021    PHUR 6.0 06/02/2021    LABCAST FEW  11/02/2011    WBCUA 2-5 06/02/2021    WBCUA NONE 01/26/2012    RBCUA 10-20 06/02/2021    RBCUA NONE 01/26/2012    BACTERIA FEW 06/02/2021    CLARITYU Clear 06/02/2021    SPECGRAV 1.020 06/02/2021    LEUKOCYTESUR SMALL 06/02/2021    UROBILINOGEN 0.2 06/02/2021    BILIRUBINUR Negative 06/02/2021    BILIRUBINUR NEGATIVE 01/26/2012    BLOODU LARGE 06/02/2021    GLUCOSEU Negative 06/02/2021    GLUCOSEU NEGATIVE 01/26/2012    AMORPHOUS MODERATE 05/25/2021       ABG:  No results found for: Quillian , PHART, THGBART, OLG1MQT, PO2ART, HUS6ABI    MICROBIOLOGY:    Blood culture -     Bottle Type Anaerobic    Source of Blood Culture Antecubital-Lef    Order Number [id number]    Enterobacter cloacae complex by PCR Not Detected    Escherichia coli by PCR Not Detected    Klebsiella oxytoca by PCR Not Detected    Klebsiella pneumoniae group by PCR Not Detected    Proteus species by PCR Not Detected    Streptococcus agalactiae by PCR Not Detected    Staphylococcus aureus by PCR Not Detected    Serratia marcescens by PCR Not Detected    Streptococcus pneumoniae by PCR Not Detected    Streptococcus pyogenes  by PCR Not Detected    Acinetobacter baumannii by PCR Not Detected    Candida albicans by PCR Not Detected    Candida glabrata by PCR Not Detected    Candida krusei by PCR Not Detected    Candida parapsilosis by PCR Not Detected    Candida tropicalis by PCR Not Detected     Enterobacteriaceae by PCR Not Detected    Enterococcus by PCR Not Detected    Haemophilus Influenzae by PCR Not Detected    Listeria monocytogenes by PCR Not Detected    Neisseria meningitidis by PCR Not Detected    Pseudomonas aeruginosa by PCR Not Detected    Staphylococcus species by PCR DETECTEDPanic      Streptococcus species by PCR Not Detected    Methicillin Resistance mecA/C  by PCR Not Detected   Narrative:           SARS CoV 2 NAAT neg       Radiology :    CT chest -       Ill-defined soft tissue thickening and fat stranding involving the visualized   base of the left neck, supraclavicular region and throughout the left lateral   chest wall.  This is nonspecific.  Infectious/inflammatory process such as   cellulitis is a consideration.  Ill-defined hematoma is also a consideration. Recommend correlation with clinical presentation.       Emphysematous changes.       Areas of atelectasis and patchy airspace opacities most evident in the lower   lung zones.  Developing infiltrates from pneumonia not excluded.  Continued   follow-up recommended.       CT abdomen and pelvis:       Exam limited by patient motion.       Urinary bladder thickening may be related to underdistention.  Cystitis not   excluded.       Other chronic appearing findings.  Short-term follow-up if symptoms persist.           Impression:     1.  Diffuse nonspecific soft tissue stranding and superficial edema about the   mid to distal aspect of the left humerus particularly along the posterior and   lateral aspects.  No intramuscular fluid collections identified on this exam.     2.  No acute osseous findings about the left humerus. 3.  Mild left shoulder and elbow degenerative changes. RECOMMENDATIONS:   These imaging features are nonspecific.  If there is high concern for acute   compartment syndrome based on clinical history and clinical findings         Vanco random 11.2      Absolute CD 3 247Low   570 - 2400 cells/uL Final 05/25/2021 10:41 PM [facility]   Absolute CD 8 (Supp) 199Low   210 - 1200 cells/uL Final 05/25/2021 10:41 PM [facility]   CD4/CD8 Ratio 0.23Low   0.80 - 3.90 ratio Final 05/25/2021 10:41 PM [facility]   Absolute CD 4 [place] 45Low   430 - 1800 cells/uL Final 05/25/2021 10:41 PM [facility]   Testing Performed By        IMPRESSION:    1. HIV infection / AIDS (CD 4 45 )   2. Chronic Hep C infection - cleared   3. Rhabdomyolysis   4. Encephalopathy, Cocaine use   5. CONS bacteremia - contamination       RECOMMENDATIONS:      1. Vancomycin random   2.   Yeyo Welch

## 2021-06-04 NOTE — PROGRESS NOTES
[address] [address] [facility] [address] [place] [address]      Date:2021   Patient Name: Antonio Myers  MRN: [id number]  : 1960  Room: [address] [place] [address]     Per OT Eval:    Evaluating OT: May Payan OTR/L [id number]     Referring Provider[de-identified] CRISTHIAN Grimaldo CNP                         Specific Provider Orders/Date: OT evaluation and treatment   21 1115   OT eval and treat         Diagnosis: multiple organ failure      Pertinent Medical History: hep C, HIV, neuropathy, GERD,       Precautions:  Fall Risk, left sided weakness & edema, bed alarm, TSM, TAPS,  rae, O2, HIV, Hep C,      Assessment of current deficits   [x]? Functional mobility             [x]?ADLs           [x]? Strength                  [x]? Cognition   [x]? Functional transfers           []? IADLs         [x]? Safety Awareness   [x]? Endurance   [x]? Fine Coordination              [x]? Balance      []? Vision/perception   [x]? Sensation     [x]? Gross Motor Coordination  [x]? ROM           []? Delirium                   [x]?  Motor Control      OT PLAN OF CARE   OT POC based on physician orders, patient diagnosis and results of clinical assessment     Frequency/Duration  2-5 days/wk for 2 weeks PRN   Specific OT Treatment to include:   Instruction/training on adapted ADL techniques and AE recommendations to increase functional independence within precautions  Functional transfer/mobility training/DME recommendations for increased independence, safety, and fall prevention  Patient/Family education to increase follow through with safety techniques and functional independence  Cognitive retraining/development of therapeutic activities to improve problem solving, judgement, memory, and attention for increased safety/participation in ADL/IADL tasks  Sensory re-education to improve body/limb awareness, maintain/improve skin integrity, and improve hand/UE motor function  Therapeutic exercise to improve motor endurance, ROM, and functional strength for ADLs/functional transfers  Therapeutic activities to facilitate/challenge dynamic balance, stand tolerance for increased safety and independence with ADLs  Therapeutic activities to facilitate gross/fine motor skills for increased independence with ADLs  Neuro-muscular re-education: facilitation of righting/equilibrium reactions, midline orientation, scapular stability/mobility, normalization of muscle tone, and facilitation of volitional active controled movement  Positioning to improve skin integrity, interaction with environment and functional independence     Modified [place] Scale   Score     Description  0             No symptoms  1             No significant disability despite symptoms  2             Slight disability; able to look after own affairs  3             Moderate disability; able to ambulate without assist/ requires assist with ADLs  4             Moderate/Severe disability;requires assist to ambulate/assist with ADLs  5             Severe disability;bedridden/incontinent   6               Score:   4     Recommended Adaptive Equipment:  TBD      Home Living: Pt lives alone in a 3 story home with  2 RAUL with no rail.    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, hx of working in construction     Pain Level: reports overall body pain, nsg aware & present, unable to receive meds for another 30 minutes, pt agreeable to work with therapy to improve comfort with Fair results    Cognition: A&O: x 4, pleasant & cooperative, highly motivated   Follows 2 step directions: fair               Memory:  fair               Sequencing: fair              Problem solving: fair              Judgement/safety: fair     Functional Assessment:   AM-PAC Daily Activity Raw Score:        Initial Eval Status  Date: 5/28/21 Treatment Status  Date:  6/4/21 STG=LTG  Time frame: 10-14 days   Feeding Moderate Assist  Set up  Due to limited use of L hand due to edema & decreased ROM Supervision    Grooming Maximal Assist   Min A  Seated in chair with grooming tasks  Minimal Assist    UB Dressing Maximal Assist   To change hospital gown, cues for sequencing and dependent placement of LUE into sleeve d/t severe pain  Min-Mod A  Doff/shira gown seated at EOB, Fair+ recall of modified techniques due to edema L UE & decreased ROM, strength Minimal Assist    LB Dressing Dependent   Mod/Max A  Doff/shira socks, simulated pants, rae still present Moderate Assist    Bathing Maximal Assist NT    Per last session  UB: Mod A  LB: Min A Minimal Assist    Toileting Dependent   Incontinent of bowel after stand to sit  Mod A  Used regular commode, no success with BM, simulated hygiene assist would be needed with clothing due to L UE  (rae still present) Moderate Assist    Bed Mobility  Rolling: max A to R, mod A to Lefl  Supine to sit: Maximal Assist   Sit to supine: Maximal Assist  Min A  Supine > sit     Supine to sit: Minimal Assist   Sit to supine: Minimal Assist    Functional Transfers Sit to stand:  Moderate Assist x 2  Stand to sit: Moderate Assist   Stand pivot: NT  Min A  Sit < > stand    Mod/Min A  Stand pivot  With walker  Minimal Assist    Functional Mobility Mod A x 2  3-4 side steps with hand held assist/side held assist  Mod A  With walker, able to complete short household distance with increased time & occasional assist with L UE maintaining  on walker, some improvement with grasp, slow but steady pace, limited by pain Min A with AAD short distance   Balance Sitting:     Static:  Mod A progressing to SBA, c/o dizziness initally     Dynamic:mod A  Standing: mod A x 2  Sitting: SBA  Standing: Min A  With walker  Sitting:     Static:  SBA    Dynamic:SBA  Standing: min A   Activity Tolerance poor Fair  Moderate tasks   Increased pain this date, increased time required with tasks  Fair+   Visual/  Perceptual Glasses: no             BUE  ROM/Strength/  Fine motor Coordination Hand dominance: R     RUE: ROM limited in shoulder and hands  But generally WFL     Strength: grossly 3+/5      Strength:  WFL     Coordination:  fair     LUE: ROM no shoulder flex/abd noted, minimal elbow flex/ext, minimal finger flex/ext     Strength: grossly 2+/5      Strength: poor     Coordination: poor       Education:  Pt was educated through out treatment regarding proper technique & safety with bed mobility, functional transfers & ADL modified techniques to ease tasks, improve safety & prevent falls. Re-educated pt on gentle massage & AAROM/AROM on L UE due to mod edema & importance of positioning L UE at all times to decrease edema with Fair+ understanding. Slight improvement noted with edema. Increased stiffness noted in neck this date,  educated pt on continuing with AROM with cervical spine & getting up in chair several times a day with Good understanding. Comments: Upon arrival pt was in bed & agreeable for therapy. At end of session pt was seated in chair tray table in front, all lines and tubes intact, call light within reach. nsg aware pt was up in chair, chair alarm set, TSM present as well. Pt demonstrating Good understanding not to get up without assist.     · Pt has made Good progress towards set goals. · Continue with current plan of care    Treatment Time In: 9:47          Treatment Time Out: 10:25      Treatment Charges: Mins Units   Ther Ex  [id number]     Manual Therapy [id number]     Thera Activities 93615 10 1   ADL/Home Mgt 11873 28 2   Neuro Re-ed [id number]     Group Therapy      Orthotic manage/training  [id number]     Non-Billable Time     Total Timed Treatment 38 3       Neeru PEREZ  [address] [place] [address], [address] [place] [address]

## 2021-06-04 NOTE — PROGRESS NOTES
[facility] MD Marcus Middleton, MD Belkis Medina MD Theron Lisle, CNP   Jana Cha, AUGUSTINE  Progress Note    6/4/2021    SUBJECTIVE:   (-) c/o's  (-) sob/elkins/cp/palp , increased alertness since yesterday. Currently on soft arm restraints. Patient still confused, yelling with no apparent shortness of breath. Persistent altered mental status due to multiple medical problems and iatrogenic consumption history of cocaine/fentanyl. Patient is currently getting evaluation for any fractures to the shoulder and arm pain with orthopedic consultation. 5/29: Patient doing better more awake and alert this morning, family is at the bedside both sister and brother with whom I had thorough discussion for future care in terms of renal failure another comorbid conditions. Patient scheduled for CT with contrast as per orthopedics, to follow-up with hemodialysis initiation this afternoon. Second treatment hemodialysis will be Monday. 5/30: Patient doing remarkably better, in terms of his orientation, anxiety. 5/31: Patient was seen in his room, interviewed and examined. Today he is more focused on the swelling in his left upper extremity that got much worse than baseline. With inability of the patient to move it except using his right arm for his motion. Case will be discussed with primary care physician the next few minutes, to proceed with asking venous duplex scan to rule out DVT. 6/1: Ongoing pain in multiple sites. Denies dyspnea. Appetite remains poor though improved compared to yesterday. Left-sided, including left upper extremity, left lower extremity, lower posterior thorax, sacrum and thigh, swelling remains substantial  6/2: No major change in clinical status, definitely improving urine output today compared to the past.  Hemodialysis scheduled for tomorrow.   I am hesitant about, tunneled catheter placement at this point, hoping his ATN filed at 6/4/2021 1025  Gross per 24 hour   Intake 920 ml   Output 1500 ml   Net -580 ml         Wt Readings from Last 3 Encounters:   06/01/21 174 lb 13.2 oz (79.3 kg)   11/14/20 160 lb (72.6 kg)   07/05/20 150 lb (68 kg)       Constitutional:  in no acute distress  HEENT: NC/AT, EOMI, sclera and conjunctiva are clear and anicteric, mucus membranes moist  Neck: Trachea midline, no bruit  Cardiovascular: S1, S2 regular rhythm, no murmur,or rub  Respiratory: Basilar crackles, worse on the left  Gastrointestinal:  Soft, nontender, nondistended, NABS  Ext: 2-3+ left upper extremity edema, 2+ left lower back, sacrum, thigh and left lower extremity edema  Skin: dry, no rash  Neuro: awake, alert, interactive      DATA:    Recent Labs     06/02/21  0654 06/03/21  0642 06/04/21  0647   WBC 7.8 7.3 7.2   HGB 10.5* 11.2* 10.9*   HCT 31.0* 33.3* 32.6*   .0* 102.1* 102.2*    244 239     Recent Labs     06/02/21  0654 06/03/21  0642 06/04/21  0647    140 142   K 3.8  3.8 4.0 4.0    105 108*   CO2 23 25 29   MG 1.8  --   --    PHOS 3.6  --   --    BUN 43* 41* 38*   CREATININE 5.8* 5.2* 4.6*   ALT 99* 83* 71*   * 116* 90*   BILITOT 0.5 0.6 0.5   ALKPHOS 44 45 42       Lab Results   Component Value Date    LABPROT 4.2 (H) 05/25/2021    LABPROT 4.2 05/25/2021         Assessment  1. Acute kidney injury, multifactorial, due to rhabdomyolysis, likely the hemodynamic impact of cocaine and fentanyl intoxication, which may have caused the seizure which may have caused rhabdomyolysis, exacerbated by volume contraction, further exacerbated by contrast-induced nephropathy from CTA of the neck and head. Sepsis of urinary likely also playing a role. Likely evolved into acute tubular necrosis given the anuria. Pyelonephritis may have played a role. CT scan did not reveal hydronephrosis, or apparent renal trauma.   Renal ultrasound is notable for increased renal echogenicity suggestive of chronic medical renal disease.       2. Chronic kidney disease, presumptive, in part based on the increased echogenicity seen on ultrasound. Last known creatinine was 1.4 mg/dL 11/2020. As a suspect poor adherence to his HIV regimen, we might consider HIV associated nephropathy, though at this point that would be purely speculative     3. Rhabdomyolysis due to at the least a fall and prolonged recumbency on a hard surface, though he may have also had an unwitnessed seizure given his cocaine and fentanyl intoxication (the latter would be speculative). Total CK continues to improve     4. Cocaine and fentanyl intoxication     5. Hyperkalemia secondary to acute kidney injury, decreased effective circulating volume due to volume contraction, rhabdomyolysis       6. HIV and hepatitis C, following with ID.    7. Altered mental status still current, neurology is following. Improving    8. Abnormalities liver function tests and transaminitis     9. Bacteremia with staph species, followed by ID. 10. Polysubstance abuse, with metabolic encephalopathy. 11. Marked edema of not only his left arm, but also throughout the left side of his body. 5/27 venous duplex of left upper extremity did not reveal DVT. The result of trauma from the fall, question seizure      No sign of renal recovery at this point  Total CK continues to improve  Swelling is improving with ultrafiltration on dialysis  S/p tunneled dialysis catheter today     Recommendations  1. Continue IHD support for solute and volume clearance. If needed, Dr. Chris Carrasquillo will finalize tomorrow morning. 2. Follow labs, UO  3. Continue supportive care  4. Otherwise as above  5. Okay for discharge to LTAC now that [facility] [place] operated by [facility] is in place.   Continue to follow, continue IHD as warranted while there    Electronically signed by Narayan Altman MD on 6/4/2021

## 2021-06-04 NOTE — PROGRESS NOTES
Hospitalist Progress Note      SYNOPSIS: Patient admitted on 5/24/2021 for altered mental status  Jovany Bahena has a past medical history that includes HIV, hepatitis C, GERD    On admission; patient lethargic and a poor historian.   Galina Mann presents due to altered mental status and left-sided weakness; unknown last known well, stroke alert was called, CT imaging ultimately showed no perfusion mismatch or occlusion and patient determined not to be TPA candidate or candidate for thrombectomy as per stroke neurologist; patient given 324 p.o. aspirin as per neurology. Rectal temperature was 100 °F, patient with labs showing acute renal failure with GFR 13 creatinine 5.6, elevated LFTs with AST 1108, ; UA positive for hematuria, no evidence for UTI.  Lactic acid is 3.9.  Leukocytosis 13.9.  Patient given 1 L NS bolus.  Rapid Covid is negative.   CXR does bilateral opacifications concerning for possible pneumonia.  Patient given broad-spectrum antibiotic treatment with vancomycin and cefepime.  Labs also significant for elevated troponin and BNP; CRP 19, procalcitonin 3.77, elevated trop and CK. UDS positive for cocaine and fentanyl.  On physical exam patient appears to have diffuse erythema and warmth over his left lateral neck and left upper flank up to his left axilla, there also appears to be a central region of swelling and a central possible insect bite or laceration with mild serous drainage, there does not appear to be any crepitus, induration, or evidence of focal abscess.  There is tenderness to palpation of these regions as well, concerns for possible cellulitis as infectious source.  During this encounter patient did remain alert, he answered some questions but is oriented only to self, he is not able to provide very much history but is able to tell us where his pain is.  He does not have any abdominal tenderness to palpation.  Lungs with mild rales bilaterally scattered.  Decision made to note this patient due to altered mental status, acute renal failure, transaminitis, dehydration, and sepsis with unknown source.  Results and plan were discussed with the patient's brother by the admitting physician, he voiced understanding and is amenable. SUBJECTIVE:    Confusion improved  For tunneled dialysis catheter placement today     Stable overnight. No other overnight issues reported. Temp (24hrs), Av.4 °F (36.9 °C), Min:97.7 °F (36.5 °C), Max:99.4 °F (37.4 °C)    DIET: DIET RENAL;  CODE: Limited    Intake/Output Summary (Last 24 hours) at 2021 1548  Last data filed at 2021 1443  Gross per 24 hour   Intake 1433.7 ml   Output 1400 ml   Net 33.7 ml       OBJECTIVE:    /82   Pulse 87   Temp 98 °F (36.7 °C) (Temporal)   Resp 18   Ht 5' 8\" (1.727 m)   Wt 174 lb 13.2 oz (79.3 kg)   SpO2 92%   BMI 26.58 kg/m²     General appearance: No apparent distress, appears stated age and cooperative. HEENT:  Conjunctivae/corneas clear. Neck: Supple. No jugular venous distention. Respiratory: Clear to auscultation bilaterally, normal respiratory effort  Cardiovascular: Regular rate rhythm, normal S1-S2  Abdomen: Soft, nontender, nondistended  Musculoskeletal: No clubbing, cyanosis, no bilateral lower extremity edema. Brisk capillary refill. Skin:   Left sided chest and lateral chest wall erythematous rash improved  Neurologic: awake, alert and following commands.  LUE paresis and edema    ASSESSMENT:    1) Acute Metabolic/Toxic encephalopathy with new small acute cerebellar stroke   -CT head no acute intracranial abnormality  -UDS positive for fentanyl and cocaine  -Continue infection and kidney failure management  -Agitation currently likely due to ativan - this has been discontinued   -Continues on DAPT    2) Sepsis with staph bacteremia secondary to left-sided chest wall cellulitis  -MRI neck soft tissue done; results noted, swelling improving   -Blood culture done on 2021 growing 2 out of 2 vancomycin  1,000 mg Intravenous Once per day on Mon Wed Fri    gabapentin  100 mg Oral TID    sodium chloride flush  5-40 mL Intravenous 2 times per day    thiamine  100 mg Oral Daily    multivitamin  1 tablet Oral Daily    melatonin  6 mg Oral Nightly    sodium bicarbonate  1,300 mg Oral 4x Daily    clopidogrel  75 mg Oral Daily    emtricitabine-tenofovir alafenamide  1 tablet Oral Daily    dolutegravir sodium  50 mg Oral Nightly    aspirin  81 mg Oral Daily     PRN Meds: sodium chloride flush, sodium chloride, oxyCODONE **OR** oxyCODONE, perflutren lipid microspheres    Labs:     Recent Labs     06/02/21  0654 06/03/21  0642 06/04/21  0647   WBC 7.8 7.3 7.2   HGB 10.5* 11.2* 10.9*   HCT 31.0* 33.3* 32.6*    244 239       Recent Labs     06/02/21  0654 06/03/21  0642 06/04/21  0647    140 142   K 3.8  3.8 4.0 4.0    105 108*   CO2 23 25 29   BUN 43* 41* 38*   CREATININE 5.8* 5.2* 4.6*   CALCIUM 7.6* 7.6* 7.6*   PHOS 3.6  --   --        Recent Labs     06/02/21  0654 06/03/21  0642 06/04/21  0647   PROT 5.4* 5.5* 5.6*   ALKPHOS 44 45 42   ALT 99* 83* 71*   * 116* 90*   BILITOT 0.5 0.6 0.5       No results for input(s): INR in the last 72 hours. Recent Labs     06/03/21  0642 06/03/21  1902 06/04/21  0647   CKTOTAL 3,235* 2,326* 1,644*       Chronic labs:    Lab Results   Component Value Date    CHOL 191 05/25/2021    TRIG 279 (H) 05/25/2021    HDL 45 05/25/2021    LDLCALC 90 05/25/2021    TSH 3.720 03/08/2017    PSA SEE NOTE (AA) 03/08/2017    PSA 0.47 03/08/2017    INR 1.4 05/26/2021    LABA1C 5.9 (H) 05/25/2021       Radiology: REVIEWED DAILY    +++++++++++++++++++++++++++++++++++++++++++++++++  Anusha Quinn MD  [employer] - Hospitalist  [address] [place] [address]  +++++++++++++++++++++++++++++++++++++++++++++++++  NOTE: This report was transcribed using voice recognition software.  Every effort was made to ensure accuracy; however, inadvertent computerized transcription errors may be present.

## 2021-06-04 NOTE — PROGRESS NOTES
Department of Internal Medicine  Infectious Diseases  Progress Note    C/C :  HIV infection, rhabdomyolysis      Pt is more awake and alert  Reports neck pain   Denies fever   Left arm swelling - improving   Afebrile       Current Facility-Administered Medications   Medication Dose Route Frequency Provider Last Rate Last Admin    vancomycin 1000 mg IVPB in 250 mL D5W addavial  1,000 mg Intravenous Once per day on Mon Wed Fri Vipin Nunez MD   Stopped at 06/04/21 1055    gabapentin (NEURONTIN) capsule 100 mg  100 mg Oral TID Vipin Nunez MD   100 mg at 06/04/21 1414    sodium chloride flush 0.9 % injection 5-40 mL  5-40 mL Intravenous 2 times per day Vipin Nunez MD   10 mL at 06/04/21 0948    sodium chloride flush 0.9 % injection 5-40 mL  5-40 mL Intravenous PRN Vipin Nunez MD        0.9 % sodium chloride infusion  25 mL Intravenous PRN Vipin Nunez MD        thiamine mononitrate tablet 100 mg  100 mg Oral Daily Vipin Nunez MD   100 mg at 06/04/21 0948    multivitamin 1 tablet  1 tablet Oral Daily Vipin Nunez MD   1 tablet at 06/04/21 0948    melatonin tablet 6 mg  6 mg Oral Nightly Vipin Nunez MD   6 mg at 06/03/21 2250    oxyCODONE (ROXICODONE) immediate release tablet 2.5 mg  2.5 mg Oral Q6H PRN Vipin Nunez MD        Or    oxyCODONE (ROXICODONE) immediate release tablet 5 mg  5 mg Oral Q6H PRN Vipin Nunez MD   5 mg at 06/04/21 1446    sodium bicarbonate tablet 1,300 mg  1,300 mg Oral 4x Daily Vipin Nunez MD   1,300 mg at 06/04/21 1414    0.9 % sodium chloride infusion   Intravenous Continuous Vipin Nunez MD 50 mL/hr at 06/03/21 2249 New Bag at 06/03/21 2249    clopidogrel (PLAVIX) tablet 75 mg  75 mg Oral Daily Vipin Nunez MD   75 mg at 06/04/21 0948    emtricitabine-tenofovir alafenamide (DESCOVY) 200-25 MG per tablet 1 tablet  1 tablet Oral Daily Lavonne Foreman MD   1 tablet at 06/04/21 0948    dolutegravir sodium (TIVICAY) tablet 50 mg  50 mg Oral Nightly Lavonne Foreman MD   50 mg at 06/03/21 2251    aspirin chewable tablet 81 mg  81 mg Oral Daily Lavonne Foreman MD   81 mg at 06/04/21 0948    perflutren lipid microspheres (DEFINITY) injection 1.65 mg  1.5 mL Intravenous ONCE PRN Lavonne Foreman MD             REVIEW OF SYSTEMS:    CONSTITUTIONAL:  Denies fever   HEENT: Neck pain   RESPIRATORY: denies cough, shortness of breath, sputum expectoration  CARDIOVASCULAR:  Denies palpitation  GASTROINTESTINAL:  Denies abdomen pain, diarrhea or constipation. GENITOURINARY:  Denies burning urination or frequency of urination  INTEGUMENT: denies wound , rash  HEMATOLOGIC/LYMPHATIC:  Denies lymph node swelling, gum bleeding or easy bruising. MUSCULOSKELETAL:  Denies leg pain , joint pain , joint swelling  NEUROLOGICAL:   Awake and alert    PHYSICAL EXAM:      Vitals:     /82   Pulse 87   Temp 98 °F (36.7 °C) (Temporal)   Resp 18   Ht 5' 8\" (1.727 m)   Wt 174 lb 13.2 oz (79.3 kg)   SpO2 92%   BMI 26.58 kg/m²     General Appearance:     Awake and alert  . Head:    Normocephalic, atraumatic   Eyes:    No pallor, no icterus,   Ears:    No obvious deformity or drainage.    Nose:   No nasal drainage   Throat:   Mucosa moist, no oral thrush   Neck:   Supple, mild swelling    Lungs:     Bilateral rhonchi    Heart:    Regular rate and rhythm, no murmur   Abdomen:     Soft, non-tender, bowel sounds present    Extremities:   Left upper arm swollen, indurated     Pulses:   Dorsalis pedis palpable    Skin:   no rashes or lesions     CBC with Differential:      Lab Results   Component Value Date    WBC 7.2 06/04/2021    RBC 3.19 06/04/2021    HGB 10.9 06/04/2021    HCT 32.6 06/04/2021     06/04/2021    .2 06/04/2021    MCH 34.2 06/04/2021    MCHC 33.4 06/04/2021    RDW 14.1 06/04/2021    SEGSPCT 47 01/06/2014 LYMPHOPCT 16.4 06/04/2021    MONOPCT 10.9 06/04/2021    BASOPCT 0.8 06/04/2021    MONOSABS 0.78 06/04/2021    LYMPHSABS 1.17 06/04/2021    EOSABS 0.51 06/04/2021    BASOSABS 0.06 06/04/2021       CMP     Lab Results   Component Value Date     06/04/2021    K 4.0 06/04/2021     06/04/2021    CO2 29 06/04/2021    BUN 38 06/04/2021    CREATININE 4.6 06/04/2021    GFRAA 16 06/04/2021    LABGLOM 16 06/04/2021    GLUCOSE 91 06/04/2021    GLUCOSE 83 01/26/2012    PROT 5.6 06/04/2021    LABALBU 2.3 06/04/2021    LABALBU 4.2 01/17/2012    CALCIUM 7.6 06/04/2021    BILITOT 0.5 06/04/2021    ALKPHOS 42 06/04/2021    AST 90 06/04/2021    ALT 71 06/04/2021         Hepatic Function Panel:    Lab Results   Component Value Date    ALKPHOS 42 06/04/2021    ALT 71 06/04/2021    AST 90 06/04/2021    PROT 5.6 06/04/2021    BILITOT 0.5 06/04/2021    BILIDIR 0.3 03/13/2015    IBILI 1.3 03/13/2015    LABALBU 2.3 06/04/2021    LABALBU 4.2 01/17/2012       PT/INR:    Lab Results   Component Value Date    PROTIME 14.9 05/26/2021    INR 1.4 05/26/2021       TSH:    Lab Results   Component Value Date    TSH 3.720 03/08/2017       U/A:    Lab Results   Component Value Date    COLORU Yellow 06/02/2021    PHUR 6.0 06/02/2021    LABCAST FEW  11/02/2011    WBCUA 2-5 06/02/2021    WBCUA NONE 01/26/2012    RBCUA 10-20 06/02/2021    RBCUA NONE 01/26/2012    BACTERIA FEW 06/02/2021    CLARITYU Clear 06/02/2021    SPECGRAV 1.020 06/02/2021    LEUKOCYTESUR SMALL 06/02/2021    UROBILINOGEN 0.2 06/02/2021    BILIRUBINUR Negative 06/02/2021    BILIRUBINUR NEGATIVE 01/26/2012    BLOODU LARGE 06/02/2021    GLUCOSEU Negative 06/02/2021    GLUCOSEU NEGATIVE 01/26/2012    AMORPHOUS MODERATE 05/25/2021       ABG:  No results found for: TMD4IKL, BEART, B4XANCOA, PHART, THGBART, TSI3LOD, PO2ART, CQV9VLU    MICROBIOLOGY:    Blood culture -     Bottle Type Anaerobic    Source of Blood Culture Antecubital-Lef    Order Number [id number]    Enterobacter cloacae complex by PCR Not Detected    Escherichia coli by PCR Not Detected    Klebsiella oxytoca by PCR Not Detected    Klebsiella pneumoniae group by PCR Not Detected    Proteus species by PCR Not Detected    Streptococcus agalactiae by PCR Not Detected    Staphylococcus aureus by PCR Not Detected    Serratia marcescens by PCR Not Detected    Streptococcus pneumoniae by PCR Not Detected    Streptococcus pyogenes  by PCR Not Detected    Acinetobacter baumannii by PCR Not Detected    Candida albicans by PCR Not Detected    Candida glabrata by PCR Not Detected    Candida krusei by PCR Not Detected    Candida parapsilosis by PCR Not Detected    Candida tropicalis by PCR Not Detected     Enterobacteriaceae by PCR Not Detected    Enterococcus by PCR Not Detected    Haemophilus Influenzae by PCR Not Detected    Listeria monocytogenes by PCR Not Detected    Neisseria meningitidis by PCR Not Detected    Pseudomonas aeruginosa by PCR Not Detected    Staphylococcus species by PCR DETECTEDPanic      Streptococcus species by PCR Not Detected    Methicillin Resistance mecA/C  by PCR Not Detected   Narrative:           SARS CoV 2 NAAT neg       Radiology :    CT chest -       Ill-defined soft tissue thickening and fat stranding involving the visualized   base of the left neck, supraclavicular region and throughout the left lateral   chest wall.  This is nonspecific.  Infectious/inflammatory process such as   cellulitis is a consideration.  Ill-defined hematoma is also a consideration.    Recommend correlation with clinical presentation.       Emphysematous changes.       Areas of atelectasis and patchy airspace opacities most evident in the lower   lung zones.  Developing infiltrates from pneumonia not excluded.  Continued   follow-up recommended.       CT abdomen and pelvis:       Exam limited by patient motion.       Urinary bladder thickening may be related to underdistention.  Cystitis not   excluded.       Other chronic appearing findings.  Short-term follow-up if symptoms persist.           Impression:     1.  Diffuse nonspecific soft tissue stranding and superficial edema about the   mid to distal aspect of the left humerus particularly along the posterior and   lateral aspects.  No intramuscular fluid collections identified on this exam.     2.  No acute osseous findings about the left humerus. 3.  Mild left shoulder and elbow degenerative changes. RECOMMENDATIONS:   These imaging features are nonspecific.  If there is high concern for acute   compartment syndrome based on clinical history and clinical findings         Vanco random 11.2      Absolute CD 3 247Low   570 - 2400 cells/uL Final 05/25/2021 10:41 PM [facility]   Absolute CD 8 (Supp) 199Low   210 - 1200 cells/uL Final 05/25/2021 10:41 PM [facility]   CD4/CD8 Ratio 0.23Low   0.80 - 3.90 ratio Final 05/25/2021 10:41 PM [facility]   Absolute CD 4 [place] 45Low   430 - 1800 cells/uL Final 05/25/2021 10:41 PM [facility]   Testing Performed By        IMPRESSION:    1. HIV infection / AIDS (CD 4-  45 )   2. Chronic Hep C infection - cleared   3. Rhabdomyolysis   4. CONS bacteremia - contamination       RECOMMENDATIONS:      1. Descovy and Tivicay   2.  HIV viral load still pending

## 2021-06-04 NOTE — CARE COORDINATION
Case reviewed with Dr Yesi Moctezuma. No plans to transition patient to LTAC. New tunneled HC catheter placed yesterday. Per Dr Yesi Moctezuma, Nephrology planning three days of treatments then patient will likely be ready to discharge to [facility] on Sunday. Dr Alexa Macias note also reviewed stating IHD treatments, next being today Friday, but no orders for HD noted. Nursing with message out to Nephrology. Call placed to [facility] in [place] to arrange a new chair time for the patient for outpatient HD treatments, spoke with Rupal Leach. Clinical information faxed to [phone]. Call placed to Tony Branham, liaison with [facility] to confirm they are able to accept the patient over the weekend if he is discharged. Tony Branham confirmed that the patient is able to transition to [facility] Sunday if medically stable. LOC completed and received 6/2/21. PASAR, Transfer paperwork, and envelope in the soft chart. Will continue to follow for further transition of care planning needs. Jasmine Díaz RN.  [place]:  [phone]    Addendum:    Spoke with Dr Yesi Moctezuma. She reviewed case with Dr Lyle Stephens and patient may not need outpatient HD set up at this time. They will review am labs to make final determination. If outpatient HD is not needed at this time, patient will be ready to transition to [facility] tomorrow for rehab. Call placed to Tony Branham, liaison with [facility]  and notified of above. The patient can transition to the facility whenever medically stable to discharge. LOC complete, PASAR, transfer paperwork, and envelope in the soft chart. Return call also received from Rupal Leach at [facility] in [place]. If the patient needs at chair time, they have him set up for T-T-S at 5 am.  Carolyn Maurer, liaison with [facility], [facility]. notified. Jasmine Díaz RN.

## 2021-06-04 NOTE — PLAN OF CARE
Problem: Skin Integrity:  Goal: Will show no infection signs and symptoms  Description: Will show no infection signs and symptoms  Outcome: Met This Shift  Goal: Absence of new skin breakdown  Description: Absence of new skin breakdown  Outcome: Met This Shift     Problem: Falls - Risk of:  Goal: Will remain free from falls  Description: Will remain free from falls  Outcome: Met This Shift  Goal: Absence of physical injury  Description: Absence of physical injury  Outcome: Met This Shift     Problem: Pain:  Goal: Pain level will decrease  Description: Pain level will decrease  Outcome: Met This Shift

## 2021-06-05 NOTE — DISCHARGE INSTR - COC
Continuity of Care Form    Patient Name: Pamela Lewis   :  1960  MRN:  [id number]    Admit date:  2021  Discharge date:  ***    Code Status Order: Limited   Advance Directives:      Admitting Physician:  Kiesha Wilson MD  PCP: No primary care provider on file. Discharging Nurse: [facility] Unit/Room#: 6734/8534-Q  Discharging Unit Phone Number: [phone]    Emergency Contact:   Extended Emergency Contact Information  Primary Emergency Contact: cruz piper  Address: [address], [address] [place]  [address] Phone: [phone]  Mobile Phone: [phone]  Relation: Brother/Sister  Secondary Emergency Contact: Dre Ospina  Mobile Phone: [phone]  Relation: Brother/Sister    Past Surgical History:  Past Surgical History:   Procedure Laterality Date    ABSCESS DRAINAGE  2007    scrotal. [facility].  Dr. Valeriy Decker      testicle    INGUINAL HERNIA REPAIR  3/9/2012    left indirect hernia repaired with mesh, Dr. Marion Syed, [address] [place] [address] OTHER SURGICAL HISTORY  3/9/2012    Left Inguinal Hernia Repair;Removal of Foreign Object Left foot    VASCULAR SURGERY N/A 6/3/2021    INSERTION TUNNELED HEMODIALYSIS CATHETER, REMOVAL OF TEMPORARY CATHETER performed by Lavonne Foreman MD at [address] [place] [address]       Immunization History:   Immunization History   Administered Date(s) Administered    Influenza 2010, 2012    Tdap (Boostrix, Adacel) 10/18/2013, 2019       Active Problems:  Patient Active Problem List   Diagnosis Code    Neuropathy G62.9    HIV infection ([facility] B20    HTN (hypertension) I10    Unilateral inguinal hernia K40.90    Chronic hepatitis B (HCC) B18.1    Mood disorder ([facility] F39    Cellulitis L03.90    Fracture of proximal end of ulna S52.009A    Septic olecranon bursitis of left elbow M71.122    CHANEL (acute kidney injury) ([facility] N17.9    Arm edema R60.0    AMS (altered mental status) R41.82    Left-sided weakness R53.1    Goals of care, counseling/discussion Z71.89    Palliative care by specialist Z51.5    Encounter regarding vascular access for dialysis for ESRD ([facility] N18.6, Z99.2    Acute renal failure superimposed on chronic kidney disease ([facility] N17.9, N18.9       Isolation/Infection:   Isolation            No Isolation          Patient Infection Status       Infection Onset Added Last Indicated Last Indicated By Review Planned Expiration Resolved Resolved By    None active    Resolved    COVID-19 Rule Out 05/25/21 05/25/21 05/25/21 COVID-19, Rapid (Ordered)   05/25/21 Rule-Out Test Resulted            Nurse Assessment:  Last Vital Signs: /87   Pulse 87   Temp 97 °F (36.1 °C)   Resp 18   Ht 5' 8\" (1.727 m)   Wt 189 lb 9.5 oz (86 kg)   SpO2 93%   BMI 28.83 kg/m²     Last documented pain score (0-10 scale): Pain Level: 10  Last Weight:   Wt Readings from Last 1 Encounters:   06/05/21 189 lb 9.5 oz (86 kg)     Mental Status:  oriented and alert    IV Access:  - Dialysis Catheter  - site  subclavian, insertion date: 6/3/2021    Nursing Mobility/ADLs:  Walking   Assisted  Transfer  Assisted  Bathing  Assisted  Dressing  Assisted  Toileting  Assisted  Feeding  Independent  Med Admin  Assisted  Med Delivery   whole    Wound Care Documentation and Therapy:        Elimination:  Continence:   · Bowel: Yes  · Bladder: Yes  Urinary Catheter: {Urinary Catheter:[id number]:::0}   Colostomy/Ileostomy/Ileal Conduit: No       Date of Last BM: 6/4    Intake/Output Summary (Last 24 hours) at 6/5/2021 1438  Last data filed at 6/5/2021 0929  Gross per 24 hour   Intake 778 ml   Output 1300 ml   Net -522 ml     I/O last 3 completed shifts:   In: 1411.7 [P.O.:360; I.V.:801.7; IV Piggyback:250]  Out: 1300 [Urine:1300]    Safety Concerns:     History of Falls (last 30 days) and At Risk for Falls    Impairments/Disabilities:      Peyton Brewster [facility] Impairments/Disabilities:[id number]:::0}    Nutrition Therapy:  Current Nutrition Therapy: - Oral Diet:  Renal    Routes of Feeding: Oral  Liquids: No Restrictions  Daily Fluid Restriction: no  Last Modified Barium Swallow with Video (Video Swallowing Test): not done    Treatments at the Time of Hospital Discharge:   Respiratory Treatments: ***  Oxygen Therapy:  is not on home oxygen therapy. Ventilator:    - No ventilator support    Rehab Therapies: Physical Therapy and Occupational Therapy  Weight Bearing Status/Restrictions: No weight bearing restirctions  Other Medical Equipment (for information only, NOT a DME order):  hospital bed  Other Treatments: ***    Patient's personal belongings (please select all that are sent with patient):  {[facility] DME Belongings:[id number]:::0}    RN SIGNATURE:  Electronically signed by Magdy Pham RN on 6/5/21 at 2:55 PM EDT    CASE MANAGEMENT/SOCIAL WORK SECTION    Inpatient Status Date: ***    Readmission Risk Assessment Score:  Readmission Risk              Risk of Unplanned Readmission:  28           Discharging to Facility/ Agency   · Name:   · Address:  · Phone:  · Fax:    Dialysis Facility (if applicable)   · Name:  · Address:  · Dialysis Schedule:  · Phone:  · Fax:    / signature: {Esignature:[id number]:::0}    PHYSICIAN SECTION    Prognosis: Fair    Condition at Discharge: Stable    Rehab Potential (if transferring to Rehab): Fair    Recommended Labs or Other Treatments After Discharge: CBC, BMP q M, W, F for next 10 days     Physician Certification: I certify the above information and transfer of Leobardo Kirkpatrick  is necessary for the continuing treatment of the diagnosis listed and that he requires [place] for less 30 days.      Update Admission H&P: No change in H&P    PHYSICIAN SIGNATURE:  Electronically signed by Cecilia Gee MD on 6/5/21 at 2:38 PM EDT

## 2021-06-05 NOTE — PROGRESS NOTES
to recover soon. 6/3: Status post tunneled dialysis catheter today. No complications. Resting comfortably. Mild discomfort at the tunnel, but otherwise denies complaint. Left-sided swelling somewhat improved compared to 6/1 6/4: Doing well with no new complaints, acute renal failure gradually resolving with dialysis on hold today. We will repeat BMP CBC in a.m. Dr. Adelina Wagner will decide whether to go forward with dialysis or hold it. 6/5: seen today on RA . LITTLE muniz . PROBLEM LIST:    Active Problems:    AMS (altered mental status)    Left-sided weakness    Goals of care, counseling/discussion    Palliative care by specialist    Encounter regarding vascular access for dialysis for ESRD [facility])    Acute renal failure superimposed on chronic kidney disease ([facility] [address]  Resolved Problems:    * No resolved hospital problems.  *         DIET:    DIET RENAL;     MEDS (scheduled):    vancomycin  1,000 mg Intravenous Once per day on Mon Wed Fri    gabapentin  100 mg Oral TID    sodium chloride flush  5-40 mL Intravenous 2 times per day    thiamine  100 mg Oral Daily    multivitamin  1 tablet Oral Daily    melatonin  6 mg Oral Nightly    sodium bicarbonate  1,300 mg Oral 4x Daily    clopidogrel  75 mg Oral Daily    emtricitabine-tenofovir alafenamide  1 tablet Oral Daily    dolutegravir sodium  50 mg Oral Nightly    aspirin  81 mg Oral Daily       MEDS (infusions):   sodium chloride      sodium chloride 50 mL/hr at 06/03/21 2249       MEDS (prn):  sodium chloride flush, sodium chloride, oxyCODONE **OR** oxyCODONE, perflutren lipid microspheres    PHYSICAL EXAM:     Patient Vitals for the past 24 hrs:   BP Temp Temp src Pulse Resp SpO2 Weight   06/05/21 0807 134/87 97 °F (36.1 °C)  87 18 93 %    06/05/21 0515       189 lb 9.5 oz (86 kg)   06/04/21 2340 130/70 98 °F (36.7 °C) Temporal 86 16     06/04/21 2045 97/67 99.1 °F (37.3 °C) Temporal 87 20 99 %    @      Intake/Output Summary (Last 24 hours) at 6/5/2021 1434  Last data filed at 6/5/2021 0929  Gross per 24 hour   Intake 778 ml   Output 1300 ml   Net -522 ml         Wt Readings from Last 3 Encounters:   06/05/21 189 lb 9.5 oz (86 kg)   11/14/20 160 lb (72.6 kg)   07/05/20 150 lb (68 kg)       Constitutional:  in no acute distress  HEENT: NC/AT, EOMI, sclera and conjunctiva are clear and anicteric, mucus membranes moist  Neck: Trachea midline, no bruit  Cardiovascular: S1, S2 regular rhythm, no murmur,or rub  Respiratory: Basilar crackles, worse on the left  Gastrointestinal:  Soft, nontender, nondistended, NABS  Ext: 2-3+ left upper extremity edema, 2+ left lower back, sacrum, thigh and left lower extremity edema  Skin: dry, no rash  Neuro: awake, alert, interactive      DATA:    Recent Labs     06/03/21  0642 06/04/21  0647 06/05/21  0818   WBC 7.3 7.2 7.1   HGB 11.2* 10.9* 10.4*   HCT 33.3* 32.6* 32.0*   .1* 102.2* 103.6*    239 274     Recent Labs     06/03/21  0642 06/04/21  0647 06/05/21  0818    142 142   K 4.0 4.0 4.0    108* 107   CO2 25 29 28   BUN 41* 38* 34*   CREATININE 5.2* 4.6* 4.2*   ALT 83* 71* 62*   * 90* 83*   BILITOT 0.6 0.5 0.5   ALKPHOS 45 42 44       Lab Results   Component Value Date    LABPROT 4.2 (H) 05/25/2021    LABPROT 4.2 05/25/2021         Assessment  1. Acute kidney injury, multifactorial, due to rhabdomyolysis, likely the hemodynamic impact of cocaine and fentanyl intoxication, which may have caused the seizure which may have caused rhabdomyolysis, exacerbated by volume contraction, further exacerbated by contrast-induced nephropathy from CTA of the neck and head. Sepsis of urinary likely also playing a role. Likely evolved into acute tubular necrosis given the anuria. Pyelonephritis may have played a role. CT scan did not reveal hydronephrosis, or apparent renal trauma. Renal ultrasound is notable for increased renal echogenicity suggestive of chronic medical renal disease.    2. Chronic kidney disease, presumptive, in part based on the increased echogenicity seen on ultrasound. Last known creatinine was 1.4 mg/dL 11/2020. As a suspect poor adherence to his HIV regimen, we might consider HIV associated nephropathy, though at this point that would be purely speculative     3. Rhabdomyolysis due to at the least a fall and prolonged recumbency on a hard surface, though he may have also had an unwitnessed seizure given his cocaine and fentanyl intoxication (the latter would be speculative). Total CK continues to improve     4. Cocaine and fentanyl intoxication     5. Hyperkalemia secondary to acute kidney injury, decreased effective circulating volume due to volume contraction, rhabdomyolysis       6. HIV and hepatitis C, following with ID.    7. Altered mental status still current, neurology is following. Improving    8. Abnormalities liver function tests and transaminitis     9. Bacteremia with staph species, followed by ID. 10. Polysubstance abuse, with metabolic encephalopathy. 11. Marked edema of not only his left arm, but also throughout the left side of his body. 5/27 venous duplex of left upper extremity did not reveal DVT. The result of trauma from the fall, question seizure           Recommendations    Continue to hold dialysis . His creatinine is drifting down on its own , UO ok .    On d/c Will need BMP Monday wedensday Friday with results faxed to office   Electronically signed by Sincere Jones MD on 6/5/2021

## 2021-06-05 NOTE — PROGRESS NOTES
This RN notied whiled administing pain pain pt had blood on gown, pt bleeding from tunneled cath site, resident notified, dressing changed, sandbag applied, pt stated he was itching site, informed pt to not scratch at site, too keep this sandbag on and this RN will be back to check on him, pt expressed understanding

## 2021-06-06 NOTE — PROGRESS NOTES
Pt bleeding from tunneled cath site,dressing changed, sandbad applied, message sent to resident oncall to notify

## 2021-06-06 NOTE — PROGRESS NOTES
patient due to altered mental status, acute renal failure, transaminitis, dehydration, and sepsis with unknown source.  Results and plan were discussed with the patient's brother by the admitting physician, he voiced understanding and is amenable. SUBJECTIVE:  Had mild bleeding from TLC site; surgery evaluated and pt cleared to dc     Stable overnight. No other overnight issues reported. Temp (24hrs), Av.9 °F (36.6 °C), Min:97.5 °F (36.4 °C), Max:98.1 °F (36.7 °C)    DIET: DIET RENAL;  CODE: Limited    Intake/Output Summary (Last 24 hours) at 2021 0909  Last data filed at 2021 2118  Gross per 24 hour   Intake 600 ml   Output 2150 ml   Net -1550 ml       OBJECTIVE:    BP (!) 148/76   Pulse 82   Temp 97.8 °F (36.6 °C) (Oral)   Resp 16   Ht 5' 8\" (1.727 m)   Wt 194 lb 3.6 oz (88.1 kg)   SpO2 94%   BMI 29.53 kg/m²     General appearance: No apparent distress, appears stated age and cooperative. HEENT:  Conjunctivae/corneas clear. Neck: Supple. No jugular venous distention. Respiratory: Clear to auscultation bilaterally, normal respiratory effort  Cardiovascular: Regular rate rhythm, normal S1-S2  Abdomen: Soft, nontender, nondistended  Musculoskeletal: No clubbing, cyanosis, no bilateral lower extremity edema. Brisk capillary refill. Skin:   Left sided chest and lateral chest wall erythematous rash improved  Neurologic: awake, alert and following commands.  LUE paresis and edema    ASSESSMENT:    1) Acute Metabolic/Toxic encephalopathy with new small acute cerebellar stroke   -CT head no acute intracranial abnormality  -UDS positive for fentanyl and cocaine  -Continue infection and kidney failure management  -Agitation currently likely due to ativan - this has been discontinued   -Continues on DAPT    2) Sepsis with staph bacteremia secondary to left-sided chest wall cellulitis  -MRI neck soft tissue done; results noted, swelling improving   -Blood culture done on 2021 growing 2 out of 2 staph (coag Neg-might be contaminant per ID)  -Follow-up repeat blood culture NGTD  -CRP and procalcitonin significantly elevated; trended down  -ID on board; on intermittent IV Vanc    3) Rhabdomyolysis  -Likely drug induced (cocaine and fentanyl), nontraumatic  -CK significantly elevated but trending down  -Received IVF NS for hydration    4) CHANEL  -Likely due to ATN from rhabdo and or contrast-induced nephropathy  -Renal ultrasound showed increased renal parenchymal echogenicity indicating medical renal disease  -Avoid nephrotoxic medications  -Cr did not improve with IVF hydration  -Nephrology on board; started on IHD; not currently requiring daily dialysis; in fact, renal function is improving; if not requiring dialysis tomorrow potentially will dc to facility with no anticipated needs for dialysis and close nephrology follow up     5) LUE weakness   -CTA head and neck: No significant occlusion  -MRI brain ordered to rule out CVA  -Neurology on board; continue DAPT for 21 days and then ASA afterward    6) LUE Swelling  -LUE Duplex neg for DVT  -Radial pulse present and palpated  -Ortho does not think its compartment syndrome.  If swelling does not improve, may reconsult tomorrow   -Reconsult ortho if pain is worsening or radial pulse is diminished     7) Elevated transaminases  -AST/ALT significantly elevated; ALP normal  -This is likely due to rhabdomyolysis as alkaline phosphatase is normal  -No further GI work-up needed  -Trending down    Other chronic medical conditions, medication resumed unless contraindicated  HIV infection; resumed meds ID  Chronic hepatitis C  Polysubstance use disorder    Family updated on the phone on 6/2/21      DISPOSITION: Reassess renal function tomorrow; potential dc to facility in am if labs continue to show trend of improvement    Medications:  REVIEWED DAILY    Infusion Medications    sodium chloride      sodium chloride 50 mL/hr at 06/03/21 2390     Scheduled Medications  vancomycin  1,000 mg Intravenous Once per day on Mon Wed Fri    gabapentin  100 mg Oral TID    sodium chloride flush  5-40 mL Intravenous 2 times per day    thiamine  100 mg Oral Daily    multivitamin  1 tablet Oral Daily    melatonin  6 mg Oral Nightly    sodium bicarbonate  1,300 mg Oral 4x Daily    clopidogrel  75 mg Oral Daily    emtricitabine-tenofovir alafenamide  1 tablet Oral Daily    dolutegravir sodium  50 mg Oral Nightly    aspirin  81 mg Oral Daily     PRN Meds: sodium chloride flush, sodium chloride, oxyCODONE **OR** oxyCODONE, perflutren lipid microspheres    Labs:     Recent Labs     06/04/21  0647 06/05/21  0818 06/06/21  0621   WBC 7.2 7.1 6.7   HGB 10.9* 10.4* 10.9*   HCT 32.6* 32.0* 33.8*    274 288       Recent Labs     06/04/21  0647 06/05/21  0818 06/06/21  0621    142 142   K 4.0 4.0 4.0   * 107 106   CO2 29 28 28   BUN 38* 34* 30*   CREATININE 4.6* 4.2* 3.6*   CALCIUM 7.6* 7.8* 7.7*       Recent Labs     06/04/21  0647 06/05/21  0818 06/06/21  0621   PROT 5.6* 5.8* 5.9*   ALKPHOS 42 44 46   ALT 71* 62* 58*   AST 90* 83* 82*   BILITOT 0.5 0.5 0.5       No results for input(s): INR in the last 72 hours. Recent Labs     06/04/21  0647 06/04/21  1824 06/05/21 0818   CKTOTAL 1,644* 1,319* 1,081*       Chronic labs:    Lab Results   Component Value Date    CHOL 191 05/25/2021    TRIG 279 (H) 05/25/2021    HDL 45 05/25/2021    LDLCALC 90 05/25/2021    TSH 3.720 03/08/2017    PSA SEE NOTE (AA) 03/08/2017    PSA 0.47 03/08/2017    INR 1.4 05/26/2021    LABA1C 5.9 (H) 05/25/2021       Radiology: REVIEWED DAILY    +++++++++++++++++++++++++++++++++++++++++++++++++  Anusha Cervantes MD  [facility] Physician - Hospitalist  [address] [place] [address]  +++++++++++++++++++++++++++++++++++++++++++++++++  NOTE: This report was transcribed using voice recognition software.  Every effort was made to ensure accuracy; however, inadvertent computerized

## 2021-06-06 NOTE — PROGRESS NOTES
Call received from [place] [id number] ambulance - pt is unable to leave tonight due to transport issues, pt is set for 0800 6/6

## 2021-06-10 NOTE — ED PROVIDER NOTES
[facility]  Department of Emergency Medicine   ED  Encounter Note  Admit Date/RoomTime: 6/10/2021 10:08 AM  ED Room: FIFI/FIFI    NAME: Jada Calix  : 1960  MRN: [id number]     Chief Complaint:  Back Pain (sent in by PCP this morning. Hasnt had dialysis in over 2 weeks)    History of Present Illness      Jada Calix is a 56 y.o. old male who presents to the emergency department for evaluation of headache and neck pain. He denies any new falls or trauma. He states he had the symptoms ever since his recent admission for a stroke. He has chronic edema of his left arm. He is a dialysis patient and states he has not been dialyzed for 2 weeks however records indicate that he was not he dialyzed last week during his admission. He has a Vas-Cath in his chest.  He signed out AMA last night from the ER prior to evaluation and refused to go back to his nursing home because they were not giving him pain medication. ROS   Pertinent positives and negatives are stated within HPI, all other systems reviewed and are negative. Past Medical History:  has a past medical history of Abscess of hand, left, Abscess of scrotum, Bacterial meningitis, Cerebral artery occlusion with cerebral infarction [facility]), Depression, Encounter regarding vascular access for dialysis for ESRD ([facility] [address] GERD (gastroesophageal reflux disease), Hepatitis C, Herpes zoster w/ nervous system complication, Human immunodeficiency virus, type 2 (HIV 2) ([facility] [address] Inguinal hernia unilateral, Pneumonia, Shingles (herpes zoster) polyneuropathy, Suicidal ideation, and Vitamin D deficiency. Surgical History:  has a past surgical history that includes cyst removal (); Abscess Drainage (2007); Inguinal hernia repair (3/9/2012); other surgical history (3/9/2012); and vascular surgery (N/A, 6/3/2021). Social History:  reports that he has been smoking cigarettes. He has a 10.00 pack-year smoking history.  He has never used smokeless tobacco. He reports previous alcohol use. He reports that he does not use drugs. Family History: family history includes Cancer (age of onset: [de-identified]) in his father; Heart Disease (age of onset: 48) in his brother. Allergies: Ativan [lorazepam]    Physical Exam   Oxygen Saturation Interpretation: Normal.        ED Triage Vitals [06/10/21 1012]   BP Temp Temp src Pulse Resp SpO2 Height Weight   (!) 168/108 97.3 °F (36.3 °C) -- 84 16 98 % -- 178 lb (80.7 kg)         Constitutional:  Alert, development consistent with age. HEENT:  NC/NT. PERRLA. Airway patent. Neck:  Normal ROM. Supple. No meningeal signs  Respiratory:  Clear to auscultation and breath sounds equal. Chest wall vas cath  CV:  Regular rate and rhythm, normal heart sounds, without pathological murmurs, ectopy, gallops, or rubs. GI:  Abdomen Soft, nontender, good bowel sounds. No firm or pulsatile mass. Back:  No costovertebral tenderness. Extremities: No tenderness. Ambulatory. Chronic edema LUE, weakness LUE  Integument:  Normal turgor. Warm, dry, without visible rash, unless noted elsewhere. Lymphatics: No lymphangitis or adenopathy noted. Neurological:  Oriented x 3, GCS 15. CNII-XII grossly intact. Motor functions intact.      Lab / Imaging Results   (All laboratory and radiology results have been personally reviewed by myself)  Labs:  Results for orders placed or performed during the hospital encounter of 06/10/21   CBC Auto Differential   Result Value Ref Range    WBC 7.1 4.5 - 11.5 E9/L    RBC 2.76 (L) 3.80 - 5.80 E12/L    Hemoglobin 9.3 (L) 12.5 - 16.5 g/dL    Hematocrit 28.0 (L) 37.0 - 54.0 %    .4 (H) 80.0 - 99.9 fL    MCH 33.7 26.0 - 35.0 pg    MCHC 33.2 32.0 - 34.5 %    RDW 13.4 11.5 - 15.0 fL    Platelets 061 222 - 444 E9/L    MPV 9.6 7.0 - 12.0 fL    Neutrophils % 60.6 43.0 - 80.0 %    Immature Granulocytes % 0.3 0.0 - 5.0 %    Lymphocytes % 18.6 (L) 20.0 - 42.0 %    Monocytes % 11.6 2.0 - 12.0 % Eosinophils % 7.8 (H) 0.0 - 6.0 %    Basophils % 1.1 0.0 - 2.0 %    Neutrophils Absolute 4.32 1.80 - 7.30 E9/L    Immature Granulocytes # 0.02 E9/L    Lymphocytes Absolute 1.33 (L) 1.50 - 4.00 E9/L    Monocytes Absolute 0.83 0.10 - 0.95 E9/L    Eosinophils Absolute 0.56 (H) 0.05 - 0.50 E9/L    Basophils Absolute 0.08 0.00 - 0.20 E9/L   Comprehensive Metabolic Panel   Result Value Ref Range    Sodium 140 132 - 146 mmol/L    Potassium 3.7 3.5 - 5.0 mmol/L    Chloride 103 98 - 107 mmol/L    CO2 24 22 - 29 mmol/L    Anion Gap 13 7 - 16 mmol/L    Glucose 113 (H) 74 - 99 mg/dL    BUN 19 6 - 23 mg/dL    CREATININE 2.2 (H) 0.7 - 1.2 mg/dL    GFR Non-African American 37 >=60 mL/min/1.73    GFR African American 37     Calcium 7.9 (L) 8.6 - 10.2 mg/dL    Total Protein 7.7 6.4 - 8.3 g/dL    Albumin 3.3 (L) 3.5 - 5.2 g/dL    Total Bilirubin 0.6 0.0 - 1.2 mg/dL    Alkaline Phosphatase 56 40 - 129 U/L    ALT 48 (H) 0 - 40 U/L    AST 65 (H) 0 - 39 U/L   EKG 12 Lead   Result Value Ref Range    Ventricular Rate 82 BPM    Atrial Rate 82 BPM    P-R Interval 152 ms    QRS Duration 76 ms    Q-T Interval 418 ms    QTc Calculation (Bazett) 488 ms    P Axis 72 degrees    R Axis 59 degrees    T Axis 64 degrees     Imaging: All Radiology results interpreted by Radiologist unless otherwise noted. CT HEAD WO CONTRAST   Final Result   No acute intracranial abnormality. Specifically, there is no acute   intracranial hemorrhage. CT CERVICAL SPINE WO CONTRAST   Final Result   1. There is no acute compression fracture or subluxation of the cervical   spine. 2. Multilevel degenerative disc and degenerative joint disease. ED Course / Medical Decision Making     Medications   HYDROcodone-acetaminophen (NORCO) 5-325 MG per tablet 1 tablet (1 tablet Oral Given 6/10/21 1109)        Re-examination:  6/10/21       Patients condition remains unchanged.     Consult(s):   None    Procedure(s):   none    MDM:   Patient recently suffered a stroke and presents today for head and neck pain. He has no new focal neurologic deficits. His labs are at baseline with no indication for emergent dialysis. CT head and neck were negative. He was evaluated by social work, PT and OT ordered as patient initially agreeable to consider nursing home placement however, he now tells me that he is leaving on requesting discharge. He is alert and oriented x3 with capacity for decision-making. Advised to follow-up with house medicine and nephrology. Plan of Care/Counseling:  Myself: Felix Fine DO reviewed today's visit with the patient in addition to providing specific details for the plan of care and counseling regarding the diagnosis and prognosis. Questions are answered at this time and are agreeable with the plan. Assessment      1. Nonintractable headache, unspecified chronicity pattern, unspecified headache type    2. Neck pain    3. Dialysis patient [facility])      Plan   Discharge home. Patient condition is stable    New Medications     Discharge Medication List as of 6/10/2021  2:02 PM        Electronically signed by Felix Fine DO   DD: 6/10/21  **This report was transcribed using voice recognition software. Every effort was made to ensure accuracy; however, inadvertent computerized transcription errors may be present.   END OF ED PROVIDER NOTE          Felix Fine DO  06/10/21 2022

## 2021-06-10 NOTE — ED NOTES
This rn tried multiple times to encourage patient to stay here at hospital to get tests completed. Pt persistently states that he \"wants to go home. \" Rn placed [facility] to [facility], pts current location for inpatient rehab, to find out that pt wanted to sign out ama there as well. RN at facility told me that pt is of sound mind and has his own power of  and can choose for himself what he wants to do. This rn called pts sister, pts sister said she cannot come pick pt up because she lives in [address] and was currently sleeping. Rn then assisted pt in calling his brother. Pt's brother spoke with this rn on the phone and stated that he will come and pick his brother up from the er entrance. Pt was explained the risks of leaving the hospital against medical advice. Pt verbalized understanding. Pt was told to come back to hospital if his pain becomes worse or if he is in need of us again. Pt verbalized understanding and said that he will \"come back tomorrow if he still doesn't feel good. \"    Pt ambulated, with a steady gait and without assist out of the ED. Pt seemed to have tolerated ambulation just fine.       Brandon Garcia RN  06/10/21 [id number]       Brandon Garcia RN  06/10/21 6064

## 2021-06-10 NOTE — ED NOTES
Patient pulled out IV and walked out. Approached patient to come in for paper work, he states \"no, I know why I was here\".   IV found in bed     Sadi Arreola RN  06/10/21 7504

## 2021-06-10 NOTE — ED NOTES
Bed: 16  Expected date:   Expected time:   Means of arrival:   Comments:  jameson Armas RN  06/09/21 6518

## 2021-06-10 NOTE — ED NOTES
Bed: 08  Expected date:   Expected time:   Means of arrival:   Comments:  [place] [employer]/luis miguel Zapata RN  06/10/21 1996

## 2021-06-10 NOTE — ED PROVIDER NOTES
ED Attending  CC: Charissa Fish 476  Department of Emergency Medicine   ED  Encounter Note  Admit Date/RoomTime: 2021 11:26 PM  ED Room: FIFI/FIFI    NAME: Lili Garner  : 1960  MRN: [id number]     Chief Complaint:  Neck Pain (pt demanded nursing home send him in for neck and head pain, states it is a 10/10. had stroke  and fell, pt says it has hurt since)    HISTORY OF PRESENT ILLNESS        Lili Garner is a 56 y.o. male who presents to the ED by ambulance for persistent headache and neck pain since injury 10 days ago. Patient states he fell but was evaluated in the ED for this when he was here for a CVA. Patient states he came here because his nursing home \"would not give me my pain medications\". Patient states symptoms are mild in severity describes as an aching pain. Patient denies anything making it better or worse. Patient does not take blood thinners. Patient states he has left-sided weakness from his CVA. Denies fever/chills, vision change, dizziness, cough, hemoptysis, chest pain, dyspnea, abdominal pain, NVD, numbness/weakness, difficulty ambulating. ROS   Pertinent positives and negatives are stated within HPI, all other systems reviewed and are negative. Past Medical History:  has a past medical history of Abscess of hand, left, Abscess of scrotum, Bacterial meningitis, Cerebral artery occlusion with cerebral infarction [facility]), Depression, Encounter regarding vascular access for dialysis for ESRD ([facility] GERD (gastroesophageal reflux disease), Hepatitis C, Herpes zoster w/ nervous system complication, Human immunodeficiency virus, type 2 (HIV 2) ([facility] [facility] Inguinal hernia unilateral, Pneumonia, Shingles (herpes zoster) polyneuropathy, Suicidal ideation, and Vitamin D deficiency. Surgical History:  has a past surgical history that includes cyst removal (); Abscess Drainage (2007);  Inguinal hernia repair (3/9/2012); other surgical history (3/9/2012); and vascular surgery (N/A, 6/3/2021). Social History:  reports that he has been smoking cigarettes. He has a 10.00 pack-year smoking history. He has never used smokeless tobacco. He reports previous alcohol use. He reports that he does not use drugs. Family History: family history includes Cancer (age of onset: [de-identified]) in his father; Heart Disease (age of onset: 48) in his brother. Allergies: Ativan [lorazepam]    PHYSICAL EXAM   Oxygen Saturation Interpretation: Normal.        ED Triage Vitals [06/09/21 2309]   BP Temp Temp src Pulse Resp SpO2 Height Weight   (!) 173/105 96.9 °F (36.1 °C) -- 84 18 96 % -- --         Physical Exam  Constitutional/General: Alert and oriented x3, well appearing, non toxic. HEENT:  NC/NT. PERRLA,  Airway patent. Neck: Supple, full ROM, no stridor, no crepitus, no meningeal signs. ttp of left parecervical and left trapezius. Respiratory: Lungs clear to auscultation bilaterally, no wheezes, rales, or rhonchi. Not in respiratory distress  CV:  Regular rate. Regular rhythm. No murmurs, gallops, or rubs. 2+ distal pulses  Chest: No chest wall tenderness  GI:  Abdomen Soft, Non tender, Non distended. +BS. No rebound, guarding, or rigidity. No pulsatile masses. Musculoskeletal: Moves all extremities x 4. Warm and well perfused, no clubbing, cyanosis, or edema. Capillary refill <3 seconds  Integument: skin warm and dry. No rashes. Lymphatic: no lymphadenopathy noted  Neurologic: GCS 15, no focal deficits, symmetric strength 5/5 in the upper and lower extremities bilaterally  Psychiatric: Normal Affect      Lab / Imaging Results   (All laboratory and radiology results have been personally reviewed by myself)  Labs:  No results found for this visit on 06/09/21. Imaging: All Radiology results interpreted by Radiologist unless otherwise noted.   No orders to display         ED Course / Medical Decision Making   Medications - No data to display      Consultations: None    Procedures:   none    MDM: Patient presenting with headache and neck pain. Patient is in no acute distress, afebrile, nontoxic appearance. Head and neck CT ordered. Patient decided he wanted to leave. Discussed risk with patient including death but patient still wanted to sign out AMA. Recommend patient follow-up with PCP. Recommended patient return to the ED with new or worsening of symptoms. Plan of Care/Counseling:  Myself: Yazan Almonte PA-C reviewed today's visit with the patient in addition to providing specific details for the plan of care and counseling regarding the diagnosis and prognosis. Questions are answered at this time and are agreeable with the plan. ASSESSMENT     1. Acute nonintractable headache, unspecified headache type    2. Strain of neck muscle, initial encounter      This patient's ED course included: a personal history and physicial examination and multiple bedside re-evaluations  This patient has remained hemodynamically stable during their ED course. PLAN   Left Against Medical Advice. Patient condition is stable. New Medications     Discharge Medication List as of 6/10/2021  1:08 AM        Electronically signed by Zaida Barrett MD   DD: 6/10/21  **This report was transcribed using voice recognition software. Every effort was made to ensure accuracy; however, inadvertent computerized transcription errors may be present. END OF PROVIDER NOTE     Yazan Almonte PA-C  06/10/21 9446  ATTENDING PROVIDER ATTESTATION:     I have personally performed and/or participated in the history, exam, medical decision making, and procedures and agree with all pertinent clinical information. I have also reviewed and agree with the past medical, family and social history unless otherwise noted. My findings/Plan: Patient presenting here because of neck pain he states its been chronic since he had fallen patient was recently seen for stroke and at facility. Patient presenting here because of persistent pain in his neck as well as in his head. Patient is awake alert oriented x3 heart lung exam normal abdomen is soft and nontender he is able to move all extremities slightly weaker on left side from prior stroke. Patient reporting no chest pain no difficulty breathing. He does have some mild edema to his lower extremities. Patient was ordered CT of head as well as neck. Patient did not want imaging done. Patient is awake alert and x3. Patient does not want even to stay here. He wants to go home. He does not want to go back to nursing home patient ambulatory in the emergency department gait is steady and normal.  Patient will sign out [address] he was told to return anytime for any further problems. This patient has chosen to leave against medical advice. I have personally explained to them that choosing to do so may result in permanent bodily harm or death. I discussed at length that without further evaluation and monitoring there may be unforeseen circumstances and deterioration resulting in permanent bodily harm or death as a result of their choice. They are alert, oriented, and competent at this time. They state that they are aware of the serious risks as explained, but they continue to wish to leave against medical advice. In light of their decision to leave against medical advice, follow-up has been arranged and they are aware of the importance of following up as instructed. They have been advised that they should return to the ED immediately if they change their mind at any time, or if their condition begins to change or worsen.             Vivek Escudero MD  06/10/21 505 Carrington Soto MD  06/10/21 3685

## 2021-06-10 NOTE — CARE COORDINATION
Social Work/ Discharge Planning:    Pt presents to the ED secondary to back pain. Pt is from home. Pt was in ED last evening from [place] [id number] for back pain and stated he was not getting his pain medication at the nursing facility. Pt left the ED AMA because he did not want to wait. Pt stated his brother came and got him. Pt states he returned and has pain in his neck and shoulders. Pt states he would like to return to [facility] if possible. CORNELIO made referral to Minnie Espinoza with [facility] who reports they are unable to accept pt. CORNELIO informed pt of this and reviewed other [facility] options. Addendum-  CORNELIO met with pt a second time and he stated he was not sure where he was going upon discharge and wanted to speak to the ED physician about his ct results first.    Per ED physician, ct results were discussed and pt declined [facility] and stated he is returning home upon discharge.

## 2021-06-11 NOTE — CARE COORDINATION
Social Work/ Discharge Planning:    Pt presents to the ED secondary to homelessness. Pt was in the ED yesterday for the same but when CORNELIO attempted to discuss [facility]/SNF placement with pt, pt decided to leave and was discharged home. CORNELIO met with pt again today and discussed possible [facility]/SNF options (which are limited due to pt's history). Pt declined the options presented. CORNELIO updated ED physician. Addendum-  12pm- Pt eloped from ED.

## 2021-06-11 NOTE — ED PROVIDER NOTES
HPI:  6/11/21,   Time: 2:20 AM EDT       Daniela Morales is a 56 y.o. male presenting to the ED for rehab placement evaluation, beginning 1 day ago. The complaint has been persistent, moderate in severity, and worsened by nothing. The patient states that he recently had a stroke. He states he was then sent to rehab. He states that after being in rehab he was not getting his meds therefore he left and came to the ER. After being discharged he was not excepted back to his rehab therefore he went and stayed with some friends. He states he came back tonight for evaluation for rehab placement. Denies any chest pain shortness of breath. No nausea vomiting. No numbness tingling. No confusion on my examination. Review of Systems:   Pertinent positives and negatives are stated within HPI, all other systems reviewed and are negative.          --------------------------------------------- PAST HISTORY ---------------------------------------------  Past Medical History:  has a past medical history of Abscess of hand, left, Abscess of scrotum, Bacterial meningitis, Cerebral artery occlusion with cerebral infarction [facility]), Depression, Encounter regarding vascular access for [facility] for [facility]D ([facility] GERD (gastroesophageal reflux disease), Hepatitis C, Herpes zoster w/ nervous system complication, Human immunodeficiency virus, type 2 (HIV 2) ([facility] Inguinal hernia unilateral, Pneumonia, Shingles (herpes zoster) polyneuropathy, Suicidal ideation, and Vitamin D deficiency. Past Surgical History:  has a past surgical history that includes cyst removal (2008); Abscess Drainage (9/18/2007); Inguinal hernia repair (3/9/2012); other surgical history (3/9/2012); and vascular surgery (N/A, 6/3/2021). Social History:  reports that he has been smoking cigarettes. He has a 10.00 pack-year smoking history. He has never used smokeless tobacco. He reports previous alcohol use. He reports that he does not use drugs.     Family History: family history includes Cancer (age of onset: [de-identified]) in his father; Heart Disease (age of onset: 48) in his brother. The patients home medications have been reviewed. Allergies: Ativan [lorazepam]        ---------------------------------------------------PHYSICAL EXAM--------------------------------------    Constitutional/General: Alert and oriented x3, well appearing, non toxic in NAD  Head: Normocephalic and atraumatic  Eyes: PERRL, EOMI, conjunctive normal, sclera non icteric  Mouth: Oropharynx clear, handling secretions, no trismus, no asymmetry of the posterior oropharynx or uvular edema  Neck: Supple, full ROM, non tender to palpation in the midline, no stridor, no crepitus, no meningeal signs  Respiratory: Lungs clear to auscultation bilaterally, no wheezes, rales, or rhonchi. Not in respiratory distress  Cardiovascular:  Regular rate. Regular rhythm. No murmurs, gallops, or rubs. 2+ distal pulses  GI:  Abdomen Soft, Non tender, Non distended. +BS. No organomegaly, no palpable masses,  No rebound, guarding, or rigidity. Musculoskeletal: Moves all extremities x 4. Warm and well perfused, no clubbing, cyanosis, or edema. Capillary refill <3 seconds  Integument: skin warm and dry. No rashes. Neurologic: GCS 15, no focal deficits, sensation intact to all 4 extremities, weakness noted to upper extremity which is chronic from recent stroke, good muscle strength bilateral lower extremities no ambulatory dysfunction   psychiatric: Normal Affect    -------------------------------------------------- RESULTS -------------------------------------------------  I have personally reviewed all laboratory and imaging results for this patient. Results are listed below.      LABS:  Results for orders placed or performed during the hospital encounter of 06/11/21   CBC Auto Differential   Result Value Ref Range    WBC 6.7 4.5 - 11.5 E9/L    RBC 2.90 (L) 3.80 - 5.80 E12/L    Hemoglobin 9.7 (L) 12.5 - 16.5 g/dL Hematocrit 29.4 (L) 37.0 - 54.0 %    .4 (H) 80.0 - 99.9 fL    MCH 33.4 26.0 - 35.0 pg    MCHC 33.0 32.0 - 34.5 %    RDW 13.4 11.5 - 15.0 fL    Platelets 902 161 - 603 E9/L    MPV 9.7 7.0 - 12.0 fL    Neutrophils % 48.4 43.0 - 80.0 %    Immature Granulocytes % 0.3 0.0 - 5.0 %    Lymphocytes % 24.2 20.0 - 42.0 %    Monocytes % 13.7 (H) 2.0 - 12.0 %    Eosinophils % 11.9 (H) 0.0 - 6.0 %    Basophils % 1.5 0.0 - 2.0 %    Neutrophils Absolute 3.22 1.80 - 7.30 E9/L    Immature Granulocytes # 0.02 E9/L    Lymphocytes Absolute 1.61 1.50 - 4.00 E9/L    Monocytes Absolute 0.91 0.10 - 0.95 E9/L    Eosinophils Absolute 0.79 (H) 0.05 - 0.50 E9/L    Basophils Absolute 0.10 0.00 - 0.20 E9/L       RADIOLOGY:  Interpreted by Radiologist.  No orders to display           ------------------------- NURSING NOTES AND VITALS REVIEWED ---------------------------   The nursing notes within the ED encounter and vital signs as below have been reviewed by myself. BP (!) 151/86   Pulse 78   Temp 97.8 °F (36.6 °C)   Resp 16   Ht 5' 8\" (1.727 m)   Wt 178 lb (80.7 kg)   SpO2 95%   BMI 27.06 kg/m²   Oxygen Saturation Interpretation: Normal    The patients available past medical records and past encounters were reviewed. ------------------------------ ED COURSE/MEDICAL DECISION MAKING----------------------  Medications - No data to display      ED COURSE:       Medical Decision Making: This is a 58-year-old male presents to the ED for rehab evaluation. After initial evaluation the patient appears nontoxic. GCS 15. Neurologically at his baseline did discuss with the patient and his brother admission for rehab placement. Initially they were on board with this plan but after waiting in the emergency department for basic blood work the patient states that he does not want to stay and he would rather just come back during the day where he can try to get placed from the emergency department back to rehab.   He states that he will go stay in a hotel tonight and come back tomorrow morning. The patient is alert and orient x4 with a GCS of 15. He is neurologically at his baseline. The patient has the capacity to make his own decisions therefore the patient be discharged after screening exam and will return to the emergency department tomorrow for possible placement to rehab. I, Dr. Tom Harkins, am the primary provider for this encounter    This patient's ED course included: a personal history and physicial examination and re-evaluation prior to disposition    This patient has remained hemodynamically stable during their ED course. Re-Evaluations:             Re-evaluation. Patients symptoms show no change    Counseling: The emergency provider has spoken with the patient and discussed todays results, in addition to providing specific details for the plan of care and counseling regarding the diagnosis and prognosis. Questions are answered at this time and they are agreeable with the plan.       --------------------------------- IMPRESSION AND DISPOSITION ---------------------------------    IMPRESSION  1. Encounter for rehabilitation        DISPOSITION  Disposition: Discharge to home  Patient condition is stable    NOTE: This report was transcribed using voice recognition software.  Every effort was made to ensure accuracy; however, inadvertent computerized transcription errors may be present        Maryam Mccoy DO  06/11/21 2791

## 2021-06-11 NOTE — ED PROVIDER NOTES
[address] Emergency Room          Pt Name: Jose Guidry  MRN: [id number]  [place] 1960  Date of evaluation: 6/11/2021      CHIEF COMPLAINT  Chief Complaint   Patient presents with    Homeless     needs rehab placement. here yesterday, cannot care for himself        Jose Guidry is a 56 y.o. male presenting to the ED with chief complaint of homelessness. Patient is here for possible rehab placement. He has been here yesterday and this is his third visit here he cannot take care of himself outside hospital.  Patient's complaints have been constant without alleviating or exacerbating factors and mild in severity.     HPI       Patient has a medical history as listed below:   Past Medical History:   Diagnosis Date    Abscess of hand, left 2009    Abscess of scrotum 2007    Bacterial meningitis 2010    Cerebral artery occlusion with cerebral infarction ([address]     Depression     Encounter regarding vascular access for dialysis for ESRD ([address] 5/29/2021    GERD (gastroesophageal reflux disease)     Hepatitis C     Herpes zoster w/ nervous system complication 5440    neuropathy    Human immunodeficiency virus, type 2 (HIV 2) ([address] 2009    Inguinal hernia unilateral     left    Pneumonia 2010    Shingles (herpes zoster) polyneuropathy     Suicidal ideation 2008    Vitamin D deficiency      Patient Active Problem List    Diagnosis Date Noted    Acute renal failure superimposed on chronic kidney disease ([address]     Encounter regarding vascular access for dialysis for ESRD ([address] 05/29/2021    Left-sided weakness 05/26/2021    Goals of care, counseling/discussion     Palliative care by specialist     AMS (altered mental status) 05/25/2021    Septic olecranon bursitis of left elbow     CHANEL (acute kidney injury) ([address]     Arm edema     Cellulitis 08/09/2019    Fracture of proximal end of ulna 08/09/2019    Mood disorder ([address] 11/04/2014    Chronic hepatitis B ([address] 12/27/2010  Neuropathy 11/15/2010    HIV infection ([facility] 11/15/2010    HTN (hypertension) 11/15/2010    Unilateral inguinal hernia 11/15/2010       Review of Systems   Constitutional: Negative for chills and fever. HENT: Negative for congestion and rhinorrhea. Eyes: Negative for pain and visual disturbance. Respiratory: Negative for cough, shortness of breath and wheezing. Cardiovascular: Negative for chest pain and leg swelling. Gastrointestinal: Negative for abdominal pain, diarrhea, nausea and vomiting. Genitourinary: Negative for dysuria, frequency and hematuria. Musculoskeletal: Negative for arthralgias and neck pain. Skin: Negative for color change and rash. Neurological: Negative for numbness and headaches. Physical Exam  Vitals and nursing note reviewed. Constitutional:       General: He is not in acute distress. Appearance: He is well-developed. HENT:      Head: Normocephalic and atraumatic. Nose: Nose normal.      Mouth/Throat:      Pharynx: Oropharynx is clear. Eyes:      Conjunctiva/sclera: Conjunctivae normal.      Pupils: Pupils are equal, round, and reactive to light. Cardiovascular:      Rate and Rhythm: Normal rate and regular rhythm. Heart sounds: Normal heart sounds. Pulmonary:      Effort: Pulmonary effort is normal. No respiratory distress. Breath sounds: Normal breath sounds. No wheezing, rhonchi or rales. Chest:      Chest wall: No tenderness. Abdominal:      General: Abdomen is flat. There is no distension. Palpations: Abdomen is soft. There is no mass. Tenderness: There is no abdominal tenderness. Musculoskeletal:      Cervical back: Normal range of motion. No rigidity. Skin:     General: Skin is warm and dry. Findings: No rash. Neurological:      Mental Status: He is alert. Mental status is at baseline.           Procedures     [facility]     ED Course as of Jun 11 2005 Fri Jun 11, 2021   1119 Patient in no distress, resting comfortably. He is already spoke with the  who previously helped to arrange nursing home/rehab placement for him.    [SO]      ED Course User Index  [SO] Mathew Morales DO        Patient eloped from emergency department before his evaluation/treatment could be complete. Ciera Brower DO  Resident  06/11/21 2005    Diagnosis:  1. Eloped from emergency department          6/11/2021 /   PATIENT HAS ELOPED FROM DEPARTMENT PRIOR TO DISCHARGE / [address] DID NOT RETURN.   ------------------------------------------------------------------------------------------------------------------      ATTENDING PROVIDER ATTESTATION:     Maria Ines Albarado presented to the emergency department for evaluation of Homeless (needs rehab placement. here yesterday, cannot care for himself)   and was initially evaluated by the Medical Resident. See Original ED Note for H&P and ED course above. I have reviewed and discussed the case, including pertinent history (medical, surgical, family and social) and exam findings with the Medical Resident assigned to Maria Ines Albarado. I have personally performed and/or participated in the history, exam, medical decision making, and procedures and agree with all pertinent clinical information. I, Dr. Sarai Jorge, am the primary provider of record         I have reviewed my findings and recommendations with the assigned Medical Resident, Maria Ines Albarado and members of family present at the time of disposition. My findings/plan: The encounter diagnosis was Eloped from emergency department.   Discharge Medication List as of 6/11/2021  1:00 PM        DO Mathew Devi DO  06/11/21 2036

## 2021-06-11 NOTE — ED NOTES
Patient unable to be found at this time. Dr. Jim Heart made aware.      Benji Junior RN  06/11/21 [id number]

## 2021-06-16 NOTE — ED NOTES
Bed: 05  Expected date:   Expected time:   Means of arrival:   Comments:  PALMER Dwyer, ANTONINA  06/16/21 1946

## 2021-06-16 NOTE — ED PROVIDER NOTES
Glenn Thomas is a 23-year-old male with PMH of HIV, HTN, Hep C, presented to emergency department with altered mental status. Patient was brought in by by EMS from home. Patient's son and uncle went to check on patient after they were unable to contact him for 2 days patient was found today on the floor of his apartment. Family called EMS to bring him to emergency department for evaluation. Patient does not provide any history and is currently nonverbal. HPI and ROS limited due to acuity of condition. LKW 2 days ago. From chart review patient was on hold for dialysis 6/5 at time of last note as his renal function was improving. The history is provided by the patient and medical records. Altered Mental Status  Presenting symptoms: lethargy    Severity:  Severe  Most recent episode:  2 days ago  Episode history:  Continuous  Duration: unknown. Timing:  Constant  Progression:  Unable to specify  Chronicity:  Recurrent  Context: not recent change in medication    Associated symptoms comment:  Unable to evaluate due to acuity of condition. Review of Systems   Unable to perform ROS: Acuity of condition        Physical Exam  Vitals and nursing note reviewed. Constitutional:       Comments: Patient is lethargic but easy to wake and ill-appearing   HENT:      Head: Normocephalic and atraumatic. Eyes:      Pupils: Pupils are equal, round, and reactive to light. Cardiovascular:      Rate and Rhythm: Normal rate and regular rhythm. Comments: tessio right chest    Pulmonary:      Effort: Pulmonary effort is normal.      Breath sounds: Normal breath sounds. Abdominal:      General: Bowel sounds are normal. There is no distension. Palpations: Abdomen is soft. Tenderness: There is no abdominal tenderness. There is no guarding or rebound. Musculoskeletal:      Cervical back: Normal range of motion and neck supple. No rigidity or tenderness. Right lower leg: No edema.       Left lower leg: No edema. Skin:     General: Skin is warm and dry. Capillary Refill: Capillary refill takes 2 to 3 seconds. Neurological:      Comments: Patient will withdraw from pain and will follow commands to open his eyes. Patient is protecting his airway currently. Patient does not answer any questions and is currently nonverbal.  Patient appears contracted   Psychiatric:      Comments: Unable to evaluate due to acuity of condition          Procedures     MDM  Number of Diagnoses or Management Options  Altered mental status, unspecified altered mental status type  Metabolic encephalopathy  Polysubstance abuse ([address]  Uremia  Diagnosis management comments: Wolf Call is a 56year old male who present emergency department concern for altered mental status. Patient's last known well was 2 days ago. Patient's imaging was reviewed interpreted unremarkable for acute process. Patient did have an elevated BUN and creatinine with mildly elevated CK. Nephrology was consulted to arrange hemodialysis. Patient was afebrile without a leukocytosis at time of reevaluation. Patient remained nonverbal.  Patient will be admitted for observation for altered mental status likely due to metabolic encephalopathy. Patient does have a Tessio in place that appears to not have been accessed since June 5. Possible source of infection. Patient's blood cultures are pending. Patient's wife Donn Madison is emergency contact and listed in the chart. Patient son presented to emergency department and provide HPI. ED Course as of Jun 16 2343 Wed Jun 16, 2021 2015 EKG: This EKG is signed and interpreted by me.     Rate: 90  Rhythm: Sinus  Interpretation: non-specific EKG, no ST elevations or depressions, normal axis, normal intervals  Comparison: was normal      [SS]      ED Course User Index  [SS] Martha Obrien MD       --------------------------------------------- PAST HISTORY ---------------------------------------------  Past Medical History:  has a past medical history of Abscess of hand, left, Abscess of scrotum, Bacterial meningitis, Cerebral artery occlusion with cerebral infarction [facility]), Depression, Encounter regarding vascular access for dialysis for ESRD ([email or web address] GERD (gastroesophageal reflux disease), Hepatitis C, Herpes zoster w/ nervous system complication, Human immunodeficiency virus, type 2 (HIV 2) ([email or web address] Inguinal hernia unilateral, Pneumonia, Shingles (herpes zoster) polyneuropathy, Suicidal ideation, and Vitamin D deficiency. Past Surgical History:  has a past surgical history that includes cyst removal (2008); Abscess Drainage (9/18/2007); Inguinal hernia repair (3/9/2012); other surgical history (3/9/2012); and vascular surgery (N/A, 6/3/2021). Social History:  reports that he has been smoking cigarettes. He has a 10.00 pack-year smoking history. He has never used smokeless tobacco. He reports previous alcohol use. He reports that he does not use drugs. Family History: family history includes Cancer (age of onset: [de-identified]) in his father; Heart Disease (age of onset: 48) in his brother. The patients home medications have been reviewed.     Allergies: Ativan [lorazepam]    -------------------------------------------------- RESULTS -------------------------------------------------    LABS:  Results for orders placed or performed during the hospital encounter of 06/16/21   CK   Result Value Ref Range    Total  (H) 20 - 200 U/L   Lactate, Sepsis   Result Value Ref Range    Lactic Acid, Sepsis 2.7 (H) 0.5 - 1.9 mmol/L   CBC auto differential   Result Value Ref Range    WBC 11.5 4.5 - 11.5 E9/L    RBC 3.28 (L) 3.80 - 5.80 E12/L    Hemoglobin 10.8 (L) 12.5 - 16.5 g/dL    Hematocrit 33.6 (L) 37.0 - 54.0 %    .4 (H) 80.0 - 99.9 fL    MCH 32.9 26.0 - 35.0 pg    MCHC 32.1 32.0 - 34.5 %    RDW 13.7 11.5 - 15.0 fL    Platelets 600 478 - 820 E9/L    MPV 9.8 7.0 - 12.0 fL    Neutrophils % 77.1 43.0 - 80.0 %    Immature Granulocytes % 0.6 0.0 - 5.0 %    Lymphocytes % 12.2 (L) 20.0 - 42.0 %    Monocytes % 9.7 2.0 - 12.0 %    Eosinophils % 0.0 0.0 - 6.0 %    Basophils % 0.4 0.0 - 2.0 %    Neutrophils Absolute 8.86 (H) 1.80 - 7.30 E9/L    Immature Granulocytes # 0.07 E9/L    Lymphocytes Absolute 1.40 (L) 1.50 - 4.00 E9/L    Monocytes Absolute 1.12 (H) 0.10 - 0.95 E9/L    Eosinophils Absolute 0.00 (L) 0.05 - 0.50 E9/L    Basophils Absolute 0.05 0.00 - 0.20 E9/L   Comprehensive Metabolic Panel w/ Reflex to MG   Result Value Ref Range    Sodium 147 (H) 132 - 146 mmol/L    Potassium reflex Magnesium 4.0 3.5 - 5.0 mmol/L    Chloride 107 98 - 107 mmol/L    CO2 22 22 - 29 mmol/L    Anion Gap 18 (H) 7 - 16 mmol/L    Glucose 169 (H) 74 - 99 mg/dL    BUN 51 (H) 6 - 23 mg/dL    CREATININE 4.7 (H) 0.7 - 1.2 mg/dL    GFR Non-African American 15 >=60 mL/min/1.73    GFR African American 15     Calcium 9.4 8.6 - 10.2 mg/dL    Total Protein 9.6 (H) 6.4 - 8.3 g/dL    Albumin 4.0 3.5 - 5.2 g/dL    Total Bilirubin 0.4 0.0 - 1.2 mg/dL    Alkaline Phosphatase 61 40 - 129 U/L    ALT 21 0 - 40 U/L    AST 32 0 - 39 U/L   Troponin   Result Value Ref Range    Troponin, High Sensitivity 477 (H) 0 - 11 ng/L   Brain Natriuretic Peptide   Result Value Ref Range    Pro-BNP 2,031 (H) 0 - 125 pg/mL   Serum Drug Screen   Result Value Ref Range    Ethanol Lvl <10 mg/dL    Acetaminophen Level <5.0 (L) 10.0 - 93.8 mcg/mL    Salicylate, Serum <0.7 0.0 - 30.0 mg/dL    TCA Scrn NEGATIVE Cutoff:300 ng/mL   Urine Drug Screen   Result Value Ref Range    Amphetamine Screen, Urine NOT DETECTED Negative <1000 ng/mL    Barbiturate Screen, Ur NOT DETECTED Negative < 200 ng/mL    Benzodiazepine Screen, Urine NOT DETECTED Negative < 200 ng/mL    Cannabinoid Scrn, Ur NOT DETECTED Negative < 50ng/mL    Cocaine Metabolite Screen, Urine POSITIVE (A) Negative < 300 ng/mL    Opiate Scrn, Ur POSITIVE (A) Negative < 300ng/mL    PCP Screen, Urine NOT DETECTED Negative < 25 ng/mL    Methadone Screen, Urine NOT DETECTED Negative <300 ng/mL    Oxycodone Urine POSITIVE (A) Negative <100 ng/mL    FENTANYL SCREEN, URINE NOT DETECTED Negative <1 ng/mL    Drug Screen Comment: see below    Urinalysis with Microscopic   Result Value Ref Range    Color, UA Yellow Straw/Yellow    Clarity, UA Clear Clear    Glucose, Ur Negative Negative mg/dL    Bilirubin Urine MODERATE (A) Negative    Ketones, Urine TRACE (A) Negative mg/dL    Specific Gravity, UA >=1.030 1.005 - 1.030    Blood, Urine LARGE (A) Negative    pH, UA 5.0 5.0 - 9.0    Protein, UA >=300 (A) Negative mg/dL    Urobilinogen, Urine 1.0 <2.0 E.U./dL    Nitrite, Urine Negative Negative    Leukocyte Esterase, Urine Negative Negative    WBC, UA 1-3 0 - 5 /HPF    RBC, UA 5-10 (A) 0 - 2 /HPF    Bacteria, UA MODERATE (A) None Seen /HPF   EKG 12 Lead   Result Value Ref Range    Ventricular Rate 90 BPM    Atrial Rate 90 BPM    P-R Interval 144 ms    QRS Duration 76 ms    Q-T Interval 392 ms    QTc Calculation (Bazett) 479 ms    P Axis 52 degrees    R Axis 62 degrees    T Axis 83 degrees       RADIOLOGY:  CT HEAD WO CONTRAST   Final Result   No acute intracranial abnormality. CT CERVICAL SPINE WO CONTRAST   Final Result   No acute abnormality of the cervical spine. Multilevel degenerative changes with mild grade 1 anterolisthesis C7 over T1.         CT ABDOMEN PELVIS WO CONTRAST Additional Contrast? None   Final Result   1. No acute abnormality is seen in the abdomen or the pelvis. 2. Punctate 1 mm right intrarenal calculus. No hydronephrosis. 3. Mild cardiomegaly. XR CHEST PORTABLE   Final Result   1. Stable coarse interstitial markings, atherosclerotic disease, and   cardiomegaly. No new cardiopulmonary pathology. 2.  Tunneled right chest wall catheter.                ------------------------- NURSING NOTES AND VITALS REVIEWED ---------------------------  Date / Time Roomed: 6/16/2021  7:46 PM  ED Bed Assignment:  05/05    The nursing notes within the ED encounter and vital signs as below have been reviewed. Patient Vitals for the past 24 hrs:   BP Temp Temp src Pulse Resp SpO2 Height Weight   06/16/21 2258 (!) 142/91 99.7 °F (37.6 °C) Bladder 80 25 97 % -- --   06/16/21 2103 (!) 148/88 -- -- 85 29 -- -- --   06/16/21 2057 -- -- -- -- -- 96 % -- --   06/16/21 2042 (!) 162/99 -- -- 93 26 95 % -- --   06/16/21 1952 123/72 96.9 °F (36.1 °C) -- 98 18 96 % 5' 8\" (1.727 m) 178 lb (80.7 kg)       Oxygen Saturation Interpretation: Normal    ------------------------------------------ PROGRESS NOTES ------------------------------------------  Re-evaluation(s):  Time: 9pm  Patients symptoms show no change  Repeat physical examination is not changed    Counseling:  I have spoken with the patient and discussed todays results, in addition to providing specific details for the plan of care and counseling regarding the diagnosis and prognosis. Their questions are answered at this time and they are agreeable with the plan of admission.    --------------------------------- ADDITIONAL PROVIDER NOTES ---------------------------------  Consultations:  Spoke with SOUND. Discussed case. They will admit the patient. This patient's ED course included: a personal history and physicial examination, re-evaluation prior to disposition, multiple bedside re-evaluations, IV medications, cardiac monitoring, continuous pulse oximetry and complex medical decision making and emergency management    This patient has remained hemodynamically stable during their ED course. Diagnosis:  1. Altered mental status, unspecified altered mental status type    2. Metabolic encephalopathy    3. Uremia    4. Polysubstance abuse ([facility] [address]        Disposition:  Patient's disposition: Admit to telemetry  Patient's condition is stable.          Martha Obrien MD  Resident  06/16/21 [id number]    ATTENDING PROVIDER ATTESTATION:     I

## 2021-06-16 NOTE — LETTER
Provider:        Admission DX: AMS (altered mental status) and DX codes: R41.82      PATIENT                 Name: Dixie Simpson : 1960 (60 yrs)   Address: [address] [place] [address]. Sex: Male   [facility] [address]         Marital Status:    Employer: DISABLED         Denominational: Alevism   Primary Care Provider:           Primary Phone: [phone]   EMERGENCY CONTACT   Contact Name Legal Guardian? Relationship to Patient Home Phone Work Phone   1. cruz piper  2. heather muller      Brother/Sister  Brother/Sister [phone]                 GUARANTOR            Guarantor: Dixie Simpson     : 1960   Address: [place] [address]. Sex: Male   [place] [id number]     Relation to Patient: Self       Home Phone: [phone]   Guarantor ID: [id number]       Work Phone:     Guarantor Employer: DISABLED         Status: DISABLED      COVERAGE        PRIMARY INSURANCE   Payor: MEDICAID [place] Plan: MEDICAID [facility] DEPT OF*   Payor Address: [place] [address],  [place] [address]       Group Number:   Insurance Type: INDEMNITY   Subscriber Name: Joyceann Lanes : 1960   Subscriber ID: [id number] Pat. Rel. to Sub: Self   SECONDARY INSURANCE   Payor:   Plan:     Payor Address:  ,           Group Number:   Insurance Type:     Subscriber Name:   Subscriber :     Subscriber ID:   Pat.  Rel. to Sub:

## 2021-06-17 PROBLEM — F19.10 POLYSUBSTANCE ABUSE (HCC): Status: ACTIVE | Noted: 2021-01-01

## 2021-06-17 PROBLEM — R23.3 PETECHIAL RASH: Status: ACTIVE | Noted: 2021-01-01

## 2021-06-17 PROBLEM — Z99.2 END-STAGE RENAL DISEASE NEEDING DIALYSIS (HCC): Status: ACTIVE | Noted: 2021-01-01

## 2021-06-17 PROBLEM — N18.6 END-STAGE RENAL DISEASE NEEDING DIALYSIS (HCC): Status: ACTIVE | Noted: 2021-01-01

## 2021-06-17 PROBLEM — D63.1 ANEMIA DUE TO CHRONIC KIDNEY DISEASE: Status: ACTIVE | Noted: 2021-01-01

## 2021-06-17 PROBLEM — E43 SEVERE MALNUTRITION (HCC): Status: ACTIVE | Noted: 2021-01-01

## 2021-06-17 PROBLEM — N20.0 KIDNEY STONE: Status: ACTIVE | Noted: 2021-01-01

## 2021-06-17 PROBLEM — R77.8 ELEVATED TROPONIN: Status: ACTIVE | Noted: 2021-01-01

## 2021-06-17 PROBLEM — R31.29 MICROSCOPIC HEMATURIA: Status: ACTIVE | Noted: 2021-01-01

## 2021-06-17 PROBLEM — N18.9 ANEMIA DUE TO CHRONIC KIDNEY DISEASE: Status: ACTIVE | Noted: 2021-01-01

## 2021-06-17 PROBLEM — R74.8 ELEVATED CPK: Status: ACTIVE | Noted: 2021-01-01

## 2021-06-17 PROBLEM — E87.20 LACTIC ACIDOSIS: Status: ACTIVE | Noted: 2021-01-01

## 2021-06-17 NOTE — PROGRESS NOTES
Patient verbalized name and date of birth, but would not verbalize anything when asking him orientation questions. At times follows commands, unable to remain alert, and follow commands enough to evaluate swallow study at this time. Unable to administer oral medications. Unable to complete MRI checklist, home medication list, and admission database due to confusion at this time.        Roger Lovelace RN, BSN

## 2021-06-17 NOTE — H&P
Hospital Medicine History & Physical      PCP: No primary care provider on file. Date of Admission: 6/16/2021    Date of Service: Pt seen/examined on and Admitted to Inpatient with expected LOS greater than two midnights due to medical therapy. Chief Complaint:  AMS      History Of Present Illness:      56 y.o. male who presented to [facility] with medical history of bacterial meningitis, scrotal abscess, CVA, depression, end-stage renal disease on dialysis, GERD, chronic hep C, herpes zoster, HIV viral infection on antiretroviral therapy, major depression with suicidal ideation and substance abuse. Patient was admitted on 5/24/2021 through 6/6/2021. He had presented with altered mental status and found to have a stroke. He had sepsis with staph bacteremia secondary to left side chest wall cellulitis. He was in renal failure and had rhabdomyolysis with CPK of around 63,000. He was also treated for pneumonia. His urine drug screen was positive for cocaine and fentanyl. CK was 882 at the time of discharge. CD4 was 45. Patient according to family members was last known well 2 days ago, \"walking and talking\". Baseline mental status is not completely normal per their report. Came in with altered mental status. Altered mental status is constant, severe, associated with aphasia. Patient is currently nonverbal just grunting and moaning. He has not had dialysis since 6/5/2021 because his son stopped it thinking that patient's kidney function was getting better. Patient is unable to answer any questions at this time. He does not have a fever. He has petechial rash on his legs. His legs are swollen and red. Vital signs notable for respiratory rate of 26, labs showed sodium of 147, glucose 169, creatinine 4.7, BUN 51, lactic acid level 2.7, , troponin IV 47, urine drug screen positive for cocaine, oxycodone, and opiate. Urinalysis is positive for blood and bacteria.   EKG shows sinus rhythm rate of 90. He had an echo in May 2021 with EF of 60%. The scan of his head is negative and CT scan of the cervical spine shows multilevel degenerative disease. CT scan of the abdomen showed a small kidney stone on the right. No hydronephrosis. Recent MRI on 6/1/2021 showed small area of stroke in the right cerebral hemisphere. Chest x-ray shows stable coarse interstitial markings, cardiomegaly and atherosclerotic disease and tunneled right chest wall catheter. He is being admitted for further management. Past Medical History:          Diagnosis Date    Abscess of hand, left 2009    Abscess of scrotum 2007    Bacterial meningitis 2010    Cerebral artery occlusion with cerebral infarction [facility])     Depression     Encounter regarding vascular access for dialysis for ESRD ([facility] [address] 5/29/2021    GERD (gastroesophageal reflux disease)     Hepatitis C     Herpes zoster w/ nervous system complication 2012    neuropathy    Human immunodeficiency virus, type 2 (HIV 2) ([facility] [address] 2009    Inguinal hernia unilateral     left    Pneumonia 2010    Shingles (herpes zoster) polyneuropathy     Suicidal ideation 2008    Vitamin D deficiency        Past Surgical History:          Procedure Laterality Date    ABSCESS DRAINAGE  9/18/2007    scrotal. [facility]. Dr. Parris Willson  2008    testicle    INGUINAL HERNIA REPAIR  3/9/2012    left indirect hernia repaired with mesh, Dr. James Dudley, [address] [place] [address] OTHER SURGICAL HISTORY  3/9/2012    Left Inguinal Hernia Repair;Removal of Foreign Object Left foot    VASCULAR SURGERY N/A 6/3/2021    INSERTION TUNNELED HEMODIALYSIS CATHETER, REMOVAL OF TEMPORARY CATHETER performed by Maico Flores MD at [facility] OR       Medications Prior to Admission:      Prior to Admission medications    Medication Sig Start Date End Date Taking?  Authorizing Provider   aspirin 81 MG chewable tablet Take 1 tablet by mouth daily 6/6/21   Anusha Soto MD   sodium bicarbonate 650 MG tablet Take 2 tablets by mouth 4 times daily 6/5/21   Anusha Soto MD   melatonin 3 MG TABS tablet Take 0.5 tablets by mouth nightly as needed (insomnia) 6/5/21 7/5/21  Anusha Soto MD   clopidogrel (PLAVIX) 75 MG tablet Take 1 tablet by mouth daily 6/6/21   Anusha Soto MD   Multiple Vitamin (MULTIVITAMIN) TABS tablet Take 1 tablet by mouth daily 6/6/21   Belton Crigler, MD   thiamine mononitrate (THIAMINE) 100 MG tablet Take 1 tablet by mouth daily 6/6/21   Anusha Soto MD   TIVICAY 50 MG tablet Take 50 mg by mouth nightly  7/26/19   Historical Provider, MD   DESCOVY 200-25 MG TABS tablet Take 1 tablet by mouth nightly  7/26/19   Historical Provider, MD   Multiple Vitamin (MULTIVITAMIN) tablet Take 1 tablet by mouth daily 7/26/19   Historical Provider, MD   gabapentin (NEURONTIN) 800 MG tablet Take 800 mg by mouth 3 times daily. Historical Provider, MD       Allergies:  Ativan [lorazepam]    Social History:      The patient currently lives at home. TOBACCO:   reports that he has been smoking cigarettes. He has a 10.00 pack-year smoking history. He has never used smokeless tobacco.  ETOH:   reports previous alcohol use. Drugs: Cocaine, oxycodone and opiate    Family History:     Reviewed in detail Positive as follows:        Problem Relation Age of Onset    Cancer Father [de-identified]        bone marrow    Heart Disease Brother 48        heart attack       REVIEW OF SYSTEMS:   Pertinent positives as noted in the HPI. Unable to obtain due to altered mental status. PHYSICAL EXAM:    BP (!) 142/91   Pulse 80   Temp 99.7 °F (37.6 °C) (Bladder)   Resp 25   Ht 5' 8\" (1.727 m)   Wt 178 lb (80.7 kg)   SpO2 97%   BMI 27.06 kg/m²     General appearance: Middle-age male, chronically ill looking and weak, painful distress, just moaning and groaning, appears stated age and afebrile. Does not follow command. Dehydrated. HEENT:  Normal cephalic, atraumatic without obvious deformity. Pupils equal, round, and reactive to light. Conjunctivae/corneas clear. Dry oral cavity. Neck: Supple, with limited range of motion. No jugular venous distention. Trachea midline. Respiratory: Increased work of breathing. Clear to auscultation, bilaterally with diffuse rhonchi. Cardiovascular:  Regular rate and rhythm with normal S1/S2 without murmurs, rubs or gallops. Abdomen: Soft, non-tender, non-distended with normal bowel sounds. He has a suprapubic catheter. Musculoskeletal:  No clubbing/cyanosis, 2+ edema bilaterally. Limited range of motion with contractures of his upper extremities and legs. Dialysis catheter in the right anterior chest.  Skin: Skin color, texture, turgor normal.  Redness involving both feet, diffuse petechial rash in both legs. neurologic: Unresponsive and does not follow command. Global weakness. Psychiatric:  Alert but not oriented, thought content inappropriate, impaired insight  Capillary Refill: Brisk,< 3 seconds   Peripheral Pulses: +2 palpable, equal bilaterally       Labs:     Recent Labs     06/16/21 2032   WBC 11.5   HGB 10.8*   HCT 33.6*        Recent Labs     06/16/21 2032   *   K 4.0      CO2 22   BUN 51*   CREATININE 4.7*   CALCIUM 9.4     Recent Labs     06/16/21 2032   AST 32   ALT 21   BILITOT 0.4   ALKPHOS 61     No results for input(s): INR in the last 72 hours. Recent Labs     06/16/21 2032   CKTOTAL 663*       Urinalysis:      Lab Results   Component Value Date    NITRU Negative 06/16/2021    WBCUA 1-3 06/16/2021    WBCUA NONE 01/26/2012    BACTERIA MODERATE 06/16/2021    RBCUA 5-10 06/16/2021    RBCUA NONE 01/26/2012    BLOODU LARGE 06/16/2021    SPECGRAV >=1.030 06/16/2021    GLUCOSEU Negative 06/16/2021    GLUCOSEU NEGATIVE 01/26/2012       Radiology:   Reviewed and documented    CT HEAD WO CONTRAST   Final Result   No acute intracranial abnormality. CT CERVICAL SPINE WO CONTRAST   Final Result   No acute abnormality of the cervical spine.       Multilevel level.  Rule out meningitis. Neurology consult. May need lumbar puncture. At risk for meningitis given HIV and immune deficiency. Hold Neurontin. 7.  Lactic acidosis, recheck after fluids. 8.  HIV viral infection on highly active antiretroviral therapy. With CD4 45, patient needs to be on PCP and meningitis prophylaxis. ID consult. 9.  Elevated CPK, recently had rhabdo and improving. 10.  Elevated troponin in the setting of renal failure and recent rhabdomyolysis. He has cocaine on board, is possible that he might have had cardiac event however his creatinine is improving from recent admission. 11.  Anemia of chronic disease. 12.  Severe malnutrition, dietitian consult when appropriate. 13.  Joint contractures, physical therapy as tolerated  14. Chronic hep C viral infection, check ammonia levels. 15.  Bilateral lower extremity edema with redness suspicious for cellulitis. IV Rocephin and doxy, Doppler ultrasound for DVT. 16.  Microscopic hematuria, has a small kidney stone. 17.  Petechial rash, platelet count is normal, this may be concerning for meningitis. Will consult ID. May benefit from IR lumbar puncture. 18.  Hypertension, blood pressure is controlled. 19.  Mood disorder, hold medication. DVT Prophylaxis: Lovenox  Diet: No diet orders on file n.p.o. until safe to feed  Code Status: Prior full code    PT/OT Eval Status: Evaluation and treatment    Dispo -inpatient/telemetry       Vijay Borges MD    Thank you No primary care provider on file. for the opportunity to be involved in this patient's care.

## 2021-06-17 NOTE — PROGRESS NOTES
Hospitalist Progress Note      SYNOPSIS: Patient admitted on 6/16/2021     56 y.o. male who presented to [address] [place] [address] with medical history of bacterial meningitis, scrotal abscess, CVA, depression, end-stage renal disease on dialysis, GERD, chronic hep C, herpes zoster, HIV viral infection on antiretroviral therapy, major depression with suicidal ideation and substance abuse. Patient was admitted on 5/24/2021 through 6/6/2021. He had presented with altered mental status and found to have a stroke. He had sepsis with staph bacteremia secondary to left side chest wall cellulitis. He was in renal failure and had rhabdomyolysis with CPK of around 63,000. He was also treated for pneumonia. His urine drug screen was positive for cocaine and fentanyl. CK was 882 at the time of discharge. CD4 was 45.     Patient according to family members was last known well 2 days ago, \"walking and talking\". Baseline mental status is not completely normal per their report. Came in with altered mental status. Altered mental status is constant, severe, associated with aphasia. Patient is currently nonverbal just grunting and moaning. He has not had dialysis since 6/5/2021 because his son stopped it thinking that patient's kidney function was getting better. Patient is unable to answer any questions at this time. He does not have a fever. He has petechial rash on his legs. His legs are swollen and red.     Vital signs notable for respiratory rate of 26, labs showed sodium of 147, glucose 169, creatinine 4.7, BUN 51, lactic acid level 2.7, , troponin IV 47, urine drug screen positive for cocaine, oxycodone, and opiate. Urinalysis is positive for blood and bacteria. EKG shows sinus rhythm rate of 90. He had an echo in May 2021 with EF of 60%. The scan of his head is negative and CT scan of the cervical spine shows multilevel degenerative disease. CT scan of the abdomen showed a small kidney stone on the right.  No hydronephrosis. Recent MRI on 2021 showed small area of stroke in the right cerebral hemisphere. Chest x-ray shows stable coarse interstitial markings, cardiomegaly and atherosclerotic disease and tunneled right chest wall catheter. SUBJECTIVE:    Patient seen and examined  Records reviewed. Temp (24hrs), Av.2 °F (36.8 °C), Min:96.9 °F (36.1 °C), Max:99.7 °F (37.6 °C)    DIET: Diet NPO  CODE: Full Code  No intake or output data in the 24 hours ending 21 1003    OBJECTIVE:    BP (!) 159/115   Pulse 78   Temp 97.1 °F (36.2 °C) (Temporal)   Resp 20   Ht 5' 8\" (1.727 m)   Wt 178 lb (80.7 kg)   SpO2 97%   BMI 27.06 kg/m²     General appearance: Ill-appearing  HEENT: Normocephalic, atraumatic. EOMI  Neck: Supple. No jugular venous distention. Respiratory: Bilateral rhonchi  Cardiovascular: Regular rate rhythm, normal S1-S2  Abdomen: Soft, nontender, nondistended  Musculoskeletal: No clubbing, cyanosis, no bilateral lower extremity edema. Brisk capillary refill. Skin:  No rashes  on visible skin  Neurologic: Alert, confused    ASSESSMENT:  -Acute metabolic encephalopathy  -Dehydration  -Elevated troponin, in the setting of ESRD  -End-stage renal disease on dialysis  -Elevated procalcitonin  -Lactic acidosis, resolving  -Polysubstance abuse  -History of HIV infection  -Severe protein calorie malnutrition  -Bilateral lower extremity cellulitis?  -Petechial rash, meningitis?        PLAN:  -Nephrology consulted  -Infectious disease consulted  -Cardiology consulted  -Repeat troponin  -Continue doxycycline and ceftriaxone  -IV fluids  -MRI brain pending  -Continue aspirin and Plavix  -PT/OT      DISPOSITION:     Medications:  REVIEWED DAILY    Infusion Medications    sodium chloride      sodium chloride 75 mL/hr at 21 0313     Scheduled Medications    aspirin  81 mg Oral Daily    clopidogrel  75 mg Oral Daily    multivitamin  1 tablet Oral Daily    sodium bicarbonate  1,300 mg Oral 4x Daily    vitamin B-1  100 mg Oral Daily    dolutegravir sodium  50 mg Oral Nightly    emtricitabine-tenofovir alafenamide  1 tablet Oral Nightly    sodium chloride flush  5-40 mL Intravenous 2 times per day    cefTRIAXone (ROCEPHIN) IV  1,000 mg Intravenous Q24H    doxycycline (VIBRAMYCIN) IV  100 mg Intravenous Q12H     PRN Meds: sodium chloride flush, sodium chloride, ondansetron **OR** ondansetron, polyethylene glycol    Labs:     Recent Labs     06/16/21 2032 06/17/21  0127   WBC 11.5 11.5   HGB 10.8* 10.0*   HCT 33.6* 32.1*    312       Recent Labs     06/16/21 2032 06/17/21  0628   * 141   K 4.0 3.4*    108*   CO2 22 21*   BUN 51* 50*   CREATININE 4.7* 3.5*   CALCIUM 9.4 8.0*   PHOS  --  3.5       Recent Labs     06/16/21 2032   PROT 9.6*   ALKPHOS 61   ALT 21   AST 32   BILITOT 0.4       No results for input(s): INR in the last 72 hours. Recent Labs     06/16/21 2032   CKTOTAL 663*       Chronic labs:    Lab Results   Component Value Date    CHOL 184 06/17/2021    TRIG 177 (H) 06/17/2021    HDL 28 06/17/2021    LDLCALC 121 (H) 06/17/2021    TSH 17.020 (H) 06/17/2021    PSA SEE NOTE (AA) 03/08/2017    PSA 0.47 03/08/2017    INR 1.4 05/26/2021    LABA1C 4.9 06/17/2021       Radiology: REVIEWED DAILY    +++++++++++++++++++++++++++++++++++++++++++++++++  DO Edwin Hardy Physician - [facility], [place]  +++++++++++++++++++++++++++++++++++++++++++++++++  NOTE: This report was transcribed using voice recognition software. Every effort was made to ensure accuracy; however, inadvertent computerized transcription errors may be present.

## 2021-06-17 NOTE — ED NOTES
Unable to complete mri check list patient is not able to answer the questions and when this nurse asked family they were unsure of his issues      Praveena Hayes RN  06/17/21 3855

## 2021-06-17 NOTE — PROGRESS NOTES
Nurse to nurse report called and given to [facility]. MRI called, to see if patient could get scan while in transport, unable to do so at this time.      Tanna Jeronimo RN, BSN

## 2021-06-17 NOTE — CONSULTS
for dialysis for ESRD ([facility] 5/29/2021    GERD (gastroesophageal reflux disease)     Hepatitis C     Herpes zoster w/ nervous system complication 9061    neuropathy    Human immunodeficiency virus, type 2 (HIV 2) ([facility] 2009    Inguinal hernia unilateral     left    Pneumonia 2010    Shingles (herpes zoster) polyneuropathy     Suicidal ideation 2008    Vitamin D deficiency        Past Surgical History:   Procedure Laterality Date    ABSCESS DRAINAGE  9/18/2007    scrotal. [facility]. Dr. Teofilo Brower  2008    testicle    INGUINAL HERNIA REPAIR  3/9/2012    left indirect hernia repaired with mesh, Dr. Cecilio Hook, [facility] OTHER SURGICAL HISTORY  3/9/2012    Left Inguinal Hernia Repair;Removal of Foreign Object Left foot    VASCULAR SURGERY N/A 6/3/2021    INSERTION TUNNELED HEMODIALYSIS CATHETER, REMOVAL OF TEMPORARY CATHETER performed by Staci Menjivar MD at [facility] OR       Family History   Problem Relation Age of Onset    Cancer Father [de-identified]        bone marrow    Heart Disease Brother 48        heart attack        reports that he has been smoking cigarettes. He has a 10.00 pack-year smoking history. He has never used smokeless tobacco. He reports previous alcohol use. He reports that he does not use drugs.     Allergies:  Ativan [lorazepam]    Current Medications:    aspirin chewable tablet 81 mg, Daily  clopidogrel (PLAVIX) tablet 75 mg, Daily  multivitamin 1 tablet, Daily  sodium bicarbonate tablet 1,300 mg, 4x Daily  thiamine mononitrate tablet 100 mg, Daily  dolutegravir sodium (TIVICAY) tablet 50 mg, Nightly  sodium chloride flush 0.9 % injection 5-40 mL, 2 times per day  sodium chloride flush 0.9 % injection 5-40 mL, PRN  0.9 % sodium chloride infusion, PRN  ondansetron (ZOFRAN-ODT) disintegrating tablet 4 mg, Q8H PRN   Or  ondansetron (ZOFRAN) injection 4 mg, Q6H PRN  polyethylene glycol (GLYCOLAX) packet 17 g, Daily PRN  0.45 % sodium chloride infusion, Continuous  lamiVUDine (EPIVIR) tablet 150 mg, Daily  vancomycin 1000 mg IVPB in 250 mL D5W addavial, Once  cefTRIAXone (ROCEPHIN) 2,000 mg in sterile water 20 mL IV syringe, Q12H  ampicillin 2000 mg ivpb mini bag, Q8H        Review of Systems:   Constitutional: negative for chills, fatigue, fevers, and malaise  Eyes: negative for icterus, irritation, redness, and visual disturbance  Ears, nose, mouth, throat, and face: negative for ear drainage, earaches, hearing loss, nasal congestion, sore mouth, sore throat, and tinnitus  Respiratory: negative for cough, dyspnea on exertion, hemoptysis, shortness of breath, and wheezing  Cardiovascular: negative for chest pain, chest pressure/discomfort, dyspnea, and palpitations  Gastrointestinal: negative for abdominal pain, diarrhea, melena, nausea, and vomiting  Genitourinary: negative for dysuria, frequency, and hematuria  Integument/breast: negative for pruritus, rash, and skin lesion(s)  Hematologic/lymphatic: negative for bleeding, easy bruising, and petechiae  Musculoskeletal: negative for arthralgias, muscle weakness, myalgias, and stiff joints  Neurological: negative for dizziness, headaches, vertigo, and weakness  Behavioral/Psych: negative for anxiety, depression, and irritability    Physical exam:  General Appearance:  awake, alert, confused, no acute distress  Skin:  Skin color, texture, turgor normal. No rashes or lesions. Tessio catheter right chest  Eyes:  PERRL, EOMI, Sclera nonicteric, and Conjunctiva clear  Ears:  canals and TMs intact  Nose/Sinuses:  Nares normal. Septum midline. Mucosa normal. No drainage or sinus tenderness. Mouth/Throat:  Mucosa moist.  No lesions. Pharynx without erythema, edema or exudate. Neck:  neck- supple, no mass, non-tender  Lungs:  Normal expansion. Rhonchi to auscultation. No rales, rhonchi, or wheezing. Heart:   Regular rate and rhythm without murmur, gallop or rub. Abdomen:  Soft, non-tender, normal bowel sounds.   No bruits, organomegaly or masses. Extremities: Extremities warm to touch, 1+ edema. Musculoskeletal:  No joint swelling, deformity, or tenderness. Peripheral Pulses:  Normal  Neurologic:  Limited ROM. Contractures upper extremities and legs.          Data:   Labs:  CBC with Differential:    Lab Results   Component Value Date    WBC 11.5 06/17/2021    RBC 3.07 06/17/2021    HGB 10.0 06/17/2021    HCT 32.1 06/17/2021     06/17/2021    .6 06/17/2021    MCH 32.6 06/17/2021    MCHC 31.2 06/17/2021    RDW 13.7 06/17/2021    SEGSPCT 47 01/06/2014    LYMPHOPCT 12.2 06/16/2021    MONOPCT 9.7 06/16/2021    BASOPCT 0.4 06/16/2021    MONOSABS 1.12 06/16/2021    LYMPHSABS 1.40 06/16/2021    EOSABS 0.00 06/16/2021    BASOSABS 0.05 06/16/2021     CMP:    Lab Results   Component Value Date     06/17/2021    K 3.4 06/17/2021    K 4.0 06/16/2021     06/17/2021    CO2 21 06/17/2021    BUN 50 06/17/2021    CREATININE 3.5 06/17/2021    GFRAA 22 06/17/2021    LABGLOM 22 06/17/2021    GLUCOSE 106 06/17/2021    GLUCOSE 83 01/26/2012    PROT 9.6 06/16/2021    LABALBU 3.1 06/17/2021    LABALBU 4.2 01/17/2012    CALCIUM 8.0 06/17/2021    BILITOT 0.4 06/16/2021    ALKPHOS 61 06/16/2021    AST 32 06/16/2021    ALT 21 06/16/2021     Ionized Calcium:  No results found for: IONCA  Magnesium:    Lab Results   Component Value Date    MG 1.8 06/02/2021     Phosphorus:    Lab Results   Component Value Date    PHOS 3.5 06/17/2021     U/A:    Lab Results   Component Value Date    COLORU Yellow 06/16/2021    PHUR 5.0 06/16/2021    LABCAST FEW  11/02/2011    WBCUA 1-3 06/16/2021    WBCUA NONE 01/26/2012    RBCUA 5-10 06/16/2021    RBCUA NONE 01/26/2012    BACTERIA MODERATE 06/16/2021    CLARITYU Clear 06/16/2021    SPECGRAV >=1.030 06/16/2021    LEUKOCYTESUR Negative 06/16/2021    UROBILINOGEN 1.0 06/16/2021    BILIRUBINUR MODERATE 06/16/2021    BILIRUBINUR NEGATIVE 01/26/2012    BLOODU LARGE 06/16/2021    GLUCOSEU Negative 06/16/2021    GLUCOSEU NEGATIVE 01/26/2012    AMORPHOUS MODERATE 05/25/2021     Microalbumen/Creatinine ratio:  No components found for: RUCREAT  Iron Saturation:  No components found for: PERCENTFE  TIBC:  No results found for: TIBC  FERRITIN:  No results found for: FERRITIN     Imaging:  US DUP LOWER EXTREMITIES BILATERAL VENOUS   Final Result      No evidence for deep venous thrombosis within the bilateral lower   extremities. CT HEAD WO CONTRAST   Final Result   No acute intracranial abnormality. CT CERVICAL SPINE WO CONTRAST   Final Result   No acute abnormality of the cervical spine. Multilevel degenerative changes with mild grade 1 anterolisthesis C7 over T1.         CT ABDOMEN PELVIS WO CONTRAST Additional Contrast? None   Final Result   1. No acute abnormality is seen in the abdomen or the pelvis. 2. Punctate 1 mm right intrarenal calculus. No hydronephrosis. 3. Mild cardiomegaly. XR CHEST PORTABLE   Final Result   1. Stable coarse interstitial markings, atherosclerotic disease, and   cardiomegaly. No new cardiopulmonary pathology. 2.  Tunneled right chest wall catheter. MRI brain without contrast    (Results Pending)   FL LUMBAR PUNCTURE DIAG    (Results Pending)       Assessment:  1. Acute kidney injury, multifactorial, due to rhabdomyolysis, likely the hemodynamic impact of cocaine, oxycodone and opiate intoxication.     2. Chronic kidney disease, presumptive, in part based on the increased echogenicity seen on ultrasound.  Last known creatinine was 1.4 mg/dL 11/2020.  As a suspect poor adherence to his HIV regimen, we might consider HIV associated nephropathy, though at this point that would be purely speculative     3. Rhabdomyolysis due to at the least a fall and prolonged recumbency on a hard surface, though he may have also had an unwitnessed seizure given his cocaine and fentanyl intoxication (the latter would be speculative).   Total CK continues to improve     4. Cocaine and fentanyl intoxication      5. Hypokalemia       6. HIV and hepatitis C, following with ID.     Plan  1. Hold dialysis as creatinine is improving with IVF. 2. Monitor labs  3. Monitor UO      Thank you for the opportunity to participate in the care of your pleasant patient. We look forward to following along with you.         Electronically signed by CRISTHIAN De Leon CNP on 6/17/2021 at 12:29 PM

## 2021-06-17 NOTE — CONSULTS
Case discussed with Dr. Savannah Frederick. Troponin elevation in the setting of ESRD. Will defer consultation at this time. Please see recent consult dated 05/26/2021. Above as discussed with Dr. Savannah Frederick.    Electronically signed by CRISTHIAN Colby CNP on 6/17/21 at 3:01 PM EDT

## 2021-06-17 NOTE — PROGRESS NOTES
Perfect serve message sent to cardiology Salvador Barrera NP regarding new consult for cardiology due to elevated troponin. Patient information given.      Chelsea Purcell RN, BSN

## 2021-06-17 NOTE — CONSULTS
[facility]  Neurology Consult    Date:  6/17/2021  Patient Name:  Robert Hammond  YOB: 1960  MRN: [id number]     PCP:  No primary care provider on file. Referring:  No ref. provider found      Chief Complaint: ams    History obtained from: chart review, nursing staff    Assessment  Robert Hammond is a 56 y.o. male with acute encephalopathy and spastic left-sided hemiparesis with history of recent right cerebellar CVA now with AMS suspicious for CVA. The patient does have a petechial rash. Consideration of LP to rule out meningitis noted. However would like to confirm the presence of no stroke with MRI prior to holding the antiplatelet medications. Plan  · MRI brain - noted troubles in obtaining MRI waiver form complete. The patient had an MRI in early June. Dr. Orville Beltran aware and to sign waiver  · Continue DAPT/Statin  · Consider LP when negative        History of Present Illness:  Robert Hammond is a 56 y.o.  male presenting for evaluation of AMS. Patient presents from home after leaving a nursing facility recently, apparently AMA. He has a past medical history of HIV on retrovirals, Chronic Hep C, HTN, cocaine abuse. He is a right handed male recently evaluated by the Neurology service 5/25/2021 for new onset left arm weakness and AMS. He was followed for a few days with concerns for acute stroke or cocaine induced vasculitis or CNS vasculitis given history of infections. Also consideration for PML though the latter was least likely. Imaging had been negative and patient was ultimately unable to tolerate MRI while Neuro was following and he was placed on DAPT x21 days with aspirin thereafter. A few days after Neurology had signed off an MRI was completed and patient had evidence of acute R cerebellar CVA + chronic L pontine lacunar infarcts. He was admitted 5/24-6/6 for AMS found to have stroke/sepsis with staph bactermia from left chest celluitis/renal failure and rhabdo. Now coming in with AMS. Apparently \"walking talking\" few days ago. +Aphasia. Not had dialysis approximately 2 weeks per chart review. Apparently son stopped it? He does have a petechial appearing rash on his bilateral lower extremities. Neurology consulted secondary to a man with recent stroke now with acute toxic vs metabolic encephalopathy concerning for meningitis. He has been on DAPT. CT head negative for acute stroke. Review of Systems: unable to obtain ams      Medical History:   Past Medical History:   Diagnosis Date    Abscess of hand, left 2009    Abscess of scrotum 2007    Bacterial meningitis 2010    [facility]erebral [facility] with [facility]     Depression     Encounter regarding vascular access for dialysis for ESRD ([facility] 5/29/2021    GERD (gastroesophageal reflux disease)     Hepatitis C     Herpes zoster w/ nervous system complication 1406    neuropathy    Human immunodeficiency virus, type 2 (HIV 2) ([facility] 2009    Inguinal hernia unilateral     left    Pneumonia 2010    Shingles (herpes zoster) polyneuropathy     Suicidal ideation 2008    Vitamin D deficiency         Surgical History:   Past Surgical History:   Procedure Laterality Date    ABSCESS DRAINAGE  9/18/2007    scrotal. [facility].  Dr. Etelvina Roberson  2008    testicle    INGUINAL HERNIA REPAIR  3/9/2012    left indirect hernia repaired with mesh, Dr. Aishwarya Batemana, [address] [place] [address] OTHER SURGICAL HISTORY  3/9/2012    Left Inguinal Hernia Repair;Removal of Foreign Object Left foot    VASCULAR SURGERY N/A 6/3/2021    INSERTION TUNNELED HEMODIALYSIS CATHETER, REMOVAL OF TEMPORARY CATHETER performed by Ayde Hernandez MD at [facility] OR        Family History:   Family History   Problem Relation Age of Onset    Cancer Father [de-identified]        bone marrow    Heart Disease Brother 48        heart attack       Social History:  Social History     Tobacco Use    Smoking status: Current Every Day Smoker     Packs/day: 0.50 06/17/21 0313     Current Outpatient Medications   Medication Sig Dispense Refill    aspirin 81 MG chewable tablet Take 1 tablet by mouth daily 30 tablet 3    sodium bicarbonate 650 MG tablet Take 2 tablets by mouth 4 times daily 30 tablet 0    melatonin 3 MG TABS tablet Take 0.5 tablets by mouth nightly as needed (insomnia) 15 tablet 0    clopidogrel (PLAVIX) 75 MG tablet Take 1 tablet by mouth daily 30 tablet 3    Multiple Vitamin (MULTIVITAMIN) TABS tablet Take 1 tablet by mouth daily 30 tablet 0    thiamine mononitrate (THIAMINE) 100 MG tablet Take 1 tablet by mouth daily 30 tablet 0    TIVICAY 50 MG tablet Take 50 mg by mouth nightly       DESCOVY 200-25 MG TABS tablet Take 1 tablet by mouth nightly       Multiple Vitamin (MULTIVITAMIN) tablet Take 1 tablet by mouth daily      gabapentin (NEURONTIN) 800 MG tablet Take 800 mg by mouth 3 times daily. Allergies:       Allergies   Allergen Reactions    Ativan [Lorazepam] Other (See Comments)     Combative, confusion, aggression        Physical Examination  Vitals   Vitals:    06/16/21 2103 06/16/21 2258 06/17/21 0558 06/17/21 0830   BP: (!) 148/88 (!) 142/91 (!) 154/90 (!) 159/115   Pulse: 85 80 74 78   Resp: 29 25 18 20   Temp:  99.7 °F (37.6 °C) 99.1 °F (37.3 °C) 97.1 °F (36.2 °C)   TempSrc:  Bladder Core Temporal   SpO2:  97% 98% 97%   Weight:       Height:            General: moaning/groaning  HEENT: Normocephalic, atraumatic  Chest: Clear to auscultation bilaterally  Heart: Regular rate and rhythm, no murmurs appreciated  Extremities: Left upper extremity contractures    Neurologic Examination    Mental Status  Aphasic      Motor     Right Left   Right Left   Deltoid 3 0  Hip Flexion 3 0   Biceps      3  0  Knee Extension 3 0   Triceps 3 0  Knee Flexion 3 0   Handgrip 3 0  Ankle Dorsiflexion 3 0       Ankle Plantarflexion 3 0     Tone: Normal in all four limbs      Reflexes     Right Left   Biceps 2 2   Brachioradialis 2 2   Triceps 2 2 Patellar 1 1   Achilles 1 1   ankle clonus none none     Toes down going bilaterally. Labs  Recent Labs     06/16/21 2032   WBC 11.5   HGB 10.8*   HCT 33.6*             Recent Labs     06/16/21 2032   *   K 4.0      CO2 22   BUN 51*   CREATININE 4.7*   CALCIUM 9.4          Recent Labs     06/16/21 2032   AST 32   ALT 21   BILITOT 0.4   ALKPHOS 61         Imaging     CT HEAD WO CONTRAST   Final Result   No acute intracranial abnormality.           CT CERVICAL SPINE WO CONTRAST   Final Result   No acute abnormality of the cervical spine.       Multilevel degenerative changes with mild grade 1 anterolisthesis C7 over T1.           CT ABDOMEN PELVIS WO CONTRAST Additional Contrast? None   Final Result   1. No acute abnormality is seen in the abdomen or the pelvis. 2. Punctate 1 mm right intrarenal calculus. No hydronephrosis. 3. Mild cardiomegaly.           XR CHEST PORTABLE   Final Result   1. Stable coarse interstitial markings, atherosclerotic disease, and   cardiomegaly. No new cardiopulmonary pathology.       2.   Tunneled right chest wall catheter.           Electronically signed by: Severiano Gear, DO, 6/17/2021 9:01 AM

## 2021-06-17 NOTE — PROGRESS NOTES
Patient's sister, Winnie Paul called this nurse and stated \"I am the legal power of , Fouzia Hoang has no say. I will send the papers to you as soon as I can. My brother does not want to be kept alive on a ventilator. When its time for him to go, he wants to go! .\"

## 2021-06-17 NOTE — PROCEDURES
Radiology protocol requires 7 days without aspirin and 5 days without plavix prior to a lumbar puncture.

## 2021-06-17 NOTE — CONSULTS
Department of Internal Medicine  Infectious Diseases   Consult Note      Reason for Consult:  HIV infection, r/o meningitis     Requesting Physician: Dr Amelia Corea:               Pt is known to me . This is a 56 yrs old male with hx of HIV infection ( CD4 count was 45 as of 5/2021 and  undetectable viral load as of June 2020 - supposed to be on  Descovy and Tivicay ) , Hep C infection,rhabdomyolysis - recently started on HD - ( has not done HD since discharge ) ,stroke presented to the ER with change in mental status and weakness . Pt is non communicating at present . WBC was 11.5, urine drug screen + for opiates, cocaine and oxycodone     Past Medical History:      Past Medical History:   Diagnosis Date    Abscess of hand, left 2009    Abscess of scrotum 2007    Bacterial meningitis 2010    Cerebral artery occlusion [facility] cerebral infarction ([facility] [place] [address]     Depression     Encounter regarding vascular access for dialysis for ESRD ([facility] [place] [address] 5/29/2021    GERD (gastroesophageal reflux disease)     Hepatitis C     Herpes zoster w/ nervous system complication 8069    neuropathy    Human immunodeficiency virus, type 2 (HIV 2) ([facility] [place] [address] 2009    Inguinal hernia unilateral     left    Pneumonia 2010    Shingles (herpes zoster) polyneuropathy     Suicidal ideation 2008    Vitamin D deficiency          Past Surgical History:      Past Surgical History:   Procedure Laterality Date    ABSCESS DRAINAGE  9/18/2007    scrotal. [facility].  Dr. Alex Coyle  2008    testicle    INGUINAL HERNIA REPAIR  3/9/2012    left indirect hernia repaired with mesh, Dr. Yee Reilly, [address] [place] [address] OTHER SURGICAL HISTORY  3/9/2012    Left Inguinal Hernia Repair;Removal of Foreign Object Left foot    VASCULAR SURGERY N/A 6/3/2021    INSERTION TUNNELED HEMODIALYSIS CATHETER, REMOVAL OF TEMPORARY CATHETER performed by Kelsea Morales MD at [facility] OR         Current Medications:      Current Facility-Administered Medications   Medication Dose Route Frequency Provider Last Rate Last Admin    aspirin chewable tablet 81 mg  81 mg Oral Daily Kuldip Caballero MD        clopidogrel (PLAVIX) tablet 75 mg  75 mg Oral Daily Kuldip Caballero MD        multivitamin 1 tablet  1 tablet Oral Daily Kuldip Caballero MD        sodium bicarbonate tablet 1,300 mg  1,300 mg Oral 4x Daily Kuldip Caballero MD        thiamine mononitrate tablet 100 mg  100 mg Oral Daily Kuldip Caballero MD        dolutegravir sodium (TIVICAY) tablet 50 mg  50 mg Oral Nightly Kuldip Caballero MD        emtricitabine-tenofovir alafenamide (DESCOVY) 200-25 MG per tablet 1 tablet  1 tablet Oral Nightly Kuldip Caballero MD        sodium chloride flush 0.9 % injection 5-40 mL  5-40 mL Intravenous 2 times per day Kuldip Caballero MD        sodium chloride flush 0.9 % injection 5-40 mL  5-40 mL Intravenous PRN Kuldip Caballero MD        0.9 % sodium chloride infusion  25 mL Intravenous PRN Kuldip Caballero MD        ondansetron (ZOFRAN-ODT) disintegrating tablet 4 mg  4 mg Oral Q8H PRN Kuldip Caballero MD        Or    ondansetron (ZOFRAN) injection 4 mg  4 mg Intravenous Q6H PRN Kuldip Caballero MD        polyethylene glycol (GLYCOLAX) packet 17 g  17 g Oral Daily PRN Kuldip Caballero MD        cefTRIAXone (ROCEPHIN) 1,000 mg in sterile water 10 mL IV syringe  1,000 mg Intravenous Q24H Kuldip Caballero MD   Stopped at 06/17/21 0214    doxycycline (VIBRAMYCIN) 100 mg in dextrose 5 % 100 mL IVPB  100 mg Intravenous Q12H Kuldip Caballero MD   Stopped at 06/17/21 0312    0.45 % sodium chloride infusion   Intravenous Continuous Kuldip Caballero MD 75 mL/hr at 06/17/21 0313 New Bag at 06/17/21 0313     Current Outpatient Medications   Medication Sig Dispense Refill    melatonin 3 MG TABS tablet Take 1.5 mg by mouth nightly as needed (sleep)      aspirin 81 MG chewable tablet Take 1 tablet by mouth daily 30 tablet 3    sodium bicarbonate 650 MG tablet Take 2 tablets by mouth 4 times daily 30 tablet 0    clopidogrel (PLAVIX) 75 MG tablet Take 1 tablet by mouth daily 30 tablet 3    Multiple Vitamin (MULTIVITAMIN) TABS tablet Take 1 tablet by mouth daily 30 tablet 0    thiamine mononitrate (THIAMINE) 100 MG tablet Take 1 tablet by mouth daily 30 tablet 0    TIVICAY 50 MG tablet Take 50 mg by mouth nightly       DESCOVY 200-25 MG TABS tablet Take 1 tablet by mouth nightly       gabapentin (NEURONTIN) 800 MG tablet Take 800 mg by mouth 3 times daily. Allergies:  Ativan [lorazepam]    Social History:      Social History     Socioeconomic History    Marital status:      Spouse name: Not on file    Number of children: Not on file    Years of education: Not on file    Highest education level: Not on file   Occupational History    Not on file   Tobacco Use    Smoking status: Current Every Day Smoker     Packs/day: 0.50     Years: 20.00     Pack years: 10.00     Types: Cigarettes    Smokeless tobacco: Never Used   Vaping Use    Vaping Use: Never used   Substance and Sexual Activity    Alcohol use: Not Currently    Drug use: No    Sexual activity: Not Currently   Other Topics Concern    Not on file   Social History Narrative    Not on file     Social Determinants of Health     Financial Resource Strain:     Difficulty of Paying Living Expenses:    Food Insecurity:     Worried About Running Out of Food in the Last Year:     Ran Out of Food in the Last Year:    Transportation Needs:     Lack of Transportation (Medical):      Lack of Transportation (Non-Medical):    Physical Activity:     Days of Exercise per Week:     Minutes of Exercise per Session:    Stress:     Feeling of Stress :    Social Connections:     Frequency of Communication with Friends and Family:     Frequency of Social Gatherings with Friends and Family:     Attends [employer] Services:     Active Member of Clubs or Organizations:     Attends Club or Organization Meetings:     Marital Status:    Intimate Partner Violence:     Fear of Current or Ex-Partner:     Emotionally Abused:     Physically Abused:     Sexually Abused:          Family History:     Family History   Problem Relation Age of Onset    Cancer Father [de-identified]        bone marrow    Heart Disease Brother 48        heart attack       REVIEW OF SYSTEMS:  Could not be obtained         PHYSICAL EXAM:      Vitals:     BP (!) 159/115   Pulse 78   Temp 97.1 °F (36.2 °C) (Temporal)   Resp 20   Ht 5' 8\" (1.727 m)   Wt 178 lb (80.7 kg)   SpO2 97%   BMI 27.06 kg/m²     General Appearance:    Opened eyes, not communicating    Head:    Normocephalic, atraumatic   Eyes:    No pallor, no icterus,   Ears:    No obvious deformity or drainage.    Nose:   No nasal drainage   Throat:   Mucosa moist, no oral thrush   Neck:   Rigid     Lungs:     Clear to auscultation bilaterally, no wheeze    Heart:    Regular rate and rhythm, no murmur   Abdomen:     Soft, non-tender, bowel sounds present    Extremities:   No edema, no cyanosis ,no open wound   Pulses:   Dorsalis pedis palpable    Skin:   no rashes      CBC with Differential:      Lab Results   Component Value Date    WBC 11.5 06/17/2021    RBC 3.07 06/17/2021    HGB 10.0 06/17/2021    HCT 32.1 06/17/2021     06/17/2021    .6 06/17/2021    MCH 32.6 06/17/2021    MCHC 31.2 06/17/2021    RDW 13.7 06/17/2021    SEGSPCT 47 01/06/2014    LYMPHOPCT 12.2 06/16/2021    MONOPCT 9.7 06/16/2021    BASOPCT 0.4 06/16/2021    MONOSABS 1.12 06/16/2021    LYMPHSABS 1.40 06/16/2021    EOSABS 0.00 06/16/2021    BASOSABS 0.05 06/16/2021       CMP     Lab Results   Component Value Date     06/17/2021    K 3.4 06/17/2021    K 4.0 06/16/2021     06/17/2021    CO2 21 06/17/2021    BUN 50 06/17/2021    CREATININE 3.5 06/17/2021    GFRAA 22 06/17/2021    LABGLOM 22 06/17/2021    GLUCOSE 106 06/17/2021    GLUCOSE 83 01/26/2012    PROT 9.6 06/16/2021    LABALBU 3.1 06/17/2021    LABALBU 4.2 01/17/2012    CALCIUM 8.0 06/17/2021    BILITOT 0.4 06/16/2021    ALKPHOS 61 06/16/2021    AST 32 06/16/2021    ALT 21 06/16/2021         Hepatic Function Panel:    Lab Results   Component Value Date    ALKPHOS 61 06/16/2021    ALT 21 06/16/2021    AST 32 06/16/2021    PROT 9.6 06/16/2021    BILITOT 0.4 06/16/2021    BILIDIR 0.3 03/13/2015    IBILI 1.3 03/13/2015    LABALBU 3.1 06/17/2021    LABALBU 4.2 01/17/2012       PT/INR:    Lab Results   Component Value Date    PROTIME 14.9 05/26/2021    INR 1.4 05/26/2021       TSH:    Lab Results   Component Value Date    TSH 17.020 06/17/2021       U/A:    Lab Results   Component Value Date    COLORU Yellow 06/16/2021    PHUR 5.0 06/16/2021    LABCAST FEW  11/02/2011    WBCUA 1-3 06/16/2021    WBCUA NONE 01/26/2012    RBCUA 5-10 06/16/2021    RBCUA NONE 01/26/2012    BACTERIA MODERATE 06/16/2021    CLARITYU Clear 06/16/2021    SPECGRAV >=1.030 06/16/2021    LEUKOCYTESUR Negative 06/16/2021    UROBILINOGEN 1.0 06/16/2021    BILIRUBINUR MODERATE 06/16/2021    BILIRUBINUR NEGATIVE 01/26/2012    BLOODU LARGE 06/16/2021    GLUCOSEU Negative 06/16/2021    GLUCOSEU NEGATIVE 01/26/2012    AMORPHOUS MODERATE 05/25/2021       ABG:  No results found for: LKT2LVZ, BEART, I7RUVHGK, PHART, THGBART, HHK1NIM, PO2ART, ZVQ6XAD    MICROBIOLOGY:    Blood culture - pending       Radiology :    Chest  X ray - no infiltrates       IMPRESSION:    1. HIV infection / HIV   2. Chronic Hep C infection   3. Encephalopathy, Cocaine use r/o meningitis       RECOMMENDATIONS:      1. LP - CSF for cell count, diff, protein, glucose, cx , meningitis panel   2. HIV viral load   3. Hold descovy  - lamivudine 50 mg po q day and viread 300 mg X1 and  tivicay 1 tab once a day    4.  Vancomycin / Rocephin / Ampicillin     Thank you Dr Clarissa Koehler for the consult

## 2021-06-17 NOTE — ED NOTES
Son Mar Rueda wants called with any updates at [address] [place] [address], RN  06/17/21 8102

## 2021-06-17 NOTE — PROGRESS NOTES
Patient's son, Yamil Samayoa called this nurse and was updated. Pilar Waller stated \"I am the only child available, I have a brother but he is in retirement. My dad, does not want to be a full code. He has told me, his wishes are, when it's time for him to die, he wants to go peacefully, he does not want CPR, he does not want to be on a ventilator. My dad tells me he doesn't want to take his meds and says just let me go! \"

## 2021-06-18 NOTE — PROGRESS NOTES
Dr Dre Bedoya notified of receipt of patient's POA/State of [address] Will Declaration and patient's wishes to be a [facility], via [place].   Read 2022

## 2021-06-18 NOTE — PROGRESS NOTES
OCCUPATIONAL THERAPY INITIAL EVALUATION     [address] [address] [address]      Date:2021                                                  Patient Name: Radha Mackey  MRN: [id number]  : 1960  Room: [address] [place], [place] [address]    Evaluating OT: Barbara Atkinson, MOT, OTR/L [id number]  Referring Anand Victoria MD  Specific Provider Orders: OT eval and treat date  Recommended Adaptive Equipment: TBD     Diagnosis: AMS   Surgery: n/a   Pertinent Medical History: CVA (chronic L pontine infarcts, recent R cerebellar), Hep C, HIV, inguinal hernia, substance abuse   Precautions:  Fall Risk    Assessment of current deficits   [x] Functional mobility  [x]ADLs  [x] Strength               [x]Cognition   [x] Functional transfers   [x] IADLs         [x] Safety Awareness   [x]Endurance   [] Fine Coordination              [x] Balance      [] Vision/perception   [x]Sensation    []Gross Motor Coordination  [x] ROM  [] Delirium                   [] Motor Control     OT PLAN OF CARE   OT POC based on physician orders, patient diagnosis and results of clinical assessment    Frequency/Duration:  1-3 days/wk for 2 weeks PRN   Specific OT Treatment to include:   * Instruction/training on adapted ADL techniques and AE recommendations to increase functional independence within precautions       * Training on energy conservation strategies, correct breathing pattern and techniques to improve independence/tolerance for self-care routine  * Functional transfer/mobility training/DME recommendations for increased independence, safety, and fall prevention  * Patient/Family education to increase follow through with safety techniques and functional independence  * Recommendation of environmental modifications for increased safety with functional transfers/mobility and ADLs  * Cognitive retraining/development of therapeutic activities to improve problem solving, judgement, memory, and attention for increased safety/participation in ADL/IADL tasks  * Therapeutic exercise to improve motor endurance, ROM, and functional strength for ADLs/functional transfers  * Therapeutic activities to facilitate/challenge dynamic balance, stand tolerance for increased safety and independence with ADLs  * Neuro-muscular re-education: facilitation of righting/equilibrium reactions, midline orientation, scapular stability/mobility, normalization of muscle tone, and facilitation of volitional active controled movement  * Positioning to improve skin integrity, interaction with environment and functional independence    Home Living: Pt unable to provide prior history due to decreased alertness and no verbalization. Pain Level: ?  Cognition: A&O: 1/4 (self); Follows no directions    Memory:  poor   Sequencing:  poor   Problem solving:  poor   Judgement/safety:  poor    Functional Assessment:  AM-PAC Daily Activity Raw Score: 6/24   Initial Eval Status  Date: 6/18/21 Treatment Status  Date: STGs=LTGs  Time Frame: 10-14 days   Feeding Dep (simulated)   Max A   Grooming Dep (assist with facial washing and oral hygiene)   Max A   UB Dressing dep   Max A   LB Dressing Dep   Max A    Bathing Dep (simulated)  Max A    Toileting dep  Max A   Bed Mobility  Log roll: NT  Supine to sit: NT   Sit to supine: NT   Log roll Max A  Supine to sit:  Max A   Sit to supine: Max A   Functional Transfers Sit to stand:nt   Stand to sit:nt  Commode: nt  Max A   Functional Mobility NT  TBA   Balance Sitting: NT  Standing: NT     Activity Tolerance Poor (limited alertness and difficult to arouse)     Visual/  Perceptual Glasses: ?              Hand dominance: ?  UE PROM: RUE:  WFL  LUE: elbow appears contracted ~75', shoulder grossly 150 with PROM  Strength: RUE: grossly 3/5 LUE: grossly 2/5   Strength: unable to follow command  Fine Motor Coordination: unable to follow command    Hearing: WFL  Sensation:  No c/o numbness/tingling   Tone: LUE  Edema: none noted Comments:Cleared by RN to see pt. Upon arrival, patient supine in bed. At end of session, patient supine in bed with call light and phone within reach, all lines and tubes intact. Pt would benefit from continued OT to increase functional independence and quality of life. Treatment: Pt required max vc's to stay alert during session. Pt kept eyes closed ~90% of time. Pt required physical assist for facial washing and oral hygiene to promote self care. Pt demonstrated no verbal responses. Continue to educate. Eval Complexity: moderate  · History: Expanded chart review of medical records and additional review of physical, cognitive, or psychosocial history related to current functional performance  · Exam: 3+ performance deficits  · Assistance/Modification: mod/max assistance or modifications required to perform tasks. May have comorbidities that affect occupational performance. Rehab Potential:fair for established goals, pt. assisted in establishment of goals. LTG: maximize independence with ADLs to return to PLOF    Patient instructed on diagnosis, prognosis/goals and plan of care. Demonstrated limited understanding. [] Malnutrition indicators have been identified and nursing has been notified to ensure a dietitian consult is ordered. Evaluation time includes thorough review of current medical information, gathering information on past medical & social history & PLOF, completion of standardized testing, informal observation of tasks, consultation with other medical professions/disciplines, assessment of data & development of POC/goals.      Time In: 7591       Time Out: 0900     Total treatment time: 0       Treatment Charges: Mins Units   OT Eval Low 70900     OT Eval Medium 52326 X    OT Eval High 82968     OT Re-Eval 26306     Ther Ex  15671       Manual Therapy [address] [facility] [address]       ADL/Home Mgt [id number]     Neuro Re-ed 54900       Group Therapy Orthotic manage/training  80115       Non-Billable Time           Aishwarya Welch, OTR/L [id number]

## 2021-06-18 NOTE — DISCHARGE SUMMARY
Hospital Medicine Discharge Summary    Patient ID: Rocky Monroy      Patient's PCP: No primary care provider on file. Admit Date: 5/24/2021     Discharge Date: 6/6/2021      Admitting Physician: Jossy Burnett MD     Discharge Physician: Kae Delgado MD     Discharge Diagnoses: Active Hospital Problems    Diagnosis Date Noted    Acute renal failure superimposed on chronic kidney disease ([facility] [address] [N17.9, N18.9]     Encounter regarding vascular access for dialysis for ESRD ([facility] [address] [N18.6, Z99.2] 05/29/2021    Left-sided weakness [R53.1] 05/26/2021    Goals of care, counseling/discussion [Z71.89]     Palliative care by specialist [Z51.5]     AMS (altered mental status) [R41.82] 05/25/2021       The patient was seen and examined on day of discharge and this discharge summary is in conjunction with any daily progress note from day of discharge. Hospital Course:   Patient was admitted on 5/24/2021 through 6/6/2021. He had presented with altered mental status and found to have a stroke. He had sepsis with staph bacteremia secondary to left side chest wall cellulitis. He was in renal failure and had rhabdomyolysis with CPK of around 63,000. He was also treated for pneumonia. His urine drug screen was positive for cocaine and fentanyl. CK was 882 at the time of discharge. CD4 was 45. Dialysis was held at discharge per nephrology. Continued on HIV meds at dc per ID recs. Discharged to facility on 6/6/21 in stable in stable condition with pcp follow up             Exam:     BP (!) 148/76   Pulse 82   Temp 97.8 °F (36.6 °C) (Oral)   Resp 16   Ht 5' 8\" (1.727 m)   Wt 194 lb 3.6 oz (88.1 kg)   SpO2 94%   BMI 29.53 kg/m²     General appearance: No apparent distress, appears stated age and cooperative. HEENT:  Conjunctivae/corneas clear. Neck: Supple. No jugular venous distention.    Respiratory: Clear to auscultation bilaterally, normal respiratory effort  Cardiovascular: Regular rate rhythm, normal S1-S2  Abdomen: Soft, nontender, nondistended  Musculoskeletal: No clubbing, cyanosis, no bilateral lower extremity edema. Brisk capillary refill. Skin:   Left sided chest and lateral chest wall erythematous rash improved  Neurologic: awake, alert and following commands. LUE paresis and edema      Consults:     IP CONSULT TO INTERNAL MEDICINE  IP CONSULT TO PHARMACY  IP CONSULT TO INFECTIOUS DISEASES  IP CONSULT TO NEPHROLOGY  IP CONSULT TO NEUROLOGY  IP CONSULT TO CARDIOLOGY  IP CONSULT TO PALLIATIVE CARE  IP CONSULT TO ORTHOPEDIC SURGERY  IP CONSULT TO VASCULAR SURGERY  IP CONSULT TO SOCIAL WORK  IP CONSULT TO VASCULAR SURGERY    Significant Diagnostic Studies:     XR CHEST 1 VIEW   Final Result   Venous catheter in place as noted, pneumothorax. FLUORO FOR SURGICAL PROCEDURES   Final Result   Intraprocedural fluoroscopic spot images as above. See separate procedure   report for more information. MRI BRAIN WO CONTRAST   Final Result   1. Tiny, 5 mm focus of acute or early subacute infarction in the right   cerebellar hemisphere. 2. Chronic left pontine lacunar infarction. 3. Moderate bilateral mastoid effusions, somewhat larger on the left. CT SOFT TISSUE NECK W CONTRAST   Final Result   1. Redemonstration of edema associated with superficial and deep left neck   soft tissue notable involving muscular compartments of mid and lower neck and   upper left shoulder. Findings could suggest rhabdomyolysis versus infectious   myositis. Short-term follow-up recommended. 2.  No loculated fluid collections to suggest abscess. 3.  View of upper lung fields shows findings suggestive of bilateral pleural   effusions. CT HEAD WO CONTRAST   Final Result      Stable study. No evidence for acute intracranial hemorrhage, territorial   infarction or intracranial mass lesion. Mild chronic microangiopathic ischemic disease.       Chronic lacunar infarction of left side of the ana. MRI NECK SOFT TISSUE WO CONTRAST   Final Result      No discrete abscess collection on the provided images. There is extensive amount of edema of superficial and deep neck spaces on the   left side extending from the skull base to the upper aspect of left chest   cavity and left supraclavicular region. The inflammation also involves   muscles and neurovascular bundles including left brachial plexus. The   finding may be related to infectious/inflammatory condition or to trauma. Moderate mucosal thickening of bilateral mastoid air cells. Extensive amount fluid collection within the prevertebral space extending   into the upper mediastinum. Recommendation: For further evaluation contrast enhanced neck CT is   recommended. The findings were sent to the Radiology Results [address] at 10:24   pm on 5/28/2021to be communicated to a licensed caregiver. CT CHEST WO CONTRAST   Final Result   1. There is stranding and ill-defined soft tissue density involving right   posterolateral chest wall and left lower neck region. This could be   hematoma, contusion and/or inflammation. There is an apparent hematoma   involving the left latissimus dorsi and teres major region. 2. Bilateral small pleural effusions with bibasilar atelectasis versus   infiltrate, left greater than right. 3. Unchanged mild to moderate emphysema. 4. New perihilar and paramediastinal bronchial wall thickening and infiltrate   in the right upper lobe and right middle lobe. CT HUMERUS LEFT WO CONTRAST   Final Result   1. Diffuse nonspecific soft tissue stranding and superficial edema about the   mid to distal aspect of the left humerus particularly along the posterior and   lateral aspects. No intramuscular fluid collections identified on this exam.      2.  No acute osseous findings about the left humerus. 3.  Mild left shoulder and elbow degenerative changes. RECOMMENDATIONS:   These imaging features are nonspecific. If there is high concern for acute   compartment syndrome based on clinical history and clinical findings,   consider obtaining compartmental pressure measurements. XR ELBOW LEFT (2 VIEWS)   Final Result   1. No acute osseous findings in the left shoulder, left humerus, left elbow,   nor left forearm. Sequela of prior trauma seen distal to the ulnar styloid   process and possibly at the proximal ulna. No abnormal periosteal elevation. Osseous mineralization appears preserved. 2.  Mild left shoulder, left elbow, left wrist triscaphe, and thumb CMC joint   osteoarthritis. XR HUMERUS LEFT (MIN 2 VIEWS)   Final Result   1. No acute osseous findings in the left shoulder, left humerus, left elbow,   nor left forearm. Sequela of prior trauma seen distal to the ulnar styloid   process and possibly at the proximal ulna. No abnormal periosteal elevation. Osseous mineralization appears preserved. 2.  Mild left shoulder, left elbow, left wrist triscaphe, and thumb CMC joint   osteoarthritis. XR RADIUS ULNA LEFT (2 VIEWS)   Final Result   1. No acute osseous findings in the left shoulder, left humerus, left elbow,   nor left forearm. Sequela of prior trauma seen distal to the ulnar styloid   process and possibly at the proximal ulna. No abnormal periosteal elevation. Osseous mineralization appears preserved. 2.  Mild left shoulder, left elbow, left wrist triscaphe, and thumb CMC joint   osteoarthritis. XR SHOULDER LEFT (MIN 2 VIEWS)   Final Result   1. No acute osseous findings in the left shoulder, left humerus, left elbow,   nor left forearm. Sequela of prior trauma seen distal to the ulnar styloid   process and possibly at the proximal ulna. No abnormal periosteal elevation. Osseous mineralization appears preserved.       2.  Mild left shoulder, left elbow, left wrist triscaphe, and thumb CMC joint osteoarthritis. US DUP UPPER EXTREMITY LEFT VENOUS   Final Result   Within the visualized vessels there is no evidence for deep venous   thrombosis               US RETROPERITONEAL COMPLETE   Final Result   1. Increased renal parenchymal echogenicity indicating medical renal disease. 2. No nephrolithiasis or hydronephrosis. CT CHEST WO CONTRAST   Final Result   CT chest:      Ill-defined soft tissue thickening and fat stranding involving the visualized   base of the left neck, supraclavicular region and throughout the left lateral   chest wall. This is nonspecific. Infectious/inflammatory process such as   cellulitis is a consideration. Ill-defined hematoma is also a consideration. Recommend correlation with clinical presentation. Emphysematous changes. Areas of atelectasis and patchy airspace opacities most evident in the lower   lung zones. Developing infiltrates from pneumonia not excluded. Continued   follow-up recommended. CT abdomen and pelvis:      Exam limited by patient motion. Urinary bladder thickening may be related to underdistention. Cystitis not   excluded. Other chronic appearing findings. Short-term follow-up if symptoms persist.         CT ABDOMEN PELVIS WO CONTRAST Additional Contrast? None   Final Result   CT chest:      Ill-defined soft tissue thickening and fat stranding involving the visualized   base of the left neck, supraclavicular region and throughout the left lateral   chest wall. This is nonspecific. Infectious/inflammatory process such as   cellulitis is a consideration. Ill-defined hematoma is also a consideration. Recommend correlation with clinical presentation. Emphysematous changes. Areas of atelectasis and patchy airspace opacities most evident in the lower   lung zones. Developing infiltrates from pneumonia not excluded. Continued   follow-up recommended.       CT abdomen and pelvis:      Exam limited by patient motion. Urinary bladder thickening may be related to underdistention. Cystitis not   excluded. Other chronic appearing findings. Short-term follow-up if symptoms persist.         XR CHEST PORTABLE   Final Result   Patchy bilateral parenchymal opacities may represent multifocal pneumonia or   mild edema. Continued follow-up recommended. CTA HEAD W CONTRAST   Final Result   1. Mild atherosclerotic disease . 2. Estimated stenosis of the proximal right and left internal carotid   artery by NASCET criteria is not hemodynamically significant         This study was analyzed by the [address] ai algorithm. CTA NECK W CONTRAST   Final Result   1. Mild atherosclerotic disease . 2. Estimated stenosis of the proximal right and left internal carotid   artery by NASCET criteria is not hemodynamically significant         This study was analyzed by the [address] ai algorithm. CT BRAIN PERFUSION   Final Result      No significant ischemic penumbra identified      This study was analyzed by the [employer] algorithm. CT HEAD WO CONTRAST   Final Result   No acute intracranial abnormality. Mild age-related loss of brain volume and   chronic periventricular ischemic changes. Findings reported to Dr. Elijah Bales at 12:12 a.m. Disposition:  SNF       Discharge Instructions/Follow-up:  Keep scheduled follow up appointments. Take medications as prescribed. Code Status:  DNR-CC     Activity: activity as tolerated    Diet: regular diet    Labs:  For convenience and continuity at follow-up the following most recent labs are provided:      CBC:    Lab Results   Component Value Date    WBC 6.1 06/18/2021    HGB 10.6 06/18/2021    HCT 32.9 06/18/2021     06/18/2021       Renal:    Lab Results   Component Value Date     06/17/2021    K 4.2 06/17/2021    K 4.0 06/16/2021     06/17/2021    CO2 19 06/17/2021    BUN 52 06/17/2021    CREATININE 3.0 06/17/2021    CALCIUM 9.1 06/17/2021    PHOS 3.5 06/17/2021       Discharge Medications:     Discharge Medication List as of 6/5/2021  4:28 PM           Details   aspirin 81 MG chewable tablet Take 1 tablet by mouth daily, Disp-30 tablet, R-3NO PRINT      sodium bicarbonate 650 MG tablet Take 2 tablets by mouth 4 times daily, Disp-30 tablet, R-0NO PRINT      clopidogrel (PLAVIX) 75 MG tablet Take 1 tablet by mouth daily, Disp-30 tablet, R-3NO PRINT      Multiple Vitamin (MULTIVITAMIN) TABS tablet Take 1 tablet by mouth daily, Disp-30 tablet, R-0Normal      thiamine mononitrate (THIAMINE) 100 MG tablet Take 1 tablet by mouth daily, Disp-30 tablet, R-0NO PRINT      oxyCODONE (ROXICODONE) 5 MG immediate release tablet Take 1 tablet by mouth every 6 hours as needed for Pain for up to 3 days. , Disp-12 tablet, R-0Print              Details   melatonin 3 MG TABS tablet Take 0.5 tablets by mouth nightly as needed (insomnia), Disp-15 tablet, R-0Normal              Details   TIVICAY 50 MG tablet Take 50 mg by mouth nightly , DAWHistorical Med      DESCOVY 200-25 MG TABS tablet Take 1 tablet by mouth nightly , DAWHistorical Med      Multiple Vitamin (MULTIVITAMIN) tablet Take 1 tablet by mouth dailyHistorical Med      gabapentin (NEURONTIN) 800 MG tablet Take 800 mg by mouth 3 times daily. Historical Med             Time Spent on discharge is more than 45 minutes in the examination, evaluation, counseling and review of medications and discharge plan.       Signed:    Art Ho MD   6/18/2021

## 2021-06-18 NOTE — PROGRESS NOTES
Department of Internal Medicine  Infectious Diseases  Progress  Note      C/C :  HIV infection, r/o meningitis     Pt opened eyes   Not responsive   No resp distress  Afebrile       Current Facility-Administered Medications   Medication Dose Route Frequency Provider Last Rate Last Admin    aspirin chewable tablet 81 mg  81 mg Oral Daily Mor Murray MD        clopidogrel (PLAVIX) tablet 75 mg  75 mg Oral Daily Mor Murray MD        multivitamin 1 tablet  1 tablet Oral Daily Mor Murray MD        sodium bicarbonate tablet 1,300 mg  1,300 mg Oral 4x Daily Mor Murray MD        thiamine mononitrate tablet 100 mg  100 mg Oral Daily Mor Murray MD        dolutegravir sodium (TIVICAY) tablet 50 mg  50 mg Oral Nightly Mor Murray MD        sodium chloride flush 0.9 % injection 5-40 mL  5-40 mL Intravenous 2 times per day Mor Murray MD   10 mL at 06/18/21 1050    sodium chloride flush 0.9 % injection 5-40 mL  5-40 mL Intravenous PRN Mor Murray MD   10 mL at 06/17/21 2230    0.9 % sodium chloride infusion  25 mL Intravenous PRN Mor Murray MD        ondansetron (ZOFRAN-ODT) disintegrating tablet 4 mg  4 mg Oral Q8H PRN Mor Murray MD        Or    ondansetron (ZOFRAN) injection 4 mg  4 mg Intravenous Q6H PRN Mor Murray MD        polyethylene glycol (GLYCOLAX) packet 17 g  17 g Oral Daily PRN Mor Murray MD        lamiVUDine (EPIVIR) tablet 50 mg  50 mg Oral Daily Oli Garcia MD        cefTRIAXone (ROCEPHIN) 2,000 mg in sterile water 20 mL IV syringe  2,000 mg Intravenous Q12H Oli aGrcia MD   2,000 mg at 06/17/21 2230    ampicillin 2000 mg ivpb mini bag  2,000 mg Intravenous Gosia Beach MD   Stopped at 06/18/21 1130    emtricitabine-tenofovir (TRUVADA) 200-300 MG per tablet 1 tablet  1 tablet Oral Once Oli Garcia MD           REVIEW OF SYSTEMS:  Could not be obtained         PHYSICAL EXAM:      Vitals:     BP (!) 168/93 3.1 06/17/2021    LABALBU 4.2 01/17/2012       PT/INR:    Lab Results   Component Value Date    PROTIME 14.9 05/26/2021    INR 1.4 05/26/2021       TSH:    Lab Results   Component Value Date    TSH 17.020 06/17/2021       U/A:    Lab Results   Component Value Date    COLORU Yellow 06/16/2021    PHUR 5.0 06/16/2021    LABCAST FEW  11/02/2011    WBCUA 1-3 06/16/2021    WBCUA NONE 01/26/2012    RBCUA 5-10 06/16/2021    RBCUA NONE 01/26/2012    BACTERIA MODERATE 06/16/2021    CLARITYU Clear 06/16/2021    SPECGRAV >=1.030 06/16/2021    LEUKOCYTESUR Negative 06/16/2021    UROBILINOGEN 1.0 06/16/2021    BILIRUBINUR MODERATE 06/16/2021    BILIRUBINUR NEGATIVE 01/26/2012    BLOODU LARGE 06/16/2021    GLUCOSEU Negative 06/16/2021    GLUCOSEU NEGATIVE 01/26/2012    AMORPHOUS MODERATE 05/25/2021       ABG:  No results found for: Aniya Gala, PHART, THGBART, [id number], PO2ART, [id number]    MICROBIOLOGY:    Blood culture -    Updated: 06/18/21 0907     Bottle Type Aerobic    Source of Blood Culture No site given    Order Number [id number]    Enterobacter cloacae complex by PCR Not Detected    Escherichia coli by PCR Not Detected    Klebsiella oxytoca by PCR Not Detected    Klebsiella pneumoniae group by PCR Not Detected    Proteus species by PCR Not Detected    Streptococcus agalactiae by PCR Not Detected    Staphylococcus aureus by PCR Not Detected    Serratia marcescens by PCR Not Detected    Streptococcus pneumoniae by PCR Not Detected    Streptococcus pyogenes  by PCR Not Detected    Acinetobacter baumannii by PCR Not Detected    Candida albicans by PCR Not Detected    Candida glabrata by PCR Not Detected    Candida krusei by PCR Not Detected    Candida parapsilosis by PCR Not Detected    Candida tropicalis by PCR Not Detected    Enterobacteriaceae by PCR Not Detected    Enterococcus by PCR Not Detected    Haemophilus Influenzae by PCR Not Detected    Listeria monocytogenes by PCR Not Detected    Neisseria meningitidis by PCR Not Detected    Pseudomonas aeruginosa by PCR Not Detected    Staphylococcus species by PCR DETECTEDPanic      Streptococcus species by PCR Not Detected    Methicillin Resistance mecA/C  by PCR DETECTEDPanic     Narrative:     CALL  Jos[facility]  H45 tel. ,            Radiology :    Chest  X ray - no infiltrates       IMPRESSION:    1. HIV infection / HIV   2. Chronic Hep C infection   3. Encephalopathy, Cocaine use r/o meningitis   4. CONS bacteremia - contamination       RECOMMENDATIONS:      1. LP - CSF for cell count, diff, protein, glucose, cx , meningitis panel (awaitng LP - not done yet due to asa/plavix)   2. HIV viral load   3. Lamivudine 50 mg po q day and  tivicay 1 tab once a day  , will give truvada on Mondy   4.  Vancomycin / Rocephin / Ampicillin

## 2021-06-18 NOTE — PROGRESS NOTES
Copies of \"State of [address] will Declaration\"  Received from patient's [address]. Copies placed into patient's soft chart.

## 2021-06-18 NOTE — PROGRESS NOTES
Hospitalist Progress Note      SYNOPSIS: Patient admitted on 6/16/2021     56 y.o. male who presented to [facility] with medical history of bacterial meningitis, scrotal abscess, CVA, depression, end-stage renal disease on dialysis, GERD, chronic hep C, herpes zoster, HIV viral infection on antiretroviral therapy, major depression with suicidal ideation and substance abuse. Patient was admitted on 5/24/2021 through 6/6/2021. He had presented with altered mental status and found to have a stroke. He had sepsis with staph bacteremia secondary to left side chest wall cellulitis. He was in renal failure and had rhabdomyolysis with CPK of around 63,000. He was also treated for pneumonia. His urine drug screen was positive for cocaine and fentanyl. CK was 882 at the time of discharge. CD4 was 45.     Patient according to family members was last known well 2 days ago, \"walking and talking\". Baseline mental status is not completely normal per their report. Came in with altered mental status. Altered mental status is constant, severe, associated with aphasia. Patient is currently nonverbal just grunting and moaning. He has not had dialysis since 6/5/2021 because his son stopped it thinking that patient's kidney function was getting better. Patient is unable to answer any questions at this time. He does not have a fever. He has petechial rash on his legs. His legs are swollen and red.     Vital signs notable for respiratory rate of 26, labs showed sodium of 147, glucose 169, creatinine 4.7, BUN 51, lactic acid level 2.7, , troponin IV 47, urine drug screen positive for cocaine, oxycodone, and opiate. Urinalysis is positive for blood and bacteria. EKG shows sinus rhythm rate of 90. He had an echo in May 2021 with EF of 60%. The scan of his head is negative and CT scan of the cervical spine shows multilevel degenerative disease. CT scan of the abdomen showed a small kidney stone on the right.  No hydronephrosis. Recent MRI on 2021 showed small area of stroke in the right cerebral hemisphere. Chest x-ray shows stable coarse interstitial markings, cardiomegaly and atherosclerotic disease and tunneled right chest wall catheter. SUBJECTIVE:    Patient seen and examined  Records reviewed. Temp (24hrs), Av.9 °F (36.6 °C), Min:97.2 °F (36.2 °C), Max:99.1 °F (37.3 °C)    DIET: Diet NPO  CODE: DNR-CC    Intake/Output Summary (Last 24 hours) at 2021 1006  Last data filed at 2021 0553  Gross per 24 hour   Intake 1100 ml   Output 1650 ml   Net -550 ml       OBJECTIVE:    BP (!) 168/93   Pulse 123   Temp 99.1 °F (37.3 °C) (Axillary)   Resp 26   Ht 5' 8\" (1.727 m)   Wt 178 lb (80.7 kg)   SpO2 92%   BMI 27.06 kg/m²     General appearance: Ill-appearing  HEENT: Normocephalic, atraumatic. EOMI  Neck: Supple. No jugular venous distention. Respiratory: Bilateral rhonchi  Cardiovascular: Regular rate rhythm, normal S1-S2  Abdomen: Soft, nontender, nondistended  Musculoskeletal: No clubbing, cyanosis, no bilateral lower extremity edema. Brisk capillary refill. Skin:  No rashes  on visible skin  Neurologic: Alert, confused    ASSESSMENT:  -Acute metabolic encephalopathy  -Dehydration  -Elevated troponin, in the setting of ESRD  -End-stage renal disease on dialysis  -Elevated procalcitonin  -Lactic acidosis, resolving  -Polysubstance abuse  -History of HIV infection  -Severe protein calorie malnutrition  -Bilateral lower extremity cellulitis?  -Petechial rash, meningitis?        PLAN:  -Nephrology following  -Infectious disease following  -LP pending  -Continue vancomycin, Rocephin, ampicillin  -IV fluids  -Monitor renal function  -Continue aspirin and Plavix  -PT/OT      DISPOSITION:     Medications:  REVIEWED DAILY    Infusion Medications    sodium chloride       Scheduled Medications    aspirin  81 mg Oral Daily    clopidogrel  75 mg Oral Daily    multivitamin  1 tablet Oral Daily    sodium bicarbonate  1,300 mg Oral 4x Daily    vitamin B-1  100 mg Oral Daily    dolutegravir sodium  50 mg Oral Nightly    sodium chloride flush  5-40 mL Intravenous 2 times per day    lamiVUDine  50 mg Oral Daily    cefTRIAXone (ROCEPHIN) IV  2,000 mg Intravenous Q12H    ampicillin IV  2,000 mg Intravenous Q8H    emtricitabine-tenofovir  1 tablet Oral Once     PRN Meds: sodium chloride flush, sodium chloride, ondansetron **OR** ondansetron, polyethylene glycol    Labs:     Recent Labs     06/16/21 2032 06/17/21  0127 06/18/21  0820   WBC 11.5 11.5 6.1   HGB 10.8* 10.0* 10.6*   HCT 33.6* 32.1* 32.9*    312 272       Recent Labs     06/17/21  0628 06/17/21  1621 06/17/21  1622 06/17/21  2139    145 146 147*   K 3.4* 4.5 4.5 4.2   * 112* 111* 112*   CO2 21* 20* 19* 19*   BUN 50* 52* 52* 52*   CREATININE 3.5* 3.0* 3.1* 3.0*   CALCIUM 8.0* 9.1 9.2 9.1   PHOS 3.5  --   --   --        Recent Labs     06/16/21 2032   PROT 9.6*   ALKPHOS 61   ALT 21   AST 32   BILITOT 0.4       No results for input(s): INR in the last 72 hours. Recent Labs     06/16/21 2032   CKTOTAL 663*       Chronic labs:    Lab Results   Component Value Date    CHOL 184 06/17/2021    TRIG 177 (H) 06/17/2021    HDL 28 06/17/2021    LDLCALC 121 (H) 06/17/2021    TSH 17.020 (H) 06/17/2021    PSA SEE NOTE (AA) 03/08/2017    PSA 0.47 03/08/2017    INR 1.4 05/26/2021    LABA1C 4.9 06/17/2021       Radiology: REVIEWED DAILY    +++++++++++++++++++++++++++++++++++++++++++++++++  Shannon Curling, DO  [employer] - [facility], [facility]  +++++++++++++++++++++++++++++++++++++++++++++++++  NOTE: This report was transcribed using voice recognition software. Every effort was made to ensure accuracy; however, inadvertent computerized transcription errors may be present.

## 2021-06-18 NOTE — PROGRESS NOTES
Patient's family have many concerns regarding pain and nutrition management. Dr. Víctor Reynolds notified. Awaiting further orders.

## 2021-06-18 NOTE — PROGRESS NOTES
[facility] MD Kwame Mcnulyt MD Cherise Kohut, MD Lonn Sarah, MD Emily Osborn, CNP   Jana Cha, AUGUSTINE  Progress Note    6/18/2021    SUBJECTIVE:   (-) c/o's  (-) sob/elkins/cp/palp , altered mental status  No major improvement and mental status's admission  PROBLEM LIST:    Active Problems:    HIV infection ([facility]    HTN (hypertension)    Chronic hepatitis C virus infection ([facility]    Mood disorder ([facility]    Cellulitis    Bilateral leg edema    AMS (altered mental status)    End-stage renal disease needing dialysis ([facility]    Anemia due to chronic kidney disease    Petechial rash    Lactic acidosis    Elevated CPK    Elevated troponin    Polysubstance abuse ([facility]    Microscopic hematuria    Kidney stone    Severe malnutrition ([facility]  Resolved Problems:    * No resolved hospital problems.  *         DIET:    Diet NPO     MEDS (scheduled):    vancomycin  1,000 mg Intravenous Once    aspirin  81 mg Oral Daily    clopidogrel  75 mg Oral Daily    multivitamin  1 tablet Oral Daily    sodium bicarbonate  1,300 mg Oral 4x Daily    vitamin B-1  100 mg Oral Daily    dolutegravir sodium  50 mg Oral Nightly    sodium chloride flush  5-40 mL Intravenous 2 times per day    lamiVUDine  50 mg Oral Daily    cefTRIAXone (ROCEPHIN) IV  2,000 mg Intravenous Q12H    ampicillin IV  2,000 mg Intravenous Q8H       MEDS (infusions):   sodium chloride         MEDS (prn):  sodium chloride flush, sodium chloride, ondansetron **OR** ondansetron, polyethylene glycol    PHYSICAL EXAM:     Patient Vitals for the past 24 hrs:   BP Temp Temp src Pulse Resp SpO2   06/18/21 0811 (!) 168/93 99.1 °F (37.3 °C) Axillary 123 26 92 %   06/18/21 0017 126/74 97.2 °F (36.2 °C) Temporal 92 18 95 %   06/17/21 1600 130/88 97.8 °F (36.6 °C) Temporal 100 20 95 %   06/17/21 1445 125/74 97.3 °F (36.3 °C) Temporal 90 18 95 %   @      Intake/Output Summary (Last 24 hours) at 6/18/2021 1335  Last data filed at 6/18/2021 0553  Gross per 24 hour   Intake 1100 ml   Output 1650 ml   Net -550 ml         Wt Readings from Last 3 Encounters:   06/16/21 178 lb (80.7 kg)   06/11/21 178 lb (80.7 kg)   06/11/21 178 lb (80.7 kg)       Constitutional:  in no acute distress  HEENT: NC/AT, EOMI, sclera and conjunctiva are clear and anicteric, mucus membranes moist  Neck: Trachea midline, no JVD  Cardiovascular: S1, S2 regular rhythm, no murmur,or rub  Respiratory:  No crackles, no wheeze  Gastrointestinal:  Soft, nontender, nondistended, NABS  Ext: no edema, feet warm  Skin: dry, no rash  Neuro: awake, alert, interactive      DATA:    Recent Labs     06/16/21 2032 06/17/21  0127 06/18/21  0820   WBC 11.5 11.5 6.1   HGB 10.8* 10.0* 10.6*   HCT 33.6* 32.1* 32.9*   .4* 104.6* 101.9*    312 272     Recent Labs     06/16/21 2032 06/16/21 2032 06/17/21  0628 06/17/21  1621 06/17/21  1622 06/17/21  2139   *  --  141 145 146 147*   K 4.0   < > 3.4* 4.5 4.5 4.2     --  108* 112* 111* 112*   CO2 22  --  21* 20* 19* 19*   PHOS  --   --  3.5  --   --   --    BUN 51*  --  50* 52* 52* 52*   CREATININE 4.7*  --  3.5* 3.0* 3.1* 3.0*   ALT 21  --   --   --   --   --    AST 32  --   --   --   --   --    BILITOT 0.4  --   --   --   --   --    ALKPHOS 61  --   --   --   --   --     < > = values in this interval not displayed. Lab Results   Component Value Date    LABPROT 4.2 (H) 05/25/2021    LABPROT 4.2 05/25/2021       ASSESSMENT / RECOMMENDATIONS:    1. CHANEL on top of CKD,  Multifactorial etiology, polysubstance abuse  Rhabdomyolysis, hepatitis C and HIV positive. Very gradually improving with IV fluid hydration as being done now. Latest BUN is 52 and creatinine 2.0   2. Anemia: Due to CKD     3. Metabolic acidosis due to CHANEL and top of CKD     4.  Hypertension: Supportive care  BMP, CBC and CPK daily   See orders      Electronically signed by Tucker Pruett MD on 6/18/2021 at 1:35 PM

## 2021-06-18 NOTE — PROGRESS NOTES
Patient admitted into room 4524 at 1600 from ED. Patient bathed immediately. Patient does not respond to questions or commands. He is non verbal.  He has non-purposeful movements of both upper and lower extremities. Patient is unable to swallow medications.

## 2021-06-18 NOTE — CARE COORDINATION
Transition of care-Patient admitted from community, was previously at [facility], called 911, discharged from ED to community, returned to ED and tried to return to nursing facility, however was unable to do so. Patient is alert to self, conducted initial assessment with sister Chele Beyer ([phone])-BDA. She stated family is unable to care for patient and he will jael to go to a skilled nursing facility at discharge, she was agreeable for CM to reach out to potential accepting facilities that could accommodate his needs, reached out to [facility], unable to accept. Referral sent to [address] to hear back to see if they can accept. Update-[facility] can accept[facility], ambulance form in soft chart, patient will require covid testing prior to transfer. The Plan for Transition of Care is related to the following treatment goals: Skilled Nursing to return to baseline    Thepatient representative  was provided with a choice of provider and agrees   with the discharge plan. [x] Yes [] No    Freedom of choice list was provided with basic dialogue that supports the patient's individualized plan of care/goals, treatment preferences and shares the quality data associated with the providers.  [x] Yes [] No    Julio César ALANIZN, RN  [facility] [place] [facility]   [phone]

## 2021-06-19 NOTE — PROGRESS NOTES
Hospitalist Progress Note      PCP: No primary care provider on file. Date of Admission: 6/16/2021    Chief Complaint: Altered mental status lethargy    Hospital Course: 56 y.o. male who presented to [facility] with medical history of bacterial meningitis, scrotal abscess, CVA, depression, end-stage renal disease on dialysis, GERD, chronic hep C, herpes zoster, HIV viral infection on antiretroviral therapy, major depression with suicidal ideation and substance abuse. Patient was admitted on 5/24/2021 through 6/6/2021. He had presented with altered mental status and found to have a stroke. He had sepsis with staph bacteremia secondary to left side chest wall cellulitis. He was in renal failure and had rhabdomyolysis with CPK of around 63,000. He was also treated for pneumonia. His urine drug screen was positive for cocaine and fentanyl. CK was 882 at the time of discharge. CD4 was 45. Patient according to family members was last known well 2 days ago, \"walking and talking\". Baseline mental status is not completely normal per their report. Came in with altered mental status. Altered mental status is constant, severe, associated with aphasia. Patient is currently nonverbal just grunting and moaning. He has not had dialysis since 6/5/2021 because his son stopped it thinking that patient's kidney function was getting better. Patient is unable to answer any questions at this time. He does not have a fever. He has petechial rash on his legs. His legs are swollen and red. Vital signs notable for respiratory rate of 26, labs showed sodium of 147, glucose 169, creatinine 4.7, BUN 51, lactic acid level 2.7, , troponin IV 47, urine drug screen positive for cocaine, oxycodone, and opiate. Urinalysis is positive for blood and bacteria. EKG shows sinus rhythm rate of 90. He had an echo in May 2021 with EF of 60%.  The scan of his head is negative and CT scan of the cervical spine shows multilevel degenerative disease. CT scan of the abdomen showed a small kidney stone on the right. No hydronephrosis. Recent MRI on 6/1/2021 showed small area of stroke in the right cerebral hemisphere. Chest x-ray shows stable coarse interstitial markings, cardiomegaly and atherosclerotic disease and tunneled right chest wall catheter. Subjective: Patient seen and examined by me personally he is lethargic not able to communicate. His sister in his room patient is off aspirin and Plavix for LP procedure. No fever no chills no change in mental status      Medications:  Reviewed    Infusion Medications    sodium chloride 125 mL/hr at 06/19/21 1156    sodium chloride       Scheduled Medications    aspirin  81 mg Oral Daily    clopidogrel  75 mg Oral Daily    multivitamin  1 tablet Oral Daily    sodium bicarbonate  1,300 mg Oral 4x Daily    vitamin B-1  100 mg Oral Daily    dolutegravir sodium  50 mg Oral Nightly    sodium chloride flush  5-40 mL Intravenous 2 times per day    lamiVUDine  50 mg Oral Daily    cefTRIAXone (ROCEPHIN) IV  2,000 mg Intravenous Q12H    ampicillin IV  2,000 mg Intravenous Q8H     PRN Meds: morphine, acetaminophen, sodium chloride flush, sodium chloride, ondansetron **OR** ondansetron, polyethylene glycol      Intake/Output Summary (Last 24 hours) at 6/19/2021 1338  Last data filed at 6/19/2021 1006  Gross per 24 hour   Intake 756.67 ml   Output 2175 ml   Net -1418.33 ml       Exam:    BP (!) 165/96   Pulse 87   Temp 98.2 °F (36.8 °C) (Axillary)   Resp 26   Ht 5' 8\" (1.727 m)   Wt 178 lb (80.7 kg)   SpO2 94%   BMI 27.06 kg/m²     General appearance: Clinically ill appearance, lethargic. HEENT: Conjunctivae/corneas clear. Neck:. No jugular venous distention. Trachea midline. Respiratory:  Normal respiratory effort. Clear to auscultation, bilaterally without Rales/Wheezes/Rhonchi.   Cardiovascular: Regular rate and rhythm with normal S1/S2 without murmurs, rubs or gallops. Abdomen: Soft, non-tender, non-distended with normal bowel sounds. Musculoskeletal: No clubbing, cyanosis or edema bilaterally. Skin: Skin color, texture, turgor normal.  No rashes or lesions. Neurologic:  Neurovascularly intact  Cranial nerves: II-XII intact, grossly non-focal.  Psychiatric: Lethargic     Labs:   Recent Labs     06/17/21  0127 06/18/21  0820 06/19/21  0545   WBC 11.5 6.1 5.9   HGB 10.0* 10.6* 10.5*   HCT 32.1* 32.9* 34.2*    272 241     Recent Labs     06/17/21  0628 06/17/21  2139 06/18/21  1621 06/19/21  0545    147* 153* 155*   K 3.4* 4.2 4.0 4.0   * 112* 116* 122*   CO2 21* 19* 21* 21*   BUN 50* 52* 57* 52*   CREATININE 3.5* 3.0* 3.0* 2.4*   CALCIUM 8.0* 9.1 8.8 8.6   PHOS 3.5  --   --   --      Recent Labs     06/16/21 2032 06/18/21  1621   AST 32 71*   ALT 21 34   BILITOT 0.4 0.3   ALKPHOS 61 55     No results for input(s): INR in the last 72 hours.   Recent Labs     06/16/21 2032 06/19/21  0545   CKTOTAL 663* 805*       Assessment/Plan:    Active Hospital Problems    Diagnosis Date Noted    End-stage renal disease needing dialysis ([facility] [N18.6, Z99.2] 06/17/2021    Anemia due to chronic kidney disease [N18.9, D63.1] 06/17/2021    Petechial rash [R23.3] 06/17/2021    Lactic acidosis [E87.2] 06/17/2021    Elevated CPK [R74.8] 06/17/2021    Elevated troponin [R77.8] 06/17/2021    Polysubstance abuse ([facility] [F19.10] 06/17/2021    Microscopic hematuria [R31.29] 06/17/2021    Kidney stone [N20.0] 06/17/2021    Severe protein-calorie malnutrition ([facility] [E43] 06/17/2021    AMS (altered mental status) [R41.82] 05/25/2021    Bilateral leg edema [R60.0]     Cellulitis [L03.90] 08/09/2019    Mood disorder ([facility] Lyndon Diaz 11/04/2014    Chronic hepatitis C virus infection ([facility] [B18.2] 12/27/2010    HIV infection ([facility] [B20] 11/15/2010    HTN (hypertension) [I10] 11/15/2010       Acute metabolic encephalopathy  HIV-positive patient with chronic hepatitis C drug abuse  Need to rule out meningitis-Petechial rash, meningitis? LP still pending  Patient's aspirin and Plavix on hold  Continue vancomycin, Rocephin and ampicillin per ID    HIV positive rule out AIDS  CD4 count viral load per ID  Continue on Lamivudine 50 mg po q day   Continue on Tivicay 1 tab once a day    Continue on Truvada on Mondy     Chronic kidney disease ESRD/HD  Secondary to dehydration, substance abuse, rhabdomyolysis  Continue IV fluid resuscitation  BUN and creatinine improving  Nephrology consulted and follow-up    H/o Polysubstance abuse    -Severe protein calorie malnutrition      DVT Prophylaxis: SCD  Diet: ADULT DIET; Dysphagia - Minced and Moist  Adult Oral Nutrition Supplement; Renal Oral Supplement  Adult Oral Nutrition Supplement;  Fortified Pudding Oral Supplement  Code Status: DNR-CC    PT/OT Eval Status: PT/OT   Dispo -based on clinical improvement    Serene Naranjo MD

## 2021-06-19 NOTE — PROGRESS NOTES
[facility] MD Luanne Paz, MD David Maynard, MD Chelsy Greene, CNP   Jana Cha, AUGUSTINE  Progress Note    6/19/2021    SUBJECTIVE:   (-) c/o's  (-) sob/elkins/cp/palp , altered mental status  No major improvement and mental status's admission  6/19: Overall improving today, with sodium level gradually normalizing with the given D5W and free water  PROBLEM LIST:    Active Problems:    HIV infection ([facility] [address]    HTN (hypertension)    Chronic hepatitis C virus infection ([facility] [address]    Mood disorder ([facility])    Cellulitis    Bilateral leg edema    AMS (altered mental status)    End-stage renal disease needing dialysis ([facility]    Anemia due to chronic kidney disease    Petechial rash    Lactic acidosis    Elevated CPK    Elevated troponin    Polysubstance abuse ([facility] [address]    Microscopic hematuria    Kidney stone    Severe protein-calorie malnutrition ([facility]  Resolved Problems:    * No resolved hospital problems. *         DIET:    ADULT DIET; Dysphagia - Minced and Moist  Adult Oral Nutrition Supplement; Renal Oral Supplement  Adult Oral Nutrition Supplement;  Fortified Pudding Oral Supplement     MEDS (scheduled):    vancomycin  1,000 mg Intravenous Once    gabapentin  400 mg Oral BID    aspirin  81 mg Oral Daily    clopidogrel  75 mg Oral Daily    multivitamin  1 tablet Oral Daily    sodium bicarbonate  1,300 mg Oral 4x Daily    vitamin B-1  100 mg Oral Daily    dolutegravir sodium  50 mg Oral Nightly    sodium chloride flush  5-40 mL Intravenous 2 times per day    lamiVUDine  50 mg Oral Daily    cefTRIAXone (ROCEPHIN) IV  2,000 mg Intravenous Q12H    ampicillin IV  2,000 mg Intravenous Q8H       MEDS (infusions):   sodium chloride 125 mL/hr at 06/19/21 1156    sodium chloride         MEDS (prn):  morphine, acetaminophen, sodium chloride flush, sodium chloride, ondansetron **OR** ondansetron, polyethylene glycol    PHYSICAL EXAM:     Patient Vitals for the past 24 hrs:   BP Temp Temp src Pulse Resp SpO2 Height   06/19/21 1043 -- -- -- -- -- -- 5' 8\" (1.727 m)   06/19/21 0825 (!) 165/96 98.2 °F (36.8 °C) Axillary 87 26 94 % --   06/19/21 0430 (!) 149/83 98.1 °F (36.7 °C) Axillary 84 24 95 % --   06/19/21 0130 (!) 162/89 97.7 °F (36.5 °C) Axillary 86 26 96 % --   06/18/21 2145 (!) 152/101 98.4 °F (36.9 °C) Axillary 95 30 93 % --   06/18/21 1827 136/87 98.6 °F (37 °C) -- 100 -- 93 % --   @      Intake/Output Summary (Last 24 hours) at 6/19/2021 1522  Last data filed at 6/19/2021 1431  Gross per 24 hour   Intake 2490 ml   Output 2175 ml   Net 315 ml         Wt Readings from Last 3 Encounters:   06/16/21 178 lb (80.7 kg)   06/11/21 178 lb (80.7 kg)   06/11/21 178 lb (80.7 kg)       Constitutional:  in no acute distress  HEENT: NC/AT, EOMI, sclera and conjunctiva are clear and anicteric, mucus membranes moist  Neck: Trachea midline, no JVD  Cardiovascular: S1, S2 regular rhythm, no murmur,or rub  Respiratory:  No crackles, no wheeze  Gastrointestinal:  Soft, nontender, nondistended, NABS  Ext: no edema, feet warm  Skin: dry, no rash  Neuro: awake, alert, interactive      DATA:    Recent Labs     06/17/21  0127 06/18/21  0820 06/19/21  0545   WBC 11.5 6.1 5.9   HGB 10.0* 10.6* 10.5*   HCT 32.1* 32.9* 34.2*   .6* 101.9* 106.5*    272 241     Recent Labs     06/16/21 2032 06/16/21 2032 06/17/21  0628 06/17/21  2139 06/18/21  1621 06/19/21  0545   *  --  141 147* 153* 155*   K 4.0   < > 3.4* 4.2 4.0 4.0     --  108* 112* 116* 122*   CO2 22  --  21* 19* 21* 21*   PHOS  --   --  3.5  --   --   --    BUN 51*  --  50* 52* 57* 52*   CREATININE 4.7*  --  3.5* 3.0* 3.0* 2.4*   ALT 21  --   --   --  34  --    AST 32  --   --   --  71*  --    BILITOT 0.4  --   --   --  0.3  --    ALKPHOS 61  --   --   --  55  --     < > = values in this interval not displayed.        Lab Results   Component Value Date    LABPROT 4.2 (H) 05/25/2021    LABPROT 4.2 05/25/2021 ASSESSMENT / RECOMMENDATIONS:    1. CHANEL on top of CKD,  Multifactorial etiology, polysubstance abuse  Rhabdomyolysis, hepatitis C and HIV positive. Very gradually improving with IV fluid hydration as being done now. Latest BUN is 52 and creatinine 2.4   2. Anemia: Due to CKD     3. Metabolic acidosis due to CHANEL and top of CKD     4.  Hypertension: Supportive care  BMP, CBC and CPK daily   See orders      Electronically signed by Luca Bradley MD on 6/19/2021 at 3:22 PM

## 2021-06-19 NOTE — PROGRESS NOTES
Pharmacy Consultation Note  (Antibiotic Dosing and Monitoring)    Initial consult date: 6/19/21  Consulting physician: Dr. Ernie Smith  Drug(s): Vancomycin  Indication: CNS infection and bacteremia      Age/  Gender Height Weight IBW Dosing weight  Allergy Information   60 y.o./male 5' 8\" (172.7 cm) 178 lb (80.7 kg)     Ideal body weight: 68.4 kg (150 lb 12.7 oz)  Adjusted ideal body weight: 73.3 kg (161 lb 10.8 oz)  80.7 kg  Ativan [lorazepam]          Other anti-infectives Start date Stop date   ampicillin 6/17    ceftriaxone 6/17                Date  Tmax WBC BUN/CR CrCL  (mL/min) Drug/Dose Time   Given Level(s)   (Time) Comments   6/17 afebrile 11.5  52/3.0   Vancomycin 1000 mg IV x1 1321     6/18 afebrile 6.1 57/3.0  Vancomycin 1000 mg IV x1 1610     6/19*  Pharmacy consulted afebrile 5.9 52/2.4                         Intake/Output Summary (Last 24 hours) at 6/19/2021 1159  Last data filed at 6/19/2021 1006  Gross per 24 hour   Intake 756.67 ml   Output 2175 ml   Net -1418.33 ml       Average urine output:    Cultures:    Site Date Result   Urine Culture 6/17 Growth not present   Blood Culture #1 6/16 GPCs in Clusters   Blood Culture #2 6/16 NGTD   Meningitis  CSF panem 6/17 Sent   CSF culture 6/17 Sent       Assessment:  · 60 YOM who is on Vancomycin/ampicillin/ceftriaxone for possible meningitis and GPC bacteremia  · Goal trough = 15 - 20 mcg/ml  · SCr down to 2.4 today    Plan:  · Ordered random level for now to see where levels at at  · Dose today based on level  · Pharmacist will follow and monitor/adjust dosing as necessary    Nick Curtis PharmD 6/19/2021 11:59 AM   [phone]

## 2021-06-19 NOTE — PLAN OF CARE
Problem: Skin Integrity:  Goal: Will show no infection signs and symptoms  Description: Will show no infection signs and symptoms  Outcome: Ongoing  Goal: Absence of new skin breakdown  Description: Absence of new skin breakdown  Outcome: Ongoing     Problem: Falls - Risk of:  Goal: Will remain free from falls  Description: Will remain free from falls  Outcome: Ongoing  Goal: Absence of physical injury  Description: Absence of physical injury  Outcome: Ongoing     Problem: Pain:  Goal: Pain level will decrease  Description: Pain level will decrease  Outcome: Ongoing  Goal: Control of acute pain  Description: Control of acute pain  Outcome: Ongoing  Goal: Control of chronic pain  Description: Control of chronic pain  Outcome: Ongoing     Problem: Confusion - Acute:  Goal: Absence of continued neurological deterioration signs and symptoms  Description: Absence of continued neurological deterioration signs and symptoms  Outcome: Ongoing  Goal: Mental status will be restored to baseline  Description: Mental status will be restored to baseline  Outcome: Ongoing     Problem: Injury - Risk of, Physical Injury:  Goal: Will remain free from falls  Description: Will remain free from falls  Outcome: Ongoing  Goal: Absence of physical injury  Description: Absence of physical injury  Outcome: Ongoing

## 2021-06-19 NOTE — CONSULTS
Palliative Care Department  [phone]  Palliative Care Initial Consult  Provider Ping Murray PA-C    Glenn Thomas  [id number]  Hospital Day: 4    Referring Provider: Amrik Tanner DO  Palliative Medicine was consulted for assistance with: Goals of care    CHIEF COMPLAINT: Altered mental status    ASSESSMENT/PLAN:     Pertinent hospital diagnoses:  1. Altered mental status  -Likely multifactorial, recent stroke, acute kidney injury, drug use  -ID consulted, recommended LP with antibiotics and HIV treatment  2. Acute on chronic kidney disease  -Nephrology following  -HD held currently, IV fluid hydration  3. Polysubstance abuse  -Urine drug screen is positive for opiates, oxycodone and cocaine  4. HIV   -ID following   -Lamivudine, tivicay and truvada scheduled for Monday    [address]  - Outcome of goals of care meeting:   -Await return call from sister  - Capacity: At this time, Glenn Thomas, Does Not have capacity for medical decision-making. Capacity is time limited and situation/question specific  - Surrogate decision maker/Legal NOK:    Hermelindo Finder sister Roxie Pulse [phone]   - Brother Desi Generous [phone] first alternate  - Code Status:   DNR-CC  - Advanced directives: completed  - Spiritual assessment: to be assessed  - Bereavement and grief: to be determined    - DISPO: ?  Plan    Referrals to: none today    HPI:   Glenn Thomas is a 56 y.o. with a past medical history of HIV, hepatitis C, CVA, end-stage renal disease who presented to the emergency department from home with altered mental status. Patient completed workup in the ED and was admitted on 6/16/2021 with diagnosis(ses) of altered mental status, metabolic encephalopathy, uremia and polysubstance abuse. Drug screen positive for cocaine, opiates and oxycodone. Patient with end-stage renal disease and severe dehydration, recent stroke, altered mental status likely with multiple etiologies.  Patient with recent hospitalization and seen by palliative medicine at that time. Patient son Doc May at bedside. Palliative medicine services introduced to him and we did have some discussion and review of patient's social history. Patient does open eyes however he is not answering questions currently. He is not speaking with his son either. Past Medical History:   Diagnosis Date    Abscess of hand, left 2009    Abscess of scrotum 2007    Bacterial meningitis 2010    Cerebral artery occlusion with cerebral infarction [facility])     Depression     [facility]/29/2021    GERD (gastroesophageal reflux disease)     Hepatitis C     Herpes zoster w/ nervous system complication 0009    neuropathy    Human immunodeficiency virus, type 2 (HIV 2) ([facility] 2009    Inguinal hernia unilateral     left    Pneumonia 2010    Shingles (herpes zoster) polyneuropathy     Suicidal ideation 2008    Vitamin D deficiency        Past Surgical History:   Procedure Laterality Date    ABSCESS DRAINAGE  9/18/2007    scrotal. [facility].  Dr. Fadi Veliz  2008    testicle    INGUINAL HERNIA REPAIR  3/9/2012    left indirect hernia repaired with mesh, Dr. Eldridge Client, [address] [place] [address] OTHER SURGICAL HISTORY  3/9/2012    Left Inguinal Hernia Repair;Removal of Foreign Object Left foot    VASCULAR SURGERY N/A 6/3/2021    INSERTION TUNNELED HEMODIALYSIS CATHETER, REMOVAL OF TEMPORARY CATHETER performed by Wesley Calderon MD at [address] medications reviewed: yes  Home Medications reviewed: yes    Allergies   Allergen Reactions    Ativan [Lorazepam] Other (See Comments)     Combative, confusion, aggression     Family History   Problem Relation Age of Onset    Cancer Father [de-identified]        bone marrow    Heart Disease Brother 48        heart attack   Social history:   status: no  Marital status:   Living status: alone   Work history: worked construction  Tobacco use: yes  Drug use: yes  Alcohol use: no    Review of Systems:  unable to obtained due to current condition of patient    OBJECTIVE:   Prognosis: Poor    Physical Exam:  BP (!) 165/96   Pulse 87   Temp 98.2 °F (36.8 °C) (Axillary)   Resp 26   Ht 5' 8\" (1.727 m)   Wt 178 lb (80.7 kg)   SpO2 94%   BMI 27.06 kg/m²   Gen: Ill-appearing, thin, awake not following commands or responding to questions  HEENT:  Normocephalic, atraumatic, mucosa dry, EOMI, sclera anicteric  Neck:  Trachea midline  Lungs:  respirations unlabored  Heart[de-identified]  RRR  Abd:  Soft, non tender, non distended  : Orellana  Ext: Slight myoclonic type movements of hands/arms, moving all extremities  Skin: Scattered ecchymoses and scarring over extremities  Neuro:  Alert, not responding to questions or following commands currently  Objective data reviewed: labs, images, records, medication use, vitals and chart    Relevant data: Per HPI    Time/Communication  Greater than 50% of time spent, total 30 minutes in counseling and coordination of care at the bedside/over the telephone regarding see above. Thank you for allowing Palliative Medicine to participate in the care of Ancelmo Ambrosio. Provider MAURICIO Champion PA-C  Palliative Medicine    Note: This report was completed using computerize voiced recognition software. Every effort has been made to ensure accuracy; however, inadvertent computerized transcription errors may be present.

## 2021-06-19 NOTE — PROGRESS NOTES
Comprehensive Nutrition Assessment    Type and Reason for Visit:  Initial, Consult    Nutrition Recommendations/Plan: Recommend and start Nepro renal supplement daily and Boost Pudding supplement daily to help meet increased nutritional needs. Pt currently on modified diet with thin liquids. Nutrition Assessment:  Pt adm w/ poor po intake of meals ; pt meets criteria for severe malnutrition AEB poor po intake >1 month and muscle/fat wasting ; adm AMS and acute metabolic encephalopathy ; ammonia levels WNL ; noted ESRD w/ HD ; hx of HIV and hepatitis C ; hx of CVA and substance abuse ; will start ONS    Malnutrition Assessment:  Malnutrition Status:  Severe malnutrition    Context:  Chronic Illness     Findings of the 6 clinical characteristics of malnutrition:  Energy Intake:  7 - 75% or less estimated energy requirements for 1 month or longer  Weight Loss:  Unable to assess (d/t possible fluid shifts ; on HD)     Body Fat Loss:   (moderate) Orbital, Triceps   Muscle Mass Loss:   (moderate) Temples (temporalis), Clavicles (pectoralis & deltoids)  Fluid Accumulation:  No significant fluid accumulation     Strength:  Not Performed    Estimated Daily Nutrient Needs:  Energy (kcal):  4667-0109 (REE 1584 x 1.2 SF); Weight Used for Energy Requirements:  Current     Protein (g):   (1.3-1.5g/kg IBW); Weight Used for Protein Requirements:  Ideal        Fluid (ml/day):  per renal; Method Used for Fluid Requirements:  Standard Renal      Nutrition Related Findings:  -I&Os (-1.8 L), 2+ edema, active BS, edentulous, A&O x 1, abrasions, muscle/fat wasting, HD      Wounds:  Surgical Incision (Incision x 1)       Current Nutrition Therapies:    ADULT DIET;  Dysphagia - Minced and Moist    Anthropometric Measures:  · Height: 5' 8\" (172.7 cm)  · Current Body Weight: 178 lb (80.7 kg) (6/16, no method)   · Usual Body Weight:  (UTO ; EMR shows past weights ranging between 150-178# within the past year)     · Ideal Body Weight: 154 lbs; % Ideal Body Weight 115.6 %   · BMI: 27.1  · BMI Categories: Overweight (BMI 25.0-29. 9)       Nutrition Diagnosis:   · Severe malnutrition, In context of chronic illness related to catabolic illness (hx of  ESRD w/ HD) as evidenced by poor intake prior to admission, moderate loss of subcutaneous fat, moderate muscle loss      Nutrition Interventions:   Food and/or Nutrient Delivery:  Continue Current Diet, Start Oral Nutrition Supplement  Nutrition Education/Counseling:  Education not indicated   Coordination of Nutrition Care:  Continue to monitor while inpatient    Goals:  Pt will consume 50-75% of meals served       Nutrition Monitoring and Evaluation:   Behavioral-Environmental Outcomes:  Beliefs and Attitutes   Food/Nutrient Intake Outcomes:  Food and Nutrient Intake, Supplement Intake  Physical Signs/Symptoms Outcomes:  Biochemical Data, Chewing or Swallowing, GI Status, Hemodynamic Status, Meal Time Behavior, Fluid Status or Edema, Nutrition Focused Physical Findings, Skin, Weight     Discharge Planning:     Too soon to determine     Electronically signed by Hardeep Mccloud RD, LD on 6/19/21 at 10:54 AM EDT    Contact: [phone]

## 2021-06-19 NOTE — PROGRESS NOTES
Department of Internal Medicine  Infectious Diseases  Progress  Note      C/C :  HIV infection, r/o meningitis     Pt opened eyes   Not responsive   No resp distress  Afebrile       Current Facility-Administered Medications   Medication Dose Route Frequency Provider Last Rate Last Admin    morphine (PF) injection 2 mg  2 mg Intravenous Q4H PRN Sukhwinder Vargas, DO   2 mg at 06/19/21 0943    0.9 % sodium chloride infusion   Intravenous Continuous Melkon O MD Gypsy 100 mL/hr at 06/19/21 0222 New Bag at 06/19/21 0222    acetaminophen (TYLENOL) suppository 650 mg  650 mg Rectal Q4H PRN Sukhwinder Vargas, DO   650 mg at 06/18/21 1609    aspirin chewable tablet 81 mg  81 mg Oral Daily Abraham Kayser, MD        clopidogrel (PLAVIX) tablet 75 mg  75 mg Oral Daily Abraham Kayser, MD        multivitamin 1 tablet  1 tablet Oral Daily Abraham Kayser, MD   1 tablet at 06/19/21 0946    sodium bicarbonate tablet 1,300 mg  1,300 mg Oral 4x Daily Abraham Kayser, MD   1,300 mg at 06/19/21 0946    thiamine mononitrate tablet 100 mg  100 mg Oral Daily Abraham Kayser, MD   100 mg at 06/19/21 0947    dolutegravir sodium (TIVICAY) tablet 50 mg  50 mg Oral Nightly Abraham Kayser, MD        sodium chloride flush 0.9 % injection 5-40 mL  5-40 mL Intravenous 2 times per day Abraham Kayser, MD   10 mL at 06/18/21 2203    sodium chloride flush 0.9 % injection 5-40 mL  5-40 mL Intravenous PRN Abraham Kayser, MD   10 mL at 06/18/21 2258    0.9 % sodium chloride infusion  25 mL Intravenous PRN Abraham Kayser, MD        ondansetron (ZOFRAN-ODT) disintegrating tablet 4 mg  4 mg Oral Q8H PRN Abraham Kayser, MD        Or    ondansetron (ZOFRAN) injection 4 mg  4 mg Intravenous Q6H PRN Abraham Kayser, MD        polyethylene glycol (GLYCOLAX) packet 17 g  17 g Oral Daily PRN Abraham Kayser, MD        lamiVUDine (EPIVIR) tablet 50 mg  50 mg Oral Daily Oli Garcia MD   50 mg at 06/19/21 0944    cefTRIAXone (ROCEPHIN) 2,000 mg in sterile water 20 mL IV syringe  2,000 mg Intravenous Q12H Oli Garcia MD   2,000 mg at 06/19/21 0937    ampicillin 2000 mg ivpb mini bag  2,000 mg Intravenous Q8H Kacy Smith MD   Stopped at 06/19/21 1042       REVIEW OF SYSTEMS:  Could not be obtained         PHYSICAL EXAM:      Vitals:     BP (!) 165/96   Pulse 87   Temp 98.2 °F (36.8 °C)   Resp 24   Ht 5' 8\" (1.727 m)   Wt 178 lb (80.7 kg)   SpO2 94%   BMI 27.06 kg/m²     General Appearance:    Opened eyes, not communicating    Head:    Normocephalic, atraumatic   Eyes:    No pallor, no icterus,   Ears:    No obvious deformity or drainage.    Nose:   No nasal drainage   Throat:   Mucosa moist, no oral thrush   Neck:   Rigid     Lungs:     Clear to auscultation bilaterally, no wheeze    Heart:    Regular rate and rhythm, no murmur   Abdomen:     Soft, non-tender, bowel sounds present    Extremities:   No edema, no cyanosis ,no open wound   Pulses:   Dorsalis pedis palpable    Skin:   no rashes   Right chest tesio - no drainage, on erythema      CBC with Differential:      Lab Results   Component Value Date    WBC 5.9 06/19/2021    RBC 3.21 06/19/2021    HGB 10.5 06/19/2021    HCT 34.2 06/19/2021     06/19/2021    .5 06/19/2021    MCH 32.7 06/19/2021    MCHC 30.7 06/19/2021    RDW 13.6 06/19/2021    SEGSPCT 47 01/06/2014    LYMPHOPCT 14.1 06/19/2021    MONOPCT 9.3 06/19/2021    BASOPCT 1.2 06/19/2021    MONOSABS 0.55 06/19/2021    LYMPHSABS 0.83 06/19/2021    EOSABS 0.60 06/19/2021    BASOSABS 0.07 06/19/2021       CMP     Lab Results   Component Value Date     06/19/2021    K 4.0 06/19/2021    K 4.0 06/16/2021     06/19/2021    CO2 21 06/19/2021    BUN 52 06/19/2021    CREATININE 2.4 06/19/2021    GFRAA 33 06/19/2021    LABGLOM 33 06/19/2021    GLUCOSE 98 06/19/2021    GLUCOSE 83 01/26/2012    PROT 8.5 06/18/2021    LABALBU 3.4 06/18/2021    LABALBU 4.2 01/17/2012    CALCIUM 8.6 06/19/2021    BILITOT 0.3 06/18/2021    ALKPHOS 55 06/18/2021    AST 71 06/18/2021    ALT 34 06/18/2021         Hepatic Function Panel:    Lab Results   Component Value Date    ALKPHOS 55 06/18/2021    ALT 34 06/18/2021    AST 71 06/18/2021    PROT 8.5 06/18/2021    BILITOT 0.3 06/18/2021    BILIDIR 0.3 03/13/2015    IBILI 1.3 03/13/2015    LABALBU 3.4 06/18/2021    LABALBU 4.2 01/17/2012       PT/INR:    Lab Results   Component Value Date    PROTIME 14.9 05/26/2021    INR 1.4 05/26/2021       TSH:    Lab Results   Component Value Date    TSH 17.020 06/17/2021       U/A:    Lab Results   Component Value Date    COLORU Yellow 06/16/2021    PHUR 5.0 06/16/2021    LABCAST FEW  11/02/2011    WBCUA 1-3 06/16/2021    WBCUA NONE 01/26/2012    RBCUA 5-10 06/16/2021    RBCUA NONE 01/26/2012    BACTERIA MODERATE 06/16/2021    CLARITYU Clear 06/16/2021    SPECGRAV >=1.030 06/16/2021    LEUKOCYTESUR Negative 06/16/2021    UROBILINOGEN 1.0 06/16/2021    BILIRUBINUR MODERATE 06/16/2021    BILIRUBINUR NEGATIVE 01/26/2012    BLOODU LARGE 06/16/2021    GLUCOSEU Negative 06/16/2021    GLUCOSEU NEGATIVE 01/26/2012    AMORPHOUS MODERATE 05/25/2021       ABG:  No results found for: Logan Keegan, PHART, THGBART, BNA2XAW, PO2ART, ISL2NBR    MICROBIOLOGY:    Blood culture -    Updated: 06/18/21 0907     Bottle Type Aerobic    Source of Blood Culture No site given    Order Number [id number]    Enterobacter cloacae complex by PCR Not Detected    Escherichia coli by PCR Not Detected    Klebsiella oxytoca by PCR Not Detected    Klebsiella pneumoniae group by PCR Not Detected    Proteus species by PCR Not Detected    Streptococcus agalactiae by PCR Not Detected    Staphylococcus aureus by PCR Not Detected    Serratia marcescens by PCR Not Detected    Streptococcus pneumoniae by PCR Not Detected    Streptococcus pyogenes  by PCR Not Detected    Acinetobacter baumannii by PCR Not Detected    Candida albicans by PCR Not Detected    Candida glabrata by PCR Not Detected    Candida krusei by PCR Not Detected    Candida parapsilosis by PCR Not Detected    Candida tropicalis by PCR Not Detected    Enterobacteriaceae by PCR Not Detected    Enterococcus by PCR Not Detected    Haemophilus Influenzae by PCR Not Detected    Listeria monocytogenes by PCR Not Detected    Neisseria meningitidis by PCR Not Detected    Pseudomonas aeruginosa by PCR Not Detected    Staphylococcus species by PCR DETECTEDPanic      Streptococcus species by PCR Not Detected    Methicillin Resistance mecA/C  by PCR DETECTEDPanic     Narrative:     CALL  Joseph  H45 tel. ,            Radiology :    Chest  X ray - no infiltrates       IMPRESSION:    1. HIV infection / HIV   2. Chronic Hep C infection   3. Encephalopathy, Cocaine use r/o meningitis   4. CONS bacteremia - contamination       RECOMMENDATIONS:      1. LP - CSF for cell count, diff, protein, glucose, cx , meningitis panel (awaitng LP - not done yet due to asa/plavix)   2. HIV viral load   3. Lamivudine 50 mg po q day and  tivicay 1 tab once a day  , will give truvada on Monday   4.  Vancomycin / Rocephin / Ampicillin

## 2021-06-20 NOTE — PROGRESS NOTES
Hospitalist Progress Note      PCP: No primary care provider on file. Date of Admission: 6/16/2021    Chief Complaint: Altered mental status lethargy    Hospital Course: 56 y.o. male who presented to [facility] with medical history of bacterial meningitis, scrotal abscess, CVA, depression, end-stage renal disease on dialysis, GERD, chronic hep C, herpes zoster, HIV viral infection on antiretroviral therapy, major depression with suicidal ideation and substance abuse. Patient was admitted on 5/24/2021 through 6/6/2021. He had presented with altered mental status and found to have a stroke. He had sepsis with staph bacteremia secondary to left side chest wall cellulitis. He was in renal failure and had rhabdomyolysis with CPK of around 63,000. He was also treated for pneumonia. His urine drug screen was positive for cocaine and fentanyl. CK was 882 at the time of discharge. CD4 was 45. Patient according to family members was last known well 2 days ago, \"walking and talking\". Baseline mental status is not completely normal per their report. Came in with altered mental status. Altered mental status is constant, severe, associated with aphasia. Patient is currently nonverbal just grunting and moaning. He has not had dialysis since 6/5/2021 because his son stopped it thinking that patient's kidney function was getting better. Patient is unable to answer any questions at this time. He does not have a fever. He has petechial rash on his legs. His legs are swollen and red. Vital signs notable for respiratory rate of 26, labs showed sodium of 147, glucose 169, creatinine 4.7, BUN 51, lactic acid level 2.7, , troponin IV 47, urine drug screen positive for cocaine, oxycodone, and opiate. Urinalysis is positive for blood and bacteria. EKG shows sinus rhythm rate of 90. He had an echo in May 2021 with EF of 60%.  The scan of his head is negative and CT scan of the cervical spine shows multilevel degenerative disease. CT scan of the abdomen showed a small kidney stone on the right. No hydronephrosis. Recent MRI on 6/1/2021 showed small area of stroke in the right cerebral hemisphere. Chest x-ray shows stable coarse interstitial markings, cardiomegaly and atherosclerotic disease and tunneled right chest wall catheter. Subjective: Patient seen and examined by me personally he is lethargic not able to communicate. Family member not in his room, patient is off aspirin and Plavix for LP procedure. No fever no chills no change in mental status blood culture positive for Staphylococcus coagulase-negative ID on case following up with      Medications:  Reviewed    Infusion Medications    sodium chloride 125 mL/hr at 06/20/21 4737    sodium chloride       Scheduled Medications    dolutegravir sodium  50 mg Oral Daily    vancomycin (VANCOCIN) intermittent dosing (placeholder)   Other RX Placeholder    vancomycin  500 mg Intravenous Once    gabapentin  400 mg Oral BID    aspirin  81 mg Oral Daily    clopidogrel  75 mg Oral Daily    multivitamin  1 tablet Oral Daily    sodium bicarbonate  1,300 mg Oral 4x Daily    vitamin B-1  100 mg Oral Daily    sodium chloride flush  5-40 mL Intravenous 2 times per day    lamiVUDine  50 mg Oral Daily    cefTRIAXone (ROCEPHIN) IV  2,000 mg Intravenous Q12H    ampicillin IV  2,000 mg Intravenous Q8H     PRN Meds: morphine, acetaminophen, sodium chloride flush, sodium chloride, ondansetron **OR** ondansetron, polyethylene glycol      Intake/Output Summary (Last 24 hours) at 6/20/2021 1224  Last data filed at 6/20/2021 0800  Gross per 24 hour   Intake 4158.33 ml   Output 2300 ml   Net 1858.33 ml       Exam:    BP (!) 181/87   Pulse 68   Temp 98.7 °F (37.1 °C) (Oral)   Resp 18   Ht 5' 8\" (1.727 m)   Wt 178 lb (80.7 kg)   SpO2 93%   BMI 27.06 kg/m²     General appearance: Clinically ill appearance, lethargic.   HEENT: Conjunctivae/corneas clear.  Neck:. No jugular venous distention. Trachea midline. Respiratory:  Normal respiratory effort. Clear to auscultation, bilaterally without Rales/Wheezes/Rhonchi. Cardiovascular: Regular rate and rhythm with normal S1/S2 without murmurs, rubs or gallops. Abdomen: Soft, non-tender, non-distended with normal bowel sounds. Musculoskeletal: No clubbing, cyanosis or edema bilaterally. Skin: Skin color, texture, turgor normal.  No rashes or lesions. Neurologic:  Neurovascularly intact  Cranial nerves: II-XII intact, grossly non-focal.  Psychiatric: Lethargic     Labs:   Recent Labs     06/18/21  0820 06/19/21  0545 06/20/21  0437   WBC 6.1 5.9 5.6   HGB 10.6* 10.5* 10.9*   HCT 32.9* 34.2* 35.7*    241 199     Recent Labs     06/17/21  2139 06/18/21  1621 06/19/21  0545   * 153* 155*   K 4.2 4.0 4.0   * 116* 122*   CO2 19* 21* 21*   BUN 52* 57* 52*   CREATININE 3.0* 3.0* 2.4*   CALCIUM 9.1 8.8 8.6     Recent Labs     06/18/21  1621   AST 71*   ALT 34   BILITOT 0.3   ALKPHOS 55     No results for input(s): INR in the last 72 hours.   Recent Labs     06/19/21  0545 06/20/21  0437   CKTOTAL 805* 507*       Assessment/Plan:    Active Hospital Problems    Diagnosis Date Noted    End-stage renal disease needing dialysis ([facility] [N18.6, Z99.2] 06/17/2021    Anemia due to chronic kidney disease [N18.9, D63.1] 06/17/2021    Petechial rash [R23.3] 06/17/2021    Lactic acidosis [E87.2] 06/17/2021    Elevated CPK [R74.8] 06/17/2021    Elevated troponin [R77.8] 06/17/2021    Polysubstance abuse ([facility] [F19.10] 06/17/2021    Microscopic hematuria [R31.29] 06/17/2021    Kidney stone [N20.0] 06/17/2021    Severe protein-calorie malnutrition ([facility] [E43] 06/17/2021    AMS (altered mental status) [R41.82] 05/25/2021    Bilateral leg edema [R60.0]     Cellulitis [L03.90] 08/09/2019    Mood disorder ([facility] [F39] 11/04/2014    Chronic hepatitis C virus infection ([facility] [B18.2] 12/27/2010    HIV

## 2021-06-20 NOTE — PROGRESS NOTES
Palliative Care Department  [phone]  Palliative Care Progress Note  Provider MAURICIO Samayoa PA-C    Renee Munoz  [id number]  Hospital Day: 5    Referring Provider: Kayley Winn DO  Palliative Medicine was consulted for assistance with: Goals of care    CHIEF COMPLAINT: Altered mental status  Brief summary:  Renee Munoz is a 56 y.o. with a past medical history of HIV, hepatitis C, CVA, end-stage renal disease who presented to the emergency department from home with altered mental status. Patient completed workup in the ED and was admitted on 6/16/2021 with diagnosis(ses) of altered mental status, metabolic encephalopathy, uremia and polysubstance abuse. Drug screen positive for cocaine, opiates and oxycodone. Patient with end-stage renal disease and severe dehydration, recent stroke, altered mental status likely with multiple etiologies. Patient with recent hospitalization and seen by palliative medicine at that time. ASSESSMENT/PLAN:     Pertinent hospital diagnoses:  1. Altered mental status - improved  -Likely multifactorial, recent stroke, acute kidney injury, drug use  -ID consulted, recommended LP with antibiotics and HIV treatment  2. Acute on chronic kidney disease  -Nephrology following  -HD held currently, IV fluid hydration  3. Polysubstance abuse  -Urine drug screen is positive for opiates, oxycodone and cocaine  4. HIV   -ID following   -Lamivudine, tivicay and truvada scheduled for Monday    [address]  - Outcome of goals of care meeting:    -N/A  - Capacity: At this time, Renee Munoz, Does Not have capacity for medical decision-making.   Capacity is time limited and situation/question specific  - Surrogate decision maker/Legal NOK:    Shital Judge sister Mignon November [phone]   - Brother Sveta Harry [phone] first alternate  - Code Status:   DNR-CC  - Advanced directives: completed  - Spiritual assessment: to be assessed  - Bereavement and grief: to be determined    - DISPO: ?  Plan    Referrals to: none today    subjective   Chart reviewed  Patient's brother at bedside  Patient's awake and does respond saying a couple words however he is not engaging in conversation. Uncertain if this is because he is using out 2 or he cannot. His brother stated that he and his siblings are going to have a meeting to try to figure out what a more appropriate discharge plan would be for the patient. He did not like the facility he went to when he left for rehab and ended up leaving the rehab early to go home and this is how he returned to the hospital.  I advised palliative medicine would follow-up to see if there is any assistance we can have been discharge planning or establishing goals of care. Additionally attempted to explore patient's goals directly and asked him what he wished to do. His brother confirmed he is/was a daily drinker and smoked crack cocaine. He states his brother has not been using however he did have a positive drug screen on admission. I asked the patient what his goals were and if he was going to continue to use things and if he was doing it in his destructive manner to hasten his death or mask a symptom we could help alleviate.   He would not engage in conversation.     OBJECTIVE:   Prognosis: Poor    Physical Exam:  BP (!) 168/90   Pulse 68   Temp 98.4 °F (36.9 °C) (Oral)   Resp 16   Ht 5' 8\" (1.727 m)   Wt 178 lb (80.7 kg)   SpO2 97%   BMI 27.06 kg/m²   Gen: Ill-appearing, thin, awake and alert, responded to one question  HEENT:  Normocephalic, atraumatic, mucosa dry, EOMI, sclera anicteric  Neck:  Trachea midline  Lungs:  respirations unlabored  Heart[de-identified]  RRR  Abd:  Soft, non tender, non distended  : Orellana  Ext: NO myoclonic movements of hands/arms today, moving all extremities  Skin: Scattered ecchymoses and scarring over extremities  Neuro:  Alert, not responding to questions or following commands currently  Objective data reviewed: labs, images, records, medication use, vitals and chart    Relevant data: Per HPI    Time/Communication  Greater than 50% of time spent, total 25 minutes in counseling and coordination of care at the bedside/over the telephone regarding goals of care, symptom management, diagnosis and prognosis and see above. Thank you for allowing Palliative Medicine to participate in the care of Juventino Yuen. Provider MAURICIO Bruner PA-C  Palliative Medicine    Note: This report was completed using computerize voiced recognition software. Every effort has been made to ensure accuracy; however, inadvertent computerized transcription errors may be present.

## 2021-06-20 NOTE — PROGRESS NOTES
Pharmacy Consultation Note  (Antibiotic Dosing and Monitoring)    Initial consult date: 6/19/21  Consulting physician: Dr. Mine Long  Drug(s): Vancomycin  Indication: CNS infection and bacteremia      Age/  Gender Height Weight IBW Dosing weight  Allergy Information   60 y.o./male 5' 8\" (172.7 cm) 178 lb (80.7 kg)     Ideal body weight: 68.4 kg (150 lb 12.7 oz)  Adjusted ideal body weight: 73.3 kg (161 lb 10.8 oz)  80.7 kg  Ativan [lorazepam]          Other anti-infectives Start date Stop date   ampicillin 6/17    ceftriaxone 6/17                Date  Tmax WBC BUN/CR CrCL  (mL/min) Drug/Dose Time   Given Level(s)   (Time) Comments   6/17 afebrile 11.5  52/3.0   Vancomycin 1000 mg IV x1 1321     6/18 afebrile 6.1 57/3.0  Vancomycin 1000 mg IV x1 1610     6/19*  Pharmacy consulted afebrile 5.9 52/2.4  Vancomycin 1000 mg IV x 1 1646 Random level = 13.6 @ 1214    6/20 afebrile 5.6 --- 31 Vancomycin 500 mg IV x 1 <1600> Random level = 17.5 @ 0437          Intake/Output Summary (Last 24 hours) at 6/20/2021 1107  Last data filed at 6/20/2021 0800  Gross per 24 hour   Intake 4158.33 ml   Output 2300 ml   Net 1858.33 ml       Average urine output:    Cultures:    Site Date Result   Urine Culture 6/17 Growth not present   Blood Culture #1 6/16 GPCs in Clusters   Blood Culture #2 6/16 NGTD   Meningitis  CSF panem 6/17 Sent   CSF culture 6/17 Sent       Assessment:  · 56 YOM who is on Vancomycin/ampicillin/ceftriaxone for possible meningitis and GPC bacteremia  · Goal trough = 15 - 20 mcg/ml  · Random level = 17.5 on 6/20    Plan:  · Scr with AM labs tomorrow  · Vancomycin 500 mg IV x 1 tonight  · Pharmacist will follow and monitor/adjust dosing as necessary      Meaghan Lee, PharmD Candidate 2023 6/20/2021 12:15 PM

## 2021-06-20 NOTE — PROGRESS NOTES
Department of Internal Medicine  Infectious Diseases  Progress  Note      C/C :  HIV infection, r/o meningitis     Pt opened eyes   Responds selectively to questions   No abdominal pain, no diarrhea  Afebrile       Current Facility-Administered Medications   Medication Dose Route Frequency Provider Last Rate Last Admin    gabapentin (NEURONTIN) capsule 400 mg  400 mg Oral BID Wolf CARREON MD   400 mg at 06/20/21 0927    morphine (PF) injection 2 mg  2 mg Intravenous Q4H PRN Jef Car, DO   2 mg at 06/19/21 0943    0.9 % sodium chloride infusion   Intravenous Continuous Yolie Lau  mL/hr at 06/20/21 0621 New Bag at 06/20/21 0621    acetaminophen (TYLENOL) suppository 650 mg  650 mg Rectal Q4H PRN Jef Car DO   650 mg at 06/18/21 1609    aspirin chewable tablet 81 mg  81 mg Oral Daily Mor Murray MD        clopidogrel (PLAVIX) tablet 75 mg  75 mg Oral Daily Mor Murray MD        multivitamin 1 tablet  1 tablet Oral Daily Mor Murray MD   1 tablet at 06/20/21 7817    sodium bicarbonate tablet 1,300 mg  1,300 mg Oral 4x Daily Mor Murray MD   1,300 mg at 06/20/21 3371    thiamine mononitrate tablet 100 mg  100 mg Oral Daily Mor Murray MD   100 mg at 06/20/21 0088    dolutegravir sodium (TIVICAY) tablet 50 mg  50 mg Oral Nightly Mor Murray MD   50 mg at 06/19/21 2108    sodium chloride flush 0.9 % injection 5-40 mL  5-40 mL Intravenous 2 times per day Mor Murray MD   10 mL at 06/18/21 2203    sodium chloride flush 0.9 % injection 5-40 mL  5-40 mL Intravenous PRN Mor Murray MD   10 mL at 06/18/21 2258    0.9 % sodium chloride infusion  25 mL Intravenous PRN Mor Murray MD        ondansetron (ZOFRAN-ODT) disintegrating tablet 4 mg  4 mg Oral Q8H PRN Mor Murray MD        Or    ondansetron (ZOFRAN) injection 4 mg  4 mg Intravenous Q6H PRN Mor Murray MD        polyethylene glycol (GLYCOLAX) packet 17 g  17 g Oral Daily PRN Vijay Borges MD        lamiVUDine (EPIVIR) tablet 50 mg  50 mg Oral Daily Oli Garcia MD   50 mg at 06/20/21 0928    cefTRIAXone (ROCEPHIN) 2,000 mg in sterile water 20 mL IV syringe  2,000 mg Intravenous Q12H Oli Garcia MD   2,000 mg at 06/20/21 5964    ampicillin 2000 mg ivpb mini bag  2,000 mg Intravenous Q8H Oli Garcia  mL/hr at 06/20/21 0927 2,000 mg at 06/20/21 6500       REVIEW OF SYSTEMS:  Could not be obtained         PHYSICAL EXAM:      Vitals:     BP (!) 181/87   Pulse 68   Temp 98.7 °F (37.1 °C) (Oral)   Resp 18   Ht 5' 8\" (1.727 m)   Wt 178 lb (80.7 kg)   SpO2 93%   BMI 27.06 kg/m²     General Appearance:    Opened eyes, not communicating    Head:    Normocephalic, atraumatic   Eyes:    No pallor, no icterus,   Ears:    No obvious deformity or drainage.    Nose:   No nasal drainage   Throat:   Mucosa moist, no oral thrush   Neck:   Rigid     Lungs:     Clear to auscultation bilaterally, no wheeze    Heart:    Regular rate and rhythm, no murmur   Abdomen:     Soft, non-tender, bowel sounds present    Extremities:   No edema, no cyanosis ,no open wound   Pulses:   Dorsalis pedis palpable    Skin:   no rashes   Right chest tesio - no drainage, on erythema      CBC with Differential:      Lab Results   Component Value Date    WBC 5.6 06/20/2021    RBC 3.37 06/20/2021    HGB 10.9 06/20/2021    HCT 35.7 06/20/2021     06/20/2021    .9 06/20/2021    MCH 32.3 06/20/2021    MCHC 30.5 06/20/2021    RDW 13.7 06/20/2021    SEGSPCT 47 01/06/2014    LYMPHOPCT 15.6 06/20/2021    MONOPCT 8.3 06/20/2021    BASOPCT 1.1 06/20/2021    MONOSABS 0.47 06/20/2021    LYMPHSABS 0.88 06/20/2021    EOSABS 1.55 06/20/2021    BASOSABS 0.06 06/20/2021       CMP     Lab Results   Component Value Date     06/19/2021    K 4.0 06/19/2021    K 4.0 06/16/2021     06/19/2021    CO2 21 06/19/2021    BUN 52 06/19/2021    CREATININE 2.4 06/19/2021    GFRAA 33 06/19/2021 LABGLOM 33 06/19/2021    GLUCOSE 98 06/19/2021    GLUCOSE 83 01/26/2012    PROT 8.5 06/18/2021    LABALBU 3.4 06/18/2021    LABALBU 4.2 01/17/2012    CALCIUM 8.6 06/19/2021    BILITOT 0.3 06/18/2021    ALKPHOS 55 06/18/2021    AST 71 06/18/2021    ALT 34 06/18/2021         Hepatic Function Panel:    Lab Results   Component Value Date    ALKPHOS 55 06/18/2021    ALT 34 06/18/2021    AST 71 06/18/2021    PROT 8.5 06/18/2021    BILITOT 0.3 06/18/2021    BILIDIR 0.3 03/13/2015    IBILI 1.3 03/13/2015    LABALBU 3.4 06/18/2021    LABALBU 4.2 01/17/2012       PT/INR:    Lab Results   Component Value Date    PROTIME 14.9 05/26/2021    INR 1.4 05/26/2021       TSH:    Lab Results   Component Value Date    TSH 17.020 06/17/2021       U/A:    Lab Results   Component Value Date    COLORU Yellow 06/16/2021    PHUR 5.0 06/16/2021    LABCAST FEW  11/02/2011    WBCUA 1-3 06/16/2021    WBCUA NONE 01/26/2012    RBCUA 5-10 06/16/2021    RBCUA NONE 01/26/2012    BACTERIA MODERATE 06/16/2021    CLARITYU Clear 06/16/2021    SPECGRAV >=1.030 06/16/2021    LEUKOCYTESUR Negative 06/16/2021    UROBILINOGEN 1.0 06/16/2021    BILIRUBINUR MODERATE 06/16/2021    BILIRUBINUR NEGATIVE 01/26/2012    BLOODU LARGE 06/16/2021    GLUCOSEU Negative 06/16/2021    GLUCOSEU NEGATIVE 01/26/2012    AMORPHOUS MODERATE 05/25/2021       ABG:  No results found for: Mignon Andrew, [id number], PHART, THGBART, BDC0QPK, PO2ART, GVS5PPK    MICROBIOLOGY:    Blood culture -    Updated: 06/18/21 0907     Bottle Type Aerobic    Source of Blood Culture No site given    Order Number [id number]    Enterobacter cloacae complex by PCR Not Detected    Escherichia coli by PCR Not Detected    Klebsiella oxytoca by PCR Not Detected    Klebsiella pneumoniae group by PCR Not Detected    Proteus species by PCR Not Detected    Streptococcus agalactiae by PCR Not Detected    Staphylococcus aureus by PCR Not Detected    Serratia marcescens by PCR Not Detected    Streptococcus pneumoniae by PCR Not Detected    Streptococcus pyogenes  by PCR Not Detected    Acinetobacter baumannii by PCR Not Detected    Candida albicans by PCR Not Detected    Candida glabrata by PCR Not Detected    Candida krusei by PCR Not Detected    Candida parapsilosis by PCR Not Detected    Candida tropicalis by PCR Not Detected    Enterobacteriaceae by PCR Not Detected    Enterococcus by PCR Not Detected    Haemophilus Influenzae by PCR Not Detected    Listeria monocytogenes by PCR Not Detected    Neisseria meningitidis by PCR Not Detected    Pseudomonas aeruginosa by PCR Not Detected    Staphylococcus species by PCR DETECTEDPanic      Streptococcus species by PCR Not Detected    Methicillin Resistance mecA/C  by PCR DETECTEDPanic     Narrative:     CALL  Joseph  H45 tel. ,            Radiology :    Chest  X ray - no infiltrates       IMPRESSION:    1. HIV infection / HIV   2. Chronic Hep C infection   3. Encephalopathy, Cocaine use r/o meningitis   4. CONS bacteremia - contamination       RECOMMENDATIONS:      1. LP - CSF for cell count, diff, protein, glucose, cx , meningitis panel (awaitng LP - not done yet due to asa/plavix)   2. HIV viral load   3. Lamivudine 50 mg po q day and  tivicay 1 tab once a day  , will give truvada on Monday   4.  Vancomycin / Rocephin / Ampicillin

## 2021-06-20 NOTE — PROGRESS NOTES
Leobardo Severe is a 56 y.o.  male     Neurology following for AMS    PMH of stroke, HIV on antivirals, chronic hep C, HTN, cocaine abuse, GERD, depression, history of bacterial meningitis in 2010    Neurology evaluated patient back on 5/25/2021 for new onset left arm weakness and AMS. MRI was completed which showed an acute right cerebellar stroke and chronic left pontine lacunar infarcts. He was also found to be septic with the staph bacteremia from left chest cellulitis/renal failure and rhabdo. This admission there was concern for repeated strokes although CT head was negative for acute findings. MRI brain was completed which was negative for any acute findings. LP is pending and aspirin and Plavix held. Patient apparently did not have dialysis for approximately 2 weeks which was stopped by son? Patient lying in bed nods head yes or no does not speak for me. He follows very simple commands for me to participate in neuro exam due to lack of cooperation. No family at bedside. ROS unable to obtain due to lack of cooperation    Objective:     BP (!) 181/87   Pulse 68   Temp 98.7 °F (37.1 °C) (Oral)   Resp 18   Ht 5' 8\" (1.727 m)   Wt 178 lb (80.7 kg)   SpO2 93%   BMI 27.06 kg/m²     General appearance: alert, appears stated age, uncooperative and no distress  Head: normocephalic, without obvious abnormality, atraumatic  Eyes: conjunctivae/corneas clear  Neck:  supple, symmetrical, trachea midline  Lungs: Respirations nonlabored  Extremities:  normal, atraumatic, no cyanosis or edema      Mental Status: Alert to self, but not answer any my questions only nodded his head yes/no. Follows simple commands.     Appropriate attention/concentration      Speech/Language: Nonverbal for me    Cranial Nerves:  I: smell NA   II: visual acuity  NA   II: visual fields Full to confrontation   II: pupils GUSTAVO   III,VII: ptosis None   III,IV,VI: extraocular muscles  Looks around the room    V: mastication Normal   V: facial light touch sensation  Normal   V,VII: corneal reflex     VII: facial muscle function - upper  Normal   VII: facial muscle function - lower Normal   VIII: hearing Normal   IX: soft palate elevation  Normal   IX,X: gag reflex    XI: trapezius strength  5/5   XI: sternocleidomastoid strength 5/5   XI: neck extension strength  5/5   XII: tongue strength  Normal     Motor:  Moves arms and legs purposefully  Decreased bulk and tone  No abnormal movements    Sensory:  LT intact    Coordination:   Uncooperative    Laboratory/Radiology:     CBC with Differential:    Lab Results   Component Value Date    WBC 5.6 06/20/2021    RBC 3.37 06/20/2021    HGB 10.9 06/20/2021    HCT 35.7 06/20/2021     06/20/2021    .9 06/20/2021    MCH 32.3 06/20/2021    MCHC 30.5 06/20/2021    RDW 13.7 06/20/2021    SEGSPCT 47 01/06/2014    LYMPHOPCT 15.6 06/20/2021    MONOPCT 8.3 06/20/2021    BASOPCT 1.1 06/20/2021    MONOSABS 0.47 06/20/2021    LYMPHSABS 0.88 06/20/2021    EOSABS 1.55 06/20/2021    BASOSABS 0.06 06/20/2021     CMP:    Lab Results   Component Value Date     06/19/2021    K 4.0 06/19/2021    K 4.0 06/16/2021     06/19/2021    CO2 21 06/19/2021    BUN 52 06/19/2021    CREATININE 2.4 06/19/2021    GFRAA 33 06/19/2021    LABGLOM 33 06/19/2021    GLUCOSE 98 06/19/2021    GLUCOSE 83 01/26/2012    PROT 8.5 06/18/2021    LABALBU 3.4 06/18/2021    LABALBU 4.2 01/17/2012    CALCIUM 8.6 06/19/2021    BILITOT 0.3 06/18/2021    ALKPHOS 55 06/18/2021    AST 71 06/18/2021    ALT 34 06/18/2021     Hepatic Function Panel:    Lab Results   Component Value Date    ALKPHOS 55 06/18/2021    ALT 34 06/18/2021    AST 71 06/18/2021    PROT 8.5 06/18/2021    BILITOT 0.3 06/18/2021    BILIDIR 0.3 03/13/2015    IBILI 1.3 03/13/2015    LABALBU 3.4 06/18/2021    LABALBU 4.2 01/17/2012     HgBA1c:    Lab Results   Component Value Date    LABA1C 4.9 06/17/2021     FLP:    Lab Results   Component Value Date    TRIG 177 06/17/2021    HDL 28 06/17/2021    LDLCALC 121 06/17/2021    LABVLDL 35 06/17/2021     Results for Laura Casper (MRN [id number]) as of 6/20/2021 10:32   Ref.  Range 6/17/2021 06:28   Absolute CD 4 [place] Latest Ref Range: 430 - 1800 cells/uL 111 (L)     CT head: Negative for acute findings    MRI brain: Negative for acute findings    All labs and imaging studies reviewed independently today    Assessment:     Altered mental status inpatient with history of stroke and HIV  --- Brain imaging studies have been negative for acute findings  --- LP to be done to rule out meningitis--- patient was seen to have a petechial rash BLE  --- metabolic derangement     Plan:     LP pending  Aspirin and plavix are on hold  Continue statin  Neurology to follow      CRISTHIAN Mejia CNP  10:20 AM  6/20/2021

## 2021-06-20 NOTE — PROGRESS NOTES
[facility] MD Blanca Angelo MD Drenda Schneider, MD Narciso Cotton, MD Yimi Ford, CNP   Jana Cha, AUGUSTINE  Progress Note    6/20/2021    SUBJECTIVE:   (-) c/o's  (-) sob/elkins/cp/palp , altered mental status  No major improvement and mental status's admission  6/19: Overall improving today, with sodium level gradually normalizing with the given D5W and free water  6/20: No major change clinical status, with no new BMP today we will order one ASAP to monitor his hypernatremia and CHANEL  PROBLEM LIST:    Active Problems:    HIV infection ([facility] [address]    HTN (hypertension)    Chronic hepatitis C virus infection ([facility] [address]    Mood disorder (HCC)    Cellulitis    Bilateral leg edema    AMS (altered mental status)    End-stage renal disease needing dialysis ([facility] [address]    Anemia due to chronic kidney disease    Petechial rash    Lactic acidosis    Elevated CPK    Elevated troponin    Polysubstance abuse ([facility] [address]    Microscopic hematuria    Kidney stone    Severe protein-calorie malnutrition ([facility] [address]  Resolved Problems:    * No resolved hospital problems. *         DIET:    ADULT DIET; Dysphagia - Minced and Moist  Adult Oral Nutrition Supplement; Renal Oral Supplement  Adult Oral Nutrition Supplement;  Fortified Pudding Oral Supplement     MEDS (scheduled):    dolutegravir sodium  50 mg Oral Daily    vancomycin (VANCOCIN) intermittent dosing (placeholder)   Other RX Placeholder    vancomycin  500 mg Intravenous Once    gabapentin  400 mg Oral BID    aspirin  81 mg Oral Daily    clopidogrel  75 mg Oral Daily    multivitamin  1 tablet Oral Daily    sodium bicarbonate  1,300 mg Oral 4x Daily    vitamin B-1  100 mg Oral Daily    sodium chloride flush  5-40 mL Intravenous 2 times per day    lamiVUDine  50 mg Oral Daily    cefTRIAXone (ROCEPHIN) IV  2,000 mg Intravenous Q12H    ampicillin IV  2,000 mg Intravenous Q8H       MEDS (infusions):   sodium chloride 125 mL/hr at 06/20/21 1346    sodium chloride         MEDS (prn):  morphine, acetaminophen, sodium chloride flush, sodium chloride, ondansetron **OR** ondansetron, polyethylene glycol    PHYSICAL EXAM:     Patient Vitals for the past 24 hrs:   BP Temp Temp src Pulse Resp SpO2   06/20/21 0800 (!) 181/87 98.7 °F (37.1 °C) Oral 68 18 93 %   06/20/21 0554 (!) 168/96 -- -- 72 16 --   06/19/21 1625 (!) 161/92 98.9 °F (37.2 °C) Oral 82 20 92 %   @      Intake/Output Summary (Last 24 hours) at 6/20/2021 1409  Last data filed at 6/20/2021 1300  Gross per 24 hour   Intake 4278.33 ml   Output 1900 ml   Net 2378.33 ml         Wt Readings from Last 3 Encounters:   06/16/21 178 lb (80.7 kg)   06/11/21 178 lb (80.7 kg)   06/11/21 178 lb (80.7 kg)       Constitutional:  in no acute distress  HEENT: NC/AT, EOMI, sclera and conjunctiva are clear and anicteric, mucus membranes moist  Neck: Trachea midline, no JVD  Cardiovascular: S1, S2 regular rhythm, no murmur,or rub  Respiratory:  No crackles, no wheeze  Gastrointestinal:  Soft, nontender, nondistended, NABS  Ext: no edema, feet warm  Skin: dry, no rash  Neuro: awake, alert, interactive      DATA:    Recent Labs     06/18/21  0820 06/19/21  0545 06/20/21  0437   WBC 6.1 5.9 5.6   HGB 10.6* 10.5* 10.9*   HCT 32.9* 34.2* 35.7*   .9* 106.5* 105.9*    241 199     Recent Labs     06/17/21  2139 06/18/21  1621 06/19/21  0545   * 153* 155*   K 4.2 4.0 4.0   * 116* 122*   CO2 19* 21* 21*   BUN 52* 57* 52*   CREATININE 3.0* 3.0* 2.4*   ALT  --  34  --    AST  --  71*  --    BILITOT  --  0.3  --    ALKPHOS  --  55  --        Lab Results   Component Value Date    LABPROT 4.2 (H) 05/25/2021    LABPROT 4.2 05/25/2021       ASSESSMENT / RECOMMENDATIONS:    1. CHANEL on top of CKD,  Multifactorial etiology, polysubstance abuse  Rhabdomyolysis, hepatitis C and HIV positive. Very gradually improving with IV fluid hydration as being done now. Latest BUN is 52 and creatinine 2.4   2.  Anemia: Due to CKD     3.  Metabolic acidosis due to CHANEL and top of CKD     4.  Hypertension: Supportive care  BMP, CBC and CPK daily   See orders      Electronically signed by Mary Trejo MD on 6/20/2021 at 2:09 PM

## 2021-06-21 NOTE — PROGRESS NOTES
Darrin Oden is a 56 y.o.  male     Neurology following for AMS    PMH of stroke, HIV on antivirals, chronic hep C, HTN, cocaine abuse, GERD, depression, history of bacterial meningitis in 2010    Neurology evaluated patient on 5/25/2021 for new onset left arm weakness and AMS. MRI was completed which showed an acute right cerebellar stroke and chronic left pontine lacunar infarcts. He was also found to be septic with the staph bacteremia from left chest cellulitis/renal failure and rhabdo. Patient was readmitted for a concern of repeated strokes although CT head was negative for acute findings. MRI brain was completed which was also negative for any acute findings. LP was ordered over the weekend and aspirin and Plavix have been held for the procedure    Patient apparently did not have dialysis for approximately 2 weeks    It is unclear as to why   Patient remains nonverbal and is not a great historian at this time    Patient remains nonverbal but is following commands and nods appropriately    On admission his urine drug screen was positive for cocaine opiates and oxycodone    ROS unable to obtain due to lack of cooperation    Objective:     BP (!) 170/108   Pulse 70   Temp 98.5 °F (36.9 °C) (Axillary)   Resp 16   Ht 5' 8\" (1.727 m)   Wt 178 lb (80.7 kg)   SpO2 97%   BMI 27.06 kg/m²     General appearance: alert, appears stated age, uncooperative and no distress  Head: normocephalic, without obvious abnormality, atraumatic  Extremities: no cyanosis or edema    Mental Status: Alert to self    Follows simple commands.     Speech/Language: Nonverbal for me but nods appropriately    Cranial Nerves:  I: smell    II: visual acuity     II: visual fields Full    II: pupils GUSTAVO   III,VII: ptosis None   III,IV,VI: extraocular muscles  Looks around the room    V: mastication Normal   V: facial light touch sensation  Normal   V,VII: corneal reflex     VII: facial muscle function - upper     VII: facial muscle function - lower Normal   VIII: hearing Normal   IX: soft palate elevation  Normal   IX,X: gag reflex    XI: trapezius strength  5/5   XI: sternocleidomastoid strength 5/5   XI: neck extension strength  5/5   XII: tongue strength  Normal     Motor:  Moves arms and legs purposefully   Generalized weakness appreciated  Decreased bulk and tone    Sensory:  LT and vibration intact    Coordination:   No ataxia appreciated with FN testing     Laboratory/Radiology:     CBC with Differential:    Lab Results   Component Value Date    WBC 5.4 06/21/2021    RBC 3.39 06/21/2021    HGB 10.9 06/21/2021    HCT 35.0 06/21/2021     06/21/2021    .2 06/21/2021    MCH 32.2 06/21/2021    MCHC 31.1 06/21/2021    RDW 13.4 06/21/2021    SEGSPCT 47 01/06/2014    LYMPHOPCT 21.6 06/21/2021    MONOPCT 10.3 06/21/2021    BASOPCT 0.6 06/21/2021    MONOSABS 0.56 06/21/2021    LYMPHSABS 1.17 06/21/2021    EOSABS 1.31 06/21/2021    BASOSABS 0.03 06/21/2021     CMP:    Lab Results   Component Value Date     06/21/2021    K 3.1 06/21/2021    K 4.0 06/16/2021     06/21/2021    CO2 26 06/21/2021    BUN 25 06/21/2021    CREATININE 1.5 06/21/2021    GFRAA 57 06/21/2021    LABGLOM 57 06/21/2021    GLUCOSE 90 06/21/2021    GLUCOSE 83 01/26/2012    PROT 8.5 06/18/2021    LABALBU 3.4 06/18/2021    LABALBU 4.2 01/17/2012    CALCIUM 8.7 06/21/2021    BILITOT 0.3 06/18/2021    ALKPHOS 55 06/18/2021    AST 71 06/18/2021    ALT 34 06/18/2021     Hepatic Function Panel:    Lab Results   Component Value Date    ALKPHOS 55 06/18/2021    ALT 34 06/18/2021    AST 71 06/18/2021    PROT 8.5 06/18/2021    BILITOT 0.3 06/18/2021    BILIDIR 0.3 03/13/2015    IBILI 1.3 03/13/2015    LABALBU 3.4 06/18/2021    LABALBU 4.2 01/17/2012     HgBA1c:    Lab Results   Component Value Date    LABA1C 4.9 06/17/2021     FLP:    Lab Results   Component Value Date    TRIG 177 06/17/2021    HDL 28 06/17/2021    LDLCALC 121 06/17/2021    LABVLDL 35 06/17/2021      Ref.  Range 6/17/2021 06:28   Absolute CD 4 [place] Latest Ref Range: 430 - 1800 cells/uL 111 (L)     CT head: Negative for acute findings    MRI brain: Negative for acute findings    All labs and imaging studies reviewed independently today    Assessment:     Altered mental status inpatient with history of stroke and HIV   Marked improvement from the weekend which lends to metabolic disarray with his illicit drug use and HD compliance difficulties    imaging studies have been negative for acute findings   LP is pending (meningitis unlikely)     Plan:     LP pending today   Aspirin and plavix are on hold  Continue statin    Mary Ruiz, CRISTHIAN - CNS  8:57 AM  6/21/2021

## 2021-06-21 NOTE — PROGRESS NOTES
Department of Internal Medicine  Infectious Diseases  Progress  Note      C/C :  HIV infection, r/o meningitis     Pt is more awake and alert  Denies fever, headache   Confused  Afebrile         Current Facility-Administered Medications   Medication Dose Route Frequency Provider Last Rate Last Admin    dolutegravir sodium (TIVICAY) tablet 50 mg  50 mg Oral Daily [address] Stephan Pozo MD   50 mg at 06/20/21 1114    vancomycin [facility]) intermittent dosing (placeholder)   Other RX Placeholder Seng Pascual, [facility]        gabapentin (NEURONTIN) capsule 400 mg  400 mg Oral BID Biju CARREON MD   400 mg at 06/20/21 2018    morphine (PF) injection 2 mg  2 mg Intravenous Q4H PRN Doyce Frankie, DO   2 mg at 06/19/21 0943    0.9 % sodium chloride infusion   Intravenous Continuous Valekon Loreto Weaver  mL/hr at 06/21/21 0141 New Bag at 06/21/21 0141    acetaminophen (TYLENOL) suppository 650 mg  650 mg Rectal Q4H PRN Doyce Frankie, DO   650 mg at 06/18/21 1609    aspirin chewable tablet 81 mg  81 mg Oral Daily Courtney Victoria MD        clopidogrel (PLAVIX) tablet 75 mg  75 mg Oral Daily Courtney Victoria MD        multivitamin 1 tablet  1 tablet Oral Daily Courtney Victoria MD   1 tablet at 06/20/21 0045    sodium bicarbonate tablet 1,300 mg  1,300 mg Oral 4x Daily Courtney Victoria MD   1,300 mg at 06/20/21 2017    thiamine mononitrate tablet 100 mg  100 mg Oral Daily Courtney Victoria MD   100 mg at 06/20/21 2098    sodium chloride flush 0.9 % injection 5-40 mL  5-40 mL Intravenous 2 times per day Courtney Victoria MD   10 mL at 06/20/21 2017    sodium chloride flush 0.9 % injection 5-40 mL  5-40 mL Intravenous PRN Courtney Victoria MD   10 mL at 06/18/21 2258    0.9 % sodium chloride infusion  25 mL Intravenous PRN Courtney Victoria MD        ondansetron (ZOFRAN-ODT) disintegrating tablet 4 mg  4 mg Oral Q8H PRN Courtney Victoria MD        Or    ondansetron (ZOFRAN) injection 4 mg  4 mg Intravenous Q6H PRN Yon Priest MD        polyethylene glycol (GLYCOLAX) packet 17 g  17 g Oral Daily PRN Yon Priest MD        lamiVUDine (EPIVIR) tablet 50 mg  50 mg Oral Daily Oli Garcia MD   50 mg at 06/20/21 0928    cefTRIAXone (ROCEPHIN) 2,000 mg in sterile water 20 mL IV syringe  2,000 mg Intravenous Q12H Hannah Garcia MD   2,000 mg at 06/20/21 2201    ampicillin 2000 mg ivpb mini bag  2,000 mg Intravenous Q8H Queenie Adams MD   Stopped at 06/21/21 0330       REVIEW OF SYSTEMS:  Could not be obtained         PHYSICAL EXAM:      Vitals:     BP (!) 168/90   Pulse 68   Temp 98.4 °F (36.9 °C) (Oral)   Resp 16   Ht 5' 8\" (1.727 m)   Wt 178 lb (80.7 kg)   SpO2 97%   BMI 27.06 kg/m²     General Appearance:    Opened eyes, not communicating    Head:    Normocephalic, atraumatic   Eyes:    No pallor, no icterus,no photophobia    Ears:    No obvious deformity or drainage.    Nose:   No nasal drainage   Throat:   Mucosa moist, no oral thrush   Neck:   Supple      Lungs:     Clear to auscultation bilaterally, no wheeze    Heart:    Regular rate and rhythm, no murmur   Abdomen:     Soft, non-tender, bowel sounds present    Extremities:   No edema, no cyanosis ,no open wound   Pulses:   Dorsalis pedis palpable    Skin:   no rashes   Right chest tesio - no drainage, on erythema      CBC with Differential:      Lab Results   Component Value Date    WBC 5.4 06/21/2021    RBC 3.39 06/21/2021    HGB 10.9 06/21/2021    HCT 35.0 06/21/2021     06/21/2021    .2 06/21/2021    MCH 32.2 06/21/2021    MCHC 31.1 06/21/2021    RDW 13.4 06/21/2021    SEGSPCT 47 01/06/2014    LYMPHOPCT 21.6 06/21/2021    MONOPCT 10.3 06/21/2021    BASOPCT 0.6 06/21/2021    MONOSABS 0.56 06/21/2021    LYMPHSABS 1.17 06/21/2021    EOSABS 1.31 06/21/2021    BASOSABS 0.03 06/21/2021       CMP     Lab Results   Component Value Date     06/21/2021    K 3.1 06/21/2021    K 4.0 06/16/2021     06/21/2021    CO2 26 06/21/2021    BUN 25 06/21/2021    CREATININE 1.5 06/21/2021    GFRAA 57 06/21/2021    LABGLOM 57 06/21/2021    GLUCOSE 90 06/21/2021    GLUCOSE 83 01/26/2012    PROT 8.5 06/18/2021    LABALBU 3.4 06/18/2021    LABALBU 4.2 01/17/2012    CALCIUM 8.7 06/21/2021    BILITOT 0.3 06/18/2021    ALKPHOS 55 06/18/2021    AST 71 06/18/2021    ALT 34 06/18/2021         Hepatic Function Panel:    Lab Results   Component Value Date    ALKPHOS 55 06/18/2021    ALT 34 06/18/2021    AST 71 06/18/2021    PROT 8.5 06/18/2021    BILITOT 0.3 06/18/2021    BILIDIR 0.3 03/13/2015    IBILI 1.3 03/13/2015    LABALBU 3.4 06/18/2021    LABALBU 4.2 01/17/2012       PT/INR:    Lab Results   Component Value Date    PROTIME 14.9 05/26/2021    INR 1.4 05/26/2021       TSH:    Lab Results   Component Value Date    TSH 17.020 06/17/2021       U/A:    Lab Results   Component Value Date    COLORU Yellow 06/16/2021    PHUR 5.0 06/16/2021    LABCAST FEW  11/02/2011    WBCUA 1-3 06/16/2021    WBCUA NONE 01/26/2012    RBCUA 5-10 06/16/2021    RBCUA NONE 01/26/2012    BACTERIA MODERATE 06/16/2021    CLARITYU Clear 06/16/2021    SPECGRAV >=1.030 06/16/2021    LEUKOCYTESUR Negative 06/16/2021    UROBILINOGEN 1.0 06/16/2021    BILIRUBINUR MODERATE 06/16/2021    BILIRUBINUR NEGATIVE 01/26/2012    BLOODU LARGE 06/16/2021    GLUCOSEU Negative 06/16/2021    GLUCOSEU NEGATIVE 01/26/2012    AMORPHOUS MODERATE 05/25/2021       ABG:  No results found for: [place], BEART, E3PRODNK, PHART, THGBART, FEJ7AKS, PO2ART, MEL7PRV    MICROBIOLOGY:    Blood culture -    Updated: 06/18/21 0907     Bottle Type Aerobic    Source of Blood Culture No site given    Order Number [id number]    Enterobacter cloacae complex by PCR Not Detected    Escherichia coli by PCR Not Detected    Klebsiella oxytoca by PCR Not Detected    Klebsiella pneumoniae group by PCR Not Detected    Proteus species by PCR Not Detected    Streptococcus agalactiae by PCR Not Detected    Staphylococcus aureus by PCR Not Detected    Serratia marcescens by PCR Not Detected    Streptococcus pneumoniae by PCR Not Detected    Streptococcus pyogenes  by PCR Not Detected    Acinetobacter baumannii by PCR Not Detected    Candida albicans by PCR Not Detected    Candida glabrata by PCR Not Detected    Candida krusei by PCR Not Detected    Candida parapsilosis by PCR Not Detected    Candida tropicalis by PCR Not Detected    Enterobacteriaceae by PCR Not Detected    Enterococcus by PCR Not Detected    Haemophilus Influenzae by PCR Not Detected    Listeria monocytogenes by PCR Not Detected    Neisseria meningitidis by PCR Not Detected    Pseudomonas aeruginosa by PCR Not Detected    Staphylococcus species by PCR DETECTEDPanic      Streptococcus species by PCR Not Detected    Methicillin Resistance mecA/C  by PCR DETECTEDPanic     Narrative:     CALL  Joseph  H45 tel. ,            Radiology :    Chest  X ray - no infiltrates       IMPRESSION:    1. HIV infection / TERRY   2. Chronic Hep C infection   3. Encephalopathy, Cocaine use r/o meningitis   4. CONS bacteremia - contamination       RECOMMENDATIONS:      1. LP - CSF for cell count, diff, protein, glucose, cx , meningitis panel ( awaiting LP )   2. HIV viral load   3. Lamivudine 50 mg po q day and  tivicay 1 tab once a day  , will give truvada X1  4.  Vancomycin / Rocephin / Ampicillin

## 2021-06-21 NOTE — PROGRESS NOTES
multilevel degenerative disease. CT scan of the abdomen showed a small kidney stone on the right. No hydronephrosis. Recent MRI on 6/1/2021 showed small area of stroke in the right cerebral hemisphere. Chest x-ray shows stable coarse interstitial markings, cardiomegaly and atherosclerotic disease and tunneled right chest wall catheter. Subjective: Patient seen and examined by me personally  Family member not in his room, patient is off aspirin and Plavix for LP procedure. No fever no chills no change in mental status.  blood culture positive for Staphylococcus coagulase-negative ID on case following up with   Patient is more awake and alert today, he denies significant complaint saying he feels a little bit weak but otherwise no complaints      Medications:  Reviewed    Infusion Medications    sodium chloride 125 mL/hr at 06/21/21 0141    sodium chloride       Scheduled Medications    potassium chloride  20 mEq Intravenous Once    dolutegravir sodium  50 mg Oral Daily    vancomycin (VANCOCIN) intermittent dosing (placeholder)   Other RX Placeholder    gabapentin  400 mg Oral BID    aspirin  81 mg Oral Daily    clopidogrel  75 mg Oral Daily    multivitamin  1 tablet Oral Daily    sodium bicarbonate  1,300 mg Oral 4x Daily    vitamin B-1  100 mg Oral Daily    sodium chloride flush  5-40 mL Intravenous 2 times per day    [Held by provider] lamiVUDine  50 mg Oral Daily    cefTRIAXone (ROCEPHIN) IV  2,000 mg Intravenous Q12H    ampicillin IV  2,000 mg Intravenous Q8H     PRN Meds: morphine, acetaminophen, sodium chloride flush, sodium chloride, ondansetron **OR** ondansetron, polyethylene glycol      Intake/Output Summary (Last 24 hours) at 6/21/2021 1206  Last data filed at 6/21/2021 0714  Gross per 24 hour   Intake 2320 ml   Output 3950 ml   Net -1630 ml       Exam:    BP (!) 170/108   Pulse 70   Temp 98.5 °F (36.9 °C) (Axillary)   Resp 16   Ht 5' 8\" (1.727 m)   Wt 178 lb (80.7 kg)   SpO2 97% BMI 27.06 kg/m²     General appearance: Clinically ill appearance, lethargic. HEENT: Conjunctivae/corneas clear. Neck:. No jugular venous distention. Trachea midline. Respiratory:  Normal respiratory effort. Clear to auscultation, bilaterally without Rales/Wheezes/Rhonchi. Cardiovascular: Regular rate and rhythm with normal S1/S2 without murmurs, rubs or gallops. Abdomen: Soft, non-tender, non-distended with normal bowel sounds. Musculoskeletal: No clubbing, cyanosis or edema bilaterally. Skin: Skin color, texture, turgor normal.  No rashes or lesions. Neurologic:  Neurovascularly intact  Cranial nerves: II-XII intact, grossly non-focal.  Psychiatric: Lethargic     Labs:   Recent Labs     06/19/21  0545 06/20/21  0437 06/21/21  0419   WBC 5.9 5.6 5.4   HGB 10.5* 10.9* 10.9*   HCT 34.2* 35.7* 35.0*    199 207     Recent Labs     06/19/21  0545 06/20/21  1444 06/21/21  0419   * 151* 151*   K 4.0 3.7 3.1*   * 116* 112*   CO2 21* 23 26   BUN 52* 36* 25*   CREATININE 2.4* 1.6* 1.5*   CALCIUM 8.6 8.8 8.7     Recent Labs     06/18/21  1621   AST 71*   ALT 34   BILITOT 0.3   ALKPHOS 55     No results for input(s): INR in the last 72 hours.   Recent Labs     06/19/21  0545 06/20/21  0437 06/21/21  0419   CKTOTAL 805* 507* 283*       Assessment/Plan:    Active Hospital Problems    Diagnosis Date Noted    End-stage renal disease needing dialysis ([facility] [address] [N18.6, Z99.2] 06/17/2021    Anemia due to chronic kidney disease [N18.9, D63.1] 06/17/2021    Petechial rash [R23.3] 06/17/2021    Lactic acidosis [E87.2] 06/17/2021    Elevated CPK [R74.8] 06/17/2021    Elevated troponin [R77.8] 06/17/2021    [facility] [F19.10] 06/17/2021    Microscopic hematuria [R31.29] 06/17/2021    Kidney stone [N20.0] 06/17/2021    Severe protein-calorie malnutrition ([facility] [E43] 06/17/2021    AMS (altered mental status) [R41.82] 05/25/2021    Bilateral leg edema [R60.0]     Cellulitis [L03.90]

## 2021-06-21 NOTE — PROGRESS NOTES
[facility] MD Kristie Martinez MD Lorrain Paras, MD Kenneth Busby, MD Keisha Moffett, CNP   Jana Cha, ANP  Progress Note    6/21/2021    SUBJECTIVE:   (-) c/o's  (-) sob/elkins/cp/palp , altered mental status  No major improvement and mental status's admission  6/19: Overall improving today, with sodium level gradually normalizing with the given D5W and free water  6/20: No major change clinical status, with no new BMP today we will order one ASAP to monitor his hypernatremia and CHANEL  6/21: Patient is more reactive and responsive to verbal stimuli and, getting spoonfed by the nurse, and output dramatically improving  PROBLEM LIST:    Active Problems:    HIV infection ([facility] [address]    HTN (hypertension)    Chronic hepatitis C virus infection ([address]    Mood disorder ([facility] [address]    Cellulitis    Bilateral leg edema    AMS (altered mental status)    End-stage renal disease needing dialysis ([address]    Anemia due to chronic kidney disease    Petechial rash    Lactic acidosis    Elevated CPK    Elevated troponin    Polysubstance abuse ([address]    Microscopic hematuria    Kidney stone    Severe protein-calorie malnutrition ([address]  Resolved Problems:    * No resolved hospital problems. *         DIET:    ADULT DIET; Dysphagia - Minced and Moist  Adult Oral Nutrition Supplement; Renal Oral Supplement  Adult Oral Nutrition Supplement;  Fortified Pudding Oral Supplement     MEDS (scheduled):    megestrol  40 mg Oral BID    vancomycin  1,000 mg Intravenous Q24H    dolutegravir sodium  50 mg Oral Daily    gabapentin  400 mg Oral BID    aspirin  81 mg Oral Daily    clopidogrel  75 mg Oral Daily    multivitamin  1 tablet Oral Daily    vitamin B-1  100 mg Oral Daily    sodium chloride flush  5-40 mL Intravenous 2 times per day    [Held by provider] lamiVUDine  50 mg Oral Daily    cefTRIAXone (ROCEPHIN) IV  2,000 mg Intravenous Q12H    ampicillin IV  2,000 mg Intravenous Q8H       MEDS (infusions):   dextrose 100 mL/hr at 06/21/21 1411    sodium chloride         MEDS (prn):  morphine, acetaminophen, sodium chloride flush, sodium chloride, ondansetron **OR** ondansetron, polyethylene glycol    PHYSICAL EXAM:     Patient Vitals for the past 24 hrs:   BP Temp Temp src Pulse Resp SpO2   06/21/21 0803 (!) 170/108 98.5 °F (36.9 °C) Axillary 70 16 97 %   06/20/21 1930 (!) 168/90 98.4 °F (36.9 °C) Oral 68 16 97 %   @      Intake/Output Summary (Last 24 hours) at 6/21/2021 1447  Last data filed at 6/21/2021 1422  Gross per 24 hour   Intake 1073 ml   Output 3200 ml   Net -2127 ml         Wt Readings from Last 3 Encounters:   06/16/21 178 lb (80.7 kg)   06/11/21 178 lb (80.7 kg)   06/11/21 178 lb (80.7 kg)       Constitutional:  in no acute distress  HEENT: NC/AT, EOMI, sclera and conjunctiva are clear and anicteric, mucus membranes moist  Neck: Trachea midline, no JVD  Cardiovascular: S1, S2 regular rhythm, no murmur,or rub  Respiratory:  No crackles, no wheeze  Gastrointestinal:  Soft, nontender, nondistended, NABS  Ext: no edema, feet warm  Skin: dry, no rash  Neuro: awake, alert, interactive      DATA:    Recent Labs     06/19/21  0545 06/20/21  0437 06/21/21  0419   WBC 5.9 5.6 5.4   HGB 10.5* 10.9* 10.9*   HCT 34.2* 35.7* 35.0*   .5* 105.9* 103.2*    199 207     Recent Labs     06/18/21  1621 06/19/21  0545 06/20/21  1444 06/21/21  0419   * 155* 151* 151*   K 4.0 4.0 3.7 3.1*   * 122* 116* 112*   CO2 21* 21* 23 26   BUN 57* 52* 36* 25*   CREATININE 3.0* 2.4* 1.6* 1.5*   ALT 34  --   --   --    AST 71*  --   --   --    BILITOT 0.3  --   --   --    ALKPHOS 55  --   --   --        Lab Results   Component Value Date    LABPROT 4.2 (H) 05/25/2021    LABPROT 4.2 05/25/2021       ASSESSMENT / RECOMMENDATIONS:    1. CHANEL on top of CKD,  Multifactorial etiology, polysubstance abuse  Rhabdomyolysis, hepatitis C and HIV positive.   Very gradually improving with IV fluid hydration as being done now. Latest BUN is 25 and creatinine 1.5   2. Anemia: Due to CKD     3. Metabolic acidosis due to CHANEL and top of CKD, resolved with the goal discontinue sodium bicarb. 4. Hypertension: Supportive care  BMP, CBC and CPK daily   See orders  5.   Hypernatremia sodium level of 151, IV fluids can be changed to D5W at 100 cc an hour  We will follow labs with BMP and CBC, Dr. Mony Souza will be rounding tomorrow    Electronically signed by Ludmila Willard MD on 6/21/2021 at 2:47 PM

## 2021-06-21 NOTE — PROGRESS NOTES
Physical Therapy    Physical Therapy Initial Assessment     Name: Mariah Snyder  : 1960  MRN: [id number]      Date of Service: 2021    Evaluating PT:  German Carney, PT, DPT  [id number]     Room #:  1562/5571-U  Diagnosis:  AMS (altered mental status) [R41.82]  PMHx/PSHx:  Abscess of hand and scrotum, bacterial meningitis, CVA, depression, ESRD, GERD, Hep C, herpes zoster, HIV, inguinal hernia unilateral, PNA, shingles, suicidal ideation, vit D deficiency   Precautions:  Falls, Cognition, L hemiparesis   Equipment Needs:  NA    SUBJECTIVE:    Per chart review (pt is a poor historian at this time): pt lives alone in a 2 story house with 2 stair(s) and 0 rail(s) to enter. Bed is on the first floor and bath is on the first floor. Full flight of stair(s) and no railing(s) to access second floor. Unknown PLOF. OBJECTIVE:   Initial Evaluation  Date: 21 Treatment Short Term/ Long Term   Goals   AM-PAC 6 Clicks      Was pt agreeable to Eval/treatment? Yes      Does pt have pain? No c/o pain      Bed Mobility  Rolling: NT  Supine to sit: Max A  Sit to supine: Max A  Scooting: Max A  Rolling: Min A  Supine to sit: Min A  Sit to supine: Min A  Scooting: Min A   Transfers Sit to stand: Max A  Stand to sit: Max A   Stand pivot: NT  Sit to stand: Min A  Stand to sit: min A   Stand pivot: Min A with AAD   Ambulation    Attempted, unable  >20 feet with AAD Min A   Stair negotiation: ascended and descended  NT  >2 steps with 1 rail Mod A   ROM BUE:  Per OT eval  BLE:  WFL     Strength LUE and LLE:  Grossly 2/5  RUE and RLE:  Grossly 3-/5      Balance Sitting EOB:  Min A  Dynamic Standing:   Max A with no AD  Sitting EOB:  SBA  Dynamic Standing:  Min A with AAD     Pt is A & O x self and hospital   Sensation:  Pt denies numbness and tingling to extremities  Edema:  Unremarkable      Therapeutic Exercises:    BLE ROM at EOB as able     Patient education  Pt educated on PT role, safety during functional mobility    Patient response to education:   Pt verbalized understanding Pt demonstrated skill Pt requires further education in this area   no no Yes      ASSESSMENT:    Conditions Requiring Skilled Therapeutic Intervention:    [x]Decreased strength     [x]Decreased ROM  [x]Decreased functional mobility  [x]Decreased balance   []Decreased endurance   []Decreased posture  []Decreased sensation  [x]Decreased coordination   []Decreased vision  [x]Decreased safety awareness   []Increased pain       Comments:  Pt received supine and agreeable to PT evaluation. Vitals monitored during session. Pt requires assistance for all mobility d/t gross weakness and cognition. Demonstrates L weakness more compared to R. Required assistance of LE and trunk to EOB. Performed STS with assistance. Pt cued on weight shifting and side stepping but unable to side step at this time. Returned to sitting. Assisted back to supine and scooting up/repositioniong in bed. Pt left with call button in reach, lines attached, and needs met. Treatment:  Patient practiced and was instructed in the following treatment:     eval only     Pt's/ family goals   1. None stated     Prognosis is fair  for reaching above PT goals. Patient and or family understand(s) diagnosis, prognosis, and plan of care. ?      PHYSICAL THERAPY PLAN OF CARE:    PT POC is established based on physician order and patient diagnosis     Referring provider/PT Order:    06/17/21 0115  PT evaluation and treat Start: 06/17/21 0115, End: 06/17/21 0115, ONE TIME, Standing Count: 1 Occurrences, R      Pete Hubbard MD       Diagnosis:  AMS (altered mental status) [R41.82]  Specific instructions for next treatment:  STS, pivot transfers with AAD, gait training with AAD    Current Treatment Recommendations:     [x] Strengthening to improve independence with functional mobility   [] ROM to improve independence with functional mobility   [x] Balance Training to improve static/dynamic balance and to reduce fall risk  [x] Endurance Training to improve activity tolerance during functional mobility   [x] Transfer Training to improve safety and independence with all functional transfers   [x] Gait Training to improve gait mechanics, endurance and asses need for appropriate assistive device  [x] Stair Training in preparation for safe discharge home and/or into the community   [x] Positioning to prevent skin breakdown and contractures  [x] Safety and Education Training   [x] Patient/Caregiver Education   [] HEP  [] Other     PT long term treatment goals are located in above grid    Frequency of treatments: 2-5x/week x 1-2 weeks. Time in  1450  Time out  1505    Total Treatment Time  0 minutes     Evaluation Time includes thorough review of current medical information, gathering information on past medical history/social history and prior level of function, completion of standardized testing/informal observation of tasks, assessment of data and education on plan of care and goals.     CPT codes:  [x] Low Complexity PT evaluation 01710  [] Moderate Complexity PT evaluation 25587  [] High Complexity PT evaluation 17185  [] PT Re-evaluation 71360  [] Gait training 31777 -- minutes  [] Manual therapy [id number] -- minutes  [] Therapeutic activities 20119 -- minutes  [] Therapeutic exercises 86090 -- minutes  [] Neuromuscular reeducation 01584 -- minutes     German Carney PT, DPT  [id number]

## 2021-06-21 NOTE — PROGRESS NOTES
Pharmacy Consultation Note  (Antibiotic Dosing and Monitoring)    Initial consult date: 6/19/21  Consulting physician: Dr. Lynn Bhatt  Drug(s): Vancomycin  Indication: CNS infection and bacteremia      Age/  Gender Height Weight IBW Dosing weight  Allergy Information   60 y.o./male 5' 8\" (172.7 cm) 178 lb (80.7 kg)     Ideal body weight: 68.4 kg (150 lb 12.7 oz)  Adjusted ideal body weight: 73.3 kg (161 lb 10.8 oz)  80.7 kg  Ativan [lorazepam]          Other anti-infectives Start date Stop date   ampicillin 6/17    ceftriaxone 6/17                Date  Tmax WBC BUN/CR CrCL  (mL/min) Drug/Dose Time   Given Level(s)   (Time) Comments   6/17 afebrile 11.5  52/3.0   Vancomycin 1000 mg IV x1 1321     6/18 afebrile 6.1 57/3.0  Vancomycin 1000 mg IV x1 1610     6/19*  Pharmacy consulted afebrile 5.9 52/2.4  Vancomycin 1000 mg IV x 1 1646 Random level = 13.6 @ 1214    6/20 afebrile 5.6 --- 31 Vancomycin 500 mg IV x 1 1659 Random level = 17.5 @ 0437    6/21 afebrile 5.4 25/1.5 49 Vancomycin 1000 mg IV q 24 hr  (1230) Random level = 12.8 mcg/mL @0419          Intake/Output Summary (Last 24 hours) at 6/21/2021 1209  Last data filed at 6/21/2021 5225  Gross per 24 hour   Intake 2320 ml   Output 3950 ml   Net -1630 ml       Average urine output:    Cultures:    Site Date Result   Urine Culture 6/17 Growth not present   Blood Culture #1 6/16 GPCs in Clusters   Blood Culture #2 6/16 NGTD   Meningitis  CSF panem 6/17 Sent   CSF culture 6/17 Sent       Assessment:  · 60 YOM who is on Vancomycin/ampicillin/ceftriaxone for possible meningitis and GPC bacteremia  · Goal trough = 15 - 20 mcg/ml  · Random level = 17.5 on 6/20  · 6/21: Random level is 12.8 mcg/mL, Scr 1.5 (baseline ~1.4)    Plan:  · Start vancomycin 1000 mg IV q 24 hr (AUC/ELIZABET 497 est trough 17.1 mcg/mL)  · A vanco trough level will be obtained when steady-state is reached  · Pharmacist will follow and monitor/adjust dosing as necessary      Dottie Guillermo, PharmD, BCPS 6/21/2021 12:26 PM

## 2021-06-22 NOTE — PROGRESS NOTES
MEDS (infusions):   dextrose 100 mL/hr at 06/22/21 0425    sodium chloride         MEDS (prn):  morphine, acetaminophen, sodium chloride flush, sodium chloride, ondansetron **OR** ondansetron, polyethylene glycol    PHYSICAL EXAM:     Patient Vitals for the past 24 hrs:   BP Temp Temp src Pulse Resp SpO2   06/22/21 0825 (!) 153/100 98 °F (36.7 °C) Oral 67 16 --   06/22/21 0530 (!) 149/116 -- -- 100 -- --   06/22/21 0000 (!) 151/105 98.4 °F (36.9 °C) Oral 79 18 98 %   @      Intake/Output Summary (Last 24 hours) at 6/22/2021 1756  Last data filed at 6/22/2021 1416  Gross per 24 hour   Intake 3056.86 ml   Output 2175 ml   Net 881.86 ml         Wt Readings from Last 3 Encounters:   06/16/21 178 lb (80.7 kg)   06/11/21 178 lb (80.7 kg)   06/11/21 178 lb (80.7 kg)       Constitutional:  in no acute distress  HEENT: NC/AT, EOMI, sclera and conjunctiva are clear and anicteric, mucus membranes moist  Neck: Trachea midline, no JVD  Cardiovascular: S1, S2 regular rhythm, no murmur,or rub  Respiratory:  No crackles, no wheeze  Gastrointestinal:  Soft, nontender, nondistended, NABS  Ext: no edema, feet warm  Skin: dry, no rash  Neuro: awake, alert, interactive      DATA:    Recent Labs     06/20/21  0437 06/21/21  0419 06/22/21  0431   WBC 5.6 5.4 5.9   HGB 10.9* 10.9* 11.8*   HCT 35.7* 35.0* 36.0*   .9* 103.2* 98.6    207 156     Recent Labs     06/20/21  1444 06/21/21  0419 06/22/21  0431   * 151* 136   K 3.7 3.1* 3.2*   * 112* 103   CO2 23 26 21*   MG  --   --  1.5*   PHOS  --   --  2.8   BUN 36* 25* 20   CREATININE 1.6* 1.5* 1.3*       Lab Results   Component Value Date    LABPROT 4.2 (H) 05/25/2021    LABPROT 4.2 05/25/2021       ASSESSMENT / RECOMMENDATIONS:    1. CHANEL on top of CKD,  Multifactorial etiology, polysubstance abuse  Rhabdomyolysis, hepatitis C and HIV positive. Very gradually improving with IV fluid hydration as being done now. Latest BUN is 52 and creatinine 2.4   2. Anemia: Due to CKD     3. Metabolic acidosis due to CHANEL and top of CKD     4.  Hypertension: Supportive care    azotemia continues to improve  Total CKD improving, as well  Nonoliguric   Hypokalemic and hypomagnesemic     Supplement potassium and magnesium orally   Follow labs, UO   Continue supportive care           Electronically signed by Yomi Negro MD on 6/22/2021

## 2021-06-22 NOTE — PROGRESS NOTES
Pharmacy Consultation Note  (Antibiotic Dosing and Monitoring)    Initial consult date: 6/19/21  Consulting physician: Dr. Meghna Mooney  Drug(s): Vancomycin  Indication: CNS infection and bacteremia      Age/  Gender Height Weight IBW Dosing weight  Allergy Information   60 y.o./male 5' 8\" (172.7 cm) 178 lb (80.7 kg)     Ideal body weight: 68.4 kg (150 lb 12.7 oz)  Adjusted ideal body weight: 73.3 kg (161 lb 10.8 oz)  80.7 kg  Ativan [lorazepam]          Other anti-infectives Start date Stop date   ampicillin 6/17    ceftriaxone 6/17                Date  Tmax WBC BUN/CR CrCL  (mL/min) Drug/Dose Time   Given Level(s)   (Time) Comments   6/17 afebrile 11.5  52/3.0   Vancomycin 1000 mg IV x1 1321     6/18 afebrile 6.1 57/3.0  Vancomycin 1000 mg IV x1 1610     6/19*  Pharmacy consulted afebrile 5.9 52/2.4  Vancomycin 1000 mg IV x 1 1646 Random level = 13.6 @ 1214    6/20 afebrile 5.6 --- 31 Vancomycin 500 mg IV x 1 1659 Random level = 17.5 @ 0437    6/21 afebrile 5.4 25/1.5 49 Vancomycin 1000 mg IV q 24 hr  1711 Random level = 12.8 mcg/mL @0419    6/22 afebrile 5.9 20/1.3 57 Vancomycin 1000 mg IV q 24 hr  (1700)     6/23       Random level with am labs           Intake/Output Summary (Last 24 hours) at 6/22/2021 1238  Last data filed at 6/22/2021 0825  Gross per 24 hour   Intake 1835 ml   Output 1875 ml   Net -40 ml       Average urine output:    Cultures:    Site Date Result   Urine Culture 6/17 Growth not present   Blood Culture #1 6/16 Coag neg staph   Blood Culture #2 6/16 NGTD   Meningitis  CSF panem 6/17 Sent   CSF culture 6/17 Sent       Assessment:  · 56 YOM who is on Vancomycin/ampicillin/ceftriaxone for possible meningitis and GPC bacteremia  · Goal trough = 15 - 20 mcg/ml  · Random level = 17.5 on 6/20  · 6/21: Random level is 12.8 mcg/mL, Scr 1.5 (baseline ~1.4)  · 6/22: Scr 1.3    Plan:  · Cont vancomycin 1000 mg IV q 24 hr (AUC/ELIZABET 497 est trough 17.1 mcg/mL)  · Random level with am labs on 6/23  · Pharmacist will follow and monitor/adjust dosing as necessary      Gray SheaD, BCPS 6/22/2021 12:38 PM   Phone: [phone]

## 2021-06-22 NOTE — PROGRESS NOTES
Hospitalist Progress Note      SYNOPSIS: Patient admitted on 6/16/2021  presented to [facility] with medical history of bacterial meningitis, scrotal abscess, CVA, depression, end-stage renal disease on dialysis, GERD, chronic hep C, herpes zoster, HIV viral infection on antiretroviral therapy, major depression with suicidal ideation and substance abuse. Christi Goltz was admitted on 5/24/2021 through 6/6/2021. [facility] had presented with altered mental status and found to have a stroke. Lyndsay Ibarra had sepsis with staph bacteremia secondary to left side chest wall cellulitis. Lyndsay Ibarra was in renal failure and had rhabdomyolysis with CPK of around 63,000. Lyndsay Ibarra was also treated for pneumonia.  His urine drug screen was positive for cocaine and fentanyl.  CK was 882 at the time of discharge.  CD4 was 45.     Patient according to family members was last known well 2 days ago, \"walking and talking\".  Baseline mental status is not completely normal per their report.  Came in with altered mental status. Altered mental status is constant, severe, associated with aphasia.  Patient is currently nonverbal just grunting and moaning.  Patient is unable to answer any questions at this time. Lyndsay Ibarra does not have a fever.  He has petechial rash on his legs.  His legs are swollen and red.     Vital signs notable for respiratory rate of 26, labs showed sodium of 147, glucose 169, creatinine 4.7, BUN 51, lactic acid level 2.7, , troponin IV 47, urine drug screen positive for cocaine, oxycodone, and opiate.  Urinalysis is positive for blood and bacteria.  EKG shows sinus rhythm rate of 90.  He had an echo in May 2021 with EF of 60%. The scan of his head is negative and CT scan of the cervical spine shows multilevel degenerative disease. CT scan of the abdomen showed a small kidney stone on the right.  No hydronephrosis. Recent MRI on 6/1/2021 showed small area of stroke in the right cerebral hemisphere. Chest x-ray shows stable coarse interstitial markings, cardiomegaly and atherosclerotic disease and tunneled right chest wall catheter. SUBJECTIVE:    Stable overnight. No other overnight issues reported. Patient seen and examined  Records reviewed. Complains of headache today      Temp (24hrs), Av °F (36.7 °C), Min:97.7 °F (36.5 °C), Max:98.4 °F (36.9 °C)    DIET: ADULT DIET; Dysphagia - Minced and Moist  Adult Oral Nutrition Supplement; Renal Oral Supplement  Adult Oral Nutrition Supplement; Fortified Pudding Oral Supplement  CODE: [id number]    Intake/Output Summary (Last 24 hours) at 2021 0948  Last data filed at 2021 0825  Gross per 24 hour   Intake 1835 ml   Output 1875 ml   Net -40 ml       Review of Systems  All bolded are positive; please see HPI  General:  Fever, chills, diaphoresis, fatigue, malaise, night sweats, weight loss  Psychological:  Anxiety, disorientation, hallucinations. ENT:  Epistaxis, headaches, vertigo, visual changes. Cardiovascular:  Chest pain, irregular heartbeats, palpitations, paroxysmal nocturnal dyspnea. Respiratory:  Shortness of breath, coughing, sputum production, hemoptysis, wheezing, orthopnea. Gastrointestinal:  Nausea, vomiting, diarrhea, heartburn, constipation, abdominal pain, hematemesis, hematochezia, melena, acholic stools  Genito-Urinary:  Dysuria, urgency, frequency, hematuria  Musculoskeletal:  Joint pain, joint stiffness, joint swelling, muscle pain  Neurology:  Headache, focal neurological deficits, weakness, numbness, paresthesia  Derm:  Rashes, ulcers, excoriations, bruising  Extremities:  Decreased ROM, peripheral edema, mottling      OBJECTIVE:    BP (!) 153/100   Pulse 67   Temp 98 °F (36.7 °C) (Oral)   Resp 16   Ht 5' 8\" (1.727 m)   Wt 178 lb (80.7 kg)   SpO2 98%   BMI 27.06 kg/m²     General appearance:  Mostly nonverbal  HEENT:  Conjunctivae/corneas clear. Neck: Supple. No jugular venous distention.    Respiratory: symmetrical; clear to auscultation bilaterally; no wheezes; no rhonchi; no rales  Cardiovascular: rhythm regular; rate controlled; no murmurs  Abdomen: Soft, nontender, nondistended  Extremities:  peripheral pulses present; no peripheral edema; no ulcers  Musculoskeletal: No clubbing, cyanosis, no bilateral lower extremity edema. Brisk capillary refill. Skin:  No rashes  on visible skin  Neurologic: mostly nonverbal    ASSESSMENT and PLAN:  Acute metabolic encephalopathy  HIV-positive patient with chronic hepatitis C drug abuse  Need to rule out meningitis-Petechial rash bilateral, meningitis?   LP still pending  LP - CSF for cell count, diff, protein, glucose, cx , meningitis panel  Patient's aspirin and Plavix on hold  Continue vancomycin, Rocephin and ampicillin per ID     HIV positive rule out AIDS  CD4 count, viral load per ID  Continue on Lamivudine 50 mg po q day   Continue on Tivicay 1 tab once a day    Continue on Truvada on Monday      CHANEL on Chronic kidney disease   Secondary to dehydration, substance abuse, rhabdomyolysis  Continue IV fluid resuscitation  BUN and creatinine improving  Nephrology consulted and follow-up  creatinine improved 1.5 now  H/o Polysubstance abuse/ HIV    Hypernatremia  On D5W     -Severe protein calorie malnutrition  Encourage p.o. intake   start on Megace     Neurology and ID follow-up         DISPOSITION: Awaiting LP    Medications:  REVIEWED DAILY    Infusion Medications    dextrose 100 mL/hr at 06/22/21 0425    sodium chloride       Scheduled Medications    megestrol  40 mg Oral BID    vancomycin  1,000 mg Intravenous Q24H    dolutegravir sodium  50 mg Oral Daily    gabapentin  400 mg Oral BID    aspirin  81 mg Oral Daily    clopidogrel  75 mg Oral Daily    multivitamin  1 tablet Oral Daily    vitamin B-1  100 mg Oral Daily    sodium chloride flush  5-40 mL Intravenous 2 times per day    [Held by provider] lamiVUDine  50 mg Oral Daily    cefTRIAXone (ROCEPHIN) IV  2,000 mg Intravenous Q12H    ampicillin IV 2,000 mg Intravenous Q8H     PRN Meds: morphine, acetaminophen, sodium chloride flush, sodium chloride, ondansetron **OR** ondansetron, polyethylene glycol    Labs:     Recent Labs     06/20/21  0437 06/21/21 0419 06/22/21 0431   WBC 5.6 5.4 5.9   HGB 10.9* 10.9* 11.8*   HCT 35.7* 35.0* 36.0*    207 156       Recent Labs     06/20/21  1444 06/21/21 0419   * 151*   K 3.7 3.1*   * 112*   CO2 23 26   BUN 36* 25*   CREATININE 1.6* 1.5*   CALCIUM 8.8 8.7       No results for input(s): PROT, ALB, ALKPHOS, ALT, AST, BILITOT, AMYLASE, LIPASE in the last 72 hours. No results for input(s): INR in the last 72 hours. Recent Labs     06/20/21  0437 06/21/21 0419 06/22/21 0431   CKTOTAL 507* 283* 193       Chronic labs:    Lab Results   Component Value Date    CHOL 184 06/17/2021    TRIG 177 (H) 06/17/2021    HDL 28 06/17/2021    LDLCALC 121 (H) 06/17/2021    TSH 17.020 (H) 06/17/2021    PSA SEE NOTE (AA) 03/08/2017    PSA 0.47 03/08/2017    INR 1.4 05/26/2021    LABA1C 4.9 06/17/2021       Radiology: REVIEWED DAILY    +++++++++++++++++++++++++++++++++++++++++++++++++  Isabela Chen, DO DO  [facility] [address], [place]  +++++++++++++++++++++++++++++++++++++++++++++++++  NOTE: This report was transcribed using voice recognition software. Every effort was made to ensure accuracy; however, inadvertent computerized transcription errors may be present.

## 2021-06-22 NOTE — PROGRESS NOTES
Chart reviewed. LP pending. Plan is [place] at discharge. Goals of care and CODE STATUS have been identified. No further needs from palliative medicine standpoint at this time. Will sign off. Please reconsult if patient's condition deteriorates or if patient/family requests further discussion with palliative medicine team.  Thank you.

## 2021-06-22 NOTE — PROGRESS NOTES
Department of Internal Medicine  Infectious Diseases  Progress  Note      C/C :  HIV infection, r/o meningitis     Pt is more awake   Denies fever, headache   Confused  Afebrile         Current Facility-Administered Medications   Medication Dose Route Frequency Provider Last Rate Last Admin    magnesium oxide (MAG-OX) tablet 400 mg  400 mg Oral Q6H Richa Peres MD   400 mg at 06/22/21 1419    megestrol (MEGACE) tablet 40 mg  40 mg Oral BID Kinza Falcon MD   40 mg at 06/22/21 0828    vancomycin 1000 mg IVPB in 250 mL D5W addavial  1,000 mg Intravenous Q24H Becca Gonzalez MD   Stopped at 06/21/21 1811    dextrose 5 % solution   Intravenous Continuous Ana Maria Hutchinson  mL/hr at 06/22/21 0425 New Bag at 06/22/21 0425    dolutegravir sodium (TIVICAY) tablet 50 mg  50 mg Oral Daily [facility] Saul Lundy MD   50 mg at 06/22/21 1231    gabapentin (NEURONTIN) capsule 400 mg  400 mg Oral BID Joao CARREON MD   400 mg at 06/22/21 1681    morphine (PF) injection 2 mg  2 mg Intravenous Q4H PRN Raza Chick, DO   2 mg at 06/19/21 0943    acetaminophen (TYLENOL) suppository 650 mg  650 mg Rectal Q4H PRN Raza Chick, DO   650 mg at 06/18/21 1609    aspirin chewable tablet 81 mg  81 mg Oral Daily Cindy Infante MD        clopidogrel (PLAVIX) tablet 75 mg  75 mg Oral Daily Cindy Infante MD        multivitamin 1 tablet  1 tablet Oral Daily Cindy Infante MD   1 tablet at 06/22/21 1119    thiamine mononitrate tablet 100 mg  100 mg Oral Daily Cindy Infante MD   100 mg at 06/22/21 2421    sodium chloride flush 0.9 % injection 5-40 mL  5-40 mL Intravenous 2 times per day Cindy Infante MD   10 mL at 06/22/21 0828    sodium chloride flush 0.9 % injection 5-40 mL  5-40 mL Intravenous PRN Cindy Infante MD   10 mL at 06/22/21 1013    0.9 % sodium chloride infusion  25 mL Intravenous PRN Cindy Infante MD        ondansetron (ZOFRAN-ODT) disintegrating tablet 4 mg  4 mg Oral Q8H PRN Justyn Mckeon MD        Or    ondansetron (ZOFRAN) injection 4 mg  4 mg Intravenous Q6H PRN Justyn Mckeon MD        polyethylene glycol (GLYCOLAX) packet 17 g  17 g Oral Daily PRN Justyn Mckeon MD        [Held by provider] lamiVUDine (EPIVIR) tablet 50 mg  50 mg Oral Daily Oli Garcia MD   50 mg at 06/20/21 0928    cefTRIAXone (ROCEPHIN) 2,000 mg in sterile water 20 mL IV syringe  2,000 mg Intravenous Q12H Oli Garcia MD   2,000 mg at 06/22/21 1013    ampicillin 2000 mg ivpb mini bag  2,000 mg Intravenous Q8H Da Valencia MD   Stopped at 06/22/21 1043       REVIEW OF SYSTEMS:  Could not be obtained         PHYSICAL EXAM:      Vitals:     BP (!) 153/100   Pulse 67   Temp 98 °F (36.7 °C) (Oral)   Resp 16   Ht 5' 8\" (1.727 m)   Wt 178 lb (80.7 kg)   SpO2 98%   BMI 27.06 kg/m²     General Appearance:    Opened eyes,more awake    Head:    Normocephalic, atraumatic   Eyes:    No pallor, no icterus,no photophobia    Ears:    No obvious deformity or drainage.    Nose:   No nasal drainage   Throat:   Mucosa moist, no oral thrush   Neck:   Supple      Lungs:     Clear to auscultation bilaterally    Heart:    Regular rate and rhythm, no murmur   Abdomen:     Soft, non-tender, bowel sounds present    Extremities:   No edema, no cyanosis ,no open wound   Pulses:   Dorsalis pedis palpable    Skin:   no rashes   Right chest tesio - no drainage, on erythema      CBC with Differential:      Lab Results   Component Value Date    WBC 5.9 06/22/2021    RBC 3.65 06/22/2021    HGB 11.8 06/22/2021    HCT 36.0 06/22/2021     06/22/2021    MCV 98.6 06/22/2021    MCH 32.3 06/22/2021    MCHC 32.8 06/22/2021    RDW 13.1 06/22/2021    SEGSPCT 47 01/06/2014    LYMPHOPCT 20.4 06/22/2021    MONOPCT 6.4 06/22/2021    BASOPCT 0.5 06/22/2021    MONOSABS 0.38 06/22/2021    LYMPHSABS 1.21 06/22/2021    EOSABS 1.27 06/22/2021    BASOSABS 0.03 06/22/2021       CMP     Lab Results   Component Value Date     06/22/2021    K 3.2 06/22/2021    K 4.0 06/16/2021     06/22/2021    CO2 21 06/22/2021    BUN 20 06/22/2021    CREATININE 1.3 06/22/2021    GFRAA >60 06/22/2021    LABGLOM >60 06/22/2021    GLUCOSE 95 06/22/2021    GLUCOSE 83 01/26/2012    PROT 8.5 06/18/2021    LABALBU 3.4 06/18/2021    LABALBU 4.2 01/17/2012    CALCIUM 8.6 06/22/2021    BILITOT 0.3 06/18/2021    ALKPHOS 55 06/18/2021    AST 71 06/18/2021    ALT 34 06/18/2021         Hepatic Function Panel:    Lab Results   Component Value Date    ALKPHOS 55 06/18/2021    ALT 34 06/18/2021    AST 71 06/18/2021    PROT 8.5 06/18/2021    BILITOT 0.3 06/18/2021    BILIDIR 0.3 03/13/2015    IBILI 1.3 03/13/2015    LABALBU 3.4 06/18/2021    LABALBU 4.2 01/17/2012       PT/INR:    Lab Results   Component Value Date    PROTIME 14.9 05/26/2021    INR 1.4 05/26/2021       TSH:    Lab Results   Component Value Date    TSH 17.020 06/17/2021       U/A:    Lab Results   Component Value Date    COLORU Yellow 06/16/2021    PHUR 5.0 06/16/2021    LABCAST FEW  11/02/2011    WBCUA 1-3 06/16/2021    WBCUA NONE 01/26/2012    RBCUA 5-10 06/16/2021    RBCUA NONE 01/26/2012    BACTERIA MODERATE 06/16/2021    CLARITYU Clear 06/16/2021    SPECGRAV >=1.030 06/16/2021    LEUKOCYTESUR Negative 06/16/2021    UROBILINOGEN 1.0 06/16/2021    BILIRUBINUR MODERATE 06/16/2021    BILIRUBINUR NEGATIVE 01/26/2012    BLOODU LARGE 06/16/2021    GLUCOSEU Negative 06/16/2021    GLUCOSEU NEGATIVE 01/26/2012    AMORPHOUS MODERATE 05/25/2021       ABG:  No results found for: QFJ0DGH, BEART, V1BFRTVP, PHART, THGBART, DRT9FQA, PO2ART, MNJ2KWY    MICROBIOLOGY:    Blood culture -    Updated: 06/18/21 0907     Bottle Type Aerobic    Source of Blood Culture No site given    Order Number [id number]    Enterobacter cloacae complex by PCR Not Detected    Escherichia coli by PCR Not Detected    Klebsiella oxytoca by PCR Not Detected    Klebsiella pneumoniae group by PCR Not Detected    Proteus species by PCR Not Detected    Streptococcus agalactiae by PCR Not Detected    Staphylococcus aureus by PCR Not Detected    Serratia marcescens by PCR Not Detected    Streptococcus pneumoniae by PCR Not Detected    Streptococcus pyogenes  by PCR Not Detected    Acinetobacter baumannii by PCR Not Detected    Candida albicans by PCR Not Detected    Candida glabrata by PCR Not Detected    Candida krusei by PCR Not Detected    Candida parapsilosis by PCR Not Detected    Candida tropicalis by PCR Not Detected    Enterobacteriaceae by PCR Not Detected    Enterococcus by PCR Not Detected    Haemophilus Influenzae by PCR Not Detected    Listeria monocytogenes by PCR Not Detected    Neisseria meningitidis by PCR Not Detected    Pseudomonas aeruginosa by PCR Not Detected    Staphylococcus species by PCR DETECTEDPanic      Streptococcus species by PCR Not Detected    Methicillin Resistance mecA/C  by PCR DETECTEDPanic     Narrative:     CALL  Joseph  H45 tel. ,            Radiology :    Chest  X ray - no infiltrates       IMPRESSION:    1. HIV infection / TERRY   2. Chronic Hep C infection   3. Encephalopathy, Cocaine use r/o meningitis   4. CONS bacteremia - contamination       RECOMMENDATIONS:      1. LP per IR. CSF for cell count, diff, protein, glucose, cx , meningitis panel  2. HIV viral load   3. Lamivudine 50 mg po q day and  tivicay 1 tab once a day  , will give truvada X1  4.  Vancomycin / Rocephin / Ampicillin

## 2021-06-22 NOTE — PROGRESS NOTES
Dixie Simpson is a 56 y.o.  male     Neurology following for AMS    PMH of stroke, HIV on antivirals, chronic hep C, HTN, cocaine abuse, GERD, depression, history of bacterial meningitis in 2010    Neurology evaluated patient on 5/25/2021 for new onset left arm weakness and AMS. MRI was completed which showed an acute right cerebellar stroke and chronic left pontine lacunar infarcts. He was also found to be septic with the staph bacteremia from left chest cellulitis/renal failure and rhabdo. Patient was readmitted for a concern of repeated strokes although CT head was negative for acute findings. MRI brain was completed which was also negative for any acute findings. LP was ordered over the weekend and aspirin and Plavix have been held for the procedure -since admission    Patient apparently did not have dialysis for approximately 2 weeks prior to admission   It is unclear as to why   Patient remains nonverbal, outside of mumbling, and is not a great historian at this time    Patient remains nonverbal but is following commands and nods appropriately at times    On admission his urine drug screen was positive for cocaine opiates and oxycodone    ROS unable to obtain due to lack of cooperation    Objective:     BP (!) 153/100   Pulse 67   Temp 98 °F (36.7 °C) (Oral)   Resp 16   Ht 5' 8\" (1.727 m)   Wt 178 lb (80.7 kg)   SpO2 98%   BMI 27.06 kg/m²     General appearance: alert, appears stated age, uncooperative and no distress  Head: normocephalic, without obvious abnormality, atraumatic  Extremities: no cyanosis or edema    Mental Status: Alert to self    Follows some simple commands.     Speech/Language: Nonverbal for me but nods appropriately  Mumbling blood identified \"pen\"    Cranial Nerves:  I: smell    II: visual acuity     II: visual fields Full    II: pupils GUSTAVO   III,VII: ptosis None   III,IV,VI: extraocular muscles  Looks around the room    V: mastication Normal   V: facial light touch sensation  Normal   V,VII: corneal reflex     VII: facial muscle function - upper     VII: facial muscle function - lower Normal   VIII: hearing Normal   IX: soft palate elevation  Normal   IX,X: gag reflex    XI: trapezius strength     XI: sternocleidomastoid strength    XI: neck extension strength     XII: tongue strength  Normal     Motor:  Moves arms and legs purposefully   Generalized weakness appreciated  Decreased bulk and tone    Sensory:  Appreciates vibration in all limbs    Coordination:   No ataxia appreciated with FN testing     Laboratory/Radiology:     CBC with Differential:    Lab Results   Component Value Date    WBC 5.9 06/22/2021    RBC 3.65 06/22/2021    HGB 11.8 06/22/2021    HCT 36.0 06/22/2021     06/22/2021    MCV 98.6 06/22/2021    MCH 32.3 06/22/2021    MCHC 32.8 06/22/2021    RDW 13.1 06/22/2021    SEGSPCT 47 01/06/2014    LYMPHOPCT 20.4 06/22/2021    MONOPCT 6.4 06/22/2021    BASOPCT 0.5 06/22/2021    MONOSABS 0.38 06/22/2021    LYMPHSABS 1.21 06/22/2021    EOSABS 1.27 06/22/2021    BASOSABS 0.03 06/22/2021     CMP:    Lab Results   Component Value Date     06/21/2021    K 3.1 06/21/2021    K 4.0 06/16/2021     06/21/2021    CO2 26 06/21/2021    BUN 25 06/21/2021    CREATININE 1.5 06/21/2021    GFRAA 57 06/21/2021    LABGLOM 57 06/21/2021    GLUCOSE 90 06/21/2021    GLUCOSE 83 01/26/2012    PROT 8.5 06/18/2021    LABALBU 3.4 06/18/2021    LABALBU 4.2 01/17/2012    CALCIUM 8.7 06/21/2021    BILITOT 0.3 06/18/2021    ALKPHOS 55 06/18/2021    AST 71 06/18/2021    ALT 34 06/18/2021     Hepatic Function Panel:    Lab Results   Component Value Date    ALKPHOS 55 06/18/2021    ALT 34 06/18/2021    AST 71 06/18/2021    PROT 8.5 06/18/2021    BILITOT 0.3 06/18/2021    BILIDIR 0.3 03/13/2015    IBILI 1.3 03/13/2015    LABALBU 3.4 06/18/2021    LABALBU 4.2 01/17/2012     HgBA1c:    Lab Results   Component Value Date    LABA1C 4.9 06/17/2021     FLP:    Lab Results Component Value Date    TRIG 177 06/17/2021    HDL 28 06/17/2021    LDLCALC 121 06/17/2021    LABVLDL 35 06/17/2021      Ref.  Range 6/17/2021 06:28   Absolute CD 4 [place] Latest Ref Range: 430 - 1800 cells/uL 111 (L)     CT head: Negative for acute findings    MRI brain: Negative for acute findings    All labs and imaging studies reviewed independently today    Assessment:     Altered mental status inpatient with history of stroke and HIV   Marked improvement from the weekend which lends to metabolic disarray with his illicit drug use and HD compliance difficulties    imaging studies have been negative for acute findings   LP is pending (meningitis unlikely)     Plan:     LP pending  Aspirin and plavix are on hold  Continue statin    Neurology to follow peripherally    CRISTHIAN Hernandez - CNS  9:24 AM  6/22/2021

## 2021-06-22 NOTE — CARE COORDINATION
For LP in am, will have completed 7 day ASA hold. [facility] following, bed available when stable. LOC completed and placed on soft chart with ambulance yesterday. For GMF today. For questions I can be reached at [phone].  Luz Woodson

## 2021-06-23 NOTE — PROGRESS NOTES
Department of Internal Medicine  Infectious Diseases  Progress  Note      C/C :  HIV infection, r/o meningitis     Pt is more awake   Denies fever, headache   Confused  Afebrile         Current Facility-Administered Medications   Medication Dose Route Frequency Provider Last Rate Last Admin    potassium bicarb-citric acid (EFFER-K) effervescent tablet 40 mEq  40 mEq Oral Once Viet Ruiz DO        megestrol (MEGACE) tablet 40 mg  40 mg Oral BID Gianluca CARREON MD   40 mg at 06/23/21 0857    vancomycin 1000 mg IVPB in 250 mL D5W addavial  1,000 mg Intravenous Q24H Iona Rodriguez MD   Stopped at 06/22/21 1732    dolutegravir sodium (TIVICAY) tablet 50 mg  50 mg Oral Daily Adrianalaan [address] Krishna MD   50 mg at 06/23/21 0857    gabapentin (NEURONTIN) capsule 400 mg  400 mg Oral BID Gianluca CARREON MD   400 mg at 06/23/21 0857    morphine (PF) injection 2 mg  2 mg Intravenous Q4H PRN Sukhwinder Vargas DO   2 mg at 06/19/21 0943    acetaminophen (TYLENOL) suppository 650 mg  650 mg Rectal Q4H PRN Sukhwinder Vargas DO   650 mg at 06/22/21 1632    [Held by provider] aspirin chewable tablet 81 mg  81 mg Oral Daily Abraham Kayser, MD        [Held by provider] clopidogrel (PLAVIX) tablet 75 mg  75 mg Oral Daily Abraham Kayser, MD        multivitamin 1 tablet  1 tablet Oral Daily Abraham Kayser, MD   1 tablet at 06/23/21 0857    thiamine mononitrate tablet 100 mg  100 mg Oral Daily Abraham Kayser, MD   100 mg at 06/23/21 0857    sodium chloride flush 0.9 % injection 5-40 mL  5-40 mL Intravenous 2 times per day Abraham Kayser, MD   10 mL at 06/22/21 0828    sodium chloride flush 0.9 % injection 5-40 mL  5-40 mL Intravenous PRN Abraham Kayser, MD   10 mL at 06/22/21 1013    0.9 % sodium chloride infusion  25 mL Intravenous PRN Abraham Kayser, MD        ondansetron (ZOFRAN-ODT) disintegrating tablet 4 mg  4 mg Oral Q8H PRN Abraham Kayser, MD        Or    ondansetron (ZOFRAN) injection 4 mg  4 mg Intravenous Q6H PRN Justyn Mckeon MD        polyethylene glycol (GLYCOLAX) packet 17 g  17 g Oral Daily PRN Justyn Mckeon MD        [Held by provider] lamiVUDine (EPIVIR) tablet 50 mg  50 mg Oral Daily Oli Garcia MD   50 mg at 06/20/21 0928    cefTRIAXone (ROCEPHIN) 2,000 mg in sterile water 20 mL IV syringe  2,000 mg Intravenous Q12H Oli Garcia MD   2,000 mg at 06/23/21 1111    ampicillin 2000 mg ivpb mini bag  2,000 mg Intravenous Q8H Da Valencia MD   Stopped at 06/23/21 1145       REVIEW OF SYSTEMS:  Could not be obtained         PHYSICAL EXAM:      Vitals:     BP (!) 146/80   Pulse 72   Temp 97.2 °F (36.2 °C) (Temporal)   Resp 16   Ht 5' 8\" (1.727 m)   Wt 178 lb (80.7 kg)   SpO2 97%   BMI 27.06 kg/m²     General Appearance:    Opened eyes,more awake    Head:    Normocephalic, atraumatic   Eyes:    No pallor, no icterus,no photophobia    Ears:    No obvious deformity or drainage.    Nose:   No nasal drainage   Throat:   Mucosa moist, no oral thrush   Neck:   Supple      Lungs:     Clear to auscultation bilaterally    Heart:    Regular rate and rhythm, no murmur   Abdomen:     Soft, non-tender, bowel sounds present    Extremities:   No edema, no cyanosis ,no open wound   Pulses:   Dorsalis pedis palpable    Skin:   no rashes   Right chest tesio - no drainage, on erythema      CBC with Differential:      Lab Results   Component Value Date    WBC 6.9 06/23/2021    RBC 3.59 06/23/2021    HGB 11.6 06/23/2021    HCT 35.0 06/23/2021     06/23/2021    MCV 97.5 06/23/2021    MCH 32.3 06/23/2021    MCHC 33.1 06/23/2021    RDW 13.2 06/23/2021    SEGSPCT 47 01/06/2014    LYMPHOPCT 19.6 06/23/2021    MONOPCT 7.2 06/23/2021    BASOPCT 0.4 06/23/2021    MONOSABS 0.50 06/23/2021    LYMPHSABS 1.35 06/23/2021    EOSABS 0.96 06/23/2021    BASOSABS 0.03 06/23/2021       CMP     Lab Results   Component Value Date     06/23/2021    K 3.4 06/23/2021    K 4.0 06/16/2021    CL 99 06/23/2021    CO2 21 06/23/2021    BUN 22 06/23/2021    CREATININE 1.3 06/23/2021    GFRAA >60 06/23/2021    LABGLOM >60 06/23/2021    GLUCOSE 99 06/23/2021    GLUCOSE 83 01/26/2012    PROT 8.5 06/18/2021    LABALBU 3.4 06/18/2021    LABALBU 4.2 01/17/2012    CALCIUM 8.6 06/23/2021    BILITOT 0.3 06/18/2021    ALKPHOS 55 06/18/2021    AST 71 06/18/2021    ALT 34 06/18/2021         Hepatic Function Panel:    Lab Results   Component Value Date    ALKPHOS 55 06/18/2021    ALT 34 06/18/2021    AST 71 06/18/2021    PROT 8.5 06/18/2021    BILITOT 0.3 06/18/2021    BILIDIR 0.3 03/13/2015    IBILI 1.3 03/13/2015    LABALBU 3.4 06/18/2021    LABALBU 4.2 01/17/2012       PT/INR:    Lab Results   Component Value Date    PROTIME 14.9 05/26/2021    INR 1.4 05/26/2021       TSH:    Lab Results   Component Value Date    TSH 17.020 06/17/2021       U/A:    Lab Results   Component Value Date    COLORU Yellow 06/16/2021    PHUR 5.0 06/16/2021    LABCAST FEW  11/02/2011    WBCUA 1-3 06/16/2021    WBCUA NONE 01/26/2012    RBCUA 5-10 06/16/2021    RBCUA NONE 01/26/2012    BACTERIA MODERATE 06/16/2021    CLARITYU Clear 06/16/2021    SPECGRAV >=1.030 06/16/2021    LEUKOCYTESUR Negative 06/16/2021    UROBILINOGEN 1.0 06/16/2021    BILIRUBINUR MODERATE 06/16/2021    BILIRUBINUR NEGATIVE 01/26/2012    BLOODU LARGE 06/16/2021    GLUCOSEU Negative 06/16/2021    GLUCOSEU NEGATIVE 01/26/2012    AMORPHOUS MODERATE 05/25/2021       ABG:  No results found for: NNC8VYC, BEART, H3CXPETR, PHART, THGBART, UEY6LUM, PO2ART, SNX3SIU    MICROBIOLOGY:    Blood culture -    Updated: 06/18/21 0907     Bottle Type Aerobic    Source of Blood Culture No site given    Order Number [id number]    Enterobacter cloacae complex by PCR Not Detected    Escherichia coli by PCR Not Detected    Klebsiella oxytoca by PCR Not Detected    Klebsiella pneumoniae group by PCR Not Detected    Proteus species by PCR Not Detected    Streptococcus agalactiae by PCR Not Detected

## 2021-06-23 NOTE — PROGRESS NOTES
Pharmacy Consultation Note  (Antibiotic Dosing and Monitoring)    Initial consult date: 6/19/21  Consulting physician: Dr. Lottie Rizvi  Drug(s): Vancomycin  Indication: CNS infection and bacteremia    Age/Gender Height Weight IBW Dosing weight  Allergy Information   60 y.o./male 5' 8\" (172.7 cm) 178 lb (80.7 kg)     Ideal body weight: 68.4 kg (150 lb 12.7 oz)  Adjusted ideal body weight: 73.3 kg (161 lb 10.8 oz)  80.7 kg  Ativan [lorazepam]          Other anti-infectives Start date Stop date   ampicillin 6/17    ceftriaxone 6/17              Date  WBC BUN/CR Drug/Dose Time   Given Level(s)   (Time)   6/17 11.5  52/3.0 Vancomycin 1000 mg x1 1321    6/18 6.1 57/3.0 Vancomycin 1000 mg x1 1610    6/19*  Pharmacy consulted 5.9 52/2.4 Vancomycin 1000 mg x1 1646 Random level = 13.6 mCg/mL @ 1214   6/20 5.6 --- Vancomycin 500 mg x1 1659 Random level = 17.5 mCg/mL @ 0437   6/21 5.4 25/1.5 Vancomycin 1000 mg q24h  1711 Random level = 12.8 mCg/mL @ 0419   6/22 5.9 20/1. 3 Vancomycin 1000 mg q24h  1632    6/23 6.9 22/1.3 vanco 1000 mg q24h  Random level = 16.9 mCg/mL @ 0542                             Intake/Output Summary (Last 24 hours) at 6/23/2021 1018  Last data filed at 6/23/2021 0541  Gross per 24 hour   Intake 2365.1 ml   Output 1900 ml   Net 465.1 ml   UO = 1 mL/kg/hr (1900 mL)    Cultures:    Site Date Result   Urine Culture 6/17 Growth not present   Blood Culture #1 6/16 Coag neg staph   Blood Culture #2 6/16 NGTD   Meningitis  CSF panem 6/17 Sent   CSF culture 6/17 Sent     Assessment:  · 60 YOM who is on Vancomycin/ampicillin/ceftriaxone for possible meningitis and GPC bacteremia  · Goal trough = 15 - 20 mcg/ml  · Random level = 17.5 on 6/20  · 6/21: Random level is 12.8 mcg/mL, Scr 1.5 (baseline ~1.4)  · 6/22: Scr 1.3  · 6/23: day #7 ATBs. Good UO documented, Cr = 1.3 (stable); vanc level OK this AM (~13 hrs post-dose).     Plan:  · Continue vancomycin 1000 mg q24h: AUC/ELIZABET 497, est trough = 17.1 mCg/mL  · Pharmacist will follow and monitor/adjust dosing as necessary.     Rosanna Acosta PharmD  6/23/2021  10:25 AM

## 2021-06-23 NOTE — PLAN OF CARE
Problem: Skin Integrity:  Goal: Will show no infection signs and symptoms  Description: Will show no infection signs and symptoms  Outcome: Ongoing  Goal: Absence of new skin breakdown  Description: Absence of new skin breakdown  Outcome: Ongoing     Problem: Falls - Risk of:  Goal: Will remain free from falls  Description: Will remain free from falls  Outcome: Ongoing  Goal: Absence of physical injury  Description: Absence of physical injury  Outcome: Ongoing     Problem: Pain:  Goal: Pain level will decrease  Description: Pain level will decrease  Outcome: Ongoing  Goal: Control of acute pain  Description: Control of acute pain  Outcome: Ongoing  Goal: Control of chronic pain  Description: Control of chronic pain  Outcome: Ongoing     Problem: Confusion - Acute:  Goal: Absence of continued neurological deterioration signs and symptoms  Description: Absence of continued neurological deterioration signs and symptoms  Outcome: Ongoing  Goal: Mental status will be restored to baseline  Description: Mental status will be restored to baseline  Outcome: Ongoing     Problem: Discharge Planning:  Goal: Ability to perform activities of daily living will improve  Description: Ability to perform activities of daily living will improve  Outcome: Ongoing  Goal: Participates in care planning  Description: Participates in care planning  Outcome: Ongoing     Problem: Injury - Risk of, Physical Injury:  Goal: Will remain free from falls  Description: Will remain free from falls  Outcome: Ongoing  Goal: Absence of physical injury  Description: Absence of physical injury  Outcome: Ongoing     Problem: Mood - Altered:  Goal: Mood stable  Description: Mood stable  Outcome: Ongoing  Goal: Absence of abusive behavior  Description: Absence of abusive behavior  Outcome: Ongoing  Goal: Verbalizations of feeling emotionally comfortable while being cared for will increase  Description: Verbalizations of feeling emotionally comfortable while being cared for will increase  Outcome: Ongoing     Problem: Psychomotor Activity - Altered:  Goal: Absence of psychomotor disturbance signs and symptoms  Description: Absence of psychomotor disturbance signs and symptoms  Outcome: Ongoing     Problem: Sensory Perception - Impaired:  Goal: Demonstrations of improved sensory functioning will increase  Description: Demonstrations of improved sensory functioning will increase  Outcome: Ongoing  Goal: Decrease in sensory misperception frequency  Description: Decrease in sensory misperception frequency  Outcome: Ongoing  Goal: Able to refrain from responding to false sensory perceptions  Description: Able to refrain from responding to false sensory perceptions  Outcome: Ongoing  Goal: Demonstrates accurate environmental perceptions  Description: Demonstrates accurate environmental perceptions  Outcome: Ongoing  Goal: Able to distinguish between reality-based and nonreality-based thinking  Description: Able to distinguish between reality-based and nonreality-based thinking  Outcome: Ongoing  Goal: Able to interrupt nonreality-based thinking  Description: Able to interrupt nonreality-based thinking  Outcome: Ongoing     Problem: Sleep Pattern Disturbance:  Goal: Appears well-rested  Description: Appears well-rested  Outcome: Ongoing

## 2021-06-23 NOTE — CARE COORDINATION
6/23, SW spent time talking with sister-Lesa and brother-Clay on patient and meeting LTAC criteria. Family is receptive and agreeable for patient to go to [facility]. Referral was made to Reina Calvillo from [facility]. Patient is accepted and can go to facility once medically cleared for discharge. No precert is needed. Discussed [place] being a back up plan to [facility] which the LOC has already been completed and information is on soft chart/envelope. Discussed patient having Medicaid in [place]-family is trying to get patient into a [place] facility. Explained to family that residency must be established in [place] for [place] for [place] to occur. Gave physician call line phone number to brother -Kylah Dior since patient does not have PCP. Kylah Dior to call for appointment for patient as patient gets better and is closer to discharging home. Patient to have LP-per charge nurse-tomorrow. Reina Calvillo from [facility] from [address] updated on latest discharge plan which is 1-[facility] and 2-[facility]. SW to follow for further discharge planning needs.       JUAN CARLOS Goodman  [facility] Case Management  [phone]

## 2021-06-23 NOTE — PROGRESS NOTES
Hospitalist Progress Note      SYNOPSIS: Patient admitted on 6/16/2021  presented to [facility] with medical history of bacterial meningitis, scrotal abscess, CVA, depression, end-stage renal disease on dialysis, GERD, chronic hep C, herpes zoster, HIV viral infection on antiretroviral therapy, major depression with suicidal ideation and substance abuse. Miguel Mullen was admitted on 5/24/2021 through 6/6/2021. [facility] had presented with altered mental status and found to have a stroke. Manuel House had sepsis with staph bacteremia secondary to left side chest wall cellulitis. Manuel House was in renal failure and had rhabdomyolysis with CPK of around 63,000. Manuel House was also treated for pneumonia.  His urine drug screen was positive for cocaine and fentanyl.  CK was 882 at the time of discharge.  CD4 was 45.     Patient according to family members was last known well 2 days ago, \"walking and talking\".  Baseline mental status is not completely normal per their report.  Came in with altered mental status. Altered mental status is constant, severe, associated with aphasia.  Patient is currently nonverbal just grunting and moaning.  Patient is unable to answer any questions at this time. Manuel House does not have a fever.  He has petechial rash on his legs.  His legs are swollen and red.     Vital signs notable for respiratory rate of 26, labs showed sodium of 147, glucose 169, creatinine 4.7, BUN 51, lactic acid level 2.7, , troponin IV 47, urine drug screen positive for cocaine, oxycodone, and opiate.  Urinalysis is positive for blood and bacteria.  EKG shows sinus rhythm rate of 90.  He had an echo in May 2021 with EF of 60%. The scan of his head is negative and CT scan of the cervical spine shows multilevel degenerative disease. CT scan of the abdomen showed a small kidney stone on the right.  No hydronephrosis. Recent MRI on 6/1/2021 showed small area of stroke in the right cerebral hemisphere. Chest x-ray shows stable coarse interstitial markings, cardiomegaly and atherosclerotic disease and tunneled right chest wall catheter. SUBJECTIVE:    Stable overnight. No other overnight issues reported. Patient seen and examined  Records reviewed. Awaiting LP  No current complaints  Mostly noverbal         Temp (24hrs), Av.2 °F (36.8 °C), Min:97.3 °F (36.3 °C), Max:98.7 °F (37.1 °C)    DIET: ADULT DIET; Dysphagia - Minced and Moist  Adult Oral Nutrition Supplement; Renal Oral Supplement  Adult Oral Nutrition Supplement; Fortified Pudding Oral Supplement  CODE: [id number]    Intake/Output Summary (Last 24 hours) at 2021 0839  Last data filed at 2021 0541  Gross per 24 hour   Intake 2365.1 ml   Output 1900 ml   Net 465.1 ml       Review of Systems  All bolded are positive; please see HPI  General:  Fever, chills, diaphoresis, fatigue, malaise, night sweats, weight loss  Psychological:  Anxiety, disorientation, hallucinations. ENT:  Epistaxis, headaches, vertigo, visual changes. Cardiovascular:  Chest pain, irregular heartbeats, palpitations, paroxysmal nocturnal dyspnea. Respiratory:  Shortness of breath, coughing, sputum production, hemoptysis, wheezing, orthopnea. Gastrointestinal:  Nausea, vomiting, diarrhea, heartburn, constipation, abdominal pain, hematemesis, hematochezia, melena, acholic stools  Genito-Urinary:  Dysuria, urgency, frequency, hematuria  Musculoskeletal:  Joint pain, joint stiffness, joint swelling, muscle pain  Neurology:  Headache, focal neurological deficits, weakness, numbness, paresthesia  Derm:  Rashes, ulcers, excoriations, bruising  Extremities:  Decreased ROM, peripheral edema, mottling      OBJECTIVE:    /75   Pulse 78   Temp 97.3 °F (36.3 °C) (Tympanic)   Resp 16   Ht 5' 8\" (1.727 m)   Wt 178 lb (80.7 kg)   SpO2 98%   BMI 27.06 kg/m²     General appearance:  Mostly nonverbal  HEENT:  Conjunctivae/corneas clear. Neck: Supple. No jugular venous distention.    Respiratory: symmetrical; clear to auscultation bilaterally; no wheezes; no rhonchi; no rales  Cardiovascular: rhythm regular; rate controlled; no murmurs  Abdomen: Soft, nontender, nondistended  Extremities:  peripheral pulses present; no peripheral edema; no ulcers  Musculoskeletal: No clubbing, cyanosis, no bilateral lower extremity edema. Brisk capillary refill. Skin:  No rashes  on visible skin  Neurologic: mostly nonverbal    ASSESSMENT and PLAN:  Acute metabolic encephalopathy  HIV-positive patient with chronic hepatitis C drug abuse  Need to rule out meningitis-Petechial rash bilateral, meningitis?   LP still pending  LP - CSF for cell count, diff, protein, glucose, cx , meningitis panel  Patient's aspirin and Plavix on hold  Continue vancomycin, Rocephin and ampicillin per ID     HIV positive rule out AIDS  CD4 count, viral load per ID  Continue on Lamivudine 50 mg po q day   Continue on Tivicay 1 tab once a day    Continue on Truvada on Monday      CHANEL on Chronic kidney disease   Secondary to dehydration, substance abuse, rhabdomyolysis  Continue IV fluid resuscitation  BUN and creatinine improving  Nephrology consulted and follow-up  creatinine improved 1.5 now  H/o Polysubstance abuse/ HIV    Hypernatremia  On D5W     -Severe protein calorie malnutrition  Encourage p.o. intake   start on Megace     Neurology and ID follow-up         DISPOSITION: Awaiting LP    Medications:  REVIEWED DAILY    Infusion Medications    dextrose 100 mL/hr at 06/22/21 0425    sodium chloride       Scheduled Medications    potassium chloride  40 mEq Oral Once    megestrol  40 mg Oral BID    vancomycin  1,000 mg Intravenous Q24H    dolutegravir sodium  50 mg Oral Daily    gabapentin  400 mg Oral BID    aspirin  81 mg Oral Daily    clopidogrel  75 mg Oral Daily    multivitamin  1 tablet Oral Daily    vitamin B-1  100 mg Oral Daily    sodium chloride flush  5-40 mL Intravenous 2 times per day    [Held by provider] lamiVUDine  50 mg Oral Daily    cefTRIAXone (ROCEPHIN) IV  2,000 mg Intravenous Q12H    ampicillin IV  2,000 mg Intravenous Q8H     PRN Meds: morphine, acetaminophen, sodium chloride flush, sodium chloride, ondansetron **OR** ondansetron, polyethylene glycol    Labs:     Recent Labs     06/21/21 0419 06/22/21 0431 06/23/21  0542   WBC 5.4 5.9 6.9   HGB 10.9* 11.8* 11.6*   HCT 35.0* 36.0* 35.0*    156 219       Recent Labs     06/21/21 0419 06/22/21 0431 06/23/21  0542   * 136 133   K 3.1* 3.2* 3.4*   * 103 99   CO2 26 21* 21*   BUN 25* 20 22   CREATININE 1.5* 1.3* 1.3*   CALCIUM 8.7 8.6 8.6   PHOS  --  2.8 3.2       No results for input(s): PROT, ALB, ALKPHOS, ALT, AST, BILITOT, AMYLASE, LIPASE in the last 72 hours. No results for input(s): INR in the last 72 hours. Recent Labs     06/21/21 0419 06/22/21 0431 06/23/21  0542   CKTOTAL 283* 193 131       Chronic labs:    Lab Results   Component Value Date    CHOL 184 06/17/2021    TRIG 177 (H) 06/17/2021    HDL 28 06/17/2021    LDLCALC 121 (H) 06/17/2021    TSH 17.020 (H) 06/17/2021    PSA SEE NOTE (AA) 03/08/2017    PSA 0.47 03/08/2017    INR 1.4 05/26/2021    LABA1C 4.9 06/17/2021       Radiology: REVIEWED DAILY    +++++++++++++++++++++++++++++++++++++++++++++++++  Eugene Sol DO DO [place], [place]  +++++++++++++++++++++++++++++++++++++++++++++++++  NOTE: This report was transcribed using voice recognition software. Every effort was made to ensure accuracy; however, inadvertent computerized transcription errors may be present.

## 2021-06-24 NOTE — PROGRESS NOTES
[facility] MD Molly Woo, MD Lakeshia Alexander, MD Victor M Lr, CNP   Jana Cha, AUGUSTINE  Progress Note    6/24/2021    SUBJECTIVE:   (-) c/o's  (-) sob/elkins/cp/palp , altered mental status  No major improvement and mental status's admission  6/19: Overall improving today, with sodium level gradually normalizing with the given D5W and free water  6/20: No major change clinical status, with no new BMP today we will order one ASAP to monitor his hypernatremia and CHANEL    6/22:   Seen this morning. Feeling better. Appetite intake remained poor. Denies dyspnea at rest on room air. ROS otherwise unremarkable  6/23: Intake has improved. No new issues. D5 water drip still running  6/24: Patient was seen in his room with large amount of house-staff, getting LP today with potential transfer to [facility] in [facility] in a.m. from the notes that I was able to to collect. PROBLEM LIST:    Active Problems:    HIV infection ([address]    HTN (hypertension)    Chronic hepatitis C virus infection ([address]    Mood disorder (HCC)    Cellulitis    Bilateral leg edema    AMS (altered mental status)    End-stage renal disease needing dialysis ([address]    Anemia due to chronic kidney disease    Petechial rash    Lactic acidosis    Elevated CPK    Elevated troponin    Polysubstance abuse ([address]    Microscopic hematuria    Kidney stone    Severe protein-calorie malnutrition ([address]  Resolved Problems:    * No resolved hospital problems. *         DIET:    ADULT DIET; Dysphagia - Minced and Moist  Adult Oral Nutrition Supplement; Renal Oral Supplement  Adult Oral Nutrition Supplement;  Fortified Pudding Oral Supplement     MEDS (scheduled):    lamiVUDine  50 mg Oral Daily    megestrol  40 mg Oral BID    vancomycin  1,000 mg Intravenous Q24H    dolutegravir sodium  50 mg Oral Daily    gabapentin  400 mg Oral BID    [Held by provider] aspirin  81 mg Oral Daily    [Held by provider] clopidogrel  75 mg Oral Daily    multivitamin  1 tablet Oral Daily    vitamin B-1  100 mg Oral Daily    sodium chloride flush  5-40 mL Intravenous 2 times per day    cefTRIAXone (ROCEPHIN) IV  2,000 mg Intravenous Q12H    ampicillin IV  2,000 mg Intravenous Q8H       MEDS (infusions):   sodium chloride         MEDS (prn):  morphine, acetaminophen, sodium chloride flush, sodium chloride, ondansetron **OR** ondansetron, polyethylene glycol    PHYSICAL EXAM:     Patient Vitals for the past 24 hrs:   BP Temp Temp src Pulse Resp SpO2   06/24/21 1052 122/85 97.2 °F (36.2 °C) Temporal 72 16 93 %   06/24/21 0827 (!) 163/75 97.5 °F (36.4 °C) Temporal 76 14 92 %   06/24/21 0013 116/63 97.5 °F (36.4 °C) Temporal 72 16 --   06/23/21 1502 124/70 97 °F (36.1 °C) Temporal 79 16 93 %   @      Intake/Output Summary (Last 24 hours) at 6/24/2021 1446  Last data filed at 6/24/2021 1445  Gross per 24 hour   Intake 300 ml   Output 2350 ml   Net -2050 ml         Wt Readings from Last 3 Encounters:   06/16/21 178 lb (80.7 kg)   06/11/21 178 lb (80.7 kg)   06/11/21 178 lb (80.7 kg)       Constitutional:  in no acute distress  HEENT: NC/AT, EOMI, sclera and conjunctiva are clear and anicteric, mucus membranes moist  Neck: Trachea midline, no JVD  Cardiovascular: S1, S2 regular rhythm, no murmur,or rub  Respiratory:  No crackles, no wheeze  Gastrointestinal:  Soft, nontender, nondistended, NABS  Ext: no edema, feet warm  Skin: dry, no rash  Neuro: awake, alert, interactive      DATA:    Recent Labs     06/22/21  0431 06/23/21  0542 06/24/21  0450   WBC 5.9 6.9 7.1   HGB 11.8* 11.6* 11.2*   HCT 36.0* 35.0* 33.5*   MCV 98.6 97.5 96.0    219 217     Recent Labs     06/22/21  0431 06/23/21  0542 06/24/21  0450    133 139   K 3.2* 3.4* 4.0    99 105   CO2 21* 21* 23   MG 1.5* 2.0 1.9   PHOS 2.8 3.2 2.5   BUN 20 22 23   CREATININE 1.3* 1.3* 1.4*       Lab Results   Component Value Date    LABPROT 4.2 (H) 05/25/2021 LABPROT 4.2 05/25/2021       ASSESSMENT / RECOMMENDATIONS:    1. CHANEL on top of CKD,  Multifactorial etiology, polysubstance abuse. Rhabdomyolysis, hepatitis C and HIV positive. 2. Anemia: Due to CKD     3. Metabolic acidosis due to CHANEL and top of CKD     4.  Hypertension: Supportive care    azotemia improved, stable  Total CK improving, as well  Nonoliguric   Hypokalemic  Mild hypokalemia secondary to the D5 water drip    Supplement potassium   Stop D5 water drip  Follow labs, UO   Continue supportive care           Electronically signed by Carmen Menjivar MD on 6/24/2021

## 2021-06-24 NOTE — PROGRESS NOTES
Department of Internal Medicine  Infectious Diseases  Progress  Note      C/C :  HIV infection, r/o meningitis     Pt is more awake   Denies fever, headache   Confused  Afebrile         Current Facility-Administered Medications   Medication Dose Route Frequency Provider Last Rate Last Admin    lamiVUDine (EPIVIR) tablet 50 mg  50 mg Oral Daily Oli Garcia MD   50 mg at 06/24/21 1745    megestrol (MEGACE) tablet 40 mg  40 mg Oral BID Edmund Nina MD   40 mg at 06/23/21 2043    vancomycin 1000 mg IVPB in 250 mL D5W addavial  1,000 mg Intravenous Q24H Vin Ocampo MD   Stopped at 06/24/21 1942    dolutegravir sodium (TIVICAY) tablet 50 mg  50 mg Oral Daily Vin Ocampo MD   50 mg at 06/24/21 1400    gabapentin (NEURONTIN) capsule 400 mg  400 mg Oral BID Magnus CARREON MD   400 mg at 06/23/21 2043    morphine (PF) injection 2 mg  2 mg Intravenous Q4H PRN Adonica Sara, DO   2 mg at 06/19/21 0943    acetaminophen (TYLENOL) suppository 650 mg  650 mg Rectal Q4H PRN Adonica Sara, DO   650 mg at 06/22/21 1632    aspirin chewable tablet 81 mg  81 mg Oral Daily Viet Ruiz DO        clopidogrel (PLAVIX) tablet 75 mg  75 mg Oral Daily Viet Ruiz DO        multivitamin 1 tablet  1 tablet Oral Daily James Santoyo MD   1 tablet at 06/24/21 1400    thiamine mononitrate tablet 100 mg  100 mg Oral Daily James Santoyo MD   100 mg at 06/24/21 1400    sodium chloride flush 0.9 % injection 5-40 mL  5-40 mL Intravenous 2 times per day James Santoyo MD   10 mL at 06/23/21 2042    sodium chloride flush 0.9 % injection 5-40 mL  5-40 mL Intravenous PRN James Santoyo MD   10 mL at 06/22/21 1013    0.9 % sodium chloride infusion  25 mL Intravenous PRN James Santoyo MD        ondansetron (ZOFRAN-ODT) disintegrating tablet 4 mg  4 mg Oral Q8H PRN James Santoyo MD        Or    ondansetron (ZOFRAN) injection 4 mg  4 mg Intravenous Q6H PRN James Santoyo MD

## 2021-06-24 NOTE — CARE COORDINATION
6/24, SW spoke with Charge Nurse on patient having LP which was done. Per -would like to see results before patient can be discharged. Plan for discharge remains to [facility] in [facility]. Tiff from [facility] updated on LP procedure. Back up plan to [facility] is [facility]. Anticipated discharge is tomorrow. [facility] to follow for further discharge planning needs. JUAN CARLOS Youssef  [facility] Case Management  [phone]

## 2021-06-24 NOTE — PROGRESS NOTES
Pharmacy Consultation Note  (Antibiotic Dosing and Monitoring)    Initial consult date: 6/19/21  Consulting physician: Dr. Maryam Garcia  Drug(s): Vancomycin  Indication: CNS infection and bacteremia    Age/Gender Height Weight IBW Dosing weight  Allergy Information   60 y.o./male 5' 8\" (172.7 cm) 178 lb (80.7 kg)     Ideal body weight: 68.4 kg (150 lb 12.7 oz)  Adjusted ideal body weight: 73.3 kg (161 lb 10.8 oz)  80.7 kg  Ativan [lorazepam]          Other anti-infectives Start date Stop date   ampicillin 6/17    ceftriaxone 6/17              Date  WBC BUN/CR Drug/Dose Time   Given Level(s)   (Time)   6/17 11.5  52/3.0 Vancomycin 1000 mg x1 1321    6/18 6.1 57/3.0 Vancomycin 1000 mg x1 1610    6/19*  Pharmacy consulted 5.9 52/2.4 Vancomycin 1000 mg x1 1646 Random level = 13.6 mCg/mL @ 1214   6/20 5.6 --- Vancomycin 500 mg x1 1659 Random level = 17.5 mCg/mL @ 0437   6/21 5.4 25/1.5 Vancomycin 1000 mg q24h  1711 Random level = 12.8 mCg/mL @ 0419   6/22 5.9 20/1. 3 Vancomycin 1000 mg q24h  1632    6/23 6.9 22/1.3 vanco 1000 mg q24h 1721 Random level = 16.9 mCg/mL @ 0542   6/24 7.1 23/1.4 vanco 1000 mg q24h                       Intake/Output Summary (Last 24 hours) at 6/24/2021 1348  Last data filed at 6/24/2021 1341  Gross per 24 hour   Intake 300 ml   Output 1950 ml   Net -1650 ml   UO = 1.3 mL/kg/hr (2450 mL)    Cultures:    Site Date Result   Urine Culture 6/17 Growth not present   Blood Culture #1 6/16 Coag neg staph   Blood Culture #2 6/16 NGTD   Meningitis  CSF panem 6/17 Sent   CSF culture 6/17 Sent     Assessment:  · 60 YOM who is on Vancomycin/ampicillin/ceftriaxone for possible meningitis and GPC bacteremia  · Goal trough = 15 - 20 mcg/ml  · Random level = 17.5 on 6/20  · 6/21: Random level is 12.8 mcg/mL, Scr 1.5 (baseline ~1.4)  · 6/22: Scr 1.3  · 6/23: day #7 ATBs. Good UO documented, Cr = 1.3 (stable); vanc level OK this AM (~13 hrs post-dose). · 6/24: day #8 ATBs.       Plan:  · Continue vancomycin 1000 mg q24h: AUC/ELIZABET 497, est trough = 17.1 mCg/mL  · Pharmacist will follow and monitor/adjust dosing as necessary.     Usha Barrera, PharmD  6/24/2021  1:48 PM

## 2021-06-24 NOTE — PROGRESS NOTES
Hospitalist Progress Note      SYNOPSIS: Patient admitted on 6/16/2021  presented to [facility] with medical history of bacterial meningitis, scrotal abscess, CVA, depression, end-stage renal disease on dialysis, GERD, chronic hep C, herpes zoster, HIV viral infection on antiretroviral therapy, major depression with suicidal ideation and substance abuse. Josesito Zepeda was admitted on 5/24/2021 through 6/6/2021. [facility] had presented with altered mental status and found to have a stroke. [facility] had sepsis with staph bacteremia secondary to left side chest wall cellulitis. [facility] was in renal failure and had rhabdomyolysis with CPK of around 63,000. [facility] was also treated for pneumonia.  His urine drug screen was positive for cocaine and fentanyl.  CK was 882 at the time of discharge.  CD4 was 45.     Patient according to family members was last known well 2 days ago, \"walking and talking\".  Baseline mental status is not completely normal per their report.  Came in with altered mental status. Altered mental status is constant, severe, associated with aphasia.  Patient is currently nonverbal just grunting and moaning.  Patient is unable to answer any questions at this time. [facility] does not have a fever.  He has petechial rash on his legs.  His legs are swollen and red.     Vital signs notable for respiratory rate of 26, labs showed sodium of 147, glucose 169, creatinine 4.7, BUN 51, lactic acid level 2.7, , troponin IV 47, urine drug screen positive for cocaine, oxycodone, and opiate.  Urinalysis is positive for blood and bacteria.  EKG shows sinus rhythm rate of 90.  He had an echo in May 2021 with EF of 60%. The scan of his head is negative and CT scan of the cervical spine shows multilevel degenerative disease. CT scan of the abdomen showed a small kidney stone on the right.  No hydronephrosis. Recent MRI on 6/1/2021 showed small area of stroke in the right cerebral hemisphere. Chest x-ray shows stable coarse interstitial markings, cardiomegaly and atherosclerotic disease and tunneled right chest wall catheter. On antibiotics, LP for  (aspirin and plavix held)    SUBJECTIVE:    Stable overnight. No other overnight issues reported. Patient seen and examined  Records reviewed. Awaiting LP. For today  No current complaints  Mostly noverbal         Temp (24hrs), Av.3 °F (36.3 °C), Min:97 °F (36.1 °C), Max:97.5 °F (36.4 °C)    DIET: ADULT DIET; Dysphagia - Minced and Moist  Adult Oral Nutrition Supplement; Renal Oral Supplement  Adult Oral Nutrition Supplement; Fortified Pudding Oral Supplement  CODE: [id number]    Intake/Output Summary (Last 24 hours) at 2021 1230  Last data filed at 2021 0935  Gross per 24 hour   Intake 60 ml   Output 1950 ml   Net -1890 ml       Review of Systems  All bolded are positive; please see HPI  General:  Fever, chills, diaphoresis, fatigue, malaise, night sweats, weight loss  Psychological:  Anxiety, disorientation, hallucinations. ENT:  Epistaxis, headaches, vertigo, visual changes. Cardiovascular:  Chest pain, irregular heartbeats, palpitations, paroxysmal nocturnal dyspnea. Respiratory:  Shortness of breath, coughing, sputum production, hemoptysis, wheezing, orthopnea.   Gastrointestinal:  Nausea, vomiting, diarrhea, heartburn, constipation, abdominal pain, hematemesis, hematochezia, melena, acholic stools  Genito-Urinary:  Dysuria, urgency, frequency, hematuria  Musculoskeletal:  Joint pain, joint stiffness, joint swelling, muscle pain  Neurology:  Headache, focal neurological deficits, weakness, numbness, paresthesia  Derm:  Rashes, ulcers, excoriations, bruising  Extremities:  Decreased ROM, peripheral edema, mottling      OBJECTIVE:    /85   Pulse 72   Temp 97.2 °F (36.2 °C) (Temporal)   Resp 16   Ht 5' 8\" (1.727 m)   Wt 178 lb (80.7 kg)   SpO2 93%   BMI 27.06 kg/m²     General appearance:  Lethargic, mostly nonverbal; appears stated age and cooperative; no apparent distress no labored breathing  HEENT:  Conjunctivae/corneas clear. Neck: Supple. No jugular venous distention. Respiratory: symmetrical; clear to auscultation bilaterally; no wheezes; no rhonchi; no rales  Cardiovascular: rhythm regular; rate controlled; no murmurs  Abdomen: Soft, nontender, nondistended  Extremities:  peripheral pulses present; no peripheral edema; no ulcers  Musculoskeletal: No clubbing, cyanosis, no bilateral lower extremity edema. Brisk capillary refill. Skin:  No rashes  on visible skin  Neurologic moves all extremties    ASSESSMENT and PLAN:  Acute metabolic encephalopathy  HIV-positive patient with chronic hepatitis C drug abuse  Need to rule out meningitis-Petechial rash bilateral, meningitis?   LP still pending  LP - CSF for cell count, diff, protein, glucose, cx , meningitis panel  Patient's aspirin and Plavix on hold  Continue vancomycin, Rocephin and ampicillin per ID     HIV positive rule out AIDS  CD4 count, viral load per ID  Continue on Lamivudine 50 mg po q day   Continue on Tivicay 1 tab once a day         CHANEL on Chronic kidney disease   Secondary to dehydration, substance abuse, rhabdomyolysis  Continue IV fluid resuscitation  BUN and creatinine improving  Nephrology consulted and follow-up  creatinine improved 1.4 now  H/o Polysubstance abuse/ HIV       -Severe protein calorie malnutrition  Encourage p.o. intake         Neurology and ID follow-up         DISPOSITION: Awaiting LP    Medications:  REVIEWED DAILY    Infusion Medications    sodium chloride       Scheduled Medications    lamiVUDine  50 mg Oral Daily    megestrol  40 mg Oral BID    vancomycin  1,000 mg Intravenous Q24H    dolutegravir sodium  50 mg Oral Daily    gabapentin  400 mg Oral BID    [Held by provider] aspirin  81 mg Oral Daily    [Held by provider] clopidogrel  75 mg Oral Daily    multivitamin  1 tablet Oral Daily    vitamin B-1  100 mg Oral Daily    sodium chloride flush  5-40 mL Intravenous 2 times per day    cefTRIAXone (ROCEPHIN) IV  2,000 mg Intravenous Q12H    ampicillin IV  2,000 mg Intravenous Q8H     PRN Meds: morphine, acetaminophen, sodium chloride flush, sodium chloride, ondansetron **OR** ondansetron, polyethylene glycol    Labs:     Recent Labs     06/22/21  0431 06/23/21  0542 06/24/21  0450   WBC 5.9 6.9 7.1   HGB 11.8* 11.6* 11.2*   HCT 36.0* 35.0* 33.5*    219 217       Recent Labs     06/22/21  0431 06/23/21  0542 06/24/21  0450    133 139   K 3.2* 3.4* 4.0    99 105   CO2 21* 21* 23   BUN 20 22 23   CREATININE 1.3* 1.3* 1.4*   CALCIUM 8.6 8.6 8.5*   PHOS 2.8 3.2 2.5       No results for input(s): PROT, ALB, ALKPHOS, ALT, AST, BILITOT, AMYLASE, LIPASE in the last 72 hours. No results for input(s): INR in the last 72 hours. Recent Labs     06/22/21  0431 06/23/21  0542 06/24/21  0450   CKTOTAL 193 131 122       Chronic labs:    Lab Results   Component Value Date    CHOL 184 06/17/2021    TRIG 177 (H) 06/17/2021    HDL 28 06/17/2021    LDLCALC 121 (H) 06/17/2021    TSH 17.020 (H) 06/17/2021    PSA SEE NOTE (AA) 03/08/2017    PSA 0.47 03/08/2017    INR 1.4 05/26/2021    LABA1C 4.9 06/17/2021       Radiology: REVIEWED DAILY    +++++++++++++++++++++++++++++++++++++++++++++++++  Jean Gale, DO DO  [address], [place]  +++++++++++++++++++++++++++++++++++++++++++++++++  NOTE: This report was transcribed using voice recognition software. Every effort was made to ensure accuracy; however, inadvertent computerized transcription errors may be present.

## 2021-06-24 NOTE — PROGRESS NOTES
Called Dr Jose Francisco Dueñas office to inquire on possible status of discharge. Per SW, patient is able to discharge to [facility] BUT we need to verify that Dr Yee Wooten, and other team members are ok with discharge.

## 2021-06-24 NOTE — PROGRESS NOTES
[facility] MD José Mcnulty, MD Matt Tobias MD Deneen Plumb, CNP   Jana Cha, AUGUSTINE  Progress Note    6/23/2021    SUBJECTIVE:   (-) c/o's  (-) sob/elkins/cp/palp , altered mental status  No major improvement and mental status's admission  6/19: Overall improving today, with sodium level gradually normalizing with the given D5W and free water  6/20: No major change clinical status, with no new BMP today we will order one ASAP to monitor his hypernatremia and CHANEL    6/22:   Seen this morning. Feeling better. Appetite intake remained poor. Denies dyspnea at rest on room air. ROS otherwise unremarkable  6/23: Intake has improved. No new issues. D5 water drip still running      PROBLEM LIST:    Active Problems:    HIV infection ([facility]    HTN (hypertension)    Chronic hepatitis C virus infection ([facility] [address]    Mood disorder (HCC)    Cellulitis    Bilateral leg edema    AMS (altered mental status)    End-stage renal disease needing dialysis ([address]    Anemia due to chronic kidney disease    Petechial rash    Lactic acidosis    Elevated CPK    Elevated troponin    Polysubstance abuse ([address]    Microscopic hematuria    Kidney stone    Severe protein-calorie malnutrition ([address]  Resolved Problems:    * No resolved hospital problems. *         DIET:    ADULT DIET; Dysphagia - Minced and Moist  Adult Oral Nutrition Supplement; Renal Oral Supplement  Adult Oral Nutrition Supplement;  Fortified Pudding Oral Supplement     MEDS (scheduled):    potassium bicarb-citric acid  40 mEq Oral Once    lamiVUDine  50 mg Oral Daily    megestrol  40 mg Oral BID    vancomycin  1,000 mg Intravenous Q24H    dolutegravir sodium  50 mg Oral Daily    gabapentin  400 mg Oral BID    [Held by provider] aspirin  81 mg Oral Daily    [Held by provider] clopidogrel  75 mg Oral Daily    multivitamin  1 tablet Oral Daily    vitamin B-1  100 mg Oral Daily    sodium chloride flush 5-40 mL Intravenous 2 times per day    cefTRIAXone (ROCEPHIN) IV  2,000 mg Intravenous Q12H    ampicillin IV  2,000 mg Intravenous Q8H       MEDS (infusions):   sodium chloride         MEDS (prn):  morphine, acetaminophen, sodium chloride flush, sodium chloride, ondansetron **OR** ondansetron, polyethylene glycol    PHYSICAL EXAM:     Patient Vitals for the past 24 hrs:   BP Temp Temp src Pulse Resp SpO2   06/23/21 1502 124/70 97 °F (36.1 °C) Temporal 79 16 93 %   06/23/21 1148 (!) 146/80 97.2 °F (36.2 °C) Temporal 72 16 97 %   06/23/21 0830 (!) 140/87 97.6 °F (36.4 °C) Temporal 76 16 94 %   06/23/21 0400 138/75 97.3 °F (36.3 °C) Tympanic 78 16 98 %   06/23/21 0015 (!) 145/98 98.5 °F (36.9 °C) Temporal 74 14 --   06/22/21 2239 (!) 142/99 98.7 °F (37.1 °C) Temporal 76 16 --   06/22/21 2108 (!) 149/101 98.1 °F (36.7 °C) Oral 70 17 99 %   @      Intake/Output Summary (Last 24 hours) at 6/23/2021 2015  Last data filed at 6/23/2021 1503  Gross per 24 hour   Intake 1623.24 ml   Output 2700 ml   Net -1076.76 ml         Wt Readings from Last 3 Encounters:   06/16/21 178 lb (80.7 kg)   06/11/21 178 lb (80.7 kg)   06/11/21 178 lb (80.7 kg)       Constitutional:  in no acute distress  HEENT: NC/AT, EOMI, sclera and conjunctiva are clear and anicteric, mucus membranes moist  Neck: Trachea midline, no JVD  Cardiovascular: S1, S2 regular rhythm, no murmur,or rub  Respiratory:  No crackles, no wheeze  Gastrointestinal:  Soft, nontender, nondistended, NABS  Ext: no edema, feet warm  Skin: dry, no rash  Neuro: awake, alert, interactive      DATA:    Recent Labs     06/21/21  0419 06/22/21  0431 06/23/21  0542   WBC 5.4 5.9 6.9   HGB 10.9* 11.8* 11.6*   HCT 35.0* 36.0* 35.0*   .2* 98.6 97.5    156 219     Recent Labs     06/21/21  0419 06/22/21  0431 06/23/21  0542   * 136 133   K 3.1* 3.2* 3.4*   * 103 99   CO2 26 21* 21*   MG  --  1.5* 2.0   PHOS  --  2.8 3.2   BUN 25* 20 22   CREATININE 1.5* 1.3* 1.3* Lab Results   Component Value Date    LABPROT 4.2 (H) 05/25/2021    LABPROT 4.2 05/25/2021       ASSESSMENT / RECOMMENDATIONS:    1. CHANEL on top of CKD,  Multifactorial etiology, polysubstance abuse. Rhabdomyolysis, hepatitis C and HIV positive. 2. Anemia: Due to CKD     3. Metabolic acidosis due to CHANEL and top of CKD     4.  Hypertension: Supportive care    azotemia improved, stable  Total CK improving, as well  Nonoliguric   Hypokalemic  Mild hypokalemia secondary to the D5 water drip    Supplement potassium   Stop D5 water drip  Follow labs, UO   Continue supportive care           Electronically signed by Jil Riggs MD on 6/23/2021

## 2021-06-24 NOTE — PLAN OF CARE
Problem: Skin Integrity:  Goal: Will show no infection signs and symptoms  Description: Will show no infection signs and symptoms  Outcome: Met This Shift  Goal: Absence of new skin breakdown  Description: Absence of new skin breakdown  Outcome: Met This Shift     Problem: Falls - Risk of:  Goal: Will remain free from falls  Description: Will remain free from falls  Outcome: Met This Shift  Goal: Absence of physical injury  Description: Absence of physical injury  Outcome: Met This Shift     Problem: Confusion - Acute:  Goal: Absence of continued neurological deterioration signs and symptoms  Description: Absence of continued neurological deterioration signs and symptoms  Outcome: Met This Shift  Goal: Mental status will be restored to baseline  Description: Mental status will be restored to baseline  Outcome: Met This Shift     Problem: Injury - Risk of, Physical Injury:  Goal: Will remain free from falls  Description: Will remain free from falls  Outcome: Met This Shift  Goal: Absence of physical injury  Description: Absence of physical injury  Outcome: Met This Shift     Problem: Mood - Altered:  Goal: Mood stable  Description: Mood stable  Outcome: Met This Shift  Goal: Absence of abusive behavior  Description: Absence of abusive behavior  Outcome: Met This Shift  Goal: Verbalizations of feeling emotionally comfortable while being cared for will increase  Description: Verbalizations of feeling emotionally comfortable while being cared for will increase  Outcome: Met This Shift     Problem: Psychomotor Activity - Altered:  Goal: Absence of psychomotor disturbance signs and symptoms  Description: Absence of psychomotor disturbance signs and symptoms  Outcome: Met This Shift     Problem: Sensory Perception - Impaired:  Goal: Demonstrations of improved sensory functioning will increase  Description: Demonstrations of improved sensory functioning will increase  Outcome: Met This Shift  Goal: Decrease in sensory misperception frequency  Description: Decrease in sensory misperception frequency  Outcome: Met This Shift  Goal: Able to refrain from responding to false sensory perceptions  Description: Able to refrain from responding to false sensory perceptions  Outcome: Met This Shift  Goal: Demonstrates accurate environmental perceptions  Description: Demonstrates accurate environmental perceptions  Outcome: Met This Shift  Goal: Able to distinguish between reality-based and nonreality-based thinking  Description: Able to distinguish between reality-based and nonreality-based thinking  Outcome: Met This Shift  Goal: Able to interrupt nonreality-based thinking  Description: Able to interrupt nonreality-based thinking  Outcome: Met This Shift     Problem: Sleep Pattern Disturbance:  Goal: Appears well-rested  Description: Appears well-rested  Outcome: Met This Shift

## 2021-06-25 NOTE — PROGRESS NOTES
Comprehensive Nutrition Assessment    Type and Reason for Visit:  Reassess    Nutrition Recommendations/Plan: Continue current diet/ONS    Nutrition Assessment:  Pt remains nutritionally at risk w/ ongoing minimal PO intake since adm w/ AMS now s/p LP. Noted hx HIV, substance abuse, hep C, CVA. Pt w/ ESRD on HD. Will continue current nutrition plan and monitor    Malnutrition Assessment:  Malnutrition Status:  Severe malnutrition    Context:  Chronic Illness     Findings of the 6 clinical characteristics of malnutrition:  Energy Intake:  7 - 75% or less estimated energy requirements for 1 month or longer  Weight Loss:  Unable to assess (d/t possible fluid shifts ; on HD)     Body Fat Loss:   (moderate) Orbital, Triceps   Muscle Mass Loss:   (moderate) Temples (temporalis), Clavicles (pectoralis & deltoids)  Fluid Accumulation:  No significant fluid accumulation     Strength:  Not Performed    Estimated Daily Nutrient Needs:  Energy (kcal):   (MSJ 1584 x 1.2 SF); Weight Used for Energy Requirements:  Current     Protein (g):   (1.3-1.5); Weight Used for Protein Requirements:  Ideal        Fluid (ml/day):  per renal; Method Used for Fluid Requirements:  Standard Renal      Nutrition Related Findings:  expressive aphasia, soft abd, active BS, +1-3 pitting edema, -I/Os, HD      Wounds:  Surgical Incision (Incision x 1)       Current Nutrition Therapies:    ADULT DIET; Dysphagia - Minced and Moist  Adult Oral Nutrition Supplement; Renal Oral Supplement  Adult Oral Nutrition Supplement; Fortified Pudding Oral Supplement    Anthropometric Measures:  · Height: 5' 8\" (172.7 cm)  · Current Body Weight: 178 lb (80.7 kg) (6/16, no method)   · Usual Body Weight:  (UTO ; EMR shows past weights ranging between 150-178# within the past year)     · Ideal Body Weight: 154 lbs; % Ideal Body Weight 115.6 %   · BMI: 27.1  · BMI Categories: Overweight (BMI 25.0-29. 9)       Nutrition Diagnosis:   · Severe

## 2021-06-25 NOTE — PROGRESS NOTES
Pharmacy Consultation Note  (Antibiotic Dosing and Monitoring)    Initial consult date: 6/19/21  Consulting physician: Dr. Mine Long  Drug(s): Vancomycin  Indication: CNS infection and bacteremia    Age/Gender Height Weight IBW Dosing weight  Allergy Information   60 y.o./male 5' 8\" (172.7 cm) 178 lb (80.7 kg)     Ideal body weight: 68.4 kg (150 lb 12.7 oz)  Adjusted ideal body weight: 73.3 kg (161 lb 10.8 oz)  80.7 kg  Ativan [lorazepam]          Other anti-infectives Start date Stop date   ampicillin 6/17    ceftriaxone 6/17              Date  WBC BUN/CR Drug/Dose Time   Given Level(s)   (Time)   6/17 11.5  52/3.0 Vancomycin 1000 mg x1 1321    6/18 6.1 57/3.0 Vancomycin 1000 mg x1 1610    6/19*  Pharmacy consulted 5.9 52/2.4 Vancomycin 1000 mg x1 1646 Random level = 13.6 mCg/mL @ 1214   6/20 5.6 --- Vancomycin 500 mg x1 1659 Random level = 17.5 mCg/mL @ 0437   6/21 5.4 25/1.5 Vancomycin 1000 mg q24h  1711 Random level = 12.8 mCg/mL @ 0419   6/22 5.9 20/1. 3 Vancomycin 1000 mg q24h  1632    6/23 6.9 22/1.3 vanco 1000 mg q24h 1721 Random level = 16.9 mCg/mL @ 0542   6/24 7.1 23/1.4 vanco 1000 mg q24h 1750    6/25 7.9 18/1.5 vanco 1000 mg q24h;  Stopped @ 1156       Intake/Output Summary (Last 24 hours) at 6/25/2021 1144  Last data filed at 6/25/2021 0548  Gross per 24 hour   Intake 300 ml   Output 2050 ml   Net -1750 ml   UO = 1.1 mL/kg/hr (2050 mL)    Cultures:    Site Date Result   Urine  6/17 Growth not present (6/19/2021)   Blood #1 6/16 Coag neg staph   Blood #2 6/16 No Growth -- FINAL (6/21/2021)   Meningitis  CSF PCR panel 6/17 ; canceled   CSF  6/17 N/A   CSF 6/24 No organisms seen     Assessment:  · 56 YOM who is on Vancomycin/ampicillin/ceftriaxone for possible meningitis and GPC bacteremia  · Goal trough = 15 - 20 mcg/ml  · Random level = 17.5 on 6/20  · 6/21: Random level is 12.8 mcg/mL, Scr 1.5 (baseline ~1.4)  · 6/22: Scr 1.3  · 6/23: day #7 ATBs.   Good UO documented, Cr = 1.3 (stable); vanc level OK this AM (~13 hrs post-dose). · 6/24: day #8 ATBs. · 6/25: day #9 ATBs. Creatinine increased, but good UO still documented. Plan:  · Continue vancomycin 1000 mg q24h: AUC/ELIZABET 497, est trough = 17.1 mCg/mL. · Monitor renal function, creatinine. · Pharmacist will follow and monitor/adjust dosing as necessary. Alma Lockhart PharmD  6/25/2021  11:44 AM      ID stopped all ATBs (for meningitis) at 1156. Clinical Pharmacy sign off.     Alma Lockhart PharmD  6/25/2021  12:08 PM

## 2021-06-25 NOTE — DISCHARGE INSTR - COC
Continuity of Care Form    Patient Name: Sheryle Gear   :  1960  MRN:  [id number]    Admit date:  2021  Discharge date:  2021    Code Status Order: [facility]   Advance Directives:   [facility] Documentation       Date/Time Healthcare Directive Type of Healthcare Directive Copy in [address] Agent's Name Healthcare Agent's Phone Number    21  Yes, patient has an advance directive for healthcare treatment  Durable power of  for health care;Living will  Yes, copy in chart  Healthcare power of   Sissy Dodd  --    21 1600  Yes, patient has an advance directive for healthcare treatment waiting for family to fax  Durable power of  for health care; Health care treatment directive  No, copy requested from family  Healthcare power of   Jacky Coronado            Admitting Physician:  Chente Rios MD  PCP: No primary care provider on file. [facility]/Room#: 1157/4682-U  Discharging Unit Phone Number: [phone]    Emergency Contact:   Extended Emergency Contact Information  Primary Emergency Contact: cruz piper  Address: [address] [facility] [address] [facility], [address] [facility] [address] Phone: [phone]  Mobile Phone: [phone]  Relation: Brother/Sister  Secondary Emergency Contact: [facility]  Mobile Phone: [phone]  Relation: Brother/Sister    Past Surgical History:  Past Surgical History:   Procedure Laterality Date    ABSCESS DRAINAGE  2007    scrotal. [facility].  Dr. Fadi Veliz  2008    testicle    INGUINAL HERNIA REPAIR  3/9/2012    left indirect hernia repaired with mesh, Dr. Eldridge Client, [address] [facility] OTHER SURGICAL HISTORY  3/9/2012    Left Inguinal Hernia Repair;Removal of Foreign Object Left foot    VASCULAR SURGERY N/A 6/3/2021    INSERTION TUNNELED HEMODIALYSIS CATHETER, REMOVAL OF TEMPORARY CATHETER performed by Kelli Ceron MD at [address] [place] [address]       Immunization History:   Immunization History   Administered Date(s) Administered    Influenza 12/27/2010, 01/17/2012    Tdap (Boostrix, Adacel) 10/18/2013, 08/09/2019       Active Problems:  Patient Active Problem List   Diagnosis Code    Neuropathy G62.9    HIV infection ([facility] B20    HTN (hypertension) I10    Unilateral inguinal hernia K40.90    Chronic hepatitis C virus infection ([facility] B18.2    Mood disorder ([facility] F39    Cellulitis L03.90    Fracture of proximal end of ulna S52.009A    Septic olecranon bursitis of left elbow M71.122    CHANEL (acute kidney injury) ([facility] N17.9    Bilateral leg edema R60.0    AMS (altered mental status) R41.82    Left-sided weakness R53.1    Goals of care, counseling/discussion Z71.89    Palliative care by specialist Z51.5    Encounter regarding vascular access for dialysis for ESRD ([facility] N18.6, Z99.2    Acute renal failure superimposed on chronic kidney disease ([facility] N17.9, N18.9    End-stage renal disease needing dialysis ([facility] N18.6, Z99.2    Anemia due to chronic kidney disease N18.9, D63.1    Petechial rash R23.3    Lactic acidosis E87.2    Elevated CPK R74.8    Elevated troponin R77.8    Polysubstance abuse ([facility] F19.10    Microscopic hematuria R31.29    Kidney stone N20.0    Severe protein-calorie malnutrition ([facility] E43       Isolation/Infection:   Isolation            No Isolation          Patient Infection Status       Infection Onset Added Last Indicated Last Indicated By Review Planned Expiration Resolved Resolved By    None active    Resolved    COVID-19 Rule Out 05/25/21 05/25/21 05/25/21 COVID-19, Rapid (Ordered)   05/25/21 Rule-Out Test Resulted            Nurse Assessment:  Last Vital Signs: /65   Pulse 85   Temp 96.3 °F (35.7 °C) (Temporal)   Resp 18   Ht 5' 8\" (1.727 m)   Wt 178 lb (80.7 kg)   SpO2 97%   BMI 27.06 kg/m²     Last documented pain score (0-10 scale): Pain Level: 0  Last Weight:   Wt Readings from Last 1 Encounters:   06/16/21 178 lb (80.7 kg)     Mental Status:  alert    IV Access:  - Dialysis Catheter  - site  right and subclavian, insertion date: 06/04/2021    Nursing Mobility/ADLs:  Walking   Dependent  Transfer  Dependent  Bathing  Dependent  Dressing  Dependent  Toileting  Dependent  Feeding  Assisted  Med Admin  Assisted  Med Delivery   crushed and prefers mixed with pudding (sweets)    Wound Care Documentation and Therapy:        Elimination:  Continence:   · Bowel: No  · Bladder: No  Urinary Catheter: Insertion Date: 06/16/2021, Removal Date 06/25/2021 @ 1830 and Due to Void 12:30am-2:30 am   Colostomy/Ileostomy/Ileal Conduit: No       Date of Last BM: 06/25/2021    Intake/Output Summary (Last 24 hours) at 6/25/2021 1225  Last data filed at 6/25/2021 0548  Gross per 24 hour   Intake 300 ml   Output 2050 ml   Net -1750 ml     I/O last 3 completed shifts: In: 360 [P.O.:360]  Out: 2050 [Urine:2050]    Safety Concerns:     History of Falls (last 30 days), At Risk for Falls and ***    Impairments/Disabilities:      Speech and Contractures - Bilat  upper extremties    Nutrition Therapy:  Current Nutrition Therapy:   - Oral Diet:  Dental Soft and Renal  - Oral Nutrition Supplement:  Renal  Daily at Lunch  and High Protein Pudding  twice a day    Routes of Feeding: Oral  Liquids: No Restrictions  Daily Fluid Restriction: no  Last Modified Barium Swallow with Video (Video Swallowing Test): not done    Treatments at the Time of Hospital Discharge:   Respiratory Treatments: none  Oxygen Therapy:  is not on home oxygen therapy.   Ventilator:    - No ventilator support    Rehab Therapies: Physical Therapy, Occupational Therapy and Speech/Language Therapy  Weight Bearing Status/Restrictions: No weight bearing restirctions  Other Medical Equipment (for information only, NOT a DME order):  hospital bed  Other Treatments: ***    Patient's personal belongings (please select all that are sent with patient):  None    RN SIGNATURE:  Electronically signed by Michel Chan RN on 6/25/21 at 5:04 PM EDT    CASE MANAGEMENT/SOCIAL WORK SECTION    Inpatient Status Date: ***    Readmission Risk Assessment Score:  Readmission Risk              Risk of Unplanned Readmission:  39           Discharging to Facility/ Agency   · Name: [address] [place] [address]  · Address:  · Phone:  · Fax:    Dialysis Facility (if applicable)   · Name:  · Address:  · Dialysis Schedule:  · Phone:  · Fax:    / signature: Electronically signed by Luz Mercedes on 6/25/21 at 3:20 PM EDT    PHYSICIAN SECTION    Prognosis: Fair    Condition at Discharge: Stable    Rehab Potential (if transferring to Rehab): Fair    Recommended Labs or Other Treatments After Discharge: Follow up with ID for final LP results for meningitis panel and HIV treatment. Follow with neurology. Follow with PCP. Physician Certification: I certify the above information and transfer of Rad Adams  is necessary for the continuing treatment of the diagnosis listed and that he requires [facility] for less 30 days.      Update Admission H&P: No change in H&P    PHYSICIAN SIGNATURE:  Electronically signed by Julia Cedillo DO on 6/25/21 at 12:26 PM EDT

## 2021-06-25 NOTE — PROGRESS NOTES
Leobardo Severe is a 56 y.o.  male     Neurology following for AMS    PMH of stroke, HIV on antivirals, chronic hep C, HTN, cocaine abuse, GERD, depression, history of bacterial meningitis in 2010    Neurology evaluated patient on 5/25/2021 for new onset left arm weakness and AMS. MRI was completed which showed an acute right cerebellar stroke and chronic left pontine lacunar infarcts. He was also found to be septic with the staph bacteremia from left chest cellulitis/renal failure and rhabdo. Patient was readmitted for a concern of repeated strokes although CT head was negative for acute findings. MRI brain was completed which was also negative for any acute findings. LP was ordered over the weekend and aspirin and Plavix have been held for the procedure which was completed yesterday   ID following follow CSF not indicative of infection   Protein markedly elevated however    Patient apparently did not have dialysis for approximately 2 weeks    It is unclear as to why   Patient remains nonverbal but nods appropriately at times and is not a great historian at this time    On admission his urine drug screen was positive for cocaine opiates and oxycodone    ROS unable to obtain due to lack of cooperation    Nurse at bedside helping with feeding and meds    Objective:     /62   Pulse 77   Temp 97.4 °F (36.3 °C) (Temporal)   Resp 16   Ht 5' 8\" (1.727 m)   Wt 178 lb (80.7 kg)   SpO2 94%   BMI 27.06 kg/m²     General appearance: alert, appears stated age, uncooperative and no distress  Head: normocephalic, without obvious abnormality, atraumatic  Extremities: no cyanosis or edema    Mental Status: Alert to self    Follows very few simple commands.     Cranial Nerves:  I: smell    II: visual acuity     II: visual fields    II: pupils GUSTAVO   III,VII: ptosis None   III,IV,VI: extraocular muscles  Looks around the room    V: mastication Normal   V: facial light touch sensation     V,VII: corneal reflex     VII: facial muscle function - upper     VII: facial muscle function - lower Normal   VIII: hearing Normal   IX: soft palate elevation  Normal   IX,X: gag reflex    XI: trapezius strength     XI: sternocleidomastoid strength    XI: neck extension strength     XII: tongue strength  Normal     Motor:  Moves arms and legs purposefully though minimally   Generalized weakness appreciated  Decreased bulk and tone    Coordination:   No ataxia appreciated     Laboratory/Radiology:     CBC with Differential:    Lab Results   Component Value Date    WBC 7.9 06/25/2021    RBC 3.37 06/25/2021    HGB 11.0 06/25/2021    HCT 32.6 06/25/2021     06/25/2021    MCV 96.7 06/25/2021    MCH 32.6 06/25/2021    MCHC 33.7 06/25/2021    RDW 13.5 06/25/2021    SEGSPCT 47 01/06/2014    LYMPHOPCT 24.4 06/25/2021    MONOPCT 11.3 06/25/2021    BASOPCT 0.5 06/25/2021    MONOSABS 0.90 06/25/2021    LYMPHSABS 1.94 06/25/2021    EOSABS 0.75 06/25/2021    BASOSABS 0.04 06/25/2021     CMP:    Lab Results   Component Value Date     06/25/2021    K 4.1 06/25/2021    K 4.0 06/16/2021     06/25/2021    CO2 20 06/25/2021    BUN 28 06/25/2021    CREATININE 1.5 06/25/2021    GFRAA 57 06/25/2021    LABGLOM 57 06/25/2021    GLUCOSE 89 06/25/2021    GLUCOSE 83 01/26/2012    PROT 8.5 06/18/2021    LABALBU 3.4 06/18/2021    LABALBU 4.2 01/17/2012    CALCIUM 8.6 06/25/2021    BILITOT 0.3 06/18/2021    ALKPHOS 55 06/18/2021    AST 71 06/18/2021    ALT 34 06/18/2021     Hepatic Function Panel:    Lab Results   Component Value Date    ALKPHOS 55 06/18/2021    ALT 34 06/18/2021    AST 71 06/18/2021    PROT 8.5 06/18/2021    BILITOT 0.3 06/18/2021    BILIDIR 0.3 03/13/2015    IBILI 1.3 03/13/2015    LABALBU 3.4 06/18/2021    LABALBU 4.2 01/17/2012     HgBA1c:    Lab Results   Component Value Date    LABA1C 4.9 06/17/2021     FLP:    Lab Results   Component Value Date    TRIG 177 06/17/2021    HDL 28 06/17/2021    LDLCALC 121 06/17/2021 LABVLDL 35 06/17/2021      Ref. Range 6/17/2021 06:28   Absolute CD 4 [place] Latest Ref Range: 430 - 1800 cells/uL 111 (L)        Ref.  Range 6/24/2021 10:03   Glucose, CSF Latest Ref Range: 40 - 70 mg/dL 41   Protein, CSF Latest Ref Range: 15 - 40 mg/dL 201 (H)   RBC, CSF Latest Units: /uL <2000   WBC, CSF Latest Ref Range: 0 - 2 /uL 7 (H)   Neutrophils, CSF Latest Ref Range: 0 - 10 % 14 (H)   Monocytes, CSF Latest Ref Range: 10 - 70 % 86 (H)   Color, CSF Unknown Colorless   Tube Number + CELL CT + DIFF-CSF Unknown Tube 3       CT head: Negative for acute findings    MRI brain: Negative for acute findings    All labs and imaging studies reviewed independently today    Assessment:     Altered mental status inpatient with history of stroke and HIV   Marked improvement from the weekend which lends to metabolic disarray with his illicit drug use and HD compliance difficulties    imaging studies have been negative for acute findings   CSF with very few white blood cells however markedly elevated protein    Meningitis panel pending versus suspect HAND    Plan:      Aspirin and plavix has been restarted  We will attempt to fluid and labs a viral count in his CSF  Await meningitis panel -check with lab  Continue statin    Maylin Dudley, APRN - CNS  11:31 AM  6/25/2021

## 2021-06-25 NOTE — PROGRESS NOTES
Message sent to CRISTHIAN Tirado regarding nursing communication order by Dr. Rodger Elaine. \"Hospitalist is wondering if there are any more recommendations from neurology perspective. \"  Electronically signed by Etelvina Reveles RN on 6/25/2021 at 10:25 AM      Neurology stated \"Lab needs to run the viral encephalitis panel ordered Also Did they do (can they do) a viral load on csf. \" Notified Dr. Rodger Elaine of this as well.   Electronically signed by Etelvina Reveles RN on 6/25/2021 at 10:38 AM

## 2021-06-25 NOTE — DISCHARGE SUMMARY
6/24 unrevealing. Antibioitics stopped. Ecephalopathy possibly from HIV encephalopathy    Acute metabolic encephalopathy  HIV-positive patient with chronic hepatitis C drug abuse  LP performed was unrevealing. ID followed. Viral meningitis panel negative  Was on vancomycin, Rocephin and ampicillin, now discontinued     HIV positive   Continue HIV medications     CHANEL on Chronic kidney disease   Secondary to dehydration, substance abuse, rhabdomyolysis  BUN and creatinine improved  Nephrology consulted and follow-up  H/o Polysubstance abuse/ HIV     -Severe protein calorie malnutrition  Encourage p.o. intake             Discharge Exam:  General appearance: Mostly nonverbal, but with prompting he does speak and answer questions; appears stated age and cooperative; no apparent distress no labored breathing  HEENT:  Conjunctivae/corneas clear. Neck: Supple. No jugular venous distention. Respiratory: symmetrical; clear to auscultation bilaterally; no wheezes; no rhonchi; no rales  Cardiovascular: rhythm regular; rate controlled; no murmurs  Abdomen: Soft, nontender, nondistended  Extremities:  peripheral pulses present; no peripheral edema; no ulcers  Musculoskeletal: No clubbing, cyanosis, no bilateral lower extremity edema. Brisk capillary refill. Skin:  No rashes  on visible skin  Neurologic moves all extremties    Disposition: SNF  The patient's condition is fair. At this time the patient is without objective evidence of an acute process requiring continuing hospitalization or inpatient management. They are stable for discharge with outpatient follow-up. I have spoken with the patient and discussed the results of the current hospitalization, in addition to providing specific details for the plan of care and counseling regarding the diagnosis and prognosis. The plan has been discussed in detail and they are aware of the specific conditions for emergent return, as well as the importance of follow-up. Their questions are answered at this time and they are agreeable with the plan for discharge to [facility]     Patient Instructions: Neurology and ID follow-up. Follow up with HIV clinic. Follow up with PCP within one week. Future Appointments   Date Time Provider Nikita Snyder   7/16/2021  2:00 PM [facility] VAS  RM [address] [facility]   7/19/2021 11:15 AM Indira Barajas MD [facility]       Discharge Medications:     Medication List      START taking these medications    gabapentin 400 MG capsule  Commonly known as: NEURONTIN  Take 1 capsule by mouth 2 times daily for 30 days.   Replaces: gabapentin 800 MG tablet     lamiVUDine 100 MG tablet  Commonly known as: EPIVIR  Take 0.5 tablets by mouth daily  Start taking on: June 26, 2021     megestrol 40 MG tablet  Commonly known as: MEGACE  Take 1 tablet by mouth 2 times daily     polyethylene glycol 17 g packet  Commonly known as: GLYCOLAX  Take 17 g by mouth daily as needed for Constipation        CHANGE how you take these medications    dolutegravir sodium 50 MG tablet  Commonly known as: Tivicay  Take 1 tablet by mouth daily  Start taking on: June 26, 2021  What changed: when to take this        CONTINUE taking these medications    aspirin 81 MG chewable tablet  Take 1 tablet by mouth daily     clopidogrel 75 MG tablet  Commonly known as: PLAVIX  Take 1 tablet by mouth daily     multivitamin Tabs tablet  Take 1 tablet by mouth daily     vitamin B-1 100 MG tablet  Commonly known as: THIAMINE  Take 1 tablet by mouth daily        STOP taking these medications    Descovy 200-25 MG Tabs tablet  Generic drug: emtricitabine-tenofovir alafenamide     gabapentin 800 MG tablet  Commonly known as: NEURONTIN  Replaced by: gabapentin 400 MG capsule     melatonin 3 MG Tabs tablet     sodium bicarbonate 650 MG tablet           Where to Get Your Medications      Information about where to get these medications is not yet available    Ask your nurse or doctor about these medications  · dolutegravir sodium 50 MG tablet  · gabapentin 400 MG capsule  · lamiVUDine 100 MG tablet  · megestrol 40 MG tablet  · polyethylene glycol 17 g packet         Activity: activity as tolerated    Diet: regular diet (minced and moist) with supplements    Wound Care: as directed    Follow-up:     · This patient is instructed to follow-up with his primary care physician. · Patient is instructed to follow-up with the consults listed above as directed by them. · They are instructed to resume home medications and take new medications as indicated in the list above. · If the patient has a recurrence of symptoms, they are instructed to go to the ED. Preparing for this patient's discharge, including paperwork, orders, instructions, and meeting with patient did require > 30 minutes.     Hernando Elliott DO   12:24 PM  6/25/2021

## 2021-06-25 NOTE — PROGRESS NOTES
Nurse to nurse report called to [facility]. Spoke to [place].   Electronically signed by Livia Palomino RN on 6/25/2021 at 5:45 PM

## 2021-06-25 NOTE — PROGRESS NOTES
[facility] MD Pushpa Martínez MD Elsa Pian, MD Sallyanne Fava, MD Delphine Saran, CNP   Jana Cha, AUGUSTINE  Progress Note    6/25/2021    SUBJECTIVE:   (-) c/o's  (-) sob/elkins/cp/palp , altered mental status  No major improvement and mental status's admission  6/19: Overall improving today, with sodium level gradually normalizing with the given D5W and free water  6/20: No major change clinical status, with no new BMP today we will order one ASAP to monitor his hypernatremia and CHANEL    6/22:   Seen this morning. Feeling better. Appetite intake remained poor. Denies dyspnea at rest on room air. ROS otherwise unremarkable  6/23: Intake has improved. No new issues. D5 water drip still running  6/24: Patient was seen in his room with large amount of house-staff, getting LP today with potential transfer to [facility] in a.m. from the notes that I was able to to collect. 6/25: Patient is awake/alert but confused with no new complaints and no fever. PROBLEM LIST:    Active Problems:    HIV infection ([address]    HTN (hypertension)    Chronic hepatitis C virus infection ([address]    Mood disorder (HCC)    Cellulitis    Bilateral leg edema    AMS (altered mental status)    End-stage renal disease needing dialysis ([address]    Anemia due to chronic kidney disease    Petechial rash    Lactic acidosis    Elevated CPK    Elevated troponin    Polysubstance abuse ([address]    Microscopic hematuria    Kidney stone    Severe protein-calorie malnutrition ([address]  Resolved Problems:    * No resolved hospital problems. *         DIET:    ADULT DIET; Dysphagia - Minced and Moist  Adult Oral Nutrition Supplement; Renal Oral Supplement  Adult Oral Nutrition Supplement;  Fortified Pudding Oral Supplement     MEDS (scheduled):    lamiVUDine  50 mg Oral Daily    megestrol  40 mg Oral BID    dolutegravir sodium  50 mg Oral Daily    gabapentin  400 mg Oral BID    aspirin  81 mg Oral Daily    clopidogrel  75 mg Oral Daily    multivitamin  1 tablet Oral Daily    vitamin B-1  100 mg Oral Daily    sodium chloride flush  5-40 mL Intravenous 2 times per day       MEDS (infusions):   sodium chloride         MEDS (prn):  morphine, acetaminophen, sodium chloride flush, sodium chloride, ondansetron **OR** ondansetron, polyethylene glycol    PHYSICAL EXAM:     Patient Vitals for the past 24 hrs:   BP Temp Temp src Pulse Resp SpO2   06/25/21 1155 110/65 96.3 °F (35.7 °C) Temporal 85 18 97 %   06/25/21 0730 101/62 97.4 °F (36.3 °C) Temporal 77 16 94 %   06/24/21 2130 (!) 169/98 97.7 °F (36.5 °C) Temporal 79 16 97 %   06/24/21 1545 (!) 140/80 97.7 °F (36.5 °C) Temporal 75 16 95 %   @      Intake/Output Summary (Last 24 hours) at 6/25/2021 1328  Last data filed at 6/25/2021 0548  Gross per 24 hour   Intake 300 ml   Output 2050 ml   Net -1750 ml         Wt Readings from Last 3 Encounters:   06/16/21 178 lb (80.7 kg)   06/11/21 178 lb (80.7 kg)   06/11/21 178 lb (80.7 kg)       Constitutional:  in no acute distress  HEENT: NC/AT, EOMI, sclera and conjunctiva are clear and anicteric, mucus membranes moist  Neck: Trachea midline, no JVD  Cardiovascular: S1, S2 regular rhythm, no murmur,or rub  Respiratory:  No crackles, no wheeze  Gastrointestinal:  Soft, nontender, nondistended, NABS  Ext: no edema, feet warm  Skin: dry, no rash  Neuro: awake, alert, interactive      DATA:    Recent Labs     06/23/21  0542 06/24/21  0450 06/25/21  0435   WBC 6.9 7.1 7.9   HGB 11.6* 11.2* 11.0*   HCT 35.0* 33.5* 32.6*   MCV 97.5 96.0 96.7    217 247     Recent Labs     06/23/21  0542 06/24/21  0450 06/25/21  0435    139 135   K 3.4* 4.0 4.1   CL 99 105 103   CO2 21* 23 20*   MG 2.0 1.9  --    PHOS 3.2 2.5  --    BUN 22 23 28*   CREATININE 1.3* 1.4* 1.5*       Lab Results   Component Value Date    LABPROT 4.2 (H) 05/25/2021    LABPROT 4.2 05/25/2021       ASSESSMENT / RECOMMENDATIONS:    1.   CHANEL on top of CKD,

## 2021-06-25 NOTE — PROGRESS NOTES
Message sent to Dr. Karin Escudero regarding family request. \"Family is concerned that patient is not eating/drinking well.  They are wondering if patient can be ordered IV fluids for hydration\"  Electronically signed by Jyotsna Michael RN on 6/25/2021 at 10:26 AM

## 2021-06-25 NOTE — PROGRESS NOTES
Department of Internal Medicine  Infectious Diseases  Progress  Note      C/C :  HIV infection- uncontrolled , r/o meningitis     Pt is more awake and alert   Denies fever, headache   Confused  Afebrile         Current Facility-Administered Medications   Medication Dose Route Frequency Provider Last Rate Last Admin    lamiVUDine (EPIVIR) tablet 50 mg  50 mg Oral Daily Oli Garcia MD   50 mg at 06/25/21 1042    megestrol (MEGACE) tablet 40 mg  40 mg Oral BID Fede CARREON MD   40 mg at 06/25/21 1042    vancomycin 1000 mg IVPB in 250 mL D5W addavial  1,000 mg Intravenous Q24H Daljit Schultz MD   Stopped at 06/24/21 1942    dolutegravir sodium (TIVICAY) tablet 50 mg  50 mg Oral Daily Daljit Schultz MD   50 mg at 06/25/21 1042    gabapentin (NEURONTIN) capsule 400 mg  400 mg Oral BID Fede CARREON MD   400 mg at 06/25/21 1042    morphine (PF) injection 2 mg  2 mg Intravenous Q4H PRN Kayley Quivers, DO   2 mg at 06/19/21 0943    acetaminophen (TYLENOL) suppository 650 mg  650 mg Rectal Q4H PRN Kayley Quivers, DO   650 mg at 06/22/21 1632    aspirin chewable tablet 81 mg  81 mg Oral Daily [facility] RENARD Ruiz, DO   81 mg at 06/25/21 1042    clopidogrel (PLAVIX) tablet 75 mg  75 mg Oral Daily [facility] RENARD Ruiz, DO   75 mg at 06/25/21 1042    multivitamin 1 tablet  1 tablet Oral Daily Gato Esquivel MD   1 tablet at 06/25/21 1041    thiamine mononitrate tablet 100 mg  100 mg Oral Daily Gato Esquivel MD   100 mg at 06/25/21 1041    sodium chloride flush 0.9 % injection 5-40 mL  5-40 mL Intravenous 2 times per day Gato Esquivel MD   10 mL at 06/25/21 1057    sodium chloride flush 0.9 % injection 5-40 mL  5-40 mL Intravenous PRN Gato Esquivel MD   10 mL at 06/22/21 1013    0.9 % sodium chloride infusion  25 mL Intravenous PRN Gato Esquivel MD        ondansetron (ZOFRAN-ODT) disintegrating tablet 4 mg  4 mg Oral Q8H PRN Gato Esquivel MD        Or    ondansetron (ZOFRAN) injection 4 mg  4 mg Intravenous Q6H PRN Anya Kruse MD        polyethylene glycol (GLYCOLAX) packet 17 g  17 g Oral Daily PRN Anya Kruse MD        cefTRIAXone (ROCEPHIN) 2,000 mg in sterile water 20 mL IV syringe  2,000 mg Intravenous Q12H Oli Garcia MD   2,000 mg at 06/25/21 1123    ampicillin 2000 mg ivpb mini bag  2,000 mg Intravenous Q8H Jose G Jefferson MD   Stopped at 06/25/21 0615       REVIEW OF SYSTEMS:  Could not be obtained         PHYSICAL EXAM:      Vitals:     /62   Pulse 77   Temp 97.4 °F (36.3 °C) (Temporal)   Resp 16   Ht 5' 8\" (1.727 m)   Wt 178 lb (80.7 kg)   SpO2 94%   BMI 27.06 kg/m²     General Appearance:    Awake, alert, no distress    Head:    Normocephalic, atraumatic   Eyes:    No pallor, no icterus,no photophobia    Ears:    No obvious deformity or drainage.    Nose:   No nasal drainage   Throat:   Mucosa moist, no oral thrush   Neck:   Supple      Lungs:     Clear to auscultation bilaterally    Heart:    Regular rate and rhythm, no murmur   Abdomen:     Soft, non-tender, bowel sounds present    Extremities:   No edema, no cyanosis ,no open wound   Pulses:   Dorsalis pedis palpable    Skin:   no rashes   Right chest tesio - no drainage, on erythema      CBC with Differential:      Lab Results   Component Value Date    WBC 7.9 06/25/2021    RBC 3.37 06/25/2021    HGB 11.0 06/25/2021    HCT 32.6 06/25/2021     06/25/2021    MCV 96.7 06/25/2021    MCH 32.6 06/25/2021    MCHC 33.7 06/25/2021    RDW 13.5 06/25/2021    SEGSPCT 47 01/06/2014    LYMPHOPCT 24.4 06/25/2021    MONOPCT 11.3 06/25/2021    BASOPCT 0.5 06/25/2021    MONOSABS 0.90 06/25/2021    LYMPHSABS 1.94 06/25/2021    EOSABS 0.75 06/25/2021    BASOSABS 0.04 06/25/2021       CMP     Lab Results   Component Value Date     06/25/2021    K 4.1 06/25/2021    K 4.0 06/16/2021     06/25/2021    CO2 20 06/25/2021    BUN 28 06/25/2021    CREATININE 1.5 06/25/2021    GFRAA 57 06/25/2021    LABGLOM 57 06/25/2021    GLUCOSE 89 06/25/2021    GLUCOSE 83 01/26/2012    PROT 8.5 06/18/2021    LABALBU 3.4 06/18/2021    LABALBU 4.2 01/17/2012    CALCIUM 8.6 06/25/2021    BILITOT 0.3 06/18/2021    ALKPHOS 55 06/18/2021    AST 71 06/18/2021    ALT 34 06/18/2021         Hepatic Function Panel:    Lab Results   Component Value Date    ALKPHOS 55 06/18/2021    ALT 34 06/18/2021    AST 71 06/18/2021    PROT 8.5 06/18/2021    BILITOT 0.3 06/18/2021    BILIDIR 0.3 03/13/2015    IBILI 1.3 03/13/2015    LABALBU 3.4 06/18/2021    LABALBU 4.2 01/17/2012       PT/INR:    Lab Results   Component Value Date    PROTIME 14.9 05/26/2021    INR 1.4 05/26/2021       TSH:    Lab Results   Component Value Date    TSH 17.020 06/17/2021       U/A:    Lab Results   Component Value Date    COLORU Yellow 06/16/2021    PHUR 5.0 06/16/2021    LABCAST FEW  11/02/2011    WBCUA 1-3 06/16/2021    WBCUA NONE 01/26/2012    RBCUA 5-10 06/16/2021    RBCUA NONE 01/26/2012    BACTERIA MODERATE 06/16/2021    CLARITYU Clear 06/16/2021    SPECGRAV >=1.030 06/16/2021    LEUKOCYTESUR Negative 06/16/2021    UROBILINOGEN 1.0 06/16/2021    BILIRUBINUR MODERATE 06/16/2021    BILIRUBINUR NEGATIVE 01/26/2012    BLOODU LARGE 06/16/2021    GLUCOSEU Negative 06/16/2021    GLUCOSEU NEGATIVE 01/26/2012    AMORPHOUS MODERATE 05/25/2021       ABG:  No results found for: Elmira Clement, [id number], PHART, THGBART, VHG2IBN, PO2ART, OEH1LMU    MICROBIOLOGY:    Blood culture -    Updated: 06/18/21 0907     Bottle Type Aerobic    Source of Blood Culture No site given    Order Number [id number]    Enterobacter cloacae complex by PCR Not Detected    Escherichia coli by PCR Not Detected    Klebsiella oxytoca by PCR Not Detected    Klebsiella pneumoniae group by PCR Not Detected    Proteus species by PCR Not Detected    Streptococcus agalactiae by PCR Not Detected    Staphylococcus aureus by PCR Not Detected    Serratia marcescens by PCR Not Detected Streptococcus pneumoniae by PCR Not Detected    Streptococcus pyogenes  by PCR Not Detected    Acinetobacter baumannii by PCR Not Detected    Candida albicans by PCR Not Detected    Candida glabrata by PCR Not Detected    Candida krusei by PCR Not Detected    Candida parapsilosis by PCR Not Detected    Candida tropicalis by PCR Not Detected    Enterobacteriaceae by PCR Not Detected    Enterococcus by PCR Not Detected    Haemophilus Influenzae by PCR Not Detected    Listeria monocytogenes by PCR Not Detected    Neisseria meningitidis by PCR Not Detected    Pseudomonas aeruginosa by PCR Not Detected    Staphylococcus species by PCR DETECTEDPanic      Streptococcus species by PCR Not Detected    Methicillin Resistance mecA/C  by PCR DETECTEDPanic     Narrative:     CALL  Joseph  H45 tel. ,      Vanco random level 16.9     Direct Exam SEE BELOW     Comment: Specimen Description          PLASMA   Direct Exam                SEE BELOW   HIV-1 RNA DETECTED,35,300 CP/ML (4.55 LOG CP/ML) Results reported to   the appropriate Health Department             Radiology :    Chest  X ray - no infiltrates       IMPRESSION:    1. HIV infection / AIDS  2. Chronic Hep C infection   3. Encephalopathy, Cocaine use. CSF - unremarkable except elevated protein   4. CONS bacteremia - contamination       RECOMMENDATIONS:      1. CSF meningitis panel , HIV genotype   3. Follow up at [facility] in 1-2 weeks   4.  Stop Vancomycin / Rocephin / Ampicillin

## 2021-06-25 NOTE — CARE COORDINATION
6/25, Discharge acknowledged. .CORNELIO spoke with Tiff from [facility] on patient not meeting LTAC criteria at this time. Contact was made to Kevin at [address] on patient being appropriate for [facility] at this time. Kevin has been following patient. Kevin confirmed that LOC information has been received to him and bed is available for patient to come later today. Transportation has been set up for a 7pm  today [facility] for patient to go to [address]. Patient will need a COVID test with negative results done prior to discharge. COVID test given to nursing. Those informed of  time:  Madeleine to patient, nursing and Julio C at [address]. HENS, LOC paper, ambulance(PAS/Letter) and envelope on soft chart. Met with Madeleine in person on floor who had questions on patient having his medications at facility. Gave Lesa contact information on [facility]. Contacted Julio C on medications that patient would be on. Awaiting to hear from Kevin. CORNELIO to follow for further discharge planning needs.       Brooke Reilly, [facility] Case Management  [phone]

## 2021-06-28 NOTE — CARE COORDINATION
6/28, CORNELIO was contacted on patient by patient's brother-Clay. Discussed questions that Rajesh Warren had on referrals that were made to other facilities on patient. Informed him of referrals made to [facility], [address] [place] [address] and [place]. Rajesh Warren had questions on visiting patient at [facility] to [address] having scheduled visits. Explained that [facility] are following the CDC guidelines. Clay to check on this and also possibly having patient transferred to [facility]. Clay to contact [facility] for availability. Informed Unknown Jury that if family-POA wants patient moved to [facility] could assist with that process.       Martha Thurston, [facility]  [facility] Case Management  [phone]

## 2021-07-03 NOTE — ED NOTES
Pt being d/c'ed back to [facility]. Called and gave report to [facility] LPN. Called [facility] to arrange transportation for the pt, ETA is 45 minutes. The pt's POA is at the bedside, she was updated. Pt resting comfortably in bed with his eyes closed, no distress noted.       Nereyda Castro, RN  07/03/21 [id number]

## 2021-07-03 NOTE — ED NOTES
Bed: 09  Expected date:   Expected time:   Means of arrival:   Comments:  PALMER You RN  07/03/21 [id number]

## 2021-07-03 NOTE — ED PROVIDER NOTES
HPI     Patient is a 56 y.o. Chalo Warrens a past medical history of encephalopathy who presents with a chief complaint of pneumonia  This has been occurring for a few days. Patient presents with chief complaint of pneumonia. Patient is presenting from a nursing home where he was DNR CC. Discussed case with patient's power of  who stated that they would like patient to have everything but intubation and chest compressions. Patient is nonverbal at this time. Report is that patient has been acting normal up to 2 weeks prior and then had a significant change in his mental status. Review of Systems   Unable to perform ROS: Patient nonverbal        Physical Exam  Vitals and nursing note reviewed. Constitutional:       Appearance: He is well-developed. He is ill-appearing. HENT:      Head: Normocephalic and atraumatic. Right Ear: External ear normal.      Left Ear: External ear normal.      Nose: Nose normal. No congestion or rhinorrhea. Mouth/Throat:      Mouth: Mucous membranes are dry. Eyes:      Extraocular Movements: Extraocular movements intact. Conjunctiva/sclera: Conjunctivae normal.      Pupils: Pupils are equal, round, and reactive to light. Cardiovascular:      Rate and Rhythm: Regular rhythm. Tachycardia present. Heart sounds: Normal heart sounds. No murmur heard. Pulmonary:      Effort: Pulmonary effort is normal. No respiratory distress. Breath sounds: No stridor. Rhonchi present. No wheezing or rales. Comments: Patient is left-sided rhonchi. Abdominal:      General: Bowel sounds are normal.      Palpations: Abdomen is soft. Tenderness: There is no abdominal tenderness. There is no guarding or rebound. Musculoskeletal:         General: Tenderness present. No deformity. Cervical back: Normal range of motion and neck supple. Comments: Patient appears contracted. Skin:     General: Skin is warm and dry.       Capillary Refill: Capillary refill takes less than 2 seconds. Neurological:      Mental Status: He is alert. Mental status is at baseline. He is disoriented. Cranial Nerves: No cranial nerve deficit. Coordination: Coordination normal.      Comments: Patient is not responding to verbal stimuli. Procedures     [facility]     ED Course as of Jul 03 1703   Sat Jul 03, 2021   1323 Do power of  outpatient who stated that she would like patient on full lab work and would possibly like to change patient's CODE STATUS from DNR CCA to DNR CCA and the patient requires hospitalization to move forward with. [JM]   1702 Extensive conversation with patient's power of  who is agreeable that patient go back to the nursing home. Patient be discharged home. [JM]      ED Course User Index  [JM] Arturo Can MD      Patient is a 56 y.o. male presenting with pneumonia. Patient is a DNR CCA. Patient will have a chest x-ray. Patient's chest x-ray does not show patient's vitals are notable for mild tachycardia. Patient is unable to provide any other history. Patient had labs drawn. Patient's labs are notable for being significantly dry. Patient was given 1000 cc of fluid. Patient's mental status improved while in the ED. Extensive conversation with power of  and shared decision making. Patient be discharged back to the nursing facility. Patient was given return precautions. Patient will follow up with their primary care provider. Patient is agreeable to this plan. Patient has remained stable throughout their stay in the ED. Patient was seen and evaluated by myself and my attending Walter Lee DO. Assessment and Plan discussed with attending provider, please see attestation for final plan of care. This note was done using dictation software and there may be some grammatical errors associated with this.     Arturo Can MD       ED Course as of Jul 03 1707   Sat Jul 03, 2021   1323 Do power of  outpatient who stated that she would like patient on full lab work and would possibly like to change patient's CODE STATUS from DNR CCA to DNR CCA and the patient requires hospitalization to move forward with. [NORMA]   1702 Extensive conversation with patient's power of  who is agreeable that patient go back to the nursing home. Patient be discharged home. [NORMA]      ED Course User Index  [NORMA] Flavia Gosselin, MD       --------------------------------------------- PAST HISTORY ---------------------------------------------  Past Medical History:  has a past medical history of Abscess of hand, left, Abscess of scrotum, Bacterial meningitis, Cerebral artery occlusion with cerebral infarction [facility]), Depression, Encounter regarding vascular access for dialysis for ESRD ([facility] [address] GERD (gastroesophageal reflux disease), Hepatitis C, Herpes zoster w/ nervous system complication, [facility] 2 [facility] Inguinal hernia unilateral, Pneumonia, Shingles (herpes zoster) polyneuropathy, Suicidal ideation, and Vitamin D deficiency. Past Surgical History:  has a past surgical history that includes cyst removal (2008); Abscess Drainage (9/18/2007); Inguinal hernia repair (3/9/2012); other surgical history (3/9/2012); and vascular surgery (N/A, 6/3/2021). Social History:  reports that he has been smoking cigarettes. He has a 10.00 pack-year smoking history. He has never used smokeless tobacco. He reports previous alcohol use. He reports that he does not use drugs. Family History: family history includes Cancer (age of onset: [de-identified]) in his father; Heart Disease (age of onset: 48) in his brother. The patients home medications have been reviewed.     Allergies: Ativan [lorazepam]    -------------------------------------------------- RESULTS -------------------------------------------------  Labs:  Results for orders placed or performed during the hospital encounter of reviewed. BP (!) 113/92   Pulse 113   Temp 97.3 °F (36.3 °C) (Oral)   Resp 16   Ht 5' 8\" (1.727 m)   Wt 178 lb (80.7 kg)   SpO2 97%   BMI 27.06 kg/m²   Oxygen Saturation Interpretation: Normal      ------------------------------------------ PROGRESS NOTES ------------------------------------------  5:07 PM EDT  I have spoken with the patient and discussed todays results, in addition to providing specific details for the plan of care and counseling regarding the diagnosis and prognosis. Their questions are answered at this time and they are agreeable with the plan. I discussed at length with them reasons for immediate return here for re evaluation. They will followup with their primary care physician by calling their office tomorrow. --------------------------------- ADDITIONAL PROVIDER NOTES ---------------------------------  At this time the patient is without objective evidence of an acute process requiring hospitalization or inpatient management. They have remained hemodynamically stable throughout their entire ED visit and are stable for discharge with outpatient follow-up. The plan has been discussed in detail and they are aware of the specific conditions for emergent return, as well as the importance of follow-up. New Prescriptions    No medications on file       Diagnosis:  1. Dehydration        Disposition:  Patient's disposition: Discharge to home  Patient's condition is stable.          Florentino Esquivel MD  Resident  07/03/21 5028

## 2021-07-04 PROBLEM — J18.9 PNEUMONIA: Status: ACTIVE | Noted: 2021-01-01

## 2021-07-04 NOTE — LETTER
[address] [place] [address] Dr Department Medicaid  CERTIFICATION OF NECESSITY  FOR NON-EMERGENCY TRANSPORTATION   BY [facility]      Individual Information   1. Name: Wolf Call 2. [address] [place] [address] Dr Medicaid Billing Number:    3. Address: [address] [place] [address]      Transportation Provider Information   4. Provider Name: [facility]   5. [address] [place] [address] Dr Medicaid Provider Number:  National Provider Identifier (NPI):      Certification  7. Criteria:  During transport, this individual requires:  [x] Medical treatment or continuous     supervision by an EMT. [x] The administration or regulation of oxygen by another person. [] Supervised protective restraint. 8. Period Beginning Date: 7/7/2021     9. Length  [x] Not more than 10 day(s)  [] One Year     Additional Information Relevant to Certification   10. Comments or Explanations, If Necessary or Appropriate   Alert to self, metabolic encephalopathy, patient exhibits decreased strength, balance, coordination, impairing functional mobility. Certifying Practitioner Information   11. Name of Practitioner: Dr. Preeti Durant   12. [address] [place] [address] Dr Medicaid Provider Number, If Applicabl 13. National Provider Identifier (NPI):      Signature Information   14. Date of Signature: 7/7/2021   15. Name of Person Signing: Maricruz Granger RN     16.  Signature and Professional Designation: Electronically signed by Maricruz Granger RN on 7/7/2021 at 1:57 PM  Discharge Planner     [facility] [id number]  Rev. 7/2015
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
## 2021-07-05 PROBLEM — E87.0 HYPERNATREMIA: Status: ACTIVE | Noted: 2021-01-01

## 2021-07-05 NOTE — PROGRESS NOTES
Department of Internal Medicine  Infectious Diseases  Progress  Note      C/C :  HIV infection- uncontrolled , resp failure , fever    Discussed with the pt's family   Pt is in respiratory distress, on BiPAP   Febrile         Current Facility-Administered Medications   Medication Dose Route Frequency Provider Last Rate Last Admin    aspirin chewable tablet 81 mg  81 mg Oral Daily Nova Geller MD        clopidogrel (PLAVIX) tablet 75 mg  75 mg Oral Daily Nova Geller MD        dolutegravir sodium (TIVICAY) tablet 50 mg  50 mg Oral Daily Nova Geller MD        lamiVUDine (EPIVIR) tablet 50 mg  50 mg Oral Daily Nova Geller MD        megestrol (MEGACE) tablet 40 mg  40 mg Oral BID Nova Geller MD        multivitamin 1 tablet  1 tablet Oral Daily Nova Geller MD        thiamine mononitrate tablet 100 mg  100 mg Oral Daily Nova Geller MD        sodium chloride flush 0.9 % injection 5-40 mL  5-40 mL Intravenous 2 times per day Nova Geller MD   10 mL at 07/05/21 0911    sodium chloride flush 0.9 % injection 5-40 mL  5-40 mL Intravenous PRN Nova Geller MD   10 mL at 07/05/21 1101    0.9 % sodium chloride infusion  25 mL Intravenous PRN Nova Geller MD        ondansetron (ZOFRAN-ODT) disintegrating tablet 4 mg  4 mg Oral Q8H PRN Nova Geller MD        Or    ondansetron (ZOFRAN) injection 4 mg  4 mg Intravenous Q6H PRN Nova Geller MD        polyethylene glycol (GLYCOLAX) packet 17 g  17 g Oral Daily PRN Nova Geller MD        acetaminophen (TYLENOL) tablet 650 mg  650 mg Oral Q6H PRN Nova Geller MD        Or    acetaminophen (TYLENOL) suppository 650 mg  650 mg Rectal Q6H PRN Nova Geller MD        piperacillin-tazobactam (ZOSYN) 3,375 mg in dextrose 5 % 100 mL IVPB extended infusion (mini-bag)  3,375 mg Intravenous Q8H Nova Geller MD 25 mL/hr at 07/05/21 0919 3,375 mg at 07/05/21 0919    vancomycin (VANCOCIN) 1,250 mg in dextrose 5 % 250 mL IVPB  15 mg/kg Intravenous Q12H Juaquin Spott, MD        heparin (porcine) injection 5,000 Units  5,000 Units Subcutaneous 3 times per day Stanley Odom MD   5,000 Units at 07/05/21 0928    dextrose 5 % solution   Intravenous Continuous Stanley Odom MD 75 mL/hr at 07/05/21 0909 New Bag at 07/05/21 0909    vancomycin (VANCOCIN) 1,250 mg in dextrose 5 % 250 mL IVPB  15 mg/kg Intravenous Once Stanley Odom .7 mL/hr at 07/05/21 1101 1,250 mg at 07/05/21 1101    morphine (PF) injection 2 mg  2 mg Intravenous Q2H PRN Duaine Cargo, DO   2 mg at 07/05/21 1102       REVIEW OF SYSTEMS:  Could not be obtained         PHYSICAL EXAM:      Vitals:     BP (!) 158/80   Pulse 80   Temp 100.6 °F (38.1 °C) (Axillary)   Resp (!) 38   Ht 5' 8\" (1.727 m)   Wt 178 lb (80.7 kg)   SpO2 94%   BMI 27.06 kg/m²     General Appearance:    Unresponsive, on BiPAP    Head:    Normocephalic, atraumatic   Eyes:    No pallor, no icterus,no photophobia    Ears:    No obvious deformity or drainage.    Nose:   No nasal drainage   Throat:   Mucosa moist, no oral thrush   Neck:   Supple      Lungs:     Bilateral coarse rhonchi    Heart:    Regular rate and rhythm   Abdomen:     Soft, non-tender, bowel sounds present    Extremities:   No edema, no cyanosis ,no open wound   Pulses:   Dorsalis pedis palpable    Skin:   no rashes   Right chest tesio - no drainage      CBC with Differential:      Lab Results   Component Value Date    WBC 4.3 07/04/2021    RBC 3.70 07/04/2021    HGB 11.5 07/04/2021    HCT 37.9 07/04/2021     07/04/2021    .4 07/04/2021    MCH 31.1 07/04/2021    MCHC 30.3 07/04/2021    RDW 14.5 07/04/2021    SEGSPCT 47 01/06/2014    LYMPHOPCT 34.9 07/04/2021    MONOPCT 10.3 07/04/2021    BASOPCT 0.2 07/04/2021    MONOSABS 0.44 07/04/2021    LYMPHSABS 1.49 07/04/2021    EOSABS 0.01 07/04/2021    BASOSABS 0.01 07/04/2021       CMP     Lab Results   Component Value Date     07/04/2021    K 5.0 07/04/2021     07/04/2021    CO2 15 07/04/2021    BUN 108 07/04/2021    CREATININE 2.9 07/04/2021    GFRAA 27 07/04/2021    LABGLOM 27 07/04/2021    GLUCOSE 113 07/04/2021    GLUCOSE 83 01/26/2012    PROT 8.0 07/04/2021    LABALBU 3.1 07/04/2021    LABALBU 4.2 01/17/2012    CALCIUM 8.7 07/04/2021    BILITOT 0.4 07/04/2021    ALKPHOS 94 07/04/2021    AST 74 07/04/2021    ALT 46 07/04/2021         Hepatic Function Panel:    Lab Results   Component Value Date    ALKPHOS 94 07/04/2021    ALT 46 07/04/2021    AST 74 07/04/2021    PROT 8.0 07/04/2021    BILITOT 0.4 07/04/2021    BILIDIR 0.3 03/13/2015    IBILI 1.3 03/13/2015    LABALBU 3.1 07/04/2021    LABALBU 4.2 01/17/2012       PT/INR:    Lab Results   Component Value Date    PROTIME 14.9 05/26/2021    INR 1.4 05/26/2021       TSH:    Lab Results   Component Value Date    TSH 17.020 06/17/2021       U/A:    Lab Results   Component Value Date    COLORU Yellow 07/04/2021    PHUR 5.5 07/04/2021    LABCAST FEW  11/02/2011    WBCUA 0-1 07/04/2021    WBCUA NONE 01/26/2012    RBCUA 0-1 07/04/2021    RBCUA NONE 01/26/2012    BACTERIA RARE 07/04/2021    CLARITYU Clear 07/04/2021    SPECGRAV >=1.030 07/04/2021    LEUKOCYTESUR Negative 07/04/2021    UROBILINOGEN 0.2 07/04/2021    BILIRUBINUR Negative 07/04/2021    BILIRUBINUR NEGATIVE 01/26/2012    BLOODU MODERATE 07/04/2021    GLUCOSEU Negative 07/04/2021    GLUCOSEU NEGATIVE 01/26/2012    AMORPHOUS FEW 07/04/2021       ABG:  No results found for: JVL7MBW, BEART, Z6JPDDOG, PHART, THGBART, IKM5QUE, PO2ART, ADL0FCB    MICROBIOLOGY:    Blood culture - negative     SARS CoV 2 negative         Radiology :    Chest  X ray - bibasilar infiltrates         IMPRESSION:    1. Respiratory failure, Pneumonia   2. HIV infection / AIDS- HIV viral load 3240 and CD 4 111 as of 6/2021   3. Chronic Hep C infection   4. Encephalopathy, Cocaine use. RECOMMENDATIONS:      1. Vancomycin random level   2. Zosyn 3.375 grams IV q 12 hrs  3. HIV genotype pending   4.  Freeda Grady / sydnee ( when able to

## 2021-07-05 NOTE — ED NOTES
Patient family placed on phone with Dr. Christiane Anderson to clarify code status. Patient is non-verbal and not talking to us since arrival.  RR rapid and equal.  Moans intermittent.        Jd Muse RN  07/04/21 [id number]

## 2021-07-05 NOTE — ED NOTES
Straight cath performed to send urine sample. Pt bladder drained. Kept in place for dry skin protection.        Leo Polo RN  07/04/21 [id number]

## 2021-07-05 NOTE — H&P
Hospitalist History & Physical      PCP: No primary care provider on file. Date of Admission: 7/4/2021    Date of Service: Pt seen/examined on 7/4/2021 and is admitted to Inpatient with expected LOS greater than two midnights due to medical therapy. Placed in Observation. Chief Complaint:  had concerns including Altered Mental Status (Increasing confusion, becoming less responsive. Per facility pt had \"CXR that showed rhonchi\"). History Of Present Illness:    Mr. Jo Mckeon, a 56y.o. year old male  who  has a past medical history of Abscess of hand, left, Abscess of scrotum, Bacterial meningitis, Cerebral artery occlusion with cerebral infarction [facility]), Depression, Encounter regarding vascular access for dialysis for ESRD ([facility] [facility] GERD (gastroesophageal reflux disease), Hepatitis C, Herpes zoster w/ nervous system complication, Human immunodeficiency virus, type 2 (HIV 2) ([facility] [address] Inguinal hernia unilateral, Pneumonia, Shingles (herpes zoster) polyneuropathy, Suicidal ideation, and Vitamin D deficiency. Pt is nonverbal and unable to follow commands. All information is obtained via chart review and ancillary staff. Pt was transferred from NH where pt was noted to have increasing confusion and lethargy with weak cough and pulmonary congestion. Of note, pt was recently discharged from [facility] on 06/25/21 where he was managed from acute metabolic encephalopathy and CHANEL on CKD. At time of discharge pt was noted to be mostly non-verbal but with prompting was able to speak and answer some questions. In ED, pt was afebrile and hemodynamically stable (108/86) but hypoxic on RA (improved to 96% on 2L), tachypneic (RR: 32) and Tachycardic (HR: 135). CMP significant for hypernatremia (Na: 152), BUN:Cr consistent with ESRD (BUN:Cr of 108:2.9). CBC with WBC of 4.3, macrocytic anemia (Hgb of 11.5, MCV: 102.4). COVID-19 negative. UA negative for UTI. CXR with bibasilar infiltrates.  Pt admitted for further management. Past Medical History:        Diagnosis Date    Abscess of hand, left 2009    Abscess of scrotum 2007    Bacterial meningitis 2010    Cerebral artery occlusion with cerebral infarction [facility])     Depression     Encounter regarding vascular access for dialysis for ESRD ([facility] 5/29/2021    GERD (gastroesophageal reflux disease)     Hepatitis C     Herpes zoster w/ nervous system complication 2409    neuropathy    [facility] immunodeficiency [facility], [facility] 2 ([facility] 2[facility] 2009    Inguinal hernia unilateral     left    Pneumonia 2010    Shingles (herpes zoster) polyneuropathy     Suicidal ideation 2008    Vitamin D deficiency        Past Surgical History:        Procedure Laterality Date    ABSCESS DRAINAGE  9/18/2007    scrotal. [facility]. Dr. Sheba Iniguez  2008    testicle    INGUINAL HERNIA REPAIR  3/9/2012    left indirect hernia repaired with mesh, Dr. Henry Mendoza, [address] [place] [address] OTHER SURGICAL HISTORY  3/9/2012    Left Inguinal Hernia Repair;Removal of Foreign Object Left foot    VASCULAR SURGERY N/A 6/3/2021    INSERTION TUNNELED HEMODIALYSIS CATHETER, REMOVAL OF TEMPORARY CATHETER performed by Tami Gottron, MD at [facility] OR       Medications Prior to Admission:      Prior to Admission medications    Medication Sig Start Date End Date Taking? Authorizing Provider   gabapentin (NEURONTIN) 400 MG capsule Take 1 capsule by mouth 2 times daily for 30 days.  6/25/21 7/25/21  Fide Ruiz, DO   megestrol (MEGACE) 40 MG tablet Take 1 tablet by mouth 2 times daily 6/25/21   Anisa Henderson, DO   lamiVUDine (EPIVIR) 100 MG tablet Take 0.5 tablets by mouth daily 6/26/21   Anisa Henderson, DO   dolutegravir sodium (TIVICAY) 50 MG tablet Take 1 tablet by mouth daily 6/26/21   Anias Henderson, DO   polyethylene glycol (GLYCOLAX) 17 g packet Take 17 g by mouth daily as needed for Constipation 6/25/21 7/25/21  Fide Ruiz, DO   aspirin 81 MG chewable tablet Take 1 tablet by mouth daily 6/6/21   Anusha Soto MD   clopidogrel (PLAVIX) 75 MG tablet Take 1 tablet by mouth daily 6/6/21   Anusha Soto MD   Multiple Vitamin (MULTIVITAMIN) TABS tablet Take 1 tablet by mouth daily 6/6/21   Patricia Gomes MD   thiamine mononitrate (THIAMINE) 100 MG tablet Take 1 tablet by mouth daily 6/6/21   Patricia Gomes MD       Allergies:  Ativan [lorazepam]    Social History:    TOBACCO:   reports that he has been smoking cigarettes. He has a 10.00 pack-year smoking history. He has never used smokeless tobacco.  ETOH:   reports previous alcohol use. Family History:    Reviewed in detail and negative for DM, CAD, Cancer, CVA. Positive as follows\"      Problem Relation Age of Onset    Cancer Father [de-identified]        bone marrow    Heart Disease Brother 48        heart attack       REVIEW OF SYSTEMS:   Pertinent positives as noted in the HPI. All other systems reviewed and negative. PHYSICAL EXAM:  /87   Pulse 135   Temp 99.6 °F (37.6 °C) (Temporal)   Resp (!) 32   Ht 5' 8\" (1.727 m)   Wt 178 lb (80.7 kg)   SpO2 92% Comment: Pt placed on 4L, sat 94%  BMI 27.06 kg/m²   General appearance: Weak, lethargic, non-verbal, unable to follow commands, ill appearing, cachectic  HEENT: Normal cephalic, atraumatic without obvious deformity. Pupils equal, round, and reactive to light. Extra ocular muscles intact. Conjunctivae/corneas clear. Neck: Supple. Flat neck veins. trachea midline. Respiratory: Decreased breath sounds B/L. Rhonchi at bases B/L. No wheezes noted. Right double lumen catheter in place  Cardiovascular: Tachycardic. N S1/S2. No MRG  Abdomen: Soft, NTTP, + BS  Musculoskeletal: No clubbing, cyanosis, edema of bilateral lower extremities. Brisk capillary refill. Skin: Normal skin color. No rashes or lesions. Neurologic:  Neurovascularly intact without any focal sensory/motor deficits.     Reviewed CXR personally    CBC:   Recent Labs     07/03/21  1338 07/04/21  2157   WBC 7.7 4.3*   RBC 4.23 3.70*   HGB 13.2 11.5*   HCT 42.3 37.9   .0* 102.4*   RDW 14.2 14.5    265     BMP:   Recent Labs     07/03/21  1338 07/04/21  2157   * 152*   K 5.1* 5.0   * 123*   CO2 16* 15*   BUN 85* 108*   CREATININE 1.9* 2.9*     LFT:  Recent Labs     07/03/21  1338 07/04/21  2157   PROT 9.3* 8.0   ALKPHOS 94 94   ALT 33 46*   AST 50* 74*   BILITOT 0.5 0.4     CE:  No results for input(s): Joanna Beat in the last 72 hours. PT/INR: No results for input(s): INR, APTT in the last 72 hours. BNP: No results for input(s): BNP in the last 72 hours.   ESR:   Lab Results   Component Value Date    SEDRATE 18 (H) 05/24/2021     CRP:   Lab Results   Component Value Date    CRP 19.0 (H) 05/24/2021     D Dimer: No results found for: DDIMER   Folate and B12: No results found for: [id number], No results found for: FOLATE  Lactic Acid:   Lab Results   Component Value Date    LACTA 1.1 06/18/2021     Thyroid Studies:   Lab Results   Component Value Date    TSH 17.020 (H) 06/17/2021       Oupatient labs:  Lab Results   Component Value Date    CHOL 184 06/17/2021    TRIG 177 (H) 06/17/2021    HDL 28 06/17/2021    LDLCALC 121 (H) 06/17/2021    TSH 17.020 (H) 06/17/2021    PSA SEE NOTE (AA) 03/08/2017    PSA 0.47 03/08/2017    INR 1.4 05/26/2021    LABA1C 4.9 06/17/2021       Urinalysis:    Lab Results   Component Value Date    NITRU Negative 07/04/2021    WBCUA 0-1 07/04/2021    WBCUA NONE 01/26/2012    BACTERIA RARE 07/04/2021    RBCUA 0-1 07/04/2021    RBCUA NONE 01/26/2012    BLOODU MODERATE 07/04/2021    SPECGRAV >=1.030 07/04/2021    GLUCOSEU Negative 07/04/2021    GLUCOSEU NEGATIVE 01/26/2012       Imaging:  CT ABDOMEN PELVIS WO CONTRAST Additional Contrast? None    Result Date: 6/16/2021  EXAMINATION: CT OF THE ABDOMEN AND PELVIS WITHOUT CONTRAST 6/16/2021 9:53 pm TECHNIQUE: CT of the abdomen and pelvis was performed without the administration of intravenous contrast. Multiplanar reformatted images are provided for review. Dose modulation, iterative reconstruction, and/or weight based adjustment of the mA/kV was utilized to reduce the radiation dose to as low as reasonably achievable. COMPARISON: CT abdomen and pelvis, 05/25/2021. HISTORY: ORDERING SYSTEM PROVIDED HISTORY: Abdominal pain TECHNOLOGIST PROVIDED HISTORY: Reason for exam:->Abdominal pain Additional Contrast?->None Decision Support Exception - unselect if not a suspected or confirmed emergency medical condition->Emergency Medical Condition (MA) What reading provider will be dictating this exam?->CRC FINDINGS: Lower Chest: The heart is mildly enlarged. Mild dependent atelectasis in the lower lobes. No pleural or pericardial effusion. Organs: Liver: Tiny calcified granuloma in the right lobe. The liver is otherwise grossly unremarkable. Gallbladder: Unremarkable. Pancreas: Moderately atrophied. Otherwise, grossly unremarkable. Spleen:  Unremarkable. Adrenals: Unremarkable. Kidneys: Punctate 1 mm intrarenal calculus on the right (series 3, image 81). Otherwise, the kidneys are unremarkable. GI/Bowel: Mild-to-moderate distal colonic diverticulosis without diverticulitis. No bowel wall thickening or obstruction. Normal appendix. Pelvis: The urinary bladder is collapsed by a Orellana catheter but is otherwise grossly unremarkable. The prostate gland is normal in size. Peritoneum/Retroperitoneum: No lymphadenopathy. Mild calcified atherosclerosis is seen in the aorta. No aneurysm. No free air or free fluid is seen. Bones/Soft Tissues: The visualized bones are intact without fracture or focal lesion. 1. No acute abnormality is seen in the abdomen or the pelvis. 2. Punctate 1 mm right intrarenal calculus. No hydronephrosis. 3. Mild cardiomegaly.      CT HEAD WO CONTRAST    Result Date: 6/16/2021  EXAMINATION: CT OF THE HEAD WITHOUT CONTRAST  6/16/2021 9:53 pm TECHNIQUE: CT of the head was performed without the administration of intravenous contrast. Dose modulation, iterative reconstruction, and/or weight based adjustment of the mA/kV was utilized to reduce the radiation dose to as low as reasonably achievable. COMPARISON: Connie 10, 2021 HISTORY: ORDERING SYSTEM PROVIDED HISTORY: AMS TECHNOLOGIST PROVIDED HISTORY: Has a \"code stroke\" or \"stroke alert\" been called? ->No Reason for exam:->AMS Decision Support Exception - unselect if not a suspected or confirmed emergency medical condition->Emergency Medical Condition (MA) What reading provider will be dictating this exam?->CRC FINDINGS: BRAIN/VENTRICLES: There is no acute intracranial hemorrhage, mass effect or midline shift. No abnormal extra-axial fluid collection. The gray-white differentiation is maintained without evidence of an acute infarct. There is no evidence of hydrocephalus. ORBITS: The visualized portion of the orbits demonstrate no acute abnormality. SINUSES: The visualized paranasal sinuses and mastoid air cells demonstrate no acute abnormality. SOFT TISSUES/SKULL:  No acute abnormality of the visualized skull or soft tissues. No acute intracranial abnormality. CT HEAD WO CONTRAST    Result Date: 6/10/2021  EXAMINATION: CT OF THE HEAD WITHOUT CONTRAST  6/10/2021 1:13 pm TECHNIQUE: CT of the head was performed without the administration of intravenous contrast. Dose modulation, iterative reconstruction, and/or weight based adjustment of the mA/kV was utilized to reduce the radiation dose to as low as reasonably achievable. COMPARISON: None. HISTORY: ORDERING SYSTEM PROVIDED HISTORY: pain TECHNOLOGIST PROVIDED HISTORY: Has a \"code stroke\" or \"stroke alert\" been called? ->No Reason for exam:->pain Decision Support Exception - unselect if not a suspected or confirmed emergency medical condition->Emergency Medical Condition (MA) What reading provider will be dictating this exam?->CRC FINDINGS: BRAIN/VENTRICLES: There is no acute intracranial hemorrhage, mass effect or midline shift.   No abnormal extra-axial fluid collection. The gray-white differentiation is maintained without evidence of an acute infarct. There is no evidence of hydrocephalus. ORBITS: The visualized portion of the orbits demonstrate no acute abnormality. SINUSES: The visualized paranasal sinuses and mastoid air cells demonstrate no acute abnormality. SOFT TISSUES/SKULL:  No acute abnormality of the visualized skull or soft tissues. There is a 4 mm metallic density overlying the right lateral aspect of the scalp posteriorly. This metallic soft tissue foreign body was present on the patient's prior study. No acute intracranial abnormality. Specifically, there is no acute intracranial hemorrhage. CT CERVICAL SPINE WO CONTRAST    Result Date: 6/16/2021  EXAMINATION: CT OF THE CERVICAL SPINE WITHOUT CONTRAST 6/16/2021 9:53 pm TECHNIQUE: CT of the cervical spine was performed without the administration of intravenous contrast. Multiplanar reformatted images are provided for review. Dose modulation, iterative reconstruction, and/or weight based adjustment of the mA/kV was utilized to reduce the radiation dose to as low as reasonably achievable. COMPARISON: None. HISTORY: ORDERING SYSTEM PROVIDED HISTORY: ams TECHNOLOGIST PROVIDED HISTORY: Reason for exam:->ams Decision Support Exception - unselect if not a suspected or confirmed emergency medical condition->Emergency Medical Condition (MA) What reading provider will be dictating this exam?->CRC FINDINGS: BONES/ALIGNMENT: There is no acute fracture. Mild grade 1 anterolisthesis C7 over T1. DEGENERATIVE CHANGES: Multilevel degenerative changes. SOFT TISSUES: There is no prevertebral soft tissue swelling. No acute abnormality of the cervical spine.  Multilevel degenerative changes with mild grade 1 anterolisthesis C7 over T1.     CT CERVICAL SPINE WO CONTRAST    Result Date: 6/10/2021  EXAMINATION: CT OF THE CERVICAL SPINE WITHOUT CONTRAST 6/10/2021 1:13 pm TECHNIQUE: CT of the cervical spine was performed without the administration of intravenous contrast. Multiplanar reformatted images are provided for review. Dose modulation, iterative reconstruction, and/or weight based adjustment of the mA/kV was utilized to reduce the radiation dose to as low as reasonably achievable. COMPARISON: None. HISTORY: ORDERING SYSTEM PROVIDED HISTORY: pain TECHNOLOGIST PROVIDED HISTORY: Reason for exam:->pain Decision Support Exception - unselect if not a suspected or confirmed emergency medical condition->Emergency Medical Condition (MA) What reading provider will be dictating this exam?->CRC FINDINGS: The ring of C1 is intact as is the dense. There is no compression fracture of the cervical spine. No jumped or perched facet is noted. Multilevel degenerative disc and degenerative joint disease is noted. The prevertebral soft tissues are unremarkable. The airway is widely patent. Images through the lung apices are negative for a pneumothorax. 1. There is no acute compression fracture or subluxation of the cervical spine. 2. Multilevel degenerative disc and degenerative joint disease. XR CHEST PORTABLE    Result Date: 7/4/2021  EXAMINATION: ONE XRAY VIEW OF THE CHEST 7/4/2021 9:01 pm COMPARISON: None. HISTORY: ORDERING SYSTEM PROVIDED HISTORY: fever/ams TECHNOLOGIST PROVIDED HISTORY: Reason for exam:->fever/ams What reading provider will be dictating this exam?->CRC FINDINGS: Patient is rotated. Dual lumen catheter in place. No pneumothorax. Bibasilar opacities. The heart is not enlarged. No pneumothorax. Bibasilar infiltrates. XR CHEST PORTABLE    Result Date: 7/3/2021  EXAMINATION: ONE XRAY VIEW OF THE CHEST 7/3/2021 1:33 pm COMPARISON: None. HISTORY: ORDERING SYSTEM PROVIDED HISTORY: pna TECHNOLOGIST PROVIDED HISTORY: Reason for exam:->pna What reading provider will be dictating this exam?->CRC FINDINGS: The lungs are without acute focal process.   There is no effusion or pneumothorax. The cardiomediastinal silhouette is without acute process. The osseous structures are without acute process. No acute process. Dialysis catheter, unchanged. XR CHEST PORTABLE    Result Date: 6/16/2021  EXAMINATION: ONE XRAY VIEW OF THE CHEST 6/16/2021 9:06 pm COMPARISON: Chest series from Connie 3, 2021 HISTORY: ORDERING SYSTEM PROVIDED HISTORY: pna? TECHNOLOGIST PROVIDED HISTORY: Reason for exam:->pna? What reading provider will be dictating this exam?->CRC FINDINGS: Tunneled right chest wall dual lumen catheter with stable position of distal tip in the distal superior vena cava. Elevation of the right hemidiaphragm. Coarse interstitial markings in the lungs. No new airspace disease, pleural effusions, or pneumothoraces. Atherosclerotic disease in the thoracic aorta. Prominence of the cardiac silhouette. Normal pulmonary vascularity. Osseous and thoracic soft tissue structures demonstrate no acute findings. 1.  Stable coarse interstitial markings, atherosclerotic disease, and cardiomegaly. No new cardiopulmonary pathology. 2.  Tunneled right chest wall catheter. MRI BRAIN WO CONTRAST    Result Date: 6/17/2021  EXAMINATION: MRI OF THE BRAIN WITHOUT CONTRAST  6/17/2021 5:29 pm TECHNIQUE: Multiplanar multisequence MRI of the brain was performed without the administration of intravenous contrast. COMPARISON: None. HISTORY: ORDERING SYSTEM PROVIDED HISTORY: stroke vs meningitis TECHNOLOGIST PROVIDED HISTORY: Reason for exam:->stroke vs meningitis What reading provider will be dictating this exam?->CRC FINDINGS: INTRACRANIAL STRUCTURES/VENTRICLES: There is no acute infarct. No mass effect or midline shift. No evidence of an acute intracranial hemorrhage. The ventricles and sulci are normal in size and configuration. Mild diffuse volume loss with chronic white matter changes in the subcortical white matter periventricular white matter basal ganglia and ana.   No evidence for blood products on gradient imaging Previously identified area of increased signal on DWI in the right cerebellum is not identified on the current study. The sellar/suprasellar regions appear unremarkable. The normal signal voids within the major intracranial vessels appear maintained. ORBITS: The visualized portion of the orbits demonstrate no acute abnormality. SINUSES: The visualized paranasal sinuses and mastoid air cells are well aerated. BONES/SOFT TISSUES: The bone marrow signal intensity appears normal. The soft tissues demonstrate no acute abnormality. No acute infarct. Previously identified area of abnormal signal in the right cerebellum on diffusion-weighted imaging is not demonstrated on the current study. Stable appearance to chronic white matter changes     FL LUMBAR PUNCTURE DIAG    Result Date: 6/24/2021  EXAMINATION: FLUOROSCOPIC GUIDED LUMBAR PUNCTURE 6/24/2021 9:51 am HISTORY: ORDERING SYSTEM PROVIDED HISTORY: Lumbar puncture TECHNOLOGIST PROVIDED HISTORY: Reason for exam:->Lumbar puncture What reading provider will be dictating this exam?->CRC FLUOROSCOPY DOSE AND TYPE OR TIME AND EXPOSURES: Images: 1 Fluoroscopic time: 0.9 minute Total dose: 9 mGy PROCEDURE: :  Andres Ghosh Informed consent was obtained after the risks and benefits of the procedure were discussed with the patient and all questions were answered fully. [place] protocol was observed and a standard timeout was performed. The patient was positioned prone and the back was prepped and draped in the normal sterile fashion. 1% lidocaine was used for local anesthesia. The subarachnoid space was accessed with a 22-gauge 3.5\" spinal needle at the L3-4 level. Free flow of clear CSF was noted. Approximately 12 ml of CSF was removed and sent for analysis. The stylet was reinserted, spinal needle was removed and brief pressure was applied at the puncture site.  There were no immediate complications and the patient tolerated the procedure well. 1.  Successful fluoroscopic-guided lumbar puncture. 2.  CSF fluid sent to the laboratory for routine analysis. US DUP LOWER EXTREMITIES BILATERAL VENOUS    Result Date: 2021  Patient MRN:  [id number] : 1960 Age: 56 years Gender: Male Order Date:  2021 7:06 AM EXAM: US DUP LOWER EXTREMITIES BILATERAL VENOUS NUMBER OF IMAGES:  46 INDICATION:  DVT DVT What reading provider will be dictating this exam?->[facility] COMPARISON: None Findings: There is no evidence for deep venous thrombosis within the bilateral lower extremities. There is good compressibility, there is good augmentation, there is good color flow. No evidence for deep venous thrombosis within the bilateral lower extremities. ASSESSMENT:    Active Problems:    HTN (hypertension)    AMS (altered mental status)    End-stage renal disease needing dialysis (HCC)    Severe protein-calorie malnutrition (HCC)    HAP (hospital-acquired pneumonia)    Hypernatremia  Resolved Problems:    * No resolved hospital problems.  *    PLAN:  - Cont on empiric abx with Vanco and Zosyn for HAP  - Obtain ID consult  - F/U Blood cx, procal, esr and crp  - Calculated free water deficit of 3.5L on admission  - Obtain Plasma and urine osmolality; urine elctrolytes  - I/O monitoring  - Start on IVF with D5W at 75cc/hr for hypernatremia  - Monitor FSBS and BMP to decrease Na by ~10mEq in 24 hours  - Although pt is ESRD, his rae is draining yellow, concentrated urine  - Obtain Nephrology consult  - Cont on home ASA and Plavix  - Cont on HAART  - Nutrition consult  - Palliative care consult  - Maintain NPO with strict aspiration and fall precautions  - SLP eval     Diet: No diet orders on file  Code Status: DNR-CCA  Surrogate decision maker confirmed with patient:   Extended Emergency Contact Information  Primary Emergency Contact: cruz piper  Address: [address], [address]  [address] Phone: [phone]  Mobile Phone: [phone]  Relation: Brother/Sister  Secondary Emergency Contact: heather muller  Mobile Phone: [phone]  Relation: Brother/Sister      DVT Prophylaxis: []Lovenox [x]Heparin []PCD [] [address] []Encouraged ambulation  Disposition: []Med/Surg [x] Intermediate [] ICU/CCU  Admit status: [] Observation [x] Inpatient     +++++++++++++++++++++++++++++++++++++++++++++++++  Rudy Guevara MD  [address]  +++++++++++++++++++++++++++++++++++++++++++++++++  NOTE: This report was transcribed using voice recognition software. Every effort was made to ensure accuracy; however, inadvertent computerized transcription errors may be present.

## 2021-07-05 NOTE — PROGRESS NOTES
Dr. Dean Cheek notified of recent vitals and code status change.  Electronically signed by Juan Carlos Piña RN on 7/5/2021 at 2:40 PM

## 2021-07-05 NOTE — CONSULTS
Palliative Care Department  [phone]  Palliative Care Initial Consult  Provider [address] [place] [address]  [phone]  Hospital Day: 2  Date of Initial Consult: 7/5/2021  Referring Provider: Jimmy Arvizu DO  Palliative Medicine was consulted for assistance with: Aravind Montgomery      Brief Summary of Hospital Course:   Robert Hammond is a 56 y.o. with a medical history of bacterial meningitis, CVA, ESRD on HD, Hepatitis C, HIV, CKD who was admitted on 7/4/2021 from nursing facility with a CHIEF COMPLAINT of altered mental status. ASSESSMENT/PLAN:     Recommendations:     Dyspnea:   -  Morphine 2mg q2hrs prn dyspnea    Pertinent Hospital Diagnoses      Altered mental status  [facility] acquired pneumonia   Hypernatremia      Palliative Care Encounter / Counseling Regarding Goals of Care  Please see detailed goals of care discussion as below   At this time, Robert Hammond, Does Not have capacity for medical decision-making. Capacity is time limited and situation/question specific   During encounter brother and sister was surrogate medical decision-maker   Outcome of goals of care meeting: continue conservative medical management, limited code, if no improvement in 24-48 hours will consult hospice   Code status Limited yes to meds   Advanced Directives: no POA or living will in [facility]   Surrogate/Legal NOK:  o Brother Elbert Rape [phone]  o Sister Sheryl Second [phone]    Spiritual assessment: no spiritual distress identified  Bereavement and grief: to be determined  Referrals to: none today    SUBJECTIVE:     Details of Conversation:  Introduced self and PM to patient's sister and brother who had discussed GOC with primary team.They wish to give patient time for antibiotics to work before they decide to go with hospice. Discussed time fram and they are ok with 24-48 hours.   Brother lives in [facility] and sister in [place], so if patient does not improve they want HOTV and would be interested in hospice house. They also want patient's dyspnea and anxiety managed while he is being treated. Discussed with bedside nurse using morphine for dyspnea and if anxiety doesn't improve will discuss changes. Evaluated patient at bedside and spoke with sister in person. Patient very tachypneic, moans and cries out when touched or repositioned, appears very uncomfortable. BP dropped with morphine, currently getting bolus. Still has fever, overall guarded prognosis. Physical Function:  PPS: 10  : History Of Present Illness:    Per H&P  \"Mr. Robert Hammond, a 56y.o. year old male  who  has a past medical history of Abscess of hand, left, Abscess of scrotum, Bacterial meningitis, Cerebral artery occlusion with cerebral infarction [facility]), Depression, Encounter regarding vascular access for dialysis for ESRD ([facility] [facility] GERD (gastroesophageal reflux disease), Hepatitis C, Herpes zoster w/ nervous system complication, Human immunodeficiency virus, type 2 (HIV 2) ([facility] Inguinal hernia unilateral, Pneumonia, Shingles (herpes zoster) polyneuropathy, Suicidal ideation, and Vitamin D deficiency.      Pt is nonverbal and unable to follow commands. All information is obtained via chart review and ancillary staff. Pt was transferred from NH where pt was noted to have increasing confusion and lethargy with weak cough and pulmonary congestion. Of note, pt was recently discharged from [facility] on 06/25/21 where he was managed from acute metabolic encephalopathy and CHANEL on CKD. At time of discharge pt was noted to be mostly non-verbal but with prompting was able to speak and answer some questions.     In ED, pt was afebrile and hemodynamically stable (108/86) but hypoxic on RA (improved to 96% on 2L), tachypneic (RR: 32) and Tachycardic (HR: 135). CMP significant for hypernatremia (Na: 152), BUN:Cr consistent with ESRD (BUN:Cr of 108:2.9). CBC with WBC of 4.3, macrocytic anemia (Hgb of 11.5, MCV: 102.4). COVID-19 negative.  UA negative for UTI. CXR with bibasilar infiltrates. Pt admitted for further management. \"    Past Medical History:          Diagnosis Date    Abscess of hand, left 2009    Abscess of scrotum 2007    Bacterial meningitis 2010    Cerebral artery occlusion with cerebral infarction [facility])     Depression     [facility]    GERD (gastroesophageal reflux disease)     Hepatitis C     Herpes zoster w/ nervous system complication 0808    neuropathy    Human immunodeficiency virus, type 2 (HIV 2) ([facility] 2009    Inguinal hernia unilateral     left    Pneumonia 2010    Shingles (herpes zoster) polyneuropathy     Suicidal ideation 2008    Vitamin D deficiency        Past Surgical History:          Procedure Laterality Date    ABSCESS DRAINAGE  9/18/2007    scrotal. SE. Dr. Inga Juárez  2008    testicle    INGUINAL HERNIA REPAIR  3/9/2012    left indirect hernia repaired with mesh, Dr. Iam Corrigan, [address] [place] [address] OTHER SURGICAL HISTORY  3/9/2012    Left Inguinal Hernia Repair;Removal of Foreign Object Left foot    VASCULAR SURGERY N/A 6/3/2021    INSERTION TUNNELED HEMODIALYSIS CATHETER, REMOVAL OF TEMPORARY CATHETER performed by Fransisco Padilla MD at [facility] OR       Medications Prior to Admission:      Prior to Admission medications    Medication Sig Start Date End Date Taking? Authorizing Provider   gabapentin (NEURONTIN) 400 MG capsule Take 1 capsule by mouth 2 times daily for 30 days.  6/25/21 7/25/21  Hanane Ruiz, DO   megestrol (MEGACE) 40 MG tablet Take 1 tablet by mouth 2 times daily 6/25/21   Eloisa Armstrong DO   lamiVUDine (EPIVIR) 100 MG tablet Take 0.5 tablets by mouth daily 6/26/21   Eloisa Armstrong DO   dolutegravir sodium (TIVICAY) 50 MG tablet Take 1 tablet by mouth daily 6/26/21   Eloisa Armstrong,    polyethylene glycol (GLYCOLAX) 17 g packet Take 17 g by mouth daily as needed for Constipation 6/25/21 7/25/21  Eloisa Armstrong, Recent Labs     07/03/21  1338 07/04/21  2157   WBC 7.7 4.3*   HGB 13.2 11.5*   HCT 42.3 37.9    265     Recent Labs     07/03/21  1338 07/04/21  2157   * 152*   K 5.1* 5.0   * 123*   CO2 16* 15*   BUN 85* 108*   CREATININE 1.9* 2.9*   CALCIUM 9.6 8.7     Recent Labs     07/03/21  1338 07/04/21  2157   AST 50* 74*   ALT 33 46*   BILITOT 0.5 0.4   ALKPHOS 94 94     No results for input(s): INR in the last 72 hours. No results for input(s): Jadene Moh in the last 72 hours. Urinalysis:      Lab Results   Component Value Date    NITRU Negative 07/04/2021    WBCUA 0-1 07/04/2021    WBCUA NONE 01/26/2012    BACTERIA RARE 07/04/2021    RBCUA 0-1 07/04/2021    RBCUA NONE 01/26/2012    BLOODU MODERATE 07/04/2021    SPECGRAV >=1.030 07/04/2021    GLUCOSEU Negative 07/04/2021    GLUCOSEU NEGATIVE 01/26/2012         Discussed patient and the plan of care with the other IDT members: Palliative Medicine IDT Team, Floor Nurse and Family    Time/Communication  Greater than 50% of time spent, total 70 minutes in counseling and coordination of care at the bedside regarding goals of care, symptom management, diagnosis and prognosis and see above. Thank you for allowing Palliative Medicine to participate in the care of Estuardo Menard.

## 2021-07-05 NOTE — PROGRESS NOTES
Date: 7/5/2021    Time: 5:09 AM    Patient Placed On BIPAP/CPAP/ Non-Invasive Ventilation? Yes    If no must comment. Facial area red/color change? No           If YES are Blister/Lesion present? No   If yes must notify nursing staff  BIPAP/CPAP skin barrier?   Yes    Skin barrier type:mepilexlite       Comments:        Janey Stallings RCP

## 2021-07-05 NOTE — ED PROVIDER NOTES
HPI:  7/4/21,   Time: 8:37 PM EDT       Leobardo Severe is a 56 y.o. male presenting to the ED for fever/tachy/poss sepsis, beginning 1 day ago. The complaint has been persistent, severe in severity, and worsened by nothing. Bib ems from  Nh, dec loc, concern for sepsis, pt unable to elicit hx. Hx same. Report of tachy and fever    Review of Systems:   Pertinent positives and negatives are stated within HPI, all other systems reviewed and are negative.          --------------------------------------------- PAST HISTORY ---------------------------------------------  Past Medical History:  has a past medical history of Abscess of hand, left, Abscess of scrotum, Bacterial meningitis, Cerebral artery occlusion with cerebral infarction [facility]), Depression, [facility] GERD (gastroesophageal reflux disease), Hepatitis C, Herpes zoster w/ nervous system complication, Human immunodeficiency virus, type 2 (HIV 2) ([facility] Inguinal hernia unilateral, Pneumonia, Shingles (herpes zoster) polyneuropathy, Suicidal ideation, and Vitamin D deficiency. Past Surgical History:  has a past surgical history that includes cyst removal (2008); Abscess Drainage (9/18/2007); Inguinal hernia repair (3/9/2012); other surgical history (3/9/2012); and vascular surgery (N/A, 6/3/2021). Social History:  reports that he has been smoking cigarettes. He has a 10.00 pack-year smoking history. He has never used smokeless tobacco. He reports previous alcohol use. He reports that he does not use drugs. Family History: family history includes Cancer (age of onset: [de-identified]) in his father; Heart Disease (age of onset: 48) in his brother. The patients home medications have been reviewed.     Allergies: Ativan [lorazepam]        ---------------------------------------------------PHYSICAL EXAM--------------------------------------    Constitutional/General: Alert nonverbal, chronically ill appearing  Head: Normocephalic and atraumatic  Eyes: PERRL, EOMI, conjunctive normal, sclera non icteric  Mouth: Oropharynx clear, handling secretions, no trismus, no asymmetry of the posterior oropharynx or uvular edema  Neck: Supple, full ROM, non tender to palpation in the midline, no stridor, no crepitus, no meningeal signs  Respiratory: coarse bs ervin  Cardiovascular:  tachy rate. Regular rhythm. No murmurs, gallops, or rubs. 2+ distal pulses  Chest: No chest wall tenderness  GI:  Abdomen Soft, Non tender, Non distended. Musculoskeletal: limited rom due to mental status, cap repill intact, no edema or cyanosis  Integument: skin warm and dry. No rashes. Lymphatic: no lymphadenopathy noted  Neurologic: GCS 11,   Psychiatric: non verbal    -------------------------------------------------- RESULTS -------------------------------------------------  I have personally reviewed all laboratory and imaging results for this patient. Results are listed below.      LABS:  Results for orders placed or performed during the hospital encounter of 07/04/21   COVID-19, Rapid    Specimen: Nasopharyngeal Swab   Result Value Ref Range    SARS-CoV-2, NAAT Not Detected Not Detected   CBC Auto Differential   Result Value Ref Range    WBC 4.3 (L) 4.5 - 11.5 E9/L    RBC 3.70 (L) 3.80 - 5.80 E12/L    Hemoglobin 11.5 (L) 12.5 - 16.5 g/dL    Hematocrit 37.9 37.0 - 54.0 %    .4 (H) 80.0 - 99.9 fL    MCH 31.1 26.0 - 35.0 pg    MCHC 30.3 (L) 32.0 - 34.5 %    RDW 14.5 11.5 - 15.0 fL    Platelets 594 387 - 125 E9/L    MPV 11.2 7.0 - 12.0 fL    Neutrophils % 53.9 43.0 - 80.0 %    Immature Granulocytes % 0.5 0.0 - 5.0 %    Lymphocytes % 34.9 20.0 - 42.0 %    Monocytes % 10.3 2.0 - 12.0 %    Eosinophils % 0.2 0.0 - 6.0 %    Basophils % 0.2 0.0 - 2.0 %    Neutrophils Absolute 2.30 1.80 - 7.30 E9/L    Immature Granulocytes # 0.02 E9/L    Lymphocytes Absolute 1.49 (L) 1.50 - 4.00 E9/L    Monocytes Absolute 0.44 0.10 - 0.95 E9/L Eosinophils Absolute 0.01 (L) 0.05 - 0.50 E9/L    Basophils Absolute 0.01 0.00 - 0.20 E9/L   Comprehensive Metabolic Panel w/ Reflex to MG   Result Value Ref Range    Sodium 152 (H) 132 - 146 mmol/L    Potassium reflex Magnesium 5.0 3.5 - 5.0 mmol/L    Chloride 123 (H) 98 - 107 mmol/L    CO2 15 (L) 22 - 29 mmol/L    Anion Gap 14 7 - 16 mmol/L    Glucose 113 (H) 74 - 99 mg/dL     (HH) 6 - 23 mg/dL    CREATININE 2.9 (H) 0.7 - 1.2 mg/dL    GFR Non-African American 27 >=60 mL/min/1.73    GFR African American 27     Calcium 8.7 8.6 - 10.2 mg/dL    Total Protein 8.0 6.4 - 8.3 g/dL    Albumin 3.1 (L) 3.5 - 5.2 g/dL    Total Bilirubin 0.4 0.0 - 1.2 mg/dL    Alkaline Phosphatase 94 40 - 129 U/L    ALT 46 (H) 0 - 40 U/L    AST 74 (H) 0 - 39 U/L   Urinalysis   Result Value Ref Range    Color, UA Yellow Straw/Yellow    Clarity, UA Clear Clear    Glucose, Ur Negative Negative mg/dL    Bilirubin Urine Negative Negative    Ketones, Urine Negative Negative mg/dL    Specific Gravity, UA >=1.030 1.005 - 1.030    Blood, Urine MODERATE (A) Negative    pH, UA 5.5 5.0 - 9.0    Protein,  (A) Negative mg/dL    Urobilinogen, Urine 0.2 <2.0 E.U./dL    Nitrite, Urine Negative Negative    Leukocyte Esterase, Urine Negative Negative   Lactate, Sepsis   Result Value Ref Range    Lactic Acid, Sepsis 1.7 0.5 - 1.9 mmol/L   Microscopic Urinalysis   Result Value Ref Range    WBC, UA 0-1 0 - 5 /HPF    RBC, UA 0-1 0 - 2 /HPF    Bacteria, UA RARE (A) None Seen /HPF    Amorphous, UA FEW        RADIOLOGY:  Interpreted by Radiologist.  XR CHEST PORTABLE   Final Result   Bibasilar infiltrates. EKG:  This EKG is signed and interpreted by the EP. Time:   Rate: 130  Rhythm: Sinus  Interpretation: sinus tachycardia  Comparison: None      ------------------------- NURSING NOTES AND VITALS REVIEWED ---------------------------   The nursing notes within the ED encounter and vital signs as below have been reviewed by myself.   /87 Pulse 135   Temp 99.6 °F (37.6 °C) (Temporal)   Resp (!) 32   Ht 5' 8\" (1.727 m)   Wt 178 lb (80.7 kg)   SpO2 92% Comment: Pt placed on 4L, sat 94%  BMI 27.06 kg/m²   Oxygen Saturation Interpretation: Abnormal and Improved after treatment    The patients available past medical records and past encounters were reviewed. ------------------------------ ED COURSE/MEDICAL DECISION MAKING----------------------  Medications   0.9 % sodium chloride IV bolus 2,421 mL (2,421 mLs Intravenous New Bag 7/4/21 2025)   cefepime (MAXIPIME) 1000 mg IVPB minibag (0 mg Intravenous Stopped 7/4/21 2315)   azithromycin (ZITHROMAX) 500 mg in D5W 250ml Vial Mate (500 mg Intravenous New Bag 7/4/21 2241)         ED COURSE:       Medical Decision Making:    Pt hypoxic, o2 to keep sats >92, clinically concern or sepsis, bolus given, hr improved, na noted, discussed with family, pt made dnr cca, form filled outp, admit for further care      This patient's ED course included: a personal history and physicial examination    This patient has remained hemodynamically stable during their ED course. Re-Evaluations:             Re-evaluation. Patients symptoms are improving    Re-examination  7/4/21   10:37 PM EDT          Vital Signs:   Vitals:    07/04/21 2045 07/04/21 2100 07/04/21 2115 07/04/21 2245   BP: 111/80 118/82 121/84 134/87   Pulse:       Resp:       Temp:       TempSrc:       SpO2:       Weight:       Height:         Card/Pulm:  Rhythm: rapid rate. Heart Sounds: no murmurs, gallops, or rubs. coars bs ervin    Capillary Refill: normal.  Radial Pulse:  equal.  Skin:  Warm. Consultations:             sound    Critical Care: 35 min        Counseling: The emergency provider has spoken with the family member sister and discussed todays results, in addition to providing specific details for the plan of care and counseling regarding the diagnosis and prognosis.   Questions are answered at this time and they are agreeable with the plan.       --------------------------------- IMPRESSION AND DISPOSITION ---------------------------------    IMPRESSION  1. Hypernatremia    2. Pneumonia of both lower lobes due to infectious organism    3. [facility]        DISPOSITION  Disposition: Admit to telemetry  Patient condition is stable    NOTE: This report was transcribed using voice recognition software.  Every effort was made to ensure accuracy; however, inadvertent computerized transcription errors may be present        Flash Shabazz MD  07/05/21 0100

## 2021-07-05 NOTE — CONSULTS
[facility] MD Estrella Redd MD Beecher Butler, MD Daun Bias, MD Natalia Annas, AUGUSTINE Pro  Consultation  Patient's Name: Cori Leonard  2:07 PM  7/5/2021    Nephrologist: Estrella Green MD    Reason for Consult: Hypernatremia with CHANEL  Requesting Physician:  No primary care provider on file. Chief Complaint: Altered mental status with shortness of breath    History Obtained From: Patient, chart    History of Present Ilness: This is 56years old -American male known to our group from before, patient was sent to emergency department with altered mental status and with fever severe worsening shortness of breath. Patient is currently on BiPAP, nonverbal interactive. Past medical history positive for abscess left hand and scrotum, bacterial meningitis, HIV type II, herpes zoster with nervous system complications, with clinical findings suggestive of sepsis. Infectious disease specialist was consulted for his H CAP treatment. Past Medical History:   Diagnosis Date    Abscess of hand, left 2009    Abscess of scrotum 2007    Bacterial meningitis 2010    Cerebral artery occlusion with cerebral infarction [facility])     Depression     Encounter regarding vascular access for dialysis for ESRD ([facility] 5/29/2021    GERD (gastroesophageal reflux disease)     Hepatitis C     Herpes zoster w/ nervous system complication 5404    neuropathy    Human immunodeficiency virus, type 2 (HIV 2) ([facility] 2009    Inguinal hernia unilateral     left    Pneumonia 2010    Shingles (herpes zoster) polyneuropathy     Suicidal ideation 2008    Vitamin D deficiency        Past Surgical History:   Procedure Laterality Date    ABSCESS DRAINAGE  9/18/2007    scrotal. [facility].  Dr. Yaya Deras  2008    testicle    INGUINAL HERNIA REPAIR  3/9/2012    left indirect hernia repaired with mesh, Dr. Javy Rodriguez, [facility] 3/9/2012    Left Inguinal Hernia Repair;Removal of Foreign Object Left foot    VASCULAR SURGERY N/A 6/3/2021    INSERTION TUNNELED HEMODIALYSIS CATHETER, REMOVAL OF TEMPORARY CATHETER performed by Julianna Hunter MD at [facility] OR       Family History   Problem Relation Age of Onset    Cancer Father [de-identified]        bone marrow    Heart Disease Brother 48        heart attack        reports that he has been smoking cigarettes. He has a 10.00 pack-year smoking history. He has never used smokeless tobacco. He reports previous alcohol use. He reports that he does not use drugs. Allergies:  Ativan [lorazepam]    Current Medications:    aspirin chewable tablet 81 mg, Daily  clopidogrel (PLAVIX) tablet 75 mg, Daily  dolutegravir sodium (TIVICAY) tablet 50 mg, Daily  lamiVUDine (EPIVIR) tablet 50 mg, Daily  megestrol (MEGACE) tablet 40 mg, BID  multivitamin 1 tablet, Daily  thiamine mononitrate tablet 100 mg, Daily  sodium chloride flush 0.9 % injection 5-40 mL, 2 times per day  sodium chloride flush 0.9 % injection 5-40 mL, PRN  0.9 % sodium chloride infusion, PRN  ondansetron (ZOFRAN-ODT) disintegrating tablet 4 mg, Q8H PRN   Or  ondansetron (ZOFRAN) injection 4 mg, Q6H PRN  polyethylene glycol (GLYCOLAX) packet 17 g, Daily PRN  acetaminophen (TYLENOL) tablet 650 mg, Q6H PRN   Or  acetaminophen (TYLENOL) suppository 650 mg, Q6H PRN  heparin (porcine) injection 5,000 Units, 3 times per day  dextrose 5 % solution, Continuous  morphine (PF) injection 2 mg, Q2H PRN  piperacillin-tazobactam (ZOSYN) 3,375 mg in dextrose 5 % 100 mL IVPB extended infusion (mini-bag), Q12H  [START ON 7/6/2021] Post Zosyn Flush (0.9 % sodium chloride infusion), Q12H        Review of Systems:     Physical exam:   General appearance: Patient is on BiPAP not responsive to verbal communications   Head and ENT could not be assessed. Patient been on BiPAP.   Lungs anterior rhonchi   Heart regular rhythm no friction rub nausea gallop  Abdomen no tenderness  Extremities no edema  Neurologic physiologic     labs:  CBC with Differential:    Lab Results   Component Value Date    WBC 4.3 07/04/2021    RBC 3.70 07/04/2021    HGB 11.5 07/04/2021    HCT 37.9 07/04/2021     07/04/2021    .4 07/04/2021    MCH 31.1 07/04/2021    MCHC 30.3 07/04/2021    RDW 14.5 07/04/2021    SEGSPCT 47 01/06/2014    LYMPHOPCT 34.9 07/04/2021    MONOPCT 10.3 07/04/2021    BASOPCT 0.2 07/04/2021    MONOSABS 0.44 07/04/2021    LYMPHSABS 1.49 07/04/2021    EOSABS 0.01 07/04/2021    BASOSABS 0.01 07/04/2021     CMP:    Lab Results   Component Value Date     07/04/2021    K 5.0 07/04/2021     07/04/2021    CO2 15 07/04/2021     07/04/2021    CREATININE 2.9 07/04/2021    GFRAA 27 07/04/2021    LABGLOM 27 07/04/2021    GLUCOSE 113 07/04/2021    GLUCOSE 83 01/26/2012    PROT 8.0 07/04/2021    LABALBU 3.1 07/04/2021    LABALBU 4.2 01/17/2012    CALCIUM 8.7 07/04/2021    BILITOT 0.4 07/04/2021    ALKPHOS 94 07/04/2021    AST 74 07/04/2021    ALT 46 07/04/2021     Ionized Calcium:  No results found for: IONCA  Magnesium:    Lab Results   Component Value Date    MG 1.9 06/24/2021     Phosphorus:    Lab Results   Component Value Date    PHOS 2.5 06/24/2021     U/A:    Lab Results   Component Value Date    COLORU Yellow 07/04/2021    PHUR 5.5 07/04/2021    LABCAST FEW  11/02/2011    WBCUA 0-1 07/04/2021    WBCUA NONE 01/26/2012    RBCUA 0-1 07/04/2021    RBCUA NONE 01/26/2012    BACTERIA RARE 07/04/2021    CLARITYU Clear 07/04/2021    SPECGRAV >=1.030 07/04/2021    LEUKOCYTESUR Negative 07/04/2021    UROBILINOGEN 0.2 07/04/2021    BILIRUBINUR Negative 07/04/2021    BILIRUBINUR NEGATIVE 01/26/2012    BLOODU MODERATE 07/04/2021    GLUCOSEU Negative 07/04/2021    GLUCOSEU NEGATIVE 01/26/2012    AMORPHOUS FEW 07/04/2021     Microalbumen/Creatinine ratio:  No components found for: RUCREAT  Iron Saturation:  No components found for: PERCENTFE  TIBC:  No results found for: TIBC  FERRITIN:  No results found for: FERRITIN     Imaging:  XR CHEST PORTABLE   Final Result   Bibasilar infiltrates. Assessment  1. Hypernatremia, probably due to lack of poor free water intake  2. CHANEL on top of CKD probably prerenal azotemia due to GI bleeding and medication induced  3. Hospital community acquired pneumonia, continue antibiotics as per ID  4. Mixed acid-base disorder with both metabolic and respiratory acidosis   plan  1. Continue current supportive treatment, encourage IV fluid hydration and free water intake  2. Avoid nonsteroidal anti-inflammatory medications, nephrotoxic medications, amino glycosides, contrast dye. 3.  We will follow urine and serum labs and clinical status daily. Thank you for the opportunity to participate in the care of your pleasant patient. We look forward to following along with you.         Electronically signed by Kamila Selby MD on 7/5/2021 at 2:07 PM

## 2021-07-06 NOTE — PROGRESS NOTES
Hospitalist Progress Note      SYNOPSIS: Patient admitted on 2021     Mr. Valerie Ledezma, a 56y.o. year old male  who  has a past medical history of Abscess of hand, left, Abscess of scrotum, Bacterial meningitis, Cerebral artery occlusion with cerebral infarction [facility]), Depression, Encounter regarding vascular access for dialysis for ESRD ([facility] [address] GERD (gastroesophageal reflux disease), Hepatitis C, Herpes zoster w/ nervous system complication, Human immunodeficiency virus, type 2 (HIV 2) ([facility] [address] Inguinal hernia unilateral, Pneumonia, Shingles (herpes zoster) polyneuropathy, Suicidal ideation, and Vitamin D deficiency.      Pt is nonverbal and unable to follow commands. All information is obtained via chart review and ancillary staff. Pt was transferred from NH where pt was noted to have increasing confusion and lethargy with weak cough and pulmonary congestion. Of note, pt was recently discharged from [facility] on 21 where he was managed from acute metabolic encephalopathy and CHANEL on CKD. At time of discharge pt was noted to be mostly non-verbal but with prompting was able to speak and answer some questions.     In ED, pt was afebrile and hemodynamically stable (108/86) but hypoxic on RA (improved to 96% on 2L), tachypneic (RR: 32) and Tachycardic (HR: 135). CMP significant for hypernatremia (Na: 152), BUN:Cr consistent with ESRD (BUN:Cr of 108:2.9). CBC with WBC of 4.3, macrocytic anemia (Hgb of 11.5, MCV: 102.4). COVID-19 negative. UA negative for UTI. CXR with bibasilar infiltrates. Pt admitted for further management. SUBJECTIVE:    Patient seen and examined  Records reviewed. Stable overnight. No other overnight issues reported.    Temp (24hrs), Av °F (37.8 °C), Min:98.5 °F (36.9 °C), Max:103 °F (39.4 °C)    DIET: Diet NPO  CODE: Limited    Intake/Output Summary (Last 24 hours) at 2021 1247  Last data filed at 2021 0600  Gross per 24 hour   Intake 1482.81 ml   Output 975 ml   Net 507.81 ml       OBJECTIVE:    /77   Pulse 114   Temp 98.7 °F (37.1 °C) (Axillary)   Resp (!) 38   Ht 5' 8\" (1.727 m)   Wt 178 lb (80.7 kg)   SpO2 96%   BMI 27.06 kg/m²     General appearance: Ill-appearing. On BiPAP  HEENT: Normocephalic, atraumatic. Neck: Supple. No jugular venous distention. Respiratory: Coarse bilaterally  Cardiovascular: Tachycardic  Abdomen: Soft, nontender, nondistended  Musculoskeletal: No clubbing, cyanosis, no bilateral lower extremity edema. Brisk capillary refill.    Skin:  No rashes  on visible skin  Neurologic: Lethargic, not responding to commands    ASSESSMENT:  -Healthcare associated pneumonia  -Acute hypoxic respiratory failure  -Hypernatremia  -Acute on chronic renal failure  -HIV infection  -Chronic hep C infection  -Acute encephalopathy       PLAN:  -Infectious disease following  -Nephrology following  -Palliative care following  -Continue vancomycin and Zosyn  -Increase D5 water 225 mL's per hour  -Monitor serum electrolytes  -Monitor renal function avoid nephrotoxic medications  -Consider hospice      DISPOSITION:     Medications:  REVIEWED DAILY    Infusion Medications    sodium chloride      dextrose 75 mL/hr at 07/05/21 0909     Scheduled Medications    aspirin  81 mg Oral Daily    clopidogrel  75 mg Oral Daily    dolutegravir sodium  50 mg Oral Daily    lamiVUDine  50 mg Oral Daily    megestrol  40 mg Oral BID    multivitamin  1 tablet Oral Daily    vitamin B-1  100 mg Oral Daily    sodium chloride flush  5-40 mL Intravenous 2 times per day    heparin (porcine)  5,000 Units Subcutaneous 3 times per day    piperacillin-tazobactam  3,375 mg Intravenous Q12H    sodium chloride   Intravenous Q12H     PRN Meds: sodium chloride flush, sodium chloride, ondansetron **OR** ondansetron, polyethylene glycol, acetaminophen **OR** acetaminophen, morphine    Labs:     Recent Labs     07/03/21  1338 07/04/21  2157 07/06/21  0541   WBC 7.7 4.3* 2.7*   HGB 13.2 11.5* 11.4*   HCT 42.3 37.9 38.0    265 179       Recent Labs     07/03/21 1338 07/03/21 1338 07/04/21 2157 07/04/21 2157 07/05/21  1418 07/05/21  1940 07/06/21  0541   *   < > 152*   < > 151* 154* 154*  155*   K 5.1*  --  5.0  --   --   --  4.9   *  --  123*  --   --   --  122*   CO2 16*  --  15*  --   --   --  16*   BUN 85*  --  108*  --   --   --  114*   CREATININE 1.9*  --  2.9*  --   --   --  3.9*   CALCIUM 9.6  --  8.7  --   --   --  9.2    < > = values in this interval not displayed. Recent Labs     07/03/21 1338 07/04/21 2157 07/06/21  0541   PROT 9.3* 8.0 8.1   ALKPHOS 94 94 75   ALT 33 46* 62*   AST 50* 74* 106*   BILITOT 0.5 0.4 0.8       Recent Labs     07/06/21  0541   INR 3.9       No results for input(s): Yumiko Salazar in the last 72 hours. Chronic labs:    Lab Results   Component Value Date    CHOL 184 06/17/2021    TRIG 177 (H) 06/17/2021    HDL 28 06/17/2021    LDLCALC 121 (H) 06/17/2021    TSH 17.020 (H) 06/17/2021    PSA SEE NOTE (AA) 03/08/2017    PSA 0.47 03/08/2017    INR 3.9 07/06/2021    LABA1C 4.9 06/17/2021       Radiology: REVIEWED DAILY    +++++++++++++++++++++++++++++++++++++++++++++++++  Bart HugoerDO [facility] - [address], [place]  +++++++++++++++++++++++++++++++++++++++++++++++++  NOTE: This report was transcribed using voice recognition software. Every effort was made to ensure accuracy; however, inadvertent computerized transcription errors may be present.

## 2021-07-06 NOTE — PROGRESS NOTES
Coordination of care discussion and chart review with PM team. Pt known to LSW from prior admission, Seen today at bedside, he did not respond to his name being called. LSW placed call to his sister,Lesa MOORE to provide support and assistance with goals of care. LSW verified that pt DOES have a HCPOA and Living Will on file, dated 2/9/2017. Main agent is his sister Valentín Vazquez, alternate is his brother Meggan Steiner. Pt is a limited intervention ,meds only which was reviewed with his sister. LSW will follow up with Gris Mccarthy.

## 2021-07-06 NOTE — PROGRESS NOTES
Q2H PRN Tonna Kill, DO   2 mg at 07/06/21 0518    piperacillin-tazobactam (ZOSYN) 3,375 mg in dextrose 5 % 100 mL IVPB extended infusion (mini-bag)  3,375 mg Intravenous Q12H Oli Garcia MD 25 mL/hr at 07/06/21 0904 3,375 mg at 07/06/21 0904    Post Zosyn Flush (0.9 % sodium chloride infusion)   Intravenous Q12H Oli Garcia MD           REVIEW OF SYSTEMS:  Could not be obtained         PHYSICAL EXAM:      Vitals:     BP 97/70   Pulse 109   Temp 98.2 °F (36.8 °C) (Axillary)   Resp 27   Ht 5' 8\" (1.727 m)   Wt 178 lb (80.7 kg)   SpO2 96%   BMI 27.06 kg/m²     General Appearance:    Unresponsive, on BiPAP    Head:    Normocephalic, atraumatic   Eyes:    No pallor, no icterus,no photophobia    Ears:    No obvious deformity or drainage.    Nose:   No nasal drainage   Throat:   Mucosa moist, no oral thrush   Neck:   Supple      Lungs:     Bilateral coarse rhonchi    Heart:    Regular rate and rhythm   Abdomen:     Soft, non-tender, bowel sounds present    Extremities:   No edema, no cyanosis ,no open wound   Pulses:   Dorsalis pedis palpable    Skin:   no rashes   Right chest tesio - no drainage      CBC with Differential:      Lab Results   Component Value Date    WBC 2.7 07/06/2021    RBC 3.66 07/06/2021    HGB 11.4 07/06/2021    HCT 38.0 07/06/2021     07/06/2021    .8 07/06/2021    MCH 31.1 07/06/2021    MCHC 30.0 07/06/2021    RDW 14.8 07/06/2021    SEGSPCT 47 01/06/2014    LYMPHOPCT 34.9 07/04/2021    MONOPCT 10.3 07/04/2021    BASOPCT 0.2 07/04/2021    MONOSABS 0.44 07/04/2021    LYMPHSABS 1.49 07/04/2021    EOSABS 0.01 07/04/2021    BASOSABS 0.01 07/04/2021       CMP     Lab Results   Component Value Date     07/06/2021     07/06/2021    K 4.9 07/06/2021     07/06/2021    CO2 16 07/06/2021     07/06/2021    CREATININE 3.9 07/06/2021    GFRAA 19 07/06/2021    LABGLOM 19 07/06/2021    GLUCOSE 115 07/06/2021    GLUCOSE 83 01/26/2012    PROT 8.1 07/06/2021 LABALBU 3.0 07/06/2021    LABALBU 4.2 01/17/2012    CALCIUM 9.2 07/06/2021    BILITOT 0.8 07/06/2021    ALKPHOS 75 07/06/2021     07/06/2021    ALT 62 07/06/2021         Hepatic Function Panel:    Lab Results   Component Value Date    ALKPHOS 75 07/06/2021    ALT 62 07/06/2021     07/06/2021    PROT 8.1 07/06/2021    BILITOT 0.8 07/06/2021    BILIDIR 0.3 03/13/2015    IBILI 1.3 03/13/2015    LABALBU 3.0 07/06/2021    LABALBU 4.2 01/17/2012       PT/INR:    Lab Results   Component Value Date    PROTIME 43.0 07/06/2021    INR 3.9 07/06/2021       TSH:    Lab Results   Component Value Date    TSH 17.020 06/17/2021       U/A:    Lab Results   Component Value Date    COLORU Yellow 07/04/2021    PHUR 5.5 07/04/2021    LABCAST FEW  11/02/2011    WBCUA 0-1 07/04/2021    WBCUA NONE 01/26/2012    RBCUA 0-1 07/04/2021    RBCUA NONE 01/26/2012    BACTERIA RARE 07/04/2021    CLARITYU Clear 07/04/2021    SPECGRAV >=1.030 07/04/2021    LEUKOCYTESUR Negative 07/04/2021    UROBILINOGEN 0.2 07/04/2021    BILIRUBINUR Negative 07/04/2021    BILIRUBINUR NEGATIVE 01/26/2012    BLOODU MODERATE 07/04/2021    GLUCOSEU Negative 07/04/2021    GLUCOSEU NEGATIVE 01/26/2012    AMORPHOUS FEW 07/04/2021       ABG:  No results found for: USV1PGC, BEART, J3ZXCXSU, PHART, THGBART, KUG4ZHH, PO2ART, CQM6PPZ    MICROBIOLOGY:    Blood culture - negative     SARS CoV 2 negative         Radiology :    Chest  X ray - bibasilar infiltrates         IMPRESSION:    1. Respiratory failure, Pneumonia   2. HIV infection / AIDS- HIV viral load 3240 and CD 4 111 as of 6/2021   3. Chronic Hep C infection   4. Encephalopathy, Cocaine use. 5. Leukopenia, Hypernatremia , fever       RECOMMENDATIONS:      1. Zosyn 3.375 grams IV q 12 hrs  2. HIV genotype pending   3. June Sebastián / discovy ( when able to take orally)   4.  Hospice care ( If no improvement in 24-48 hrs )

## 2021-07-06 NOTE — PROGRESS NOTES
Speech Language Pathology      NAME:  Rad Adams  :  1960  DATE: 2021  ROOM:  [id number]    Order received. Chart reviewed. Pt unavailable at this time due to:  [x] HOLD per RN, Pt remains on BiPAP  [] Off unit for testing/ procedure    [] With medical staff   [] Declined intervention  [] Sleeping/ Lethargic   [] Other:     Will re-attempt later this date as able. Thank you.        Pneumonia [J18.9]

## 2021-07-06 NOTE — PROGRESS NOTES
Nephrology notified of morning lab results and low urine output from nightshift.  Electronically signed by Jennifer Mcdonald RN on 7/6/2021 at 7:49 AM

## 2021-07-06 NOTE — CARE COORDINATION
Transition of care-Patient is unable to participate in conversation d/t altered mental status, call placed to his sister Bryon Pendleton ([phone]) POA-to discuss discharge planning. She wishes to consult hospice if there is no improvement in condition in the next 24-48 hrs. She wishes for her brother to go to the [facility], she does not wish for her brother to return to [address] [place] [address], did not provide any back up plan in event of denial to hospice house. CM following.     Jo ALANIZN, RN  GEOVANNA   [phone]

## 2021-07-06 NOTE — PROGRESS NOTES
07/06/21 0401   NIV Type   $NIV $Daily Charge   Skin Assessment Clean, dry, & intact   Skin Protection for O2 Device Yes   Orientation Middle   Location Nose   NIV Started/Stopped On   Equipment Type V60   Mode Bilevel   Mask Type Full face mask   Mask Size Medium   Bonnet size Medium   Settings/Measurements   IPAP 16 cmH20   CPAP/EPAP 8 cmH2O   Resp 26   FiO2  100 %   I Time/ I Time % 0.95 s   Vt Exhaled 724 mL   Minute Volume 18 Liters   Mask Leak (lpm) 74 lpm   Comfort Level Good   Using Accessory Muscles No   SpO2 95   Date: 7/6/2021    Time: 4:03 AM    BIPAP RECHECK  Facial area red/color change? No           If YES are Blister/Lesion present? No   If yes must notify nursing staff  BIPAP/CPAP skin barrier?   Yes    Skin barrier type:mepilexlite       Comments:        Shade Caputo RCP

## 2021-07-06 NOTE — PROGRESS NOTES
[facility] MD Yulia Salazar, MD Amilcar Grissom, MD Nataliya Reina, MD Arthur Chaparro, CORINNA Cha, AUGUSTINE  Progress Note    7/6/2021    SUBJECTIVE:   (-) c/o's  (-) sob/elkins/cp/palp , remains on BiPAP with unable to verbally communicate. PROBLEM LIST:    Active Problems:    HTN (hypertension)    AMS (altered mental status)    End-stage renal disease needing dialysis (HCC)    Severe protein-calorie malnutrition (HCC)    HAP (hospital-acquired pneumonia)    Hypernatremia  Resolved Problems:    * No resolved hospital problems.  *         DIET:    Diet NPO     MEDS (scheduled):    aspirin  81 mg Oral Daily    clopidogrel  75 mg Oral Daily    dolutegravir sodium  50 mg Oral Daily    lamiVUDine  50 mg Oral Daily    megestrol  40 mg Oral BID    multivitamin  1 tablet Oral Daily    vitamin B-1  100 mg Oral Daily    sodium chloride flush  5-40 mL Intravenous 2 times per day    heparin (porcine)  5,000 Units Subcutaneous 3 times per day    piperacillin-tazobactam  3,375 mg Intravenous Q12H    sodium chloride   Intravenous Q12H       MEDS (infusions):   sodium chloride      dextrose 125 mL/hr at 07/06/21 0802       MEDS (prn):  sodium chloride flush, sodium chloride, ondansetron **OR** ondansetron, polyethylene glycol, acetaminophen **OR** acetaminophen, morphine    PHYSICAL EXAM:     Patient Vitals for the past 24 hrs:   BP Temp Temp src Pulse Resp SpO2   07/06/21 1041 -- -- -- -- 26 --   07/06/21 0804 97/70 98.2 °F (36.8 °C) Axillary 109 27 96 %   07/06/21 0500 114/77 98.7 °F (37.1 °C) Axillary 114 (!) 38 --   07/06/21 0401 -- -- -- -- 26 --   07/06/21 0030 112/67 98.5 °F (36.9 °C) Axillary 124 28 96 %   07/05/21 2133 -- -- -- -- 27 --   07/05/21 1930 101/68 100 °F (37.8 °C) Axillary 118 (!) 39 98 %   07/05/21 1645 110/64 98.9 °F (37.2 °C) Axillary 120 (!) 33 --   07/05/21 1429 (!) 82/50 103 °F (39.4 °C) Axillary 130 (!) 48 94 %   @      Intake/Output Summary (Last 24 hours) at 7/6/2021 1225  Last data filed at 7/6/2021 0804  Gross per 24 hour   Intake 1482.81 ml   Output 625 ml   Net 857.81 ml         Wt Readings from Last 3 Encounters:   07/04/21 178 lb (80.7 kg)   07/03/21 178 lb (80.7 kg)   06/16/21 178 lb (80.7 kg)       Constitutional:  in no acute distress  HEENT: NC/AT, EOMI, sclera and conjunctiva are clear and anicteric, mucus membranes moist  Neck: Trachea midline, no JVD  Cardiovascular: S1, S2 regular rhythm, no murmur,or rub  Respiratory:  No crackles, no wheeze  Gastrointestinal:  Soft, nontender, nondistended, NABS  Ext: no edema, feet warm  Skin: dry, no rash  Neuro: awake, alert, interactive      DATA:    Recent Labs     07/03/21 1338 07/04/21 2157 07/06/21  0541   WBC 7.7 4.3* 2.7*   HGB 13.2 11.5* 11.4*   HCT 42.3 37.9 38.0   .0* 102.4* 103.8*    265 179     Recent Labs     07/03/21 1338 07/03/21 1338 07/04/21 2157 07/04/21 2157 07/05/21  1418 07/05/21 1940 07/06/21  0541   *   < > 152*   < > 151* 154* 154*  155*   K 5.1*  --  5.0  --   --   --  4.9   *  --  123*  --   --   --  122*   CO2 16*  --  15*  --   --   --  16*   BUN 85*  --  108*  --   --   --  114*   CREATININE 1.9*  --  2.9*  --   --   --  3.9*   ALT 33  --  46*  --   --   --  62*   AST 50*  --  74*  --   --   --  106*   BILITOT 0.5  --  0.4  --   --   --  0.8   ALKPHOS 94  --  94  --   --   --  75    < > = values in this interval not displayed. Lab Results   Component Value Date    LABPROT 4.2 (H) 05/25/2021    LABPROT 4.2 05/25/2021       ASSESSMENT / RECOMMENDATIONS:    1. CHANEL on CKD Stage 3   Worsening renal status with BUN being up to 214 today and creatinine 3.9 due to his infection/sepsis and intravascular fluid status  2. Anemia: Due to CKD     3. Secondary Hyperparathyroidism: Due to CKD     4. Hypernatremia: On on both D5W at 125 cc/h and free water.   Sister discussed with  therapy and to placement in hospice house,

## 2021-07-06 NOTE — PROGRESS NOTES
07/05/21 2133   NIV Type   Skin Assessment Clean, dry, & intact   Skin Protection for O2 Device Yes   Orientation Middle   Location Nose   Equipment ID [id number]   Equipment Type v60   Mode Bilevel   Mask Type Full face mask   Settings/Measurements   IPAP 16 cmH20   CPAP/EPAP 8 cmH2O   Resp 27   FiO2  100 %   I Time/ I Time % 0.95 s   Vt Exhaled 660 mL   Minute Volume 20 Liters   Mask Leak (lpm) 95 lpm   Comfort Level Good   Using Accessory Muscles No   Date: 7/5/2021    Time: 9:37 PM    BIPAP  RECHECK  Facial area red/color change? No           If YES are Blister/Lesion present? No   If yes must notify nursing staff  BIPAP/CPAP skin barrier?   Yes    Skin barrier type:mepilexlite       Comments:SPO2 WOULD NOT          Crystal Damico RCP

## 2021-07-07 NOTE — PROGRESS NOTES
Department of Internal Medicine  Infectious Diseases  Progress  Note      C/C :  HIV infection- uncontrolled , resp failure , fever      Pt is in respiratory distress, on BiPAP   Febrile         Current Facility-Administered Medications   Medication Dose Route Frequency Provider Last Rate Last Admin    aspirin chewable tablet 81 mg  81 mg Oral Daily Mabel Escalante MD        clopidogrel (PLAVIX) tablet 75 mg  75 mg Oral Daily Mabel Escalante MD        dolutegravir sodium (TIVICAY) tablet 50 mg  50 mg Oral Daily Mabel Escalante MD        lamiVUDine (EPIVIR) tablet 50 mg  50 mg Oral Daily Mabel Escalante MD        megestrol (MEGACE) tablet 40 mg  40 mg Oral BID Mabel Escalante MD        multivitamin 1 tablet  1 tablet Oral Daily Mabel Escalante MD        thiamine mononitrate tablet 100 mg  100 mg Oral Daily Mabel Escalante MD        sodium chloride flush 0.9 % injection 5-40 mL  5-40 mL Intravenous 2 times per day Mabel Escalante MD   10 mL at 07/06/21 2143    sodium chloride flush 0.9 % injection 5-40 mL  5-40 mL Intravenous PRN Mabel Escalante MD   10 mL at 07/06/21 1431    0.9 % sodium chloride infusion  25 mL Intravenous PRN Mabel Escalante MD        ondansetron (ZOFRAN-ODT) disintegrating tablet 4 mg  4 mg Oral Q8H PRN Mabel Escalante MD        Or    ondansetron (ZOFRAN) injection 4 mg  4 mg Intravenous Q6H PRN Mabel Escalante MD        polyethylene glycol (GLYCOLAX) packet 17 g  17 g Oral Daily PRN Mabel Escalante MD        acetaminophen (TYLENOL) tablet 650 mg  650 mg Oral Q6H PRN Mabel Escalante MD        Or    acetaminophen (TYLENOL) suppository 650 mg  650 mg Rectal Q6H PRN Mabel Escalante MD   650 mg at 07/05/21 2135    heparin (porcine) injection 5,000 Units  5,000 Units Subcutaneous 3 times per day Mabel Escalante MD   5,000 Units at 07/07/21 0812    dextrose 5 % solution   Intravenous Continuous [place],  mL/hr at 07/07/21 0631 New Bag at 07/07/21 0631    morphine (PF) injection 2 mg  2 mg Intravenous Q2H PRN Herminio Mo, DO   2 mg at 07/06/21 1431    piperacillin-tazobactam (ZOSYN) 3,375 mg in dextrose 5 % 100 mL IVPB extended infusion (mini-bag)  3,375 mg Intravenous Q12H Maldonado Michael MD   Stopped at 07/07/21 1150    Post Zosyn Flush (0.9 % sodium chloride infusion)   Intravenous Q12H Oli Garcia MD 12.5 mL/hr at 07/07/21 1150 New Bag at 07/07/21 1150       REVIEW OF SYSTEMS:  Could not be obtained         PHYSICAL EXAM:      Vitals:     /80   Pulse 76   Temp 96.3 °F (35.7 °C) (Temporal)   Resp 20   Ht 5' 8\" (1.727 m)   Wt 178 lb (80.7 kg)   SpO2 100%   BMI 27.06 kg/m²     General Appearance:    Unresponsive, on BiPAP    Head:    Normocephalic, atraumatic   Eyes:    No pallor, no icterus,no photophobia    Ears:    No obvious deformity or drainage.    Nose:   No nasal drainage   Throat:   Mucosa moist, no oral thrush   Neck:   Supple      Lungs:     Bilateral coarse rhonchi    Heart:    Regular rate and rhythm   Abdomen:     Soft, non-tender, bowel sounds present    Extremities:   No edema, no cyanosis ,no open wound   Pulses:   Dorsalis pedis palpable    Skin:   no rashes   Right chest tesio - no drainage      CBC with Differential:      Lab Results   Component Value Date    WBC 2.8 07/07/2021    RBC 3.33 07/07/2021    HGB 10.3 07/07/2021    HCT 33.8 07/07/2021     07/07/2021    .5 07/07/2021    MCH 30.9 07/07/2021    MCHC 30.5 07/07/2021    RDW 15.2 07/07/2021    NRBC 0.9 07/07/2021    SEGSPCT 47 01/06/2014    LYMPHOPCT 40.9 07/07/2021    MONOPCT 7.8 07/07/2021    MYELOPCT 0.9 07/07/2021    BASOPCT 0.9 07/07/2021    MONOSABS 0.22 07/07/2021    LYMPHSABS 1.15 07/07/2021    EOSABS 0.10 07/07/2021    BASOSABS 0.03 07/07/2021       CMP     Lab Results   Component Value Date     07/07/2021    K 5.2 07/07/2021    K 4.9 07/06/2021     07/07/2021    CO2 16 07/07/2021     07/07/2021    CREATININE 4.8 07/07/2021    GFRAA 15 07/07/2021    LABGLOM 15 07/07/2021 GLUCOSE 117 07/07/2021    GLUCOSE 83 01/26/2012    PROT 7.7 07/07/2021    LABALBU 2.5 07/07/2021    LABALBU 4.2 01/17/2012    CALCIUM 8.9 07/07/2021    BILITOT 0.9 07/07/2021    ALKPHOS 117 07/07/2021     07/07/2021     07/07/2021         Hepatic Function Panel:    Lab Results   Component Value Date    ALKPHOS 117 07/07/2021     07/07/2021     07/07/2021    PROT 7.7 07/07/2021    BILITOT 0.9 07/07/2021    BILIDIR 0.3 03/13/2015    IBILI 1.3 03/13/2015    LABALBU 2.5 07/07/2021    LABALBU 4.2 01/17/2012       PT/INR:    Lab Results   Component Value Date    PROTIME 43.0 07/06/2021    INR 3.9 07/06/2021       TSH:    Lab Results   Component Value Date    TSH 17.020 06/17/2021       U/A:    Lab Results   Component Value Date    COLORU Yellow 07/04/2021    PHUR 5.5 07/04/2021    LABCAST FEW  11/02/2011    WBCUA 0-1 07/04/2021    WBCUA NONE 01/26/2012    RBCUA 0-1 07/04/2021    RBCUA NONE 01/26/2012    BACTERIA RARE 07/04/2021    CLARITYU Clear 07/04/2021    SPECGRAV >=1.030 07/04/2021    LEUKOCYTESUR Negative 07/04/2021    UROBILINOGEN 0.2 07/04/2021    BILIRUBINUR Negative 07/04/2021    BILIRUBINUR NEGATIVE 01/26/2012    BLOODU MODERATE 07/04/2021    GLUCOSEU Negative 07/04/2021    GLUCOSEU NEGATIVE 01/26/2012    AMORPHOUS FEW 07/04/2021       ABG:  No results found for: MRJ1TYB, BEART, C3JWYDEX, PHART, THGBART, DVO9MUT, PO2ART, KCX6WJN    MICROBIOLOGY:    Blood culture - negative     SARS CoV 2 negative         Radiology :    Chest  X ray - bibasilar infiltrates         IMPRESSION:    1. Respiratory failure, Pneumonia   2. HIV infection / AIDS- HIV viral load 3240 and CD 4 111 as of 6/2021   3. Chronic Hep C infection   4. Encephalopathy, Cocaine use. 5. Leukopenia       RECOMMENDATIONS:      1. Zosyn 3.375 grams IV q 12 hrs  2.   Hospice care )

## 2021-07-07 NOTE — PROGRESS NOTES
Physician Progress Note      PATIENT:               Peter Sal  CSN #:                  [id number]  :                       1960  ADMIT DATE:       2021 8:16 PM  [address] [place] DATE:  RESPONDING  PROVIDER #:        Tomas Osborn DO          QUERY TEXT:    Pt admitted with pneumonia and altered mental status. Noted documentation of   Sepsis on  by ordered ER physician. If possible, please document in   progress notes and discharge summary:    The medical record reflects the following:  Risk Factors: Pneumonia, HIV, AMS  Clinical Indicators: Per ED Pneumonia of both lower lobes due to infectious   organism; Septicemia; LABS on admit WBC 4.3, 2.7 lactic 2.5 VS on admit 99.6,   135, 32, 108/86; Per ID HIV infection uncontrolled; resp failure, fever  Treatment: ID Consult, Serial labs, IV antibiotics, continued inpatient   monitoring on an intermediate tele unit    Thank you  Osbaldo CORONADO, RN, CCDS  Clinical Documentation Improvement  Options provided:  -- Sepsis confirmed present on admission  -- Sepsis confirmed not present on admission  -- Sepsis ruled out  -- Other - I will add my own diagnosis  -- Disagree - Not applicable / Not valid  -- Disagree - Clinically unable to determine / Unknown  -- Refer to Clinical Documentation Reviewer    PROVIDER RESPONSE TEXT:    The diagnosis of Sepsis was confirmed not present on admission.     Query created by: Rafael Salazar on  1:29 PM      Electronically signed by:  Tomas Osborn DO 2021 3:11 PM

## 2021-07-07 NOTE — PROGRESS NOTES
Hospitalist Progress Note      SYNOPSIS: Patient admitted on 2021     Mr. Juventino Yuen, a 56y.o. year old male  who  has a past medical history of Abscess of hand, left, Abscess of scrotum, Bacterial meningitis, Cerebral artery occlusion with cerebral infarction [facility]), Depression, Encounter regarding vascular access for dialysis for ESRD ([facility] [address] GERD (gastroesophageal reflux disease), Hepatitis C, Herpes zoster w/ nervous system complication, Human immunodeficiency virus, type 2 (HIV 2) ([facility] [address] Inguinal hernia unilateral, Pneumonia, Shingles (herpes zoster) polyneuropathy, Suicidal ideation, and Vitamin D deficiency.      Pt is nonverbal and unable to follow commands. All information is obtained via chart review and ancillary staff. Pt was transferred from NH where pt was noted to have increasing confusion and lethargy with weak cough and pulmonary congestion. Of note, pt was recently discharged from [facility] on 21 where he was managed from acute metabolic encephalopathy and CHANEL on CKD. At time of discharge pt was noted to be mostly non-verbal but with prompting was able to speak and answer some questions.     In ED, pt was afebrile and hemodynamically stable (108/86) but hypoxic on RA (improved to 96% on 2L), tachypneic (RR: 32) and Tachycardic (HR: 135). CMP significant for hypernatremia (Na: 152), BUN:Cr consistent with ESRD (BUN:Cr of 108:2.9). CBC with WBC of 4.3, macrocytic anemia (Hgb of 11.5, MCV: 102.4). COVID-19 negative. UA negative for UTI. CXR with bibasilar infiltrates. Pt admitted for further management. SUBJECTIVE:    Patient seen and examined  Records reviewed. Sodium 149  Potassium 5.2  Creatinine 4.8        Stable overnight. No other overnight issues reported.    Temp (24hrs), Av.6 °F (36.4 °C), Min:96.3 °F (35.7 °C), Max:98.6 °F (37 °C)    DIET: Diet NPO  CODE: Limited    Intake/Output Summary (Last 24 hours) at 2021 5100  Last data filed at 2021 0641  Gross per 24 hour Intake 2862.48 ml   Output 895 ml   Net 1967.48 ml       OBJECTIVE:    /80   Pulse 76   Temp 96.3 °F (35.7 °C) (Temporal)   Resp 20   Ht 5' 8\" (1.727 m)   Wt 178 lb (80.7 kg)   SpO2 100%   BMI 27.06 kg/m²     General appearance: Ill-appearing. On BiPAP  HEENT: Normocephalic, atraumatic. Neck: Supple. No jugular venous distention. Respiratory: Coarse bilaterally  Cardiovascular: Tachycardic  Abdomen: Soft, nontender, nondistended  Musculoskeletal: No clubbing, cyanosis, no bilateral lower extremity edema. Brisk capillary refill.    Skin:  No rashes  on visible skin  Neurologic: Lethargic, not responding to commands    ASSESSMENT:  -Healthcare associated pneumonia  -Acute hypoxic respiratory failure  -Hypernatremia  -Acute on chronic renal failure  -HIV infection  -Chronic hep C infection  -Acute encephalopathy       PLAN:  -Infectious disease following  -Nephrology following  -Palliative care following  -Continue Zosyn and Tivicary  -D5 water 125 mL/hr and free water  -Monitor serum electrolytes  -Monitor renal function avoid nephrotoxic medications      DISPOSITION:     Medications:  REVIEWED DAILY    Infusion Medications    sodium chloride      dextrose 125 mL/hr at 07/07/21 0631     Scheduled Medications    aspirin  81 mg Oral Daily    clopidogrel  75 mg Oral Daily    dolutegravir sodium  50 mg Oral Daily    lamiVUDine  50 mg Oral Daily    megestrol  40 mg Oral BID    multivitamin  1 tablet Oral Daily    vitamin B-1  100 mg Oral Daily    sodium chloride flush  5-40 mL Intravenous 2 times per day    heparin (porcine)  5,000 Units Subcutaneous 3 times per day    piperacillin-tazobactam  3,375 mg Intravenous Q12H    sodium chloride   Intravenous Q12H     PRN Meds: sodium chloride flush, sodium chloride, ondansetron **OR** ondansetron, polyethylene glycol, acetaminophen **OR** acetaminophen, morphine    Labs:     Recent Labs     07/04/21  2157 07/06/21  0541 07/07/21  0535   WBC 4.3* 2. 7* 2.8*   HGB 11.5* 11.4* 10.3*   HCT 37.9 38.0 33.8*    179 146       Recent Labs     07/04/21 2157 07/05/21  1418 07/06/21  0541 07/06/21  0541 07/06/21 2009 07/07/21  0131 07/07/21  0535   *   < > 155*  154*  155*   < > 149* 148* 149*   K 5.0  --  4.9  --   --   --  5.2*   *  --  122*  --   --   --  117*   CO2 15*  --  16*  --   --   --  16*   *  --  114*  --   --   --  122*   CREATININE 2.9*  --  3.9*  --   --   --  4.8*   CALCIUM 8.7  --  9.2  --   --   --  8.9    < > = values in this interval not displayed. Recent Labs     07/04/21 2157 07/06/21  0541 07/07/21  0535   PROT 8.0 8.1 7.7   ALKPHOS 94 75 117   ALT 46* 62* 170*   AST 74* 106* 283*   BILITOT 0.4 0.8 0.9       Recent Labs     07/06/21  0541   INR 3.9       No results for input(s): CKTOTAL, TROPONINI in the last 72 hours. Chronic labs:    Lab Results   Component Value Date    CHOL 184 06/17/2021    TRIG 177 (H) 06/17/2021    HDL 28 06/17/2021    LDLCALC 121 (H) 06/17/2021    TSH 17.020 (H) 06/17/2021    PSA SEE NOTE (AA) 03/08/2017    PSA 0.47 03/08/2017    INR 3.9 07/06/2021    LABA1C 4.9 06/17/2021       Radiology: REVIEWED DAILY    +++++++++++++++++++++++++++++++++++++++++++++++++  [facility], DO  [employer] - [address] Yvon Yao, [facility]  +++++++++++++++++++++++++++++++++++++++++++++++++  NOTE: This report was transcribed using voice recognition software. Every effort was made to ensure accuracy; however, inadvertent computerized transcription errors may be present.

## 2021-07-07 NOTE — PROGRESS NOTES
[facility] Twan Springer Overall, MD [place], MD Goldie Acosta, MD Bharathi Welsh, MD Vanessa Antony, CNP   Jana Cha, AUGUSTINE  Progress Note    7/7/2021    SUBJECTIVE:   7/7 : seen today , weak , o2/nc NIPPV . apper euvolemic still with TESIO in place   PROBLEM LIST:    Active Problems:    HTN (hypertension)    AMS (altered mental status)    End-stage renal disease needing dialysis ([facility] [address]    Severe protein-calorie malnutrition (HCC)    HAP (hospital-acquired pneumonia)    Hypernatremia  Resolved Problems:    * No resolved hospital problems.  *         DIET:    Diet NPO     MEDS (scheduled):    aspirin  81 mg Oral Daily    clopidogrel  75 mg Oral Daily    dolutegravir sodium  50 mg Oral Daily    lamiVUDine  50 mg Oral Daily    megestrol  40 mg Oral BID    multivitamin  1 tablet Oral Daily    vitamin B-1  100 mg Oral Daily    sodium chloride flush  5-40 mL Intravenous 2 times per day    heparin (porcine)  5,000 Units Subcutaneous 3 times per day    piperacillin-tazobactam  3,375 mg Intravenous Q12H    sodium chloride   Intravenous Q12H       MEDS (infusions):   sodium chloride      dextrose 125 mL/hr at 07/07/21 0631       MEDS (prn):  sodium chloride flush, sodium chloride, ondansetron **OR** ondansetron, polyethylene glycol, acetaminophen **OR** acetaminophen, morphine    PHYSICAL EXAM:     Patient Vitals for the past 24 hrs:   BP Temp Temp src Pulse Resp SpO2   07/07/21 1524 125/81 96.3 °F (35.7 °C) Temporal 86 20 100 %   07/07/21 0834 -- -- -- -- 20 --   07/07/21 0804 108/80 96.3 °F (35.7 °C) Temporal 76 19 100 %   07/07/21 0412 -- -- -- -- 20 --   07/07/21 0021 -- -- -- -- 22 --   07/06/21 2238 99/68 -- -- 117 -- --   07/06/21 2149 87/77 97.8 °F (36.6 °C) Axillary 117 22 100 %   07/06/21 2142 -- -- -- -- 23 --   @      Intake/Output Summary (Last 24 hours) at 7/7/2021 1913  Last data filed at 7/7/2021 1729  Gross per 24 hour   Intake 1976.3 ml   Output 970 ml   Net 1006.3 ml         Wt Readings from Last 3 Encounters:   07/04/21 178 lb (80.7 kg)   07/03/21 178 lb (80.7 kg)   06/16/21 178 lb (80.7 kg)       Constitutional:  in no acute distress  HEENT: NC/AT, EOMI, sclera and conjunctiva are clear and anicteric, mucus membranes moist  Neck: Trachea midline, no JVD  Cardiovascular: S1, S2 regular rhythm, no murmur,or rub  Respiratory:  No crackles, no wheeze  Gastrointestinal:  Soft, nontender, nondistended, NABS  Ext: no edema, feet warm  Skin: dry, no rash  Neuro: awake, alert, interactive      DATA:    Recent Labs     07/04/21 2157 07/06/21  0541 07/07/21  0535   WBC 4.3* 2.7* 2.8*   HGB 11.5* 11.4* 10.3*   HCT 37.9 38.0 33.8*   .4* 103.8* 101.5*    179 146     Recent Labs     07/04/21 2157 07/05/21  1418 07/06/21  0541 07/06/21 2009 07/07/21  0131 07/07/21  0535 07/07/21  1326   *   < > 155*  154*  155*   < > 148* 149* 144   K 5.0  --  4.9  --   --  5.2*  --    *  --  122*  --   --  117*  --    CO2 15*  --  16*  --   --  16*  --    MG  --   --   --   --   --  2.8*  --    *  --  114*  --   --  122*  --    CREATININE 2.9*  --  3.9*  --   --  4.8*  --    ALT 46*  --  62*  --   --  170*  --    AST 74*  --  106*  --   --  283*  --    BILITOT 0.4  --  0.8  --   --  0.9  --    ALKPHOS 94  --  75  --   --  117  --     < > = values in this interval not displayed. Lab Results   Component Value Date    LABPROT 4.2 (H) 05/25/2021    LABPROT 4.2 05/25/2021       ASSESSMENT / RECOMMENDATIONS:      1. CHANEL on CKD Stage 3 : baseline cr 1.3-1.5      2. Anemia:      3. Secondary Hyperparathyroidism:     4. Hypernatremia:     5. HCAP     6. HIV infection    7. Chronic hep C infection      Plan :     Pt clinically dry . His urine lyte support volume depletion   He continue to have Tesio in place (was on dialysis 2 month back and was weaned off dialysis and was supposed to f/u at office for arrangement of tesio removal  ) .      Continue d5w   Check BMP

## 2021-07-07 NOTE — CARE COORDINATION
Transition of Care-Patient remains on continuous BiPAP, palliative medicine is following, if no improvement sister wishes for HOTV to be consulted and patient to go to the hospice house. [facility]. Ambulance form/Envelope in soft chart.     Armando ALANIZN, RN  GEOVANNA   [phone]

## 2021-07-07 NOTE — CARE COORDINATION
Spoke with sister, Xin Delarosa, she would prefer that patient does not return to [address], asked about [facility]. We also discussed facilities closer to them in [address] and referral made to [facility] in [place]. Patient is not oriented, not a flight risk but history will likely make placement at an alternate facility challenging. Filiberto Clark discussion with sister in regards to this. [facility] and they declined patient. Referral pending to [facility]. Emailed HOLLY list to sister at [email or web address]. For questions I can be reached at [phone].  Merline Lawman, [place]

## 2021-07-07 NOTE — PROGRESS NOTES
Palliative Care Department  [phone]  Palliative Care Progress Note  Provider Nilay Keene, [address]  [phone]  Hospital Day: 4  Date of Initial Consult: 7/5/2021  Referring Provider: Evelia Griffith DO  Palliative Medicine was consulted for assistance with: Aravind Montgomery      Brief Summary of Hospital Course:   Lay Gill is a 56 y.o. with a medical history of bacterial meningitis, CVA, ESRD on HD, Hepatitis C, HIV, CKD who was admitted on 7/4/2021 from nursing facility with a CHIEF COMPLAINT of altered mental status. ASSESSMENT/PLAN:     Recommendations:     Dyspnea:   -  Morphine 2mg q2hrs prn dyspnea    Pertinent Hospital Diagnoses      Altered mental status  [facility] acquired pneumonia   Hypernatremia   CHANEL   Acute hypoxic respiratory failure requiring Bipap      Palliative Care Encounter / Counseling Regarding Goals of Care  Please see detailed goals of care discussion as below   At this time, Lay Gill, Does Not have capacity for medical decision-making. Capacity is time limited and situation/question specific   During encounter sister and brother were surrogate medical decision-maker   Outcome of goals of care meeting: family wants to speak with primary team as well as SW   Code status Limited yes to meds   Advanced Directives: has HCPOA/living will in [place] Surrogate/Legal NOK:  o Brother Gregory Phillip [phone]  o Sister Kait Yun [phone]    Spiritual assessment: no spiritual distress identified  Bereavement and grief: to be determined  Referrals to: none today    SUBJECTIVE:     Details of Conversation:  Spoke with patient's sister and brother. They are disturbed that patient hasn't been getting pain medication, states he has chronic pain and feel that even though he is nonverbal he is likely suffering. They are also worried about whether patient is getting his HIV meds since he isn't awake enough to take oral meds.   They request that either SW or CM call them about a different facility choice. Discussed hospice at facility versus GIP short-term at hospice house would still require a facility choice if patient was accepted for GIP. Family does not want patient to go back to [address]. Spoke with CM who will have SW call family. Also notified primary team who will call family to discuss their concerns further. No decision has been made regarding continued treatment versus hospice care at this time. Physical Function:  PPS: 10      History Of Present Illness:    Per H&P  \"Mr. Malcolm Joe, a 56y.o. year old male  who  has a past medical history of Abscess of hand, left, Abscess of scrotum, Bacterial meningitis, Cerebral artery occlusion with cerebral infarction [facility]), Depression, Encounter regarding vascular access for dialysis for ESRD ([facility] [address] GERD (gastroesophageal reflux disease), Hepatitis C, Herpes zoster w/ nervous system complication, Human immunodeficiency virus, type 2 (HIV 2) ([facility] [address] Inguinal hernia unilateral, Pneumonia, Shingles (herpes zoster) polyneuropathy, Suicidal ideation, and Vitamin D deficiency.      Pt is nonverbal and unable to follow commands. All information is obtained via chart review and ancillary staff. Pt was transferred from NH where pt was noted to have increasing confusion and lethargy with weak cough and pulmonary congestion. Of note, pt was recently discharged from [facility] on 06/25/21 where he was managed from acute metabolic encephalopathy and CHANEL on CKD. At time of discharge pt was noted to be mostly non-verbal but with prompting was able to speak and answer some questions.     In ED, pt was afebrile and hemodynamically stable (108/86) but hypoxic on RA (improved to 96% on 2L), tachypneic (RR: 32) and Tachycardic (HR: 135). CMP significant for hypernatremia (Na: 152), BUN:Cr consistent with ESRD (BUN:Cr of 108:2.9). CBC with WBC of 4.3, macrocytic anemia (Hgb of 11.5, MCV: 102.4). COVID-19 negative. UA negative for UTI.  CXR with bibasilar infiltrates. Pt admitted for further management. \"      OBJECTIVE:   Prognosis: Poor and Guarded    Physical Exam:  /80   Pulse 76   Temp 96.3 °F (35.7 °C) (Temporal)   Resp 20   Ht 5' 8\" (1.727 m)   Wt 178 lb (80.7 kg)   SpO2 100%   BMI 27.06 kg/m²     Constitutional:  Elderly, thin, continues to have bipap mask in place  Eyes: normal lids, no discharge  ENMT:  Normocephalic, atraumatic, mucosa dry  Neck:  trachea midline, no JVD  Lungs:  Very tachypneic, poor inspiratory effort despite bipap, coarse breath sounds bilaterally  Heart[de-identified]  RRR, distant heart tones, no murmur, rub, or gallop noted during exam  Abd:  Scaphoid, non distended, bowel sounds hypoactive  :  deferred  MSK: sarcopenia present  Ext:  Lower extremities seem contracted, no edema, pulses present  Skin:  Multiple wounds- seeing nursing documentation  Psych: unable to assess  Neuro:  not following commands, no grimacing or moaning noted today    Objective data reviewed: labs, images, records, medication use, vitals and chart    Labs:     Recent Labs     07/04/21 2157 07/06/21  0541 07/07/21  0535   WBC 4.3* 2.7* 2.8*   HGB 11.5* 11.4* 10.3*   HCT 37.9 38.0 33.8*    179 146     Recent Labs     07/04/21 2157 07/05/21  1418 07/06/21  0541 07/06/21  0541 07/06/21 2009 07/07/21  0131 07/07/21  0535   *   < > 155*  154*  155*   < > 149* 148* 149*   K 5.0  --  4.9  --   --   --  5.2*   *  --  122*  --   --   --  117*   CO2 15*  --  16*  --   --   --  16*   *  --  114*  --   --   --  122*   CREATININE 2.9*  --  3.9*  --   --   --  4.8*   CALCIUM 8.7  --  9.2  --   --   --  8.9    < > = values in this interval not displayed. Recent Labs     07/04/21  2157 07/06/21  0541 07/07/21  0535   AST 74* 106* 283*   ALT 46* 62* 170*   BILITOT 0.4 0.8 0.9   ALKPHOS 94 75 117     Recent Labs     07/06/21  0541   INR 3.9     No results for input(s): CKTOTAL, TROPONINI in the last 72 hours.     Urinalysis:      Lab Results   Component Value Date    NITRU Negative 07/04/2021    WBCUA 0-1 07/04/2021    WBCUA NONE 01/26/2012    BACTERIA RARE 07/04/2021    RBCUA 0-1 07/04/2021    RBCUA NONE 01/26/2012    BLOODU MODERATE 07/04/2021    SPECGRAV >=1.030 07/04/2021    GLUCOSEU Negative 07/04/2021    GLUCOSEU NEGATIVE 01/26/2012         Discussed patient and the plan of care with the other IDT members: Palliative Medicine IDT Team, Floor Nurse and Family    Time/Communication  Greater than 50% of time spent, total 35 minutes in counseling and coordination of care at the bedside regarding goals of care, symptom management, diagnosis and prognosis and see above. Thank you for allowing Palliative Medicine to participate in the care of Gerzenobia Floss.

## 2021-07-08 NOTE — PROGRESS NOTES
HOSPICE [facility] ASSESSMENT NOTE      Patient Name: Ancelmo Ambrosio   :  1960  MRN:  [id number]  Today's Date: 2021      Current Code Status: DNR-CC      Pain Assessment:  intermittent    Location: patient not able to verbalize    Severity of patient not able to verbalize  Character: occasional grimace  Pain with movement: yes  Current medication regimen for pain: Morphine 2 mg IV every 15 min as needed  Number of PRN doses in the last 72 hrs 3  Effectiveness some  Comments:     Respiratory Assessment: positive for - tachypnea and labored breathing Respiratory Rate:28  Dyspnea Yes   Current O2 Status:  2 liters/min via nasal cannula  Current medication regimen for respiratory:  Morphine 2 mg IV every 15 min as needed, Robinul 0.4 mg IV every 4 hours as needed  Number of PRN doses in last 72 hrs 3 Morphine  Effective  little   Comments:     Nausea Assessment: no nausea and no vomiting   Frequency: none. Last Occurred: none  Current medication regimen for nausea/vomiting: none     PRN Doses in the last 72 hrs none            Effective none  Comments:     Agitation/Anxiety/Restless Assessment: tense   Frequency: intermittent per day. Last Occurred: now  Current medication regimen for agitation/anxiety: Versed 0.5 mg IV every 15 min as needed     PRN Doses in the last 72 hrs 3            Effective some  Comments:       Other Symptoms of Higher Acuity Care requiring frequent skilled nursing/physician interventions  :  Yes   Symptom descriptions: IV medications     Discussed symptoms with Fanny Yun NP and has determined patient is eligible for inpatient hospice level of care    Attending physician to be notified of changes to plan of care by charge nurse/ bedside RN- they are updated that patient is transferring to the [facility] and require discharge order. RN instructed to leave IV and rae in place. Received transfer time of as soon as possible.   [facility] set up for transport at within the hour. Report called to the [facility].     Electronically signed by Sada Hill RN on 7/8/2021 at 4:39 PM

## 2021-07-08 NOTE — PROGRESS NOTES
LSW present with PC physician to meet with pts sister/ Karri Jose and her daughter while they discussed process of compassionately transitioning pt. off bipap and to comfort care. Emotional support provided.

## 2021-07-08 NOTE — FLOWSHEET NOTE
Inpatient Wound Care(initial evaluation)  4522    Admit Date: 7/4/2021  8:16 PM    Reason for consult:  Coccyx, hip    Significant history:    Chief Complaint:  had concerns including Altered Mental Status (Increasing confusion, becoming less responsive. Per facility pt had \"CXR that showed rhonchi\"). past medical history of Abscess of hand, left, Abscess of scrotum, Bacterial meningitis, Cerebral artery occlusion with cerebral infarction [facility]), Depression, Encounter regarding vascular access for dialysis for ESRD ([facility] [address] GERD (gastroesophageal reflux disease), Hepatitis C, Herpes zoster w/ nervous system complication, Human immunodeficiency virus, type 2 (HIV 2) ([facility] [address] Inguinal hernia unilateral, Pneumonia, Shingles (herpes zoster) polyneuropathy, Suicidal ideation, and Vitamin D deficiency.      Pt is nonverbal and unable to follow commands. Pt was transferred from NH where pt was noted to have increasing confusion and lethargy with weak cough and pulmonary congestion. Of note, pt was recently discharged from [facility] on 06/25/21 where he was managed from acute metabolic encephalopathy and CHANEL on CKD.  At time of discharge pt was noted to be mostly non-verbal but with prompting was able to speak and answer some questions.     Findings:       07/08/21 1310   Skin Integrity   Skin Integrity Bruising  (dry flaky, dried scabs)   Location BUE   Skin Integrity Site 2   Skin Integrity Location 2   (old healed area)   Location 2 right lateral foot   Skin Integrity Site 3   Skin Integrity Location 3   (dried scabs,old healed areas,dry flaky,vascular discolration)    Location 3 BLE   Skin Integrity Site 4   Skin Integrity Location 4   (dry flaky)   Location 4 generalized   Wound 07/05/21 Coccyx   Date First Assessed/Time First Assessed: 07/05/21 0801   Present on Hospital Admission: Yes  Location: Coccyx   Wound Image    Wound Etiology Pressure Stage  2   Dressing/Treatment Protective barrier   Wound Length (cm) 4 cm   Wound Width (cm) 4 cm   Wound Depth (cm) 0.1 cm   Wound Surface Area (cm^2) 16 cm^2   Change in Wound Size % (l*w) 0   Wound Volume (cm^3) 1.6 cm^3   Wound Healing % 0   Wound Assessment Pink/red   Drainage Amount None   Leah-wound Assessment Dry/flaky   Wound 07/08/21 Hip Left   Date First Assessed/Time First Assessed: 07/08/21 1300   Present on Hospital Admission: No  Location: Hip  Wound Location Orientation: Left   Wound Image    Wound Etiology Deep tissue/Injury   Dressing/Treatment Open to air   Wound Length (cm) 3 cm   Wound Width (cm) 4 cm   Wound Depth (cm)   (unable to determine)   Wound Surface Area (cm^2) 12 cm^2   Wound Assessment Purple/maroon  (blistering)   Drainage Amount None   Leah-wound Assessment Dry/flaky     **Informed Consent**    photos taken of wounds and inserted into their chart as part of their permanent medical record for purposes of documentation, treatment management and/or medical review. All Images taken on 7/8/21 of patient name: Jack Zaragoza were transmitted and stored on secured [employer] located within [facility] [employer] by a registered [employer] Device.        Impression:  Coccyx stage 2  Left hip DTI    Plan:  Protective barriers- conservative care  hospice    Maik Howard RN 7/8/2021 1:14 PM

## 2021-07-08 NOTE — PROGRESS NOTES
[facility] OF THE [place]     Liaison Information Visit Note              Patient Name: Lilian Wall   :  1960  MRN:  [id number]  Admit date:  2021   Hospital Admitting Physician:  Rhea Johnson MD   PCP:  No primary care provider on file.   Primary Insurance: Payor: [place]ICA[place] /  /  /    Emergency Contact:      Contact/Relation:   /         Phone:     Advance Directive  Patient has a documented healthcare surrogate  Discussed with: Family member  DPOA-HC Name-Relation:    Phone:     Terminal Diagnosis severe sepsis with respirtory failure and pneumonia, ESRD as confirmed by Dr. Dr Jenn Wick Problem List:   Patient Active Problem List   Diagnosis Code    Neuropathy G62.9    HIV infection ([facility] [facility] B20    HTN (hypertension) I10    Unilateral inguinal hernia K40.90    Chronic hepatitis C virus infection ([facility] [address] B18.2    Mood disorder ([facility] [address] F39    Cellulitis L03.90    Fracture of proximal end of ulna S52.009A    Septic olecranon bursitis of left elbow M71.122    CHANEL (acute kidney injury) ([address] N17.9    Bilateral leg edema R60.0    AMS (altered mental status) R41.82    Left-sided weakness R53.1    Goals of care, counseling/discussion Z71.89    Palliative care by specialist Z51.5    Encounter regarding vascular access for dialysis for ESRD ([address] N18.6, Z99.2    Acute renal failure superimposed on chronic kidney disease ([address] N17.9, N18.9    End-stage renal disease needing dialysis ([address] N18.6, Z99.2    Anemia due to chronic kidney disease N18.9, D63.1    Petechial rash R23.3    Lactic acidosis E87.2    Elevated CPK R74.8    Elevated troponin R77.8    Polysubstance abuse ([address] F19.10    Microscopic hematuria R31.29    Kidney stone N20.0    Severe protein-calorie malnutrition ([address] E43    HAP (hospital-acquired pneumonia) J18.9, Y95    Hypernatremia E87.0       Code Status Order: Limited     Allergies:  Ativan [lorazepam]    Family Goal: comfort    Meeting held with met with multiple family members including AIXA Mcfadden and Nany Maradiaga    The hospice benefit and philosophy were explained including that hospice is end of life care in which, per Medicare, a patient has a terminal diagnosis that life expectancy would be 6 months or less. Hospice care is a service that is covered by most insurance plans. The following levels of hospice care were discussed including, routine level of hospice care at private home or facility, which patient/family is responsible for any room and board fees at the facility, and general in patient level of care (GIP) at the [facility] for short term symptom management. Per Medicare guidelines, a patient is considered appropriate for GIP if they have uncontrolled symptoms such as pain, agitation, labored breathing or nausea/vomiting. Once symptoms become managed, the patient would need to be moved to a lower level of care such as home with hospice, ECF with hospice or the Transition program.  [facility] transition program also explained which is routine hospice care provided at the [facility] instead of an ECF or home. The transition program is private pay $300/day for room/board. Room/board for the transition program is not covered by Medicaid as would be in an ECF. Family informed that with the routine level of care at home or ECF,  the hospice team consists of the RN who visits 1-3 times a week, a  who visits within the first five days of the hospice election, the personal care team who visit 1-3 times a week, non-medical volunteers and Chaplains. Explained that at home in routine level of care, familles are responsible for the 24 hour care. Discussed that under hospice care patient would not receive chemotherapy, radiation, immune therapy, IV antibiotics, dialysis or blood transfusions.   Explained that once in hospice care, all aggressive treatments would be stopped and allow nature to takes its course with focus on comfort care for the patient. Family is in agreement with removing bipap and focusing on comfort care at this time. I will assess patient once off bipap to determine best discharge plan for patient. Discharge Plan:  Discharge Disposition; unknown at present    Hospitals in [place] plan:  1.  will assess patient once off bipap  2. Please call Nicanor Paula [phone] with any questions. 3. Patient not currently under the care of hospice.     Electronically signed by Fabio Blackmon RN on 7/8/2021 at 3:55 PM

## 2021-07-08 NOTE — PROGRESS NOTES
Changed patient mask back to full face mask from total face mask. Patient was not getting tidal volume with total face mask.

## 2021-07-08 NOTE — PROGRESS NOTES
Hospitalist Progress Note      SYNOPSIS: Patient admitted on 2021     Mr. Sheryle Gear, a 56y.o. year old male  who  has a past medical history of Abscess of hand, left, Abscess of scrotum, Bacterial meningitis, Cerebral artery occlusion with cerebral infarction [facility]), Depression, Encounter regarding vascular access for dialysis for ESRD ([facility] [address] [facility] GERD (gastroesophageal reflux disease), Hepatitis C, Herpes zoster w/ nervous system complication, Human immunodeficiency virus, type 2 (HIV 2) ([facility] [address] Inguinal hernia unilateral, Pneumonia, Shingles (herpes zoster) polyneuropathy, Suicidal ideation, and Vitamin D deficiency.      Pt is nonverbal and unable to follow commands. All information is obtained via chart review and ancillary staff. Pt was transferred from NH where pt was noted to have increasing confusion and lethargy with weak cough and pulmonary congestion. Of note, pt was recently discharged from [facility] on 21 where he was managed from acute metabolic encephalopathy and CHANEL on CKD. At time of discharge pt was noted to be mostly non-verbal but with prompting was able to speak and answer some questions.     In ED, pt was afebrile and hemodynamically stable (108/86) but hypoxic on RA (improved to 96% on 2L), tachypneic (RR: 32) and Tachycardic (HR: 135). CMP significant for hypernatremia (Na: 152), BUN:Cr consistent with ESRD (BUN:Cr of 108:2.9). CBC with WBC of 4.3, macrocytic anemia (Hgb of 11.5, MCV: 102.4). COVID-19 negative. UA negative for UTI. CXR with bibasilar infiltrates. Pt admitted for further management. SUBJECTIVE:    Patient seen and examined  Records reviewed. Remains on continuous BiPAP  Kidney function continues to worsen.   Today creatinine 5.1     Temp (24hrs), Av.8 °F (36 °C), Min:96.3 °F (35.7 °C), Max:97.2 °F (36.2 °C)    DIET: Diet NPO  CODE: Limited    Intake/Output Summary (Last 24 hours) at 2021 4167  Last data filed at 2021 0650  Gross per 24 hour   Intake 620 ml Output 975 ml   Net -355 ml       OBJECTIVE:    BP 95/66   Pulse 99   Temp 96.9 °F (36.1 °C) (Temporal)   Resp 28   Ht 5' 8\" (1.727 m)   Wt 178 lb (80.7 kg)   SpO2 96%   BMI 27.06 kg/m²     General appearance: Ill-appearing. On BiPAP  HEENT: Normocephalic, atraumatic. Neck: Supple. No jugular venous distention. Respiratory: Coarse bilaterally  Cardiovascular: RRR  Abdomen: Soft, nontender, nondistended  Musculoskeletal: No clubbing, cyanosis, no bilateral lower extremity edema. Brisk capillary refill. Skin:  No rashes  on visible skin  Neurologic: Lethargic, not responding to commands    ASSESSMENT:  -Healthcare associated pneumonia  -Acute hypoxic respiratory failure  -Hypernatremia, resolved  -Acute on chronic renal failure  -HIV infection  -Chronic hep C infection  -Acute encephalopathy       PLAN:  -Infectious disease following  -Nephrology following, HD?  -Palliative care following  -Continue Zosyn and Tivicary  -Monitor serum electrolytes  -Monitor renal function avoid nephrotoxic medications  -Try to arrange family meeting  -Hospice?     DISPOSITION:     Medications:  REVIEWED DAILY    Infusion Medications    sodium chloride       Scheduled Medications    aspirin  81 mg Oral Daily    clopidogrel  75 mg Oral Daily    dolutegravir sodium  50 mg Oral Daily    lamiVUDine  50 mg Oral Daily    megestrol  40 mg Oral BID    multivitamin  1 tablet Oral Daily    vitamin B-1  100 mg Oral Daily    sodium chloride flush  5-40 mL Intravenous 2 times per day    heparin (porcine)  5,000 Units Subcutaneous 3 times per day    piperacillin-tazobactam  3,375 mg Intravenous Q12H    sodium chloride   Intravenous Q12H     PRN Meds: sodium chloride flush, sodium chloride, ondansetron **OR** ondansetron, polyethylene glycol, acetaminophen **OR** acetaminophen, morphine    Labs:     Recent Labs     07/06/21  0541 07/07/21  0535 07/08/21  0522   WBC 2.7* 2.8* 4.0*   HGB 11.4* 10.3* 11.2*   HCT 38.0 33.8* 36.2*  146 156       Recent Labs     07/07/21  0535 07/07/21  0535 07/07/21  1325 07/07/21  1325 07/07/21  1326 07/08/21  0120 07/08/21  0522   *   < > 146   < > 144 142 143  141   K 5.2*  --  5.5*  --   --   --  5.4*   *  --  117*  --   --   --  110*   CO2 16*  --  14*  --   --   --  14*   *  --  123*  --   --   --  128*   CREATININE 4.8*  --  4.8*  --   --   --  5.1*   CALCIUM 8.9  --  8.5*  --   --   --  9.1    < > = values in this interval not displayed. Recent Labs     07/06/21  0541 07/07/21  0535 07/08/21  0522   PROT 8.1 7.7 7.8   ALKPHOS 75 117 133*   ALT 62* 170* 133*   * 283* 128*   BILITOT 0.8 0.9 0.7       Recent Labs     07/06/21  0541   INR 3.9       No results for input(s): CKTOTAL, TROPONINI in the last 72 hours. Chronic labs:    Lab Results   Component Value Date    CHOL 184 06/17/2021    TRIG 177 (H) 06/17/2021    HDL 28 06/17/2021    LDLCALC 121 (H) 06/17/2021    TSH 17.020 (H) 06/17/2021    PSA SEE NOTE (AA) 03/08/2017    PSA 0.47 03/08/2017    INR 3.9 07/06/2021    LABA1C 4.9 06/17/2021       Radiology: REVIEWED DAILY    +++++++++++++++++++++++++++++++++++++++++++++++++  DO Edwin Shepherd Physician - [address], [place]  +++++++++++++++++++++++++++++++++++++++++++++++++  NOTE: This report was transcribed using voice recognition software. Every effort was made to ensure accuracy; however, inadvertent computerized transcription errors may be present.

## 2021-07-08 NOTE — PROGRESS NOTES
Palliative Care Department  [phone]  Palliative Care Progress Note  Provider Nattymichael Torres, [address]  [id number]  Hospital Day: 5  Date of Initial Consult: 7/5/2021  Referring Provider: Lesa Reed DO  Palliative Medicine was consulted for assistance with: Venkat Colon      Brief Summary of Hospital Course:   Radha Mackey is a 56 y.o. with a medical history of bacterial meningitis, CVA, ESRD on HD, Hepatitis C, HIV, CKD who was admitted on 7/4/2021 from nursing facility with a CHIEF COMPLAINT of altered mental status. ASSESSMENT/PLAN:     Recommendations:     Dyspnea:   -  Morphine 2mg q2hrs prn dyspnea    Pertinent Hospital Diagnoses      Altered mental status  [facility] acquired pneumonia   Hypernatremia   CHANEL   Acute hypoxic respiratory failure requiring Bipap      Palliative Care Encounter / Counseling Regarding Goals of Care  Please see detailed goals of care discussion as below   At this time, Radha Mackey, Does Not have capacity for medical decision-making. Capacity is time limited and situation/question specific   During encounter sister and brother were surrogate medical decision-maker   Outcome of goals of care meeting: family coming at 2pm to start process of bipap weaning, goal being hospice house if patient stable enough   Code status Limited yes to meds   Advanced Directives: [address] HCPOA[address] Surrogate/Legal NOK:  o Brother Yudy Mariya [phone]  o Sister Angie Harper [phone]    Spiritual assessment: no spiritual distress identified  Bereavement and grief: to be determined  Referrals to: none today    SUBJECTIVE:     Details of Conversation:  Spoke with patient's sister Kade Avitia.  She states patient's brother is in a meeting right now, she will call him after his meeting to discuss how patient is doing right now and what their plan is moving forward. Kade Don called back after speaking with both Olivier Gonzalez, patient's brothers.   At this time they would like to proceed with aggressive comfort measures, though they are profoundly disappointed with the fact that patient did not receive his HIV medications while admitted, and feel that staff/physicians were not completely honest with them. Discussed process of bipap weaning using medications for dyspnea/anxiety. Family wishes to be present for this process, they plan to arrive around 2pm.  Primary team notified of family's decision. Physical Function:  PPS: 10      History Of Present Illness:    Per H&P  \"Mr. Cori Leonard, a 56y.o. year old male  who  has a past medical history of Abscess of hand, left, Abscess of scrotum, Bacterial meningitis, Cerebral artery occlusion with cerebral infarction [facility]), Depression, Encounter regarding vascular access for dialysis for ESRD ([facility] [address] GERD (gastroesophageal reflux disease), Hepatitis C, Herpes zoster w/ nervous system complication, Human immunodeficiency virus, type 2 (HIV 2) ([facility] [address] Inguinal hernia unilateral, Pneumonia, Shingles (herpes zoster) polyneuropathy, Suicidal ideation, and Vitamin D deficiency.      Pt is nonverbal and unable to follow commands. All information is obtained via chart review and ancillary staff. Pt was transferred from NH where pt was noted to have increasing confusion and lethargy with weak cough and pulmonary congestion. Of note, pt was recently discharged from [facility] on 06/25/21 where he was managed from acute metabolic encephalopathy and CHANEL on CKD. At time of discharge pt was noted to be mostly non-verbal but with prompting was able to speak and answer some questions.     In ED, pt was afebrile and hemodynamically stable (108/86) but hypoxic on RA (improved to 96% on 2L), tachypneic (RR: 32) and Tachycardic (HR: 135). CMP significant for hypernatremia (Na: 152), BUN:Cr consistent with ESRD (BUN:Cr of 108:2.9). CBC with WBC of 4.3, macrocytic anemia (Hgb of 11.5, MCV: 102.4). COVID-19 negative. UA negative for UTI.  CXR with bibasilar infiltrates. Pt admitted for further management. \"      OBJECTIVE:   Prognosis: Poor and Guarded    Physical Exam:  BP 95/66   Pulse 99   Temp 96.9 °F (36.1 °C) (Temporal)   Resp 28   Ht 5' 8\" (1.727 m)   Wt 178 lb (80.7 kg)   SpO2 96%   BMI 27.06 kg/m²     Constitutional:  Elderly, thin, continues to have bipap mask in place  Eyes: normal lids, no discharge  ENMT:  Normocephalic, atraumatic, mucosa dry  Neck:  trachea midline, no JVD  Lungs:  Very tachypneic, poor inspiratory effort despite bipap, coarse breath sounds bilaterally  Heart[de-identified]  RRR, distant heart tones, no murmur, rub, or gallop noted during exam  Abd:  Scaphoid, non distended, bowel sounds hypoactive  :  deferred  MSK: sarcopenia present  Ext:  Lower extremities seem contracted, no edema, pulses present  Skin:  Multiple wounds- seeing nursing documentation  Psych: unable to assess  Neuro:  not following commands, flickers eyes open briefly to loud voice per nurse, no response seen on exam    Objective data reviewed: labs, images, records, medication use, vitals and chart    Labs:     Recent Labs     07/06/21  0541 07/07/21  0535 07/08/21  0522   WBC 2.7* 2.8* 4.0*   HGB 11.4* 10.3* 11.2*   HCT 38.0 33.8* 36.2*    146 156     Recent Labs     07/07/21  0535 07/07/21  0535 07/07/21  1325 07/07/21  1325 07/07/21  1326 07/08/21  0120 07/08/21  0522   *   < > 146   < > 144 142 143  141   K 5.2*  --  5.5*  --   --   --  5.4*   *  --  117*  --   --   --  110*   CO2 16*  --  14*  --   --   --  14*   *  --  123*  --   --   --  128*   CREATININE 4.8*  --  4.8*  --   --   --  5.1*   CALCIUM 8.9  --  8.5*  --   --   --  9.1    < > = values in this interval not displayed.      Recent Labs     07/06/21  0541 07/07/21  0535 07/08/21  0522   * 283* 128*   ALT 62* 170* 133*   BILITOT 0.8 0.9 0.7   ALKPHOS 75 117 133*     Recent Labs     07/06/21  0541   INR 3.9     No results for input(s): Jory Maxwlel in the last 72 hours.    Urinalysis:      Lab Results   Component Value Date    NITRU Negative 07/04/2021    WBCUA 0-1 07/04/2021    WBCUA NONE 01/26/2012    BACTERIA RARE 07/04/2021    RBCUA 0-1 07/04/2021    RBCUA NONE 01/26/2012    BLOODU MODERATE 07/04/2021    SPECGRAV >=1.030 07/04/2021    GLUCOSEU Negative 07/04/2021    GLUCOSEU NEGATIVE 01/26/2012         Discussed patient and the plan of care with the other IDT members: Palliative Medicine IDT Team, Floor Nurse and Family    Time/Communication  Greater than 50% of time spent, total 45 minutes in counseling and coordination of care at the bedside regarding goals of care, symptom management, diagnosis and prognosis and see above. Thank you for allowing Palliative Medicine to participate in the care of Darrin Oden.

## 2021-07-08 NOTE — PROGRESS NOTES
[facility] MD Richa Amezcua, MD Jose Partida, MD Theodore Shepard, MD Manda Guzman, CORINNA Cha, AUGUSTINE  Progress Note    7/8/2021    SUBJECTIVE:   7/7 : seen today , weak , o2/nc NIPPV . apper euvolemic still with TESIO in place     7/8 : seen today , sick looking , on bipap , no edema , no jvd , iv fluid held over night . bp stable . PROBLEM LIST:    Active Problems:    HTN (hypertension)    AMS (altered mental status)    End-stage renal disease needing dialysis (HCC)    Severe protein-calorie malnutrition (HCC)    HAP (hospital-acquired pneumonia)    Hypernatremia  Resolved Problems:    * No resolved hospital problems.  *         DIET:    Diet NPO     MEDS (scheduled):    aspirin  81 mg Oral Daily    clopidogrel  75 mg Oral Daily    dolutegravir sodium  50 mg Oral Daily    lamiVUDine  50 mg Oral Daily    megestrol  40 mg Oral BID    multivitamin  1 tablet Oral Daily    vitamin B-1  100 mg Oral Daily    sodium chloride flush  5-40 mL Intravenous 2 times per day    heparin (porcine)  5,000 Units Subcutaneous 3 times per day    piperacillin-tazobactam  3,375 mg Intravenous Q12H    sodium chloride   Intravenous Q12H       MEDS (infusions):   sodium chloride         MEDS (prn):  sodium chloride flush, sodium chloride, ondansetron **OR** ondansetron, polyethylene glycol, acetaminophen **OR** acetaminophen, morphine    PHYSICAL EXAM:     Patient Vitals for the past 24 hrs:   BP Temp Temp src Pulse Resp SpO2   07/08/21 0841 -- -- -- -- 28 --   07/08/21 0729 95/66 96.9 °F (36.1 °C) Temporal 99 20 96 %   07/07/21 2354 108/73 97.2 °F (36.2 °C) Axillary 90 29 100 %   07/07/21 2000 -- -- -- -- 26 --   07/07/21 1524 125/81 96.3 °F (35.7 °C) Temporal 86 20 100 %   @      Intake/Output Summary (Last 24 hours) at 7/8/2021 1100  Last data filed at 7/8/2021 0650  Gross per 24 hour   Intake 620 ml   Output 975 ml   Net -355 ml         Wt Readings from Last 3 Encounters:   07/04/21 178 lb (80.7 kg)   07/03/21 178 lb (80.7 kg)   06/16/21 178 lb (80.7 kg)       Constitutional:  in no acute distress  HEENT: NC/AT, EOMI, sclera and conjunctiva are clear and anicteric, mucus membranes moist  Neck: Trachea midline, no JVD  Cardiovascular: S1, S2 regular rhythm, no murmur,or rub  Respiratory:  No crackles, no wheeze  Gastrointestinal:  Soft, nontender, nondistended, NABS  Ext: no edema, feet warm  Skin: dry, no rash  Neuro: awake, alert, interactive      DATA:    Recent Labs     07/06/21  0541 07/07/21  0535 07/08/21  0522   WBC 2.7* 2.8* 4.0*   HGB 11.4* 10.3* 11.2*   HCT 38.0 33.8* 36.2*   .8* 101.5* 99.5    146 156     Recent Labs     07/06/21  0541 07/06/21 2009 07/07/21  0535 07/07/21  0535 07/07/21  1325 07/07/21  1325 07/07/21  1326 07/08/21  0120 07/08/21  0522   *  154*  155*   < > 149*   < > 146   < > 144 142 143  141   K 4.9  --  5.2*  --  5.5*  --   --   --  5.4*   *  --  117*  --  117*  --   --   --  110*   CO2 16*  --  16*  --  14*  --   --   --  14*   MG  --   --  2.8*  --   --   --   --   --  2.8*   *  --  122*  --  123*  --   --   --  128*   CREATININE 3.9*  --  4.8*  --  4.8*  --   --   --  5.1*   ALT 62*  --  170*  --   --   --   --   --  133*   *  --  283*  --   --   --   --   --  128*   BILITOT 0.8  --  0.9  --   --   --   --   --  0.7   ALKPHOS 75  --  117  --   --   --   --   --  133*    < > = values in this interval not displayed. Lab Results   Component Value Date    LABPROT 4.2 (H) 05/25/2021    LABPROT 4.2 05/25/2021       ASSESSMENT / RECOMMENDATIONS:      1. CHANEL on CKD Stage 3 : baseline cr 1.3-1.5   Pt clinically dry . His urine lyte support volume depletion   He continue to have Tesio in place (was on dialysis 2 month back and was weaned off dialysis and was supposed to f/u at office for arrangement of tesio removal  ) . 2. Anemia:      3. Secondary Hyperparathyroidism:     4.   Hypernatremia:

## 2021-07-08 NOTE — PROGRESS NOTES
Department of Internal Medicine  Infectious Diseases  Progress  Note      C/C :  HIV infection- uncontrolled , resp failure , fever      Pt is in respiratory distress, on BiPAP   Afebrile       Current Facility-Administered Medications   Medication Dose Route Frequency Provider Last Rate Last Admin    aspirin chewable tablet 81 mg  81 mg Oral Daily Kipp Nissen, MD        clopidogrel (PLAVIX) tablet 75 mg  75 mg Oral Daily Kipp Nissen, MD        dolutegravir sodium (TIVICAY) tablet 50 mg  50 mg Oral Daily Kipp Nissen, MD        lamiVUDine (EPIVIR) tablet 50 mg  50 mg Oral Daily Kipp Nissen, MD        megestrol (MEGACE) tablet 40 mg  40 mg Oral BID Kipp Nissen, MD        multivitamin 1 tablet  1 tablet Oral Daily Kipp Nissen, MD        thiamine mononitrate tablet 100 mg  100 mg Oral Daily Kipp Nissen, MD        sodium chloride flush 0.9 % injection 5-40 mL  5-40 mL Intravenous 2 times per day Kipp Nissen, MD   10 mL at 07/08/21 0916    sodium chloride flush 0.9 % injection 5-40 mL  5-40 mL Intravenous PRN Kipp Nissen, MD   10 mL at 07/06/21 1431    0.9 % sodium chloride infusion  25 mL Intravenous PRN Kipp Nissen, MD        ondansetron (ZOFRAN-ODT) disintegrating tablet 4 mg  4 mg Oral Q8H PRN Kipp Nissen, MD        Or    ondansetron (ZOFRAN) injection 4 mg  4 mg Intravenous Q6H PRN Kipp Nissen, MD        polyethylene glycol (GLYCOLAX) packet 17 g  17 g Oral Daily PRN Kipp Nissen, MD        acetaminophen (TYLENOL) tablet 650 mg  650 mg Oral Q6H PRN Kipp Nissen, MD        Or    acetaminophen (TYLENOL) suppository 650 mg  650 mg Rectal Q6H PRN Kipp Nissen, MD   650 mg at 07/05/21 2135    heparin (porcine) injection 5,000 Units  5,000 Units Subcutaneous 3 times per day Kipp Nissen, MD   5,000 Units at 07/08/21 0917    morphine (PF) injection 2 mg  2 mg Intravenous Q2H PRN Cande Berry DO   2 mg at 07/08/21 0748    piperacillin-tazobactam (ZOSYN) 3,375 mg in dextrose 5 % 100 mL IVPB extended infusion (mini-bag)  3,375 mg Intravenous Q12H Mariah Garcia MD 25 mL/hr at 07/08/21 0916 3,375 mg at 07/08/21 0916    Post Zosyn Flush (0.9 % sodium chloride infusion)   Intravenous Q12H Mariha Garcia MD   Stopped at 07/07/21 1517       REVIEW OF SYSTEMS:  Could not be obtained         PHYSICAL EXAM:      Vitals:     BP 95/66   Pulse 99   Temp 96.9 °F (36.1 °C) (Temporal)   Resp 28   Ht 5' 8\" (1.727 m)   Wt 178 lb (80.7 kg)   SpO2 96%   BMI 27.06 kg/m²     General Appearance:    Unresponsive, on BiPAP    Head:    Normocephalic, atraumatic   Eyes:    No pallor, no icterus,no photophobia    Ears:    No obvious deformity or drainage.    Nose:   No nasal drainage   Throat:   Mucosa moist, no oral thrush   Neck:   Supple      Lungs:     Bilateral coarse rhonchi    Heart:    Regular rate and rhythm   Abdomen:     Soft, non-tender, bowel sounds present    Extremities:   No edema, no cyanosis ,no open wound   Pulses:   Dorsalis pedis palpable    Skin:   no rashes   Right chest tesio - no drainage      CBC with Differential:      Lab Results   Component Value Date    WBC 4.0 07/08/2021    RBC 3.64 07/08/2021    HGB 11.2 07/08/2021    HCT 36.2 07/08/2021     07/08/2021    MCV 99.5 07/08/2021    MCH 30.8 07/08/2021    MCHC 30.9 07/08/2021    RDW 14.9 07/08/2021    NRBC 0.9 07/07/2021    SEGSPCT 47 01/06/2014    LYMPHOPCT 40.9 07/07/2021    MONOPCT 7.8 07/07/2021    MYELOPCT 0.9 07/07/2021    BASOPCT 0.9 07/07/2021    MONOSABS 0.22 07/07/2021    LYMPHSABS 1.15 07/07/2021    EOSABS 0.10 07/07/2021    BASOSABS 0.03 07/07/2021       CMP     Lab Results   Component Value Date     07/08/2021     07/08/2021    K 5.4 07/08/2021    K 4.9 07/06/2021     07/08/2021    CO2 14 07/08/2021     07/08/2021    CREATININE 5.1 07/08/2021    GFRAA 14 07/08/2021    LABGLOM 14 07/08/2021    GLUCOSE 99 07/08/2021    GLUCOSE 83 01/26/2012    PROT 7.8 07/08/2021    LABALBU 2.8 07/08/2021    LABALBU 4.2 01/17/2012    CALCIUM 9.1 07/08/2021    BILITOT 0.7 07/08/2021    ALKPHOS 133 07/08/2021     07/08/2021     07/08/2021         Hepatic Function Panel:    Lab Results   Component Value Date    ALKPHOS 133 07/08/2021     07/08/2021     07/08/2021    PROT 7.8 07/08/2021    BILITOT 0.7 07/08/2021    BILIDIR 0.3 03/13/2015    IBILI 1.3 03/13/2015    LABALBU 2.8 07/08/2021    LABALBU 4.2 01/17/2012       PT/INR:    Lab Results   Component Value Date    PROTIME 43.0 07/06/2021    INR 3.9 07/06/2021       TSH:    Lab Results   Component Value Date    TSH 17.020 06/17/2021       U/A:    Lab Results   Component Value Date    COLORU Yellow 07/04/2021    PHUR 5.5 07/04/2021    LABCAST FEW  11/02/2011    WBCUA 0-1 07/04/2021    WBCUA NONE 01/26/2012    RBCUA 0-1 07/04/2021    RBCUA NONE 01/26/2012    BACTERIA RARE 07/04/2021    CLARITYU Clear 07/04/2021    SPECGRAV >=1.030 07/04/2021    LEUKOCYTESUR Negative 07/04/2021    UROBILINOGEN 0.2 07/04/2021    BILIRUBINUR Negative 07/04/2021    BILIRUBINUR NEGATIVE 01/26/2012    BLOODU MODERATE 07/04/2021    GLUCOSEU Negative 07/04/2021    GLUCOSEU NEGATIVE 01/26/2012    AMORPHOUS FEW 07/04/2021       ABG:  No results found for: OGF7ASD, BEART, K9RQCPDH, PHART, THGBART, BBN6OZP, PO2ART, RCL0PTO    MICROBIOLOGY:    Blood culture - negative     SARS CoV 2 negative         Radiology :    Chest  X ray - bibasilar infiltrates         IMPRESSION:    1. Respiratory failure, Pneumonia   2. HIV infection / AIDS- HIV viral load 3240 and CD 4 111 as of 6/2021 -not able to give HAART at present   3. Chronic Hep C infection   4. Encephalopathy, Cocaine use. 5. CHANEL - worsening       RECOMMENDATIONS:      1. Zosyn 3.375 grams IV q 12 hrs  2. Family meeting - ?   Hospice transfer

## 2021-07-08 NOTE — CARE COORDINATION
Patient remains on bipap continuous 100% FIO2, has not been able to wean at this point. Sister emailed me yesterday also requesting a list in [address]. Obtained off medicare website and emailed to her as well. Also for information purposes checked with [facility], they do feel patient is candidate for their facility if he can wean off bipap. Will also check to see if candidate for LTAC and discuss with sister if an option. Typically need bipap weaned to FIO2 70% or less. Dr. Elias Tesfaye requesting family meeting with sister and palliative. Spoke with Dr. Akosua Rocha, she is unavailable after 2pm since she is also covering [facility]. Sister cannot be here by 1pm today. Dr. Akosua Rocha to call sister again today. If family meeting needed afterwards will arrange with Bryon Pendleton, she tells me she is available all day tomorrow. 36 Family has decided for comfort measures. Referral called to [facility], [facility], the family will be here around 2pm to meet with Dr. Akosua Rocha. For questions I can be reached at [phone].  [place], [place]

## 2021-07-10 LAB
BLOOD CULTURE, ROUTINE: NORMAL
CULTURE, BLOOD 2: NORMAL

## 2021-07-21 NOTE — DISCHARGE SUMMARY
Hospitalist Discharge Summary    Patient ID: Ladonna Ramos   Patient : 1960  Patient's PCP: No primary care provider on file. Admit Date: 2021   Admitting Physician: Rudy Guevara MD    Discharge Date:  2021   Discharge Physician: Raza Cornejo DO   Discharge Condition:   Discharge Disposition: [facility] course in brief:  (Please refer to daily progress notes for a comprehensive review of the hospitalization by requesting medical records)    Do Pandey y. o. year old [de-identified]  has a past medical history of Abscess of hand, left, Abscess of scrotum, Bacterial meningitis, Cerebral artery occlusion with cerebral infarction [facility]), Depression, Encounter regarding vascular access for dialysis for ESRD ([facility] [address] GERD (gastroesophageal reflux disease), Hepatitis C, Herpes zoster w/ nervous system complication, Human immunodeficiency virus, type 2 (HIV 2) ([facility] [address] Inguinal hernia unilateral, Pneumonia, Shingles (herpes zoster) polyneuropathy, Suicidal ideation, and Vitamin D deficiency.      Pt is nonverbal and unable to follow commands. All information is obtained via chart review and ancillary staff. Pt was transferred from NH where pt was noted to have increasing confusion and lethargy with weak cough and pulmonary congestion. Of note, pt was recently discharged from [facility] on 21 where he was managed from acute metabolic encephalopathy and CHANEL on CKD. At time of discharge pt was noted to be mostly non-verbal but with prompting was able to speak and answer some questions.     In ED, pt was afebrile and hemodynamically stable (108/86) but hypoxic on RA (improved to 96% on 2L), tachypneic (RR: 32) and Tachycardic (HR: 135). CMP significant for hypernatremia (Na: 152), BUN:Cr consistent with ESRD (BUN:Cr of 108:2.9). CBC with WBC of 4.3, macrocytic anemia (Hgb of 11.5, MCV: 102.4). COVID-19 negative. UA negative for UTI.  CXR with bibasilar infiltrates. Pt admitted for further management. Meeting held with met with multiple family members including Kamila Patterson and Trinh Guerrero     The hospice benefit and philosophy were explained including that hospice is end of life care in which, per Medicare, a patient has a terminal diagnosis that life expectancy would be 6 months or less. Hospice care is a service that is covered by most insurance plans. The following levels of hospice care were discussed including, routine level of hospice care at private home or facility, which patient/family is responsible for any room and board fees at the facility, and general in patient level of care (GIP) at the [facility] for short term symptom management. Per Medicare guidelines, a patient is considered appropriate for GIP if they have uncontrolled symptoms such as pain, agitation, labored breathing or nausea/vomiting. Once symptoms become managed, the patient would need to be moved to a lower level of care such as home with hospice, ECF with hospice or the Transition program.  [facility] transition program also explained which is routine hospice care provided at the [facility] instead of an ECF or home. The transition program is private pay $300/day for room/board. Room/board for the transition program is not covered by Medicaid as would be in an ECF. Family informed that with the routine level of care at home or ECF,  the hospice team consists of the RN who visits 1-3 times a week, a  who visits within the first five days of the hospice election, the personal care team who visit 1-3 times a week, non-medical volunteers and Chaplains. Explained that at home in routine level of care, familles are responsible for the 24 hour care. Discussed that under hospice care patient would not receive chemotherapy, radiation, immune therapy, IV antibiotics, dialysis or blood transfusions.   Explained that once in hospice care, all aggressive treatments would be stopped and allow nature to takes its course with focus on comfort care for the patient.     Family is in agreement with removing bipap and focusing on comfort care at this time. I will assess patient once off bipap to determine best discharge plan for patient.     Discharge Plan:  Discharge Disposition; unknown at present     HOT plan:  1.  will assess patient once off bipap  2. Please call Maureen Altman [phone] with any questions. 3. Patient not currently under the care of hospice.       Consults:   IP CONSULT TO INTERNAL MEDICINE  IP CONSULT TO PULMONOLOGY  IP CONSULT TO NEPHROLOGY  IP CONSULT TO INFECTIOUS DISEASES  IP CONSULT TO PALLIATIVE CARE  IP CONSULT TO HOSPICE    Discharge Diagnoses:        Discharge Instructions / Follow up:    No future appointments. Continued appropriate risk factor modification of blood pressure, diabetes and serum lipids will remain essential to reducing risk of future atherosclerotic development    Activity: activity as tolerated    Significant labs:  CBC:   No results for input(s): WBC, RBC, HGB, HCT, MCV, RDW, PLT in the last 72 hours. BMP: No results for input(s): NA, K, CL, CO2, BUN, CREATININE, MG, PHOS in the last 72 hours. Invalid input(s): CA  LFT:  No results for input(s): PROT, ALB, ALKPHOS, ALT, AST, BILITOT, AMYLASE, LIPASE in the last 72 hours. PT/INR: No results for input(s): INR, APTT in the last 72 hours. BNP: No results for input(s): BNP in the last 72 hours.   Hgb A1C:   Lab Results   Component Value Date    LABA1C 4.9 06/17/2021     Folate and B12: No results found for: Santiago Marie, No results found for: FOLATE  Thyroid Studies:   Lab Results   Component Value Date    TSH 17.020 (H) 06/17/2021       Urinalysis:    Lab Results   Component Value Date    NITRU Negative 07/04/2021    WBCUA 0-1 07/04/2021    WBCUA NONE 01/26/2012    BACTERIA RARE 07/04/2021    RBCUA 0-1 07/04/2021    RBCUA NONE 01/26/2012    BLOODU MODERATE 07/04/2021    SPECGRAV >=1.030 07/04/2021    GLUCOSEU Negative 07/04/2021    GLUCOSEU NEGATIVE 01/26/2012       Imaging:  XR CHEST PORTABLE    Result Date: 7/8/2021  EXAMINATION: ONE XRAY VIEW OF THE CHEST 7/8/2021 2:16 am COMPARISON: 07/04/2021 HISTORY: ORDERING SYSTEM PROVIDED HISTORY: shortness of breath TECHNOLOGIST PROVIDED HISTORY: Reason for exam:->shortness of breath What reading provider will be dictating this exam?->CRC FINDINGS: EKG leads overlie the chest.  The right-sided double lumen catheter remains in place. Heart size is normal.  Scattered vascular calcifications. Interval development of atelectasis involving the base of the right upper lobe with some elevation of the minor fissure. Patchy opacification at the left base may represent atelectasis or infiltrates. No effusion. Interval development of atelectasis involving the base of the right upper lobe. Left basal opacities which may represent atelectasis or developing infiltrates from pneumonia. Continued follow-up recommended. XR CHEST PORTABLE    Result Date: 7/4/2021  EXAMINATION: ONE XRAY VIEW OF THE CHEST 7/4/2021 9:01 pm COMPARISON: None. HISTORY: ORDERING SYSTEM PROVIDED HISTORY: fever/ams TECHNOLOGIST PROVIDED HISTORY: Reason for exam:->fever/ams What reading provider will be dictating this exam?->CRC FINDINGS: Patient is rotated. Dual lumen catheter in place. No pneumothorax. Bibasilar opacities. The heart is not enlarged. No pneumothorax. Bibasilar infiltrates. XR CHEST PORTABLE    Result Date: 7/3/2021  EXAMINATION: ONE XRAY VIEW OF THE CHEST 7/3/2021 1:33 pm COMPARISON: None. HISTORY: ORDERING SYSTEM PROVIDED HISTORY: pna TECHNOLOGIST PROVIDED HISTORY: Reason for exam:->pna What reading provider will be dictating this exam?->CRC FINDINGS: The lungs are without acute focal process. There is no effusion or pneumothorax. The cardiomediastinal silhouette is without acute process. The osseous structures are without acute process. No acute process.  Dialysis catheter, unchanged. FL LUMBAR PUNCTURE DIAG    Result Date: 6/24/2021  EXAMINATION: FLUOROSCOPIC GUIDED LUMBAR PUNCTURE 6/24/2021 9:51 am HISTORY: ORDERING SYSTEM PROVIDED HISTORY: Lumbar puncture TECHNOLOGIST PROVIDED HISTORY: Reason for exam:->Lumbar puncture What reading provider will be dictating this exam?->CRC FLUOROSCOPY DOSE AND TYPE OR TIME AND EXPOSURES: Images: 1 Fluoroscopic time: 0.9 minute Total dose: 9 mGy PROCEDURE: :  Joanie Tenorio Informed consent was obtained after the risks and benefits of the procedure were discussed with the patient and all questions were answered fully. Langston protocol was observed and a standard timeout was performed. The patient was positioned prone and the back was prepped and draped in the normal sterile fashion. 1% lidocaine was used for local anesthesia. The subarachnoid space was accessed with a 22-gauge 3.5\" spinal needle at the L3-4 level. Free flow of clear CSF was noted. Approximately 12 ml of CSF was removed and sent for analysis. The stylet was reinserted, spinal needle was removed and brief pressure was applied at the puncture site. There were no immediate complications and the patient tolerated the procedure well. 1.  Successful fluoroscopic-guided lumbar puncture. 2.  CSF fluid sent to the laboratory for routine analysis. Discharge Medications:      Medication List      ASK your doctor about these medications    aspirin 81 MG chewable tablet  Take 1 tablet by mouth daily     clopidogrel 75 MG tablet  Commonly known as: PLAVIX  Take 1 tablet by mouth daily     dolutegravir sodium 50 MG tablet  Commonly known as: Tivicay  Take 1 tablet by mouth daily     gabapentin 400 MG capsule  Commonly known as: NEURONTIN  Take 1 capsule by mouth 2 times daily for 30 days.      lamiVUDine 100 MG tablet  Commonly known as: EPIVIR  Take 0.5 tablets by mouth daily     megestrol 40 MG tablet  Commonly known as: MEGACE  Take 1 tablet by mouth 2 times daily multivitamin Tabs tablet  Take 1 tablet by mouth daily     polyethylene glycol 17 g packet  Commonly known as: GLYCOLAX  Take 17 g by mouth daily as needed for Constipation     vitamin B-1 100 MG tablet  Commonly known as: THIAMINE  Take 1 tablet by mouth daily            Time Spent on discharge is more than 45 minutes in the examination, evaluation, counseling and review of medications and discharge plan.    +++++++++++++++++++++++++++++++++++++++++++++++++  Vaughn Osei DO  [address], [place]  +++++++++++++++++++++++++++++++++++++++++++++++++  NOTE: This report was transcribed using voice recognition software. Every effort was made to ensure accuracy; however, inadvertent computerized transcription errors may be present.

## 2023-01-14 NOTE — PLAN OF CARE
Problem: Skin Integrity:  Goal: Will show no infection signs and symptoms  Description: Will show no infection signs and symptoms  Outcome: Met This Shift  Goal: Absence of new skin breakdown  Description: Absence of new skin breakdown  Outcome: Met This Shift     Problem: Falls - Risk of:  Goal: Will remain free from falls  Description: Will remain free from falls  Outcome: Met This Shift  Goal: Absence of physical injury  Description: Absence of physical injury  Outcome: Met This Shift     Problem: Pain:  Goal: Pain level will decrease  Description: Pain level will decrease  Outcome: Met This Shift  Goal: Control of acute pain  Description: Control of acute pain  Outcome: Met This Shift  Goal: Control of chronic pain  Description: Control of chronic pain  Outcome: Met This Shift allergic reaction vs anaphylaxis Differential Diagnosis

## 2023-06-07 NOTE — PROGRESS NOTES
Department of Internal Medicine  Infectious Diseases  Progress Note    C/C :  HIV infection, change in mental status     Pt is more awake and alert  Reports neck pain   Denies fever     Discussed with the family       Current Facility-Administered Medications   Medication Dose Route Frequency Provider Last Rate Last Admin    sodium chloride flush 0.9 % injection 5-40 mL  5-40 mL Intravenous 2 times per day Pawan Caper, DO        sodium chloride flush 0.9 % injection 5-40 mL  5-40 mL Intravenous PRN Pawan Caper, DO        0.9 % sodium chloride infusion  25 mL Intravenous PRN Pawan Caper, DO        thiamine mononitrate tablet 100 mg  100 mg Oral Daily Pawan Caper, DO   100 mg at 05/29/21 1250    multivitamin 1 tablet  1 tablet Oral Daily Pawan Caper, DO   1 tablet at 05/29/21 1249    LORazepam (ATIVAN) tablet 1 mg  1 mg Oral Q1H PRN Pawan Caper, DO        Or    LORazepam (ATIVAN) injection 1 mg  1 mg Intravenous Q1H PRN Pawan Caper, DO        Or    LORazepam (ATIVAN) tablet 2 mg  2 mg Oral Q1H PRN Pawan Caper, DO        Or    LORazepam (ATIVAN) injection 2 mg  2 mg Intravenous Q1H PRN Pawan Caper, DO        Or    LORazepam (ATIVAN) tablet 3 mg  3 mg Oral Q1H PRN Pawan Caper, DO        Or    LORazepam (ATIVAN) injection 3 mg  3 mg Intravenous Q1H PRN Pawan Caper, DO        Or    LORazepam (ATIVAN) tablet 4 mg  4 mg Oral Q1H PRN Pawan Caper, DO        Or    LORazepam (ATIVAN) injection 4 mg  4 mg Intravenous Q1H PRN Pawan Caper, DO        [START ON 5/30/2021] melatonin tablet 6 mg  6 mg Oral Nightly Pawan Caper, DO        oxyCODONE (ROXICODONE) immediate release tablet 2.5 mg  2.5 mg Oral Q6H PRN Pawan Caper, DO        Or    oxyCODONE (ROXICODONE) immediate release tablet 5 mg  5 mg Oral Q6H PRN Pawan Caper, DO   5 mg at 05/29/21 1249    LORazepam (ATIVAN) injection 0.5 mg  0.5 mg Intravenous Once Paulette Nichole MD  sodium bicarbonate tablet 1,300 mg  1,300 mg Oral 4x Daily Yolie Betancur MD   1,300 mg at 05/29/21 1249    0.9 % sodium chloride infusion   Intravenous Continuous Yolie Betancur  mL/hr at 05/29/21 0931 Rate Change at 05/29/21 0931    clopidogrel (PLAVIX) tablet 75 mg  75 mg Oral Daily Abbie Gitelman. Kimnder, APN   75 mg at 05/29/21 0940    heparin (porcine) injection 5,000 Units  5,000 Units Subcutaneous 3 times per day Melly Travis MD   5,000 Units at 05/28/21 0600    emtricitabine-tenofovir alafenamide (DESCOVY) 200-25 MG per tablet 1 tablet  1 tablet Oral Daily Kei Kathleen MD   1 tablet at 05/29/21 0940    dolutegravir sodium (TIVICAY) tablet 50 mg  50 mg Oral Nightly Oli Garcia MD        aspirin chewable tablet 81 mg  81 mg Oral Daily Lizella Adjutant, DO   81 mg at 05/29/21 0940    perflutren lipid microspheres (DEFINITY) injection 1.65 mg  1.5 mL Intravenous ONCE PRN Lizella Adjutant, DO             REVIEW OF SYSTEMS:    CONSTITUTIONAL:  Denies fever   HEENT: Neck pain   RESPIRATORY: denies cough, shortness of breath, sputum expectoration  CARDIOVASCULAR:  Denies palpitation  GASTROINTESTINAL:  Denies abdomen pain, diarrhea or constipation. GENITOURINARY:  Denies burning urination or frequency of urination  INTEGUMENT: denies wound , rash  HEMATOLOGIC/LYMPHATIC:  Denies lymph node swelling, gum bleeding or easy bruising. MUSCULOSKELETAL:  Denies leg pain , joint pain , joint swelling  NEUROLOGICAL:  Change in mental status     PHYSICAL EXAM:      Vitals:     BP (!) 160/98 Comment: manual  Pulse 82   Temp 98 °F (36.7 °C) (Temporal)   Resp 20   Ht 5' 8\" (1.727 m)   Wt 180 lb 12.8 oz (82 kg)   SpO2 98%   BMI 27.49 kg/m²     General Appearance:     More awake  . Head:    Normocephalic, atraumatic   Eyes:    No pallor, no icterus,   Ears:    No obvious deformity or drainage.    Nose:   No nasal drainage   Throat:   Mucosa moist, no oral thrush   Neck:   Supple, mild 05/25/2021    RBCUA NONE 01/26/2012    BACTERIA FEW 05/25/2021    CLARITYU Clear 05/25/2021    SPECGRAV 1.025 05/25/2021    LEUKOCYTESUR Negative 05/25/2021    UROBILINOGEN 0.2 05/25/2021    BILIRUBINUR SMALL 05/25/2021    BILIRUBINUR NEGATIVE 01/26/2012    BLOODU LARGE 05/25/2021    GLUCOSEU Negative 05/25/2021    GLUCOSEU NEGATIVE 01/26/2012    AMORPHOUS MODERATE 05/25/2021       ABG:  No results found for: Marion Hummingbird, PHART, THGBART, [id number], PO2ART, [id number]    MICROBIOLOGY:    Blood culture -     Bottle Type Anaerobic    Source of Blood Culture Antecubital-Lef    Order Number [id number]    Enterobacter cloacae complex by PCR Not Detected    Escherichia coli by PCR Not Detected    Klebsiella oxytoca by PCR Not Detected    Klebsiella pneumoniae group by PCR Not Detected    Proteus species by PCR Not Detected    Streptococcus agalactiae by PCR Not Detected    Staphylococcus aureus by PCR Not Detected    Serratia marcescens by PCR Not Detected    Streptococcus pneumoniae by PCR Not Detected    Streptococcus pyogenes  by PCR Not Detected    Acinetobacter baumannii by PCR Not Detected    Candida albicans by PCR Not Detected    Candida glabrata by PCR Not Detected    Candida krusei by PCR Not Detected    Candida parapsilosis by PCR Not Detected    Candida tropicalis by PCR Not Detected     Enterobacteriaceae by PCR Not Detected    Enterococcus by PCR Not Detected    Haemophilus Influenzae by PCR Not Detected    Listeria monocytogenes by PCR Not Detected    Neisseria meningitidis by PCR Not Detected    Pseudomonas aeruginosa by PCR Not Detected    Staphylococcus species by PCR DETECTEDPanic      Streptococcus species by PCR Not Detected    Methicillin Resistance mecA/C  by PCR Not Detected   Narrative:           SARS CoV 2 NAAT neg       Radiology :    CT chest -       Ill-defined soft tissue thickening and fat stranding involving the visualized   base of the left neck, supraclavicular region and throughout the left lateral   chest wall.  This is nonspecific.  Infectious/inflammatory process such as   cellulitis is a consideration.  Ill-defined hematoma is also a consideration. Recommend correlation with clinical presentation.       Emphysematous changes.       Areas of atelectasis and patchy airspace opacities most evident in the lower   lung zones.  Developing infiltrates from pneumonia not excluded.  Continued   follow-up recommended.       CT abdomen and pelvis:       Exam limited by patient motion.       Urinary bladder thickening may be related to underdistention.  Cystitis not   excluded.       Other chronic appearing findings.  Short-term follow-up if symptoms persist.           Impression:     1.  Diffuse nonspecific soft tissue stranding and superficial edema about the   mid to distal aspect of the left humerus particularly along the posterior and   lateral aspects.  No intramuscular fluid collections identified on this exam.     2.  No acute osseous findings about the left humerus. 3.  Mild left shoulder and elbow degenerative changes. RECOMMENDATIONS:   These imaging features are nonspecific.  If there is high concern for acute   compartment syndrome based on clinical history and clinical findings             IMPRESSION:    1. HIV infection - controlled as of June 2020    2. Chronic Hep C infection   3. Rhabdomyolysis   4. Encephalopathy, Cocaine use   5. CONS bacteremia - contamination       RECOMMENDATIONS:      1. Vancomycin random level    2. HIV viral load, CD4, Hep C viral load   3. Descovy and Tivicay   4.  For HD Alert-The patient is alert, awake and responds to voice. The patient is oriented to time, place, and person. The triage nurse is able to obtain subjective information.

## 2023-06-13 NOTE — PLAN OF CARE
Detail Level: Detailed Problem: SKIN INTEGRITY  Goal: Report decrease in pain level  Outcome: Met This Shift     Problem: Pain:  Goal: Pain level will decrease  Description  Pain level will decrease  Outcome: Met This Shift     Problem: Pain:  Goal: Control of chronic pain  Description  Control of chronic pain  Outcome: Completed Detail Level: Zone

## (undated) DEVICE — SYRINGE MED 10ML LUERLOCK TIP W/O SFTY DISP

## (undated) DEVICE — LABEL MED 4 IN SURG PANEL W/ PEN STRL

## (undated) DEVICE — BLADE CLIPPER GEN PURP NS

## (undated) DEVICE — 18 GA N.G. KIT, 10 PACK: Brand: SITE-RITE

## (undated) DEVICE — GAUZE,SPONGE,4"X4",8PLY,STRL,LF,10/TRAY: Brand: MEDLINE

## (undated) DEVICE — TRAY VCF/TESIO TRAY  REUSABLE

## (undated) DEVICE — SHEET, T, LAPAROTOMY, STERILE: Brand: MEDLINE

## (undated) DEVICE — COVER,LIGHT HANDLE,FLX,2/PK: Brand: MEDLINE INDUSTRIES, INC.

## (undated) DEVICE — ADHESIVE SKIN CLOSURE TOP 36 CC HI VISC DERMBND MINI

## (undated) DEVICE — NEEDLE HYPO 25GA L1.5IN BLU POLYPR HUB S STL REG BVL STR

## (undated) DEVICE — CLOTH SURG PREP PREOPERATIVE CHLORHEXIDINE GLUC 2% READYPREP

## (undated) DEVICE — MAGNETIC INSTR DRAPE 20X16: Brand: MEDLINE INDUSTRIES, INC.

## (undated) DEVICE — 3M™ MEDIPORE™ + PAD 3564: Brand: 3M™ MEDIPORE™

## (undated) DEVICE — SURGICAL PROCEDURE PACK VASC MAJ CUST

## (undated) DEVICE — TOWEL,OR,DSP,ST,BLUE,STD,6/PK,12PK/CS: Brand: MEDLINE

## (undated) DEVICE — GLOVE ORANGE PI 7 1/2   MSG9075

## (undated) DEVICE — DRAPE SHEET: Brand: UNBRANDED

## (undated) DEVICE — DOUBLE BASIN SET: Brand: MEDLINE INDUSTRIES, INC.

## (undated) DEVICE — SYRINGE MED 10ML POLYPR LUERSLIP TIP FLAT TOP W/O SFTY DISP

## (undated) DEVICE — GOWN,SIRUS,FABRNF,XL,20/CS: Brand: MEDLINE